# Patient Record
Sex: FEMALE | Race: WHITE | NOT HISPANIC OR LATINO
[De-identification: names, ages, dates, MRNs, and addresses within clinical notes are randomized per-mention and may not be internally consistent; named-entity substitution may affect disease eponyms.]

---

## 2018-09-20 ENCOUNTER — APPOINTMENT (OUTPATIENT)
Dept: HEART AND VASCULAR | Facility: CLINIC | Age: 73
End: 2018-09-20
Payer: MEDICARE

## 2018-09-20 VITALS — WEIGHT: 245 LBS | BODY MASS INDEX: 45.08 KG/M2 | HEIGHT: 62 IN

## 2018-09-20 DIAGNOSIS — Z86.39 PERSONAL HISTORY OF OTHER ENDOCRINE, NUTRITIONAL AND METABOLIC DISEASE: ICD-10-CM

## 2018-09-20 PROCEDURE — 93306 TTE W/DOPPLER COMPLETE: CPT

## 2018-09-20 PROCEDURE — 99214 OFFICE O/P EST MOD 30 MIN: CPT

## 2018-09-20 PROCEDURE — 93880 EXTRACRANIAL BILAT STUDY: CPT

## 2018-09-20 PROCEDURE — 93000 ELECTROCARDIOGRAM COMPLETE: CPT

## 2019-03-07 ENCOUNTER — APPOINTMENT (OUTPATIENT)
Dept: HEART AND VASCULAR | Facility: CLINIC | Age: 74
End: 2019-03-07

## 2019-03-25 ENCOUNTER — APPOINTMENT (OUTPATIENT)
Dept: HEART AND VASCULAR | Facility: CLINIC | Age: 74
End: 2019-03-25

## 2019-04-02 ENCOUNTER — APPOINTMENT (OUTPATIENT)
Dept: HEART AND VASCULAR | Facility: CLINIC | Age: 74
End: 2019-04-02
Payer: MEDICARE

## 2019-04-02 VITALS — HEIGHT: 62 IN | WEIGHT: 253 LBS | BODY MASS INDEX: 46.56 KG/M2

## 2019-04-02 PROCEDURE — 99214 OFFICE O/P EST MOD 30 MIN: CPT

## 2019-04-02 PROCEDURE — 93306 TTE W/DOPPLER COMPLETE: CPT

## 2019-04-02 PROCEDURE — 93000 ELECTROCARDIOGRAM COMPLETE: CPT

## 2019-04-02 NOTE — PHYSICAL EXAM
[General Appearance - Well Developed] : well developed [Normal Appearance] : normal appearance [Well Groomed] : well groomed [General Appearance - Well Nourished] : well nourished [No Deformities] : no deformities [General Appearance - In No Acute Distress] : no acute distress [Normal Conjunctiva] : the conjunctiva exhibited no abnormalities [Eyelids - No Xanthelasma] : the eyelids demonstrated no xanthelasmas [Normal Oral Mucosa] : normal oral mucosa [No Oral Pallor] : no oral pallor [No Oral Cyanosis] : no oral cyanosis [Normal Jugular Venous A Waves Present] : normal jugular venous A waves present [Normal Jugular Venous V Waves Present] : normal jugular venous V waves present [No Jugular Venous Valle A Waves] : no jugular venous valle A waves [Respiration, Rhythm And Depth] : normal respiratory rhythm and effort [Exaggerated Use Of Accessory Muscles For Inspiration] : no accessory muscle use [Auscultation Breath Sounds / Voice Sounds] : lungs were clear to auscultation bilaterally [Abdomen Soft] : soft [Abdomen Tenderness] : non-tender [Abdomen Mass (___ Cm)] : no abdominal mass palpated [Abnormal Walk] : normal gait [Gait - Sufficient For Exercise Testing] : the gait was sufficient for exercise testing [Nail Clubbing] : no clubbing of the fingernails [Cyanosis, Localized] : no localized cyanosis [Petechial Hemorrhages (___cm)] : no petechial hemorrhages [] : no ischemic changes [Normal S1] : normal S1 [Normal S2] : normal S2 [IV] : a grade 4 [Right Carotid Bruit] : right carotid bruit heard [Left Carotid Bruit] : left carotid bruit heard

## 2019-04-03 LAB
ANION GAP SERPL CALC-SCNC: 14 MMOL/L
BUN SERPL-MCNC: 32 MG/DL
CALCIUM SERPL-MCNC: 9.4 MG/DL
CHLORIDE SERPL-SCNC: 110 MMOL/L
CO2 SERPL-SCNC: 20 MMOL/L
CREAT SERPL-MCNC: 1.22 MG/DL
GLUCOSE SERPL-MCNC: 84 MG/DL
NT-PROBNP SERPL-MCNC: 1386 PG/ML
POTASSIUM SERPL-SCNC: 4.2 MMOL/L
SODIUM SERPL-SCNC: 144 MMOL/L

## 2019-04-04 ENCOUNTER — RX RENEWAL (OUTPATIENT)
Age: 74
End: 2019-04-04

## 2019-05-02 NOTE — DISCUSSION/SUMMARY
[FreeTextEntry1] : Carotid non obstructive <50 plaque \par Echo Ef 55% MOD MR Mod -severe AS 44 mmhg SHAINA .88 \par Results of echocardiogram and carotid exam were reviewed with the patient patient aortic valve is approximately 0.88 which is not significantly changed from the measurement obtained in March of this past year she again has some dyspnea with walking minimal distance again her activity level is been downgraded largely due to obesity. We discussed about the likelihood of needing an aortic valve intervention the patient would obviously be a good candidate for a catheter in light of her elevated surgical risk we discussed the potential options and we will follow her up in 6 months to see if any change in symptoms or the aortic valve gradient or medications were reviewed with the patient during her encounter today

## 2019-05-02 NOTE — REVIEW OF SYSTEMS
[Shortness Of Breath] : shortness of breath [Negative] : Constitutional [Chest Pain] : no chest pain [Cough] : no cough [Abdominal Pain] : no abdominal pain

## 2019-05-02 NOTE — HISTORY OF PRESENT ILLNESS
[FreeTextEntry1] : Patient is a 73-year-old white female with progressive aortic valve stenosis status post a prior TIA and left internal carotid endarterectomy in the past she presents today for routine followup patient's exertional capacity. Her  capacity is severely limited due to morbid obesity assessment of her functional status is difficult in light of her poor mobility

## 2019-07-09 ENCOUNTER — RX RENEWAL (OUTPATIENT)
Age: 74
End: 2019-07-09

## 2019-09-17 ENCOUNTER — APPOINTMENT (OUTPATIENT)
Dept: HEART AND VASCULAR | Facility: CLINIC | Age: 74
End: 2019-09-17

## 2020-01-01 ENCOUNTER — APPOINTMENT (OUTPATIENT)
Dept: HEART AND VASCULAR | Facility: CLINIC | Age: 75
End: 2020-01-01

## 2020-01-01 ENCOUNTER — APPOINTMENT (OUTPATIENT)
Dept: NEUROLOGY | Facility: CLINIC | Age: 75
End: 2020-01-01

## 2020-01-01 ENCOUNTER — APPOINTMENT (OUTPATIENT)
Dept: NEUROSURGERY | Facility: CLINIC | Age: 75
End: 2020-01-01

## 2020-01-01 ENCOUNTER — NON-APPOINTMENT (OUTPATIENT)
Age: 75
End: 2020-01-01

## 2020-01-01 ENCOUNTER — TRANSCRIPTION ENCOUNTER (OUTPATIENT)
Age: 75
End: 2020-01-01

## 2020-01-01 ENCOUNTER — APPOINTMENT (OUTPATIENT)
Dept: HEART AND VASCULAR | Facility: CLINIC | Age: 75
End: 2020-01-01
Payer: MEDICARE

## 2020-01-01 ENCOUNTER — LABORATORY RESULT (OUTPATIENT)
Age: 75
End: 2020-01-01

## 2020-01-01 ENCOUNTER — RESULT REVIEW (OUTPATIENT)
Age: 75
End: 2020-01-01

## 2020-01-01 ENCOUNTER — APPOINTMENT (OUTPATIENT)
Dept: CT IMAGING | Facility: HOSPITAL | Age: 75
End: 2020-01-01

## 2020-01-01 ENCOUNTER — APPOINTMENT (OUTPATIENT)
Dept: CARDIOTHORACIC SURGERY | Facility: CLINIC | Age: 75
End: 2020-01-01

## 2020-01-01 ENCOUNTER — APPOINTMENT (OUTPATIENT)
Dept: NEUROSURGERY | Facility: CLINIC | Age: 75
End: 2020-01-01
Payer: MEDICARE

## 2020-01-01 ENCOUNTER — INPATIENT (INPATIENT)
Facility: HOSPITAL | Age: 75
LOS: 7 days | Discharge: NURSING, REHAB OR LONG-TERM CARE | DRG: 149 | End: 2020-06-10
Attending: THORACIC SURGERY (CARDIOTHORACIC VASCULAR SURGERY) | Admitting: THORACIC SURGERY (CARDIOTHORACIC VASCULAR SURGERY)
Payer: MEDICARE

## 2020-01-01 ENCOUNTER — INPATIENT (INPATIENT)
Facility: HOSPITAL | Age: 75
LOS: 11 days | Discharge: HOME HEALTH | DRG: 266 | End: 2020-06-01
Attending: THORACIC SURGERY (CARDIOTHORACIC VASCULAR SURGERY) | Admitting: THORACIC SURGERY (CARDIOTHORACIC VASCULAR SURGERY)
Payer: MEDICARE

## 2020-01-01 ENCOUNTER — OUTPATIENT (OUTPATIENT)
Dept: OUTPATIENT SERVICES | Facility: HOSPITAL | Age: 75
LOS: 1 days | End: 2020-01-01
Payer: MEDICARE

## 2020-01-01 ENCOUNTER — APPOINTMENT (OUTPATIENT)
Dept: CT IMAGING | Facility: HOSPITAL | Age: 75
End: 2020-01-01
Payer: MEDICARE

## 2020-01-01 ENCOUNTER — APPOINTMENT (OUTPATIENT)
Dept: CARDIOTHORACIC SURGERY | Facility: HOSPITAL | Age: 75
End: 2020-01-01
Payer: MEDICARE

## 2020-01-01 ENCOUNTER — RX RENEWAL (OUTPATIENT)
Age: 75
End: 2020-01-01

## 2020-01-01 ENCOUNTER — APPOINTMENT (OUTPATIENT)
Dept: OPHTHALMOLOGY | Facility: CLINIC | Age: 75
End: 2020-01-01

## 2020-01-01 ENCOUNTER — INPATIENT (INPATIENT)
Facility: HOSPITAL | Age: 75
LOS: 9 days | Discharge: NURSING, REHAB OR LONG-TERM CARE | DRG: 31 | End: 2020-08-07
Attending: INTERNAL MEDICINE | Admitting: NEUROLOGICAL SURGERY
Payer: MEDICARE

## 2020-01-01 VITALS
SYSTOLIC BLOOD PRESSURE: 155 MMHG | RESPIRATION RATE: 33 BRPM | HEART RATE: 134 BPM | HEIGHT: 62 IN | DIASTOLIC BLOOD PRESSURE: 101 MMHG | WEIGHT: 244.05 LBS | OXYGEN SATURATION: 97 % | TEMPERATURE: 99 F

## 2020-01-01 VITALS
HEART RATE: 82 BPM | SYSTOLIC BLOOD PRESSURE: 116 MMHG | OXYGEN SATURATION: 97 % | HEIGHT: 62 IN | DIASTOLIC BLOOD PRESSURE: 68 MMHG | WEIGHT: 234.13 LBS | TEMPERATURE: 98 F | RESPIRATION RATE: 19 BRPM

## 2020-01-01 VITALS
RESPIRATION RATE: 20 BRPM | OXYGEN SATURATION: 94 % | HEART RATE: 112 BPM | DIASTOLIC BLOOD PRESSURE: 90 MMHG | SYSTOLIC BLOOD PRESSURE: 130 MMHG | HEIGHT: 66 IN | TEMPERATURE: 98 F

## 2020-01-01 VITALS
RESPIRATION RATE: 18 BRPM | WEIGHT: 240 LBS | HEART RATE: 114 BPM | OXYGEN SATURATION: 98 % | DIASTOLIC BLOOD PRESSURE: 87 MMHG | BODY MASS INDEX: 44.16 KG/M2 | TEMPERATURE: 98 F | SYSTOLIC BLOOD PRESSURE: 125 MMHG | HEIGHT: 62 IN

## 2020-01-01 VITALS
RESPIRATION RATE: 16 BRPM | HEIGHT: 62 IN | WEIGHT: 240 LBS | BODY MASS INDEX: 44.16 KG/M2 | OXYGEN SATURATION: 98 % | TEMPERATURE: 98 F | SYSTOLIC BLOOD PRESSURE: 112 MMHG | HEART RATE: 65 BPM | DIASTOLIC BLOOD PRESSURE: 81 MMHG

## 2020-01-01 VITALS
HEIGHT: 62 IN | SYSTOLIC BLOOD PRESSURE: 121 MMHG | TEMPERATURE: 98.4 F | HEART RATE: 65 BPM | RESPIRATION RATE: 16 BRPM | WEIGHT: 240 LBS | DIASTOLIC BLOOD PRESSURE: 83 MMHG | BODY MASS INDEX: 44.16 KG/M2 | OXYGEN SATURATION: 98 %

## 2020-01-01 VITALS — DIASTOLIC BLOOD PRESSURE: 85 MMHG | SYSTOLIC BLOOD PRESSURE: 120 MMHG

## 2020-01-01 VITALS — DIASTOLIC BLOOD PRESSURE: 85 MMHG | SYSTOLIC BLOOD PRESSURE: 110 MMHG

## 2020-01-01 VITALS
TEMPERATURE: 99 F | HEART RATE: 79 BPM | DIASTOLIC BLOOD PRESSURE: 76 MMHG | RESPIRATION RATE: 16 BRPM | SYSTOLIC BLOOD PRESSURE: 106 MMHG | OXYGEN SATURATION: 95 %

## 2020-01-01 VITALS — TEMPERATURE: 97 F

## 2020-01-01 VITALS — TEMPERATURE: 98 F

## 2020-01-01 DIAGNOSIS — G45.9 TRANSIENT CEREBRAL ISCHEMIC ATTACK, UNSPECIFIED: ICD-10-CM

## 2020-01-01 DIAGNOSIS — Z98.890 OTHER SPECIFIED POSTPROCEDURAL STATES: Chronic | ICD-10-CM

## 2020-01-01 DIAGNOSIS — N39.0 URINARY TRACT INFECTION, SITE NOT SPECIFIED: ICD-10-CM

## 2020-01-01 DIAGNOSIS — Z98.890 OTHER SPECIFIED POSTPROCEDURAL STATES: ICD-10-CM

## 2020-01-01 DIAGNOSIS — I25.10 ATHEROSCLEROTIC HEART DISEASE OF NATIVE CORONARY ARTERY WITHOUT ANGINA PECTORIS: ICD-10-CM

## 2020-01-01 DIAGNOSIS — G93.41 METABOLIC ENCEPHALOPATHY: ICD-10-CM

## 2020-01-01 DIAGNOSIS — I13.0 HYPERTENSIVE HEART AND CHRONIC KIDNEY DISEASE WITH HEART FAILURE AND STAGE 1 THROUGH STAGE 4 CHRONIC KIDNEY DISEASE, OR UNSPECIFIED CHRONIC KIDNEY DISEASE: ICD-10-CM

## 2020-01-01 DIAGNOSIS — N19 UNSPECIFIED KIDNEY FAILURE: ICD-10-CM

## 2020-01-01 DIAGNOSIS — Z86.73 PERSONAL HISTORY OF TRANSIENT ISCHEMIC ATTACK (TIA), AND CEREBRAL INFARCTION WITHOUT RESIDUAL DEFICITS: ICD-10-CM

## 2020-01-01 DIAGNOSIS — E78.5 HYPERLIPIDEMIA, UNSPECIFIED: ICD-10-CM

## 2020-01-01 DIAGNOSIS — R63.8 OTHER SYMPTOMS AND SIGNS CONCERNING FOOD AND FLUID INTAKE: ICD-10-CM

## 2020-01-01 DIAGNOSIS — I50.32 CHRONIC DIASTOLIC (CONGESTIVE) HEART FAILURE: ICD-10-CM

## 2020-01-01 DIAGNOSIS — I27.20 PULMONARY HYPERTENSION, UNSPECIFIED: ICD-10-CM

## 2020-01-01 DIAGNOSIS — I35.0 NONRHEUMATIC AORTIC (VALVE) STENOSIS: ICD-10-CM

## 2020-01-01 DIAGNOSIS — N18.9 CHRONIC KIDNEY DISEASE, UNSPECIFIED: ICD-10-CM

## 2020-01-01 DIAGNOSIS — I47.2 VENTRICULAR TACHYCARDIA: ICD-10-CM

## 2020-01-01 DIAGNOSIS — I50.33 ACUTE ON CHRONIC DIASTOLIC (CONGESTIVE) HEART FAILURE: ICD-10-CM

## 2020-01-01 DIAGNOSIS — Z95.2 PRESENCE OF PROSTHETIC HEART VALVE: ICD-10-CM

## 2020-01-01 DIAGNOSIS — I25.10 ATHEROSCLEROTIC HEART DISEASE OF NATIVE CORONARY ARTERY W/OUT ANGINA PECTORIS: ICD-10-CM

## 2020-01-01 DIAGNOSIS — Z79.82 LONG TERM (CURRENT) USE OF ASPIRIN: ICD-10-CM

## 2020-01-01 DIAGNOSIS — K44.9 DIAPHRAGMATIC HERNIA WITHOUT OBSTRUCTION OR GANGRENE: ICD-10-CM

## 2020-01-01 DIAGNOSIS — R42 DIZZINESS AND GIDDINESS: ICD-10-CM

## 2020-01-01 DIAGNOSIS — I48.20 CHRONIC ATRIAL FIBRILLATION, UNSPECIFIED: ICD-10-CM

## 2020-01-01 DIAGNOSIS — K64.9 UNSPECIFIED HEMORRHOIDS: ICD-10-CM

## 2020-01-01 DIAGNOSIS — Z79.899 OTHER LONG TERM (CURRENT) DRUG THERAPY: ICD-10-CM

## 2020-01-01 DIAGNOSIS — I21.4 NON-ST ELEVATION (NSTEMI) MYOCARDIAL INFARCTION: ICD-10-CM

## 2020-01-01 DIAGNOSIS — R65.20 SEVERE SEPSIS WITHOUT SEPTIC SHOCK: ICD-10-CM

## 2020-01-01 DIAGNOSIS — I48.91 UNSPECIFIED ATRIAL FIBRILLATION: ICD-10-CM

## 2020-01-01 DIAGNOSIS — D32.0 BENIGN NEOPLASM OF CEREBRAL MENINGES: ICD-10-CM

## 2020-01-01 DIAGNOSIS — G91.0 COMMUNICATING HYDROCEPHALUS: ICD-10-CM

## 2020-01-01 DIAGNOSIS — I10 ESSENTIAL (PRIMARY) HYPERTENSION: ICD-10-CM

## 2020-01-01 DIAGNOSIS — G91.9 HYDROCEPHALUS, UNSPECIFIED: ICD-10-CM

## 2020-01-01 DIAGNOSIS — I48.19 OTHER PERSISTENT ATRIAL FIBRILLATION: ICD-10-CM

## 2020-01-01 DIAGNOSIS — G93.5 COMPRESSION OF BRAIN: ICD-10-CM

## 2020-01-01 DIAGNOSIS — J96.01 ACUTE RESPIRATORY FAILURE WITH HYPOXIA: ICD-10-CM

## 2020-01-01 DIAGNOSIS — E66.01 MORBID (SEVERE) OBESITY DUE TO EXCESS CALORIES: ICD-10-CM

## 2020-01-01 DIAGNOSIS — I08.3 COMBINED RHEUMATIC DISORDERS OF MITRAL, AORTIC AND TRICUSPID VALVES: ICD-10-CM

## 2020-01-01 DIAGNOSIS — Z98.2 PRESENCE OF CEREBROSPINAL FLUID DRAINAGE DEVICE: ICD-10-CM

## 2020-01-01 DIAGNOSIS — R22.0 LOCALIZED SWELLING, MASS AND LUMP, HEAD: ICD-10-CM

## 2020-01-01 DIAGNOSIS — R41.82 ALTERED MENTAL STATUS, UNSPECIFIED: ICD-10-CM

## 2020-01-01 DIAGNOSIS — B88.8 OTHER SPECIFIED INFESTATIONS: ICD-10-CM

## 2020-01-01 DIAGNOSIS — K21.9 GASTRO-ESOPHAGEAL REFLUX DISEASE WITHOUT ESOPHAGITIS: ICD-10-CM

## 2020-01-01 DIAGNOSIS — Z00.6 ENCOUNTER FOR EXAMINATION FOR NORMAL COMPARISON AND CONTROL IN CLINICAL RESEARCH PROGRAM: ICD-10-CM

## 2020-01-01 DIAGNOSIS — I71.2 THORACIC AORTIC ANEURYSM, WITHOUT RUPTURE: ICD-10-CM

## 2020-01-01 DIAGNOSIS — N18.2 CHRONIC KIDNEY DISEASE, STAGE 2 (MILD): ICD-10-CM

## 2020-01-01 DIAGNOSIS — I34.0 NONRHEUMATIC MITRAL (VALVE) INSUFFICIENCY: ICD-10-CM

## 2020-01-01 DIAGNOSIS — H53.9 UNSPECIFIED VISUAL DISTURBANCE: ICD-10-CM

## 2020-01-01 DIAGNOSIS — R29.712 NIHSS SCORE 12: ICD-10-CM

## 2020-01-01 DIAGNOSIS — Z16.12 EXTENDED SPECTRUM BETA LACTAMASE (ESBL) RESISTANCE: ICD-10-CM

## 2020-01-01 DIAGNOSIS — R29.810 FACIAL WEAKNESS: ICD-10-CM

## 2020-01-01 DIAGNOSIS — N18.3 CHRONIC KIDNEY DISEASE, STAGE 3 (MODERATE): ICD-10-CM

## 2020-01-01 DIAGNOSIS — Z79.01 LONG TERM (CURRENT) USE OF ANTICOAGULANTS: ICD-10-CM

## 2020-01-01 DIAGNOSIS — I25.2 OLD MYOCARDIAL INFARCTION: ICD-10-CM

## 2020-01-01 DIAGNOSIS — A41.9 SEPSIS, UNSPECIFIED ORGANISM: ICD-10-CM

## 2020-01-01 LAB
A1C WITH ESTIMATED AVERAGE GLUCOSE RESULT: 5.7 % — HIGH (ref 4–5.6)
ACTH SER-ACNC: 19.3 PG/ML — SIGNIFICANT CHANGE UP (ref 7.2–63.3)
ALBUMIN SERPL ELPH-MCNC: 3 G/DL — LOW (ref 3.3–5)
ALBUMIN SERPL ELPH-MCNC: 3.1 G/DL — LOW (ref 3.3–5)
ALBUMIN SERPL ELPH-MCNC: 3.2 G/DL — LOW (ref 3.3–5)
ALBUMIN SERPL ELPH-MCNC: 3.3 G/DL — SIGNIFICANT CHANGE UP (ref 3.3–5)
ALBUMIN SERPL ELPH-MCNC: 3.4 G/DL — SIGNIFICANT CHANGE UP (ref 3.3–5)
ALBUMIN SERPL ELPH-MCNC: 3.5 G/DL — SIGNIFICANT CHANGE UP (ref 3.3–5)
ALBUMIN SERPL ELPH-MCNC: 3.5 G/DL — SIGNIFICANT CHANGE UP (ref 3.3–5)
ALBUMIN SERPL ELPH-MCNC: 3.6 G/DL — SIGNIFICANT CHANGE UP (ref 3.3–5)
ALBUMIN SERPL ELPH-MCNC: 3.7 G/DL — SIGNIFICANT CHANGE UP (ref 3.3–5)
ALBUMIN SERPL ELPH-MCNC: 3.8 G/DL — SIGNIFICANT CHANGE UP (ref 3.3–5)
ALBUMIN SERPL ELPH-MCNC: 3.9 G/DL — SIGNIFICANT CHANGE UP (ref 3.3–5)
ALBUMIN SERPL ELPH-MCNC: 3.9 G/DL — SIGNIFICANT CHANGE UP (ref 3.3–5)
ALBUMIN SERPL ELPH-MCNC: 4 G/DL
ALBUMIN SERPL ELPH-MCNC: 4.1 G/DL — SIGNIFICANT CHANGE UP (ref 3.3–5)
ALP BLD-CCNC: 80 U/L
ALP SERPL-CCNC: 36 U/L — LOW (ref 40–120)
ALP SERPL-CCNC: 38 U/L — LOW (ref 40–120)
ALP SERPL-CCNC: 39 U/L — LOW (ref 40–120)
ALP SERPL-CCNC: 41 U/L — SIGNIFICANT CHANGE UP (ref 40–120)
ALP SERPL-CCNC: 41 U/L — SIGNIFICANT CHANGE UP (ref 40–120)
ALP SERPL-CCNC: 42 U/L — SIGNIFICANT CHANGE UP (ref 40–120)
ALP SERPL-CCNC: 43 U/L — SIGNIFICANT CHANGE UP (ref 40–120)
ALP SERPL-CCNC: 44 U/L — SIGNIFICANT CHANGE UP (ref 40–120)
ALP SERPL-CCNC: 44 U/L — SIGNIFICANT CHANGE UP (ref 40–120)
ALP SERPL-CCNC: 45 U/L — SIGNIFICANT CHANGE UP (ref 40–120)
ALP SERPL-CCNC: 46 U/L — SIGNIFICANT CHANGE UP (ref 40–120)
ALP SERPL-CCNC: 47 U/L — SIGNIFICANT CHANGE UP (ref 40–120)
ALP SERPL-CCNC: 47 U/L — SIGNIFICANT CHANGE UP (ref 40–120)
ALP SERPL-CCNC: 48 U/L — SIGNIFICANT CHANGE UP (ref 40–120)
ALP SERPL-CCNC: 53 U/L — SIGNIFICANT CHANGE UP (ref 40–120)
ALP SERPL-CCNC: 54 U/L — SIGNIFICANT CHANGE UP (ref 40–120)
ALT FLD-CCNC: 10 U/L — SIGNIFICANT CHANGE UP (ref 10–45)
ALT FLD-CCNC: 11 U/L — SIGNIFICANT CHANGE UP (ref 10–45)
ALT FLD-CCNC: 12 U/L — SIGNIFICANT CHANGE UP (ref 10–45)
ALT FLD-CCNC: 12 U/L — SIGNIFICANT CHANGE UP (ref 10–45)
ALT FLD-CCNC: 13 U/L — SIGNIFICANT CHANGE UP (ref 10–45)
ALT FLD-CCNC: 14 U/L — SIGNIFICANT CHANGE UP (ref 10–45)
ALT FLD-CCNC: 16 U/L — SIGNIFICANT CHANGE UP (ref 10–45)
ALT FLD-CCNC: 19 U/L — SIGNIFICANT CHANGE UP (ref 10–45)
ALT FLD-CCNC: 21 U/L — SIGNIFICANT CHANGE UP (ref 10–45)
ALT FLD-CCNC: 7 U/L — LOW (ref 10–45)
ALT FLD-CCNC: <5 U/L — LOW (ref 10–45)
ALT SERPL-CCNC: 11 U/L
ANION GAP SERPL CALC-SCNC: 10 MMOL/L — SIGNIFICANT CHANGE UP (ref 5–17)
ANION GAP SERPL CALC-SCNC: 11 MMOL/L — SIGNIFICANT CHANGE UP (ref 5–17)
ANION GAP SERPL CALC-SCNC: 12 MMOL/L — SIGNIFICANT CHANGE UP (ref 5–17)
ANION GAP SERPL CALC-SCNC: 13 MMOL/L — SIGNIFICANT CHANGE UP (ref 5–17)
ANION GAP SERPL CALC-SCNC: 14 MMOL/L
ANION GAP SERPL CALC-SCNC: 14 MMOL/L — SIGNIFICANT CHANGE UP (ref 5–17)
ANION GAP SERPL CALC-SCNC: 15 MMOL/L — SIGNIFICANT CHANGE UP (ref 5–17)
ANION GAP SERPL CALC-SCNC: 16 MMOL/L — SIGNIFICANT CHANGE UP (ref 5–17)
ANION GAP SERPL CALC-SCNC: 16 MMOL/L — SIGNIFICANT CHANGE UP (ref 5–17)
ANION GAP SERPL CALC-SCNC: 17 MMOL/L — SIGNIFICANT CHANGE UP (ref 5–17)
ANION GAP SERPL CALC-SCNC: 9 MMOL/L — SIGNIFICANT CHANGE UP (ref 5–17)
APPEARANCE CSF: CLEAR — SIGNIFICANT CHANGE UP
APPEARANCE CSF: SIGNIFICANT CHANGE UP
APPEARANCE SPUN FLD: COLORLESS — SIGNIFICANT CHANGE UP
APPEARANCE SPUN FLD: COLORLESS — SIGNIFICANT CHANGE UP
APPEARANCE UR: ABNORMAL
APPEARANCE UR: CLEAR — SIGNIFICANT CHANGE UP
APTT BLD: 103.1 SEC — HIGH (ref 27.5–35.5)
APTT BLD: 103.5 SEC — HIGH (ref 27.5–36.3)
APTT BLD: 139.6 SEC — CRITICAL HIGH (ref 27.5–35.5)
APTT BLD: 156 SEC — CRITICAL HIGH (ref 27.5–35.5)
APTT BLD: 24.5 SEC — LOW (ref 27.5–36.3)
APTT BLD: 25 SEC — LOW (ref 27.5–36.3)
APTT BLD: 25.5 SEC — LOW (ref 27.5–36.3)
APTT BLD: 26.2 SEC — LOW (ref 27.5–36.3)
APTT BLD: 27.2 SEC — LOW (ref 27.5–36.3)
APTT BLD: 27.5 SEC — SIGNIFICANT CHANGE UP (ref 27.5–36.3)
APTT BLD: 28.1 SEC — SIGNIFICANT CHANGE UP (ref 27.5–35.5)
APTT BLD: 29.7 SEC — SIGNIFICANT CHANGE UP (ref 27.5–36.3)
APTT BLD: 30.1 SEC — SIGNIFICANT CHANGE UP (ref 27.5–35.5)
APTT BLD: 32 SEC — SIGNIFICANT CHANGE UP (ref 27.5–35.5)
APTT BLD: 34.5 SEC — SIGNIFICANT CHANGE UP (ref 27.5–36.3)
APTT BLD: 39.9 SEC — HIGH (ref 27.5–36.3)
APTT BLD: 42.3 SEC — HIGH (ref 27.5–36.3)
APTT BLD: 49.4 SEC — HIGH (ref 27.5–36.3)
APTT BLD: 52.3 SEC — HIGH (ref 27.5–36.3)
APTT BLD: 55.4 SEC — HIGH (ref 27.5–36.3)
APTT BLD: 61.6 SEC — HIGH (ref 27.5–36.3)
APTT BLD: 65 SEC — HIGH (ref 27.5–36.3)
APTT BLD: 67.7 SEC — HIGH (ref 27.5–36.3)
APTT BLD: 69.2 SEC — HIGH (ref 27.5–35.5)
APTT BLD: 72.2 SEC — HIGH (ref 27.5–36.3)
APTT BLD: 72.2 SEC — HIGH (ref 27.5–36.3)
APTT BLD: 74.6 SEC — HIGH (ref 27.5–36.3)
APTT BLD: 75.9 SEC — HIGH (ref 27.5–36.3)
APTT BLD: 80.5 SEC — HIGH (ref 27.5–36.3)
APTT BLD: 82 SEC — HIGH (ref 27.5–36.3)
APTT BLD: 84.7 SEC — HIGH (ref 27.5–36.3)
APTT BLD: 89.9 SEC — HIGH (ref 27.5–35.5)
APTT BLD: 95.9 SEC — HIGH (ref 27.5–36.3)
APTT BLD: >200 SEC — CRITICAL HIGH (ref 27.5–35.5)
AST SERPL-CCNC: 15 U/L — SIGNIFICANT CHANGE UP (ref 10–40)
AST SERPL-CCNC: 15 U/L — SIGNIFICANT CHANGE UP (ref 10–40)
AST SERPL-CCNC: 16 U/L — SIGNIFICANT CHANGE UP (ref 10–40)
AST SERPL-CCNC: 16 U/L — SIGNIFICANT CHANGE UP (ref 10–40)
AST SERPL-CCNC: 17 U/L — SIGNIFICANT CHANGE UP (ref 10–40)
AST SERPL-CCNC: 18 U/L
AST SERPL-CCNC: 18 U/L — SIGNIFICANT CHANGE UP (ref 10–40)
AST SERPL-CCNC: 19 U/L — SIGNIFICANT CHANGE UP (ref 10–40)
AST SERPL-CCNC: 20 U/L — SIGNIFICANT CHANGE UP (ref 10–40)
AST SERPL-CCNC: 22 U/L — SIGNIFICANT CHANGE UP (ref 10–40)
AST SERPL-CCNC: 23 U/L — SIGNIFICANT CHANGE UP (ref 10–40)
AST SERPL-CCNC: 30 U/L — SIGNIFICANT CHANGE UP (ref 10–40)
AST SERPL-CCNC: 30 U/L — SIGNIFICANT CHANGE UP (ref 10–40)
AST SERPL-CCNC: 49 U/L — HIGH (ref 10–40)
B BURGDOR DNA SPEC QL NAA+PROBE: NEGATIVE — SIGNIFICANT CHANGE UP
BACTERIA # UR AUTO: PRESENT /HPF
BASE EXCESS BLDA CALC-SCNC: -0.9 MMOL/L — SIGNIFICANT CHANGE UP (ref -2–3)
BASE EXCESS BLDA CALC-SCNC: -2.2 MMOL/L — LOW (ref -2–3)
BASOPHILS # BLD AUTO: 0.06 K/UL — SIGNIFICANT CHANGE UP (ref 0–0.2)
BASOPHILS # BLD AUTO: 0.06 K/UL — SIGNIFICANT CHANGE UP (ref 0–0.2)
BASOPHILS # BLD AUTO: 0.08 K/UL
BASOPHILS # BLD AUTO: 0.08 K/UL — SIGNIFICANT CHANGE UP (ref 0–0.2)
BASOPHILS # BLD AUTO: 0.09 K/UL — SIGNIFICANT CHANGE UP (ref 0–0.2)
BASOPHILS NFR BLD AUTO: 0.5 % — SIGNIFICANT CHANGE UP (ref 0–2)
BASOPHILS NFR BLD AUTO: 0.6 % — SIGNIFICANT CHANGE UP (ref 0–2)
BASOPHILS NFR BLD AUTO: 0.9 % — SIGNIFICANT CHANGE UP (ref 0–2)
BASOPHILS NFR BLD AUTO: 1.1 % — SIGNIFICANT CHANGE UP (ref 0–2)
BASOPHILS NFR BLD AUTO: 1.2 %
BASOPHILS NFR BLD AUTO: 1.2 % — SIGNIFICANT CHANGE UP (ref 0–2)
BASOPHILS NFR BLD AUTO: 1.2 % — SIGNIFICANT CHANGE UP (ref 0–2)
BILIRUB SERPL-MCNC: 0.4 MG/DL
BILIRUB SERPL-MCNC: 0.4 MG/DL — SIGNIFICANT CHANGE UP (ref 0.2–1.2)
BILIRUB SERPL-MCNC: 0.5 MG/DL — SIGNIFICANT CHANGE UP (ref 0.2–1.2)
BILIRUB SERPL-MCNC: 0.6 MG/DL — SIGNIFICANT CHANGE UP (ref 0.2–1.2)
BILIRUB SERPL-MCNC: 0.7 MG/DL — SIGNIFICANT CHANGE UP (ref 0.2–1.2)
BILIRUB SERPL-MCNC: 0.8 MG/DL — SIGNIFICANT CHANGE UP (ref 0.2–1.2)
BILIRUB SERPL-MCNC: 0.9 MG/DL — SIGNIFICANT CHANGE UP (ref 0.2–1.2)
BILIRUB SERPL-MCNC: 0.9 MG/DL — SIGNIFICANT CHANGE UP (ref 0.2–1.2)
BILIRUB UR-MCNC: NEGATIVE — SIGNIFICANT CHANGE UP
BILIRUB UR-MCNC: NEGATIVE — SIGNIFICANT CHANGE UP
BLD GP AB SCN SERPL QL: NEGATIVE — SIGNIFICANT CHANGE UP
BUN SERPL-MCNC: 15 MG/DL — SIGNIFICANT CHANGE UP (ref 7–23)
BUN SERPL-MCNC: 16 MG/DL — SIGNIFICANT CHANGE UP (ref 7–23)
BUN SERPL-MCNC: 16 MG/DL — SIGNIFICANT CHANGE UP (ref 7–23)
BUN SERPL-MCNC: 17 MG/DL — SIGNIFICANT CHANGE UP (ref 7–23)
BUN SERPL-MCNC: 18 MG/DL — SIGNIFICANT CHANGE UP (ref 7–23)
BUN SERPL-MCNC: 19 MG/DL — SIGNIFICANT CHANGE UP (ref 7–23)
BUN SERPL-MCNC: 20 MG/DL — SIGNIFICANT CHANGE UP (ref 7–23)
BUN SERPL-MCNC: 21 MG/DL — SIGNIFICANT CHANGE UP (ref 7–23)
BUN SERPL-MCNC: 21 MG/DL — SIGNIFICANT CHANGE UP (ref 7–23)
BUN SERPL-MCNC: 22 MG/DL — SIGNIFICANT CHANGE UP (ref 7–23)
BUN SERPL-MCNC: 23 MG/DL — SIGNIFICANT CHANGE UP (ref 7–23)
BUN SERPL-MCNC: 23 MG/DL — SIGNIFICANT CHANGE UP (ref 7–23)
BUN SERPL-MCNC: 24 MG/DL
BUN SERPL-MCNC: 24 MG/DL — HIGH (ref 7–23)
BUN SERPL-MCNC: 25 MG/DL — HIGH (ref 7–23)
BUN SERPL-MCNC: 26 MG/DL — HIGH (ref 7–23)
BUN SERPL-MCNC: 28 MG/DL — HIGH (ref 7–23)
BUN SERPL-MCNC: 28 MG/DL — HIGH (ref 7–23)
BUN SERPL-MCNC: 29 MG/DL — HIGH (ref 7–23)
BUN SERPL-MCNC: 29 MG/DL — HIGH (ref 7–23)
BUN SERPL-MCNC: 33 MG/DL — HIGH (ref 7–23)
BUN SERPL-MCNC: 33 MG/DL — HIGH (ref 7–23)
BUN SERPL-MCNC: 35 MG/DL — HIGH (ref 7–23)
BUN SERPL-MCNC: 35 MG/DL — HIGH (ref 7–23)
CALCIUM SERPL-MCNC: 8.5 MG/DL — SIGNIFICANT CHANGE UP (ref 8.4–10.5)
CALCIUM SERPL-MCNC: 8.6 MG/DL — SIGNIFICANT CHANGE UP (ref 8.4–10.5)
CALCIUM SERPL-MCNC: 8.6 MG/DL — SIGNIFICANT CHANGE UP (ref 8.4–10.5)
CALCIUM SERPL-MCNC: 8.7 MG/DL — SIGNIFICANT CHANGE UP (ref 8.4–10.5)
CALCIUM SERPL-MCNC: 8.8 MG/DL — SIGNIFICANT CHANGE UP (ref 8.4–10.5)
CALCIUM SERPL-MCNC: 8.8 MG/DL — SIGNIFICANT CHANGE UP (ref 8.4–10.5)
CALCIUM SERPL-MCNC: 8.9 MG/DL — SIGNIFICANT CHANGE UP (ref 8.4–10.5)
CALCIUM SERPL-MCNC: 8.9 MG/DL — SIGNIFICANT CHANGE UP (ref 8.4–10.5)
CALCIUM SERPL-MCNC: 9 MG/DL — SIGNIFICANT CHANGE UP (ref 8.4–10.5)
CALCIUM SERPL-MCNC: 9.1 MG/DL — SIGNIFICANT CHANGE UP (ref 8.4–10.5)
CALCIUM SERPL-MCNC: 9.2 MG/DL — SIGNIFICANT CHANGE UP (ref 8.4–10.5)
CALCIUM SERPL-MCNC: 9.3 MG/DL — SIGNIFICANT CHANGE UP (ref 8.4–10.5)
CALCIUM SERPL-MCNC: 9.4 MG/DL
CALCIUM SERPL-MCNC: 9.4 MG/DL — SIGNIFICANT CHANGE UP (ref 8.4–10.5)
CALCIUM SERPL-MCNC: 9.5 MG/DL — SIGNIFICANT CHANGE UP (ref 8.4–10.5)
CALCIUM SERPL-MCNC: 9.5 MG/DL — SIGNIFICANT CHANGE UP (ref 8.4–10.5)
CALCIUM SERPL-MCNC: 9.6 MG/DL — SIGNIFICANT CHANGE UP (ref 8.4–10.5)
CALCIUM SERPL-MCNC: 9.7 MG/DL — SIGNIFICANT CHANGE UP (ref 8.4–10.5)
CALCIUM SERPL-MCNC: 9.9 MG/DL — SIGNIFICANT CHANGE UP (ref 8.4–10.5)
CHLORIDE SERPL-SCNC: 100 MMOL/L — SIGNIFICANT CHANGE UP (ref 96–108)
CHLORIDE SERPL-SCNC: 101 MMOL/L — SIGNIFICANT CHANGE UP (ref 96–108)
CHLORIDE SERPL-SCNC: 102 MMOL/L — SIGNIFICANT CHANGE UP (ref 96–108)
CHLORIDE SERPL-SCNC: 103 MMOL/L — SIGNIFICANT CHANGE UP (ref 96–108)
CHLORIDE SERPL-SCNC: 104 MMOL/L — SIGNIFICANT CHANGE UP (ref 96–108)
CHLORIDE SERPL-SCNC: 105 MMOL/L — SIGNIFICANT CHANGE UP (ref 96–108)
CHLORIDE SERPL-SCNC: 106 MMOL/L — SIGNIFICANT CHANGE UP (ref 96–108)
CHLORIDE SERPL-SCNC: 107 MMOL/L — SIGNIFICANT CHANGE UP (ref 96–108)
CHLORIDE SERPL-SCNC: 108 MMOL/L
CHLORIDE SERPL-SCNC: 108 MMOL/L — SIGNIFICANT CHANGE UP (ref 96–108)
CHLORIDE SERPL-SCNC: 109 MMOL/L — HIGH (ref 96–108)
CHLORIDE SERPL-SCNC: 110 MMOL/L — HIGH (ref 96–108)
CHLORIDE SERPL-SCNC: 111 MMOL/L — HIGH (ref 96–108)
CHLORIDE SERPL-SCNC: 95 MMOL/L — LOW (ref 96–108)
CHLORIDE SERPL-SCNC: 96 MMOL/L — SIGNIFICANT CHANGE UP (ref 96–108)
CHLORIDE SERPL-SCNC: 96 MMOL/L — SIGNIFICANT CHANGE UP (ref 96–108)
CHLORIDE SERPL-SCNC: 97 MMOL/L — SIGNIFICANT CHANGE UP (ref 96–108)
CHLORIDE SERPL-SCNC: 98 MMOL/L — SIGNIFICANT CHANGE UP (ref 96–108)
CHOLEST SERPL-MCNC: 157 MG/DL — SIGNIFICANT CHANGE UP (ref 10–199)
CHOLEST SERPL-MCNC: 97 MG/DL
CK MB CFR SERPL CALC: 14.2 NG/ML — HIGH (ref 0–6.7)
CK SERPL-CCNC: 287 U/L — HIGH (ref 25–170)
CO2 SERPL-SCNC: 21 MMOL/L — LOW (ref 22–31)
CO2 SERPL-SCNC: 21 MMOL/L — LOW (ref 22–31)
CO2 SERPL-SCNC: 22 MMOL/L — SIGNIFICANT CHANGE UP (ref 22–31)
CO2 SERPL-SCNC: 23 MMOL/L
CO2 SERPL-SCNC: 23 MMOL/L — SIGNIFICANT CHANGE UP (ref 22–31)
CO2 SERPL-SCNC: 24 MMOL/L — SIGNIFICANT CHANGE UP (ref 22–31)
CO2 SERPL-SCNC: 25 MMOL/L — SIGNIFICANT CHANGE UP (ref 22–31)
CO2 SERPL-SCNC: 26 MMOL/L — SIGNIFICANT CHANGE UP (ref 22–31)
CO2 SERPL-SCNC: 27 MMOL/L — SIGNIFICANT CHANGE UP (ref 22–31)
CO2 SERPL-SCNC: 28 MMOL/L — SIGNIFICANT CHANGE UP (ref 22–31)
CO2 SERPL-SCNC: 28 MMOL/L — SIGNIFICANT CHANGE UP (ref 22–31)
CO2 SERPL-SCNC: 29 MMOL/L — SIGNIFICANT CHANGE UP (ref 22–31)
CO2 SERPL-SCNC: 29 MMOL/L — SIGNIFICANT CHANGE UP (ref 22–31)
COLOR CSF: ABNORMAL
COLOR CSF: SIGNIFICANT CHANGE UP
COLOR SPEC: YELLOW — SIGNIFICANT CHANGE UP
COLOR SPEC: YELLOW — SIGNIFICANT CHANGE UP
CORTIS AM PEAK SERPL-MCNC: 11.5 UG/DL — SIGNIFICANT CHANGE UP (ref 3.9–37.5)
CREAT SERPL-MCNC: 0.97 MG/DL — SIGNIFICANT CHANGE UP (ref 0.5–1.3)
CREAT SERPL-MCNC: 1 MG/DL — SIGNIFICANT CHANGE UP (ref 0.5–1.3)
CREAT SERPL-MCNC: 1.02 MG/DL — SIGNIFICANT CHANGE UP (ref 0.5–1.3)
CREAT SERPL-MCNC: 1.03 MG/DL — SIGNIFICANT CHANGE UP (ref 0.5–1.3)
CREAT SERPL-MCNC: 1.03 MG/DL — SIGNIFICANT CHANGE UP (ref 0.5–1.3)
CREAT SERPL-MCNC: 1.04 MG/DL — SIGNIFICANT CHANGE UP (ref 0.5–1.3)
CREAT SERPL-MCNC: 1.04 MG/DL — SIGNIFICANT CHANGE UP (ref 0.5–1.3)
CREAT SERPL-MCNC: 1.05 MG/DL — SIGNIFICANT CHANGE UP (ref 0.5–1.3)
CREAT SERPL-MCNC: 1.06 MG/DL — SIGNIFICANT CHANGE UP (ref 0.5–1.3)
CREAT SERPL-MCNC: 1.07 MG/DL — SIGNIFICANT CHANGE UP (ref 0.5–1.3)
CREAT SERPL-MCNC: 1.07 MG/DL — SIGNIFICANT CHANGE UP (ref 0.5–1.3)
CREAT SERPL-MCNC: 1.09 MG/DL — SIGNIFICANT CHANGE UP (ref 0.5–1.3)
CREAT SERPL-MCNC: 1.12 MG/DL — SIGNIFICANT CHANGE UP (ref 0.5–1.3)
CREAT SERPL-MCNC: 1.13 MG/DL — SIGNIFICANT CHANGE UP (ref 0.5–1.3)
CREAT SERPL-MCNC: 1.2 MG/DL — SIGNIFICANT CHANGE UP (ref 0.5–1.3)
CREAT SERPL-MCNC: 1.2 MG/DL — SIGNIFICANT CHANGE UP (ref 0.5–1.3)
CREAT SERPL-MCNC: 1.21 MG/DL — SIGNIFICANT CHANGE UP (ref 0.5–1.3)
CREAT SERPL-MCNC: 1.23 MG/DL — SIGNIFICANT CHANGE UP (ref 0.5–1.3)
CREAT SERPL-MCNC: 1.26 MG/DL — SIGNIFICANT CHANGE UP (ref 0.5–1.3)
CREAT SERPL-MCNC: 1.26 MG/DL — SIGNIFICANT CHANGE UP (ref 0.5–1.3)
CREAT SERPL-MCNC: 1.27 MG/DL — SIGNIFICANT CHANGE UP (ref 0.5–1.3)
CREAT SERPL-MCNC: 1.28 MG/DL — SIGNIFICANT CHANGE UP (ref 0.5–1.3)
CREAT SERPL-MCNC: 1.28 MG/DL — SIGNIFICANT CHANGE UP (ref 0.5–1.3)
CREAT SERPL-MCNC: 1.29 MG/DL — SIGNIFICANT CHANGE UP (ref 0.5–1.3)
CREAT SERPL-MCNC: 1.3 MG/DL — SIGNIFICANT CHANGE UP (ref 0.5–1.3)
CREAT SERPL-MCNC: 1.31 MG/DL — HIGH (ref 0.5–1.3)
CREAT SERPL-MCNC: 1.33 MG/DL — HIGH (ref 0.5–1.3)
CREAT SERPL-MCNC: 1.35 MG/DL — HIGH (ref 0.5–1.3)
CREAT SERPL-MCNC: 1.39 MG/DL
CREAT SERPL-MCNC: 1.4 MG/DL — HIGH (ref 0.5–1.3)
CREAT SERPL-MCNC: 1.41 MG/DL — HIGH (ref 0.5–1.3)
CREAT SERPL-MCNC: 1.44 MG/DL — HIGH (ref 0.5–1.3)
CREAT SERPL-MCNC: 1.46 MG/DL — HIGH (ref 0.5–1.3)
CREAT SERPL-MCNC: 1.48 MG/DL — HIGH (ref 0.5–1.3)
CREAT SERPL-MCNC: 1.57 MG/DL — HIGH (ref 0.5–1.3)
CREAT SERPL-MCNC: 1.65 MG/DL — HIGH (ref 0.5–1.3)
CREAT SERPL-MCNC: 1.78 MG/DL — HIGH (ref 0.5–1.3)
CREAT SERPL-MCNC: 1.81 MG/DL — HIGH (ref 0.5–1.3)
CRYPTOC AG CSF-ACNC: NEGATIVE — SIGNIFICANT CHANGE UP
CSF COMMENTS: SIGNIFICANT CHANGE UP
CSF COMMENTS: SIGNIFICANT CHANGE UP
CSF PCR RESULT: SIGNIFICANT CHANGE UP
CULTURE RESULTS: NO GROWTH — SIGNIFICANT CHANGE UP
DIFF PNL FLD: ABNORMAL
DIFF PNL FLD: ABNORMAL
EOSINOPHIL # BLD AUTO: 0.02 K/UL — SIGNIFICANT CHANGE UP (ref 0–0.5)
EOSINOPHIL # BLD AUTO: 0.27 K/UL — SIGNIFICANT CHANGE UP (ref 0–0.5)
EOSINOPHIL # BLD AUTO: 0.29 K/UL — SIGNIFICANT CHANGE UP (ref 0–0.5)
EOSINOPHIL # BLD AUTO: 0.3 K/UL
EOSINOPHIL # BLD AUTO: 0.3 K/UL — SIGNIFICANT CHANGE UP (ref 0–0.5)
EOSINOPHIL # BLD AUTO: 0.33 K/UL — SIGNIFICANT CHANGE UP (ref 0–0.5)
EOSINOPHIL # BLD AUTO: 0.41 K/UL — SIGNIFICANT CHANGE UP (ref 0–0.5)
EOSINOPHIL NFR BLD AUTO: 0.2 % — SIGNIFICANT CHANGE UP (ref 0–6)
EOSINOPHIL NFR BLD AUTO: 2.7 % — SIGNIFICANT CHANGE UP (ref 0–6)
EOSINOPHIL NFR BLD AUTO: 3.3 % — SIGNIFICANT CHANGE UP (ref 0–6)
EOSINOPHIL NFR BLD AUTO: 3.6 % — SIGNIFICANT CHANGE UP (ref 0–6)
EOSINOPHIL NFR BLD AUTO: 4.1 % — SIGNIFICANT CHANGE UP (ref 0–6)
EOSINOPHIL NFR BLD AUTO: 4.3 %
EOSINOPHIL NFR BLD AUTO: 5.5 % — SIGNIFICANT CHANGE UP (ref 0–6)
EPI CELLS # UR: SIGNIFICANT CHANGE UP /HPF (ref 0–5)
ESTIMATED AVERAGE GLUCOSE: 111 MG/DL
ESTIMATED AVERAGE GLUCOSE: 117 MG/DL — HIGH (ref 68–114)
FOLATE SERPL-MCNC: 10.3 NG/ML — SIGNIFICANT CHANGE UP
FOLATE SERPL-MCNC: 11.4 NG/ML
FSH SERPL-MCNC: 0.7 IU/L — SIGNIFICANT CHANGE UP
GAMMA INTERFERON BACKGROUND BLD IA-ACNC: 0.05 IU/ML — SIGNIFICANT CHANGE UP
GAS PNL BLDA: SIGNIFICANT CHANGE UP
GH SERPL-MCNC: 0.79 NG/ML — SIGNIFICANT CHANGE UP (ref 0.12–9.88)
GLUCOSE BLDC GLUCOMTR-MCNC: 102 MG/DL — HIGH (ref 70–99)
GLUCOSE BLDC GLUCOMTR-MCNC: 103 MG/DL — HIGH (ref 70–99)
GLUCOSE BLDC GLUCOMTR-MCNC: 104 MG/DL — HIGH (ref 70–99)
GLUCOSE BLDC GLUCOMTR-MCNC: 105 MG/DL — HIGH (ref 70–99)
GLUCOSE BLDC GLUCOMTR-MCNC: 106 MG/DL — HIGH (ref 70–99)
GLUCOSE BLDC GLUCOMTR-MCNC: 106 MG/DL — HIGH (ref 70–99)
GLUCOSE BLDC GLUCOMTR-MCNC: 107 MG/DL — HIGH (ref 70–99)
GLUCOSE BLDC GLUCOMTR-MCNC: 108 MG/DL — HIGH (ref 70–99)
GLUCOSE BLDC GLUCOMTR-MCNC: 111 MG/DL — HIGH (ref 70–99)
GLUCOSE BLDC GLUCOMTR-MCNC: 113 MG/DL — HIGH (ref 70–99)
GLUCOSE BLDC GLUCOMTR-MCNC: 114 MG/DL — HIGH (ref 70–99)
GLUCOSE BLDC GLUCOMTR-MCNC: 115 MG/DL — HIGH (ref 70–99)
GLUCOSE BLDC GLUCOMTR-MCNC: 115 MG/DL — HIGH (ref 70–99)
GLUCOSE BLDC GLUCOMTR-MCNC: 121 MG/DL — HIGH (ref 70–99)
GLUCOSE BLDC GLUCOMTR-MCNC: 121 MG/DL — HIGH (ref 70–99)
GLUCOSE BLDC GLUCOMTR-MCNC: 122 MG/DL — HIGH (ref 70–99)
GLUCOSE BLDC GLUCOMTR-MCNC: 124 MG/DL — HIGH (ref 70–99)
GLUCOSE BLDC GLUCOMTR-MCNC: 127 MG/DL — HIGH (ref 70–99)
GLUCOSE BLDC GLUCOMTR-MCNC: 129 MG/DL — HIGH (ref 70–99)
GLUCOSE BLDC GLUCOMTR-MCNC: 130 MG/DL — HIGH (ref 70–99)
GLUCOSE BLDC GLUCOMTR-MCNC: 130 MG/DL — HIGH (ref 70–99)
GLUCOSE BLDC GLUCOMTR-MCNC: 135 MG/DL — HIGH (ref 70–99)
GLUCOSE BLDC GLUCOMTR-MCNC: 140 MG/DL — HIGH (ref 70–99)
GLUCOSE BLDC GLUCOMTR-MCNC: 151 MG/DL — HIGH (ref 70–99)
GLUCOSE BLDC GLUCOMTR-MCNC: 153 MG/DL — HIGH (ref 70–99)
GLUCOSE BLDC GLUCOMTR-MCNC: 154 MG/DL — HIGH (ref 70–99)
GLUCOSE BLDC GLUCOMTR-MCNC: 160 MG/DL — HIGH (ref 70–99)
GLUCOSE BLDC GLUCOMTR-MCNC: 163 MG/DL — HIGH (ref 70–99)
GLUCOSE BLDC GLUCOMTR-MCNC: 202 MG/DL — HIGH (ref 70–99)
GLUCOSE BLDC GLUCOMTR-MCNC: 91 MG/DL — SIGNIFICANT CHANGE UP (ref 70–99)
GLUCOSE BLDC GLUCOMTR-MCNC: 91 MG/DL — SIGNIFICANT CHANGE UP (ref 70–99)
GLUCOSE BLDC GLUCOMTR-MCNC: 92 MG/DL — SIGNIFICANT CHANGE UP (ref 70–99)
GLUCOSE BLDC GLUCOMTR-MCNC: 92 MG/DL — SIGNIFICANT CHANGE UP (ref 70–99)
GLUCOSE BLDC GLUCOMTR-MCNC: 93 MG/DL — SIGNIFICANT CHANGE UP (ref 70–99)
GLUCOSE BLDC GLUCOMTR-MCNC: 97 MG/DL — SIGNIFICANT CHANGE UP (ref 70–99)
GLUCOSE BLDC GLUCOMTR-MCNC: 99 MG/DL — SIGNIFICANT CHANGE UP (ref 70–99)
GLUCOSE BLDC GLUCOMTR-MCNC: 99 MG/DL — SIGNIFICANT CHANGE UP (ref 70–99)
GLUCOSE CSF-MCNC: 57 MG/DL — SIGNIFICANT CHANGE UP (ref 40–70)
GLUCOSE CSF-MCNC: 69 MG/DL — SIGNIFICANT CHANGE UP (ref 40–70)
GLUCOSE SERPL-MCNC: 100 MG/DL — HIGH (ref 70–99)
GLUCOSE SERPL-MCNC: 100 MG/DL — HIGH (ref 70–99)
GLUCOSE SERPL-MCNC: 101 MG/DL — HIGH (ref 70–99)
GLUCOSE SERPL-MCNC: 101 MG/DL — HIGH (ref 70–99)
GLUCOSE SERPL-MCNC: 102 MG/DL — HIGH (ref 70–99)
GLUCOSE SERPL-MCNC: 102 MG/DL — HIGH (ref 70–99)
GLUCOSE SERPL-MCNC: 103 MG/DL — HIGH (ref 70–99)
GLUCOSE SERPL-MCNC: 104 MG/DL — HIGH (ref 70–99)
GLUCOSE SERPL-MCNC: 104 MG/DL — HIGH (ref 70–99)
GLUCOSE SERPL-MCNC: 105 MG/DL — HIGH (ref 70–99)
GLUCOSE SERPL-MCNC: 106 MG/DL — HIGH (ref 70–99)
GLUCOSE SERPL-MCNC: 107 MG/DL — HIGH (ref 70–99)
GLUCOSE SERPL-MCNC: 108 MG/DL — HIGH (ref 70–99)
GLUCOSE SERPL-MCNC: 108 MG/DL — HIGH (ref 70–99)
GLUCOSE SERPL-MCNC: 112 MG/DL — HIGH (ref 70–99)
GLUCOSE SERPL-MCNC: 114 MG/DL — HIGH (ref 70–99)
GLUCOSE SERPL-MCNC: 116 MG/DL
GLUCOSE SERPL-MCNC: 116 MG/DL — HIGH (ref 70–99)
GLUCOSE SERPL-MCNC: 120 MG/DL — HIGH (ref 70–99)
GLUCOSE SERPL-MCNC: 124 MG/DL — HIGH (ref 70–99)
GLUCOSE SERPL-MCNC: 128 MG/DL — HIGH (ref 70–99)
GLUCOSE SERPL-MCNC: 132 MG/DL — HIGH (ref 70–99)
GLUCOSE SERPL-MCNC: 140 MG/DL — HIGH (ref 70–99)
GLUCOSE SERPL-MCNC: 141 MG/DL — HIGH (ref 70–99)
GLUCOSE SERPL-MCNC: 142 MG/DL — HIGH (ref 70–99)
GLUCOSE SERPL-MCNC: 144 MG/DL — HIGH (ref 70–99)
GLUCOSE SERPL-MCNC: 145 MG/DL — HIGH (ref 70–99)
GLUCOSE SERPL-MCNC: 160 MG/DL — HIGH (ref 70–99)
GLUCOSE SERPL-MCNC: 168 MG/DL — HIGH (ref 70–99)
GLUCOSE SERPL-MCNC: 185 MG/DL — HIGH (ref 70–99)
GLUCOSE SERPL-MCNC: 88 MG/DL — SIGNIFICANT CHANGE UP (ref 70–99)
GLUCOSE SERPL-MCNC: 88 MG/DL — SIGNIFICANT CHANGE UP (ref 70–99)
GLUCOSE SERPL-MCNC: 89 MG/DL — SIGNIFICANT CHANGE UP (ref 70–99)
GLUCOSE SERPL-MCNC: 90 MG/DL — SIGNIFICANT CHANGE UP (ref 70–99)
GLUCOSE SERPL-MCNC: 91 MG/DL — SIGNIFICANT CHANGE UP (ref 70–99)
GLUCOSE SERPL-MCNC: 92 MG/DL — SIGNIFICANT CHANGE UP (ref 70–99)
GLUCOSE SERPL-MCNC: 93 MG/DL — SIGNIFICANT CHANGE UP (ref 70–99)
GLUCOSE SERPL-MCNC: 94 MG/DL — SIGNIFICANT CHANGE UP (ref 70–99)
GLUCOSE SERPL-MCNC: 98 MG/DL — SIGNIFICANT CHANGE UP (ref 70–99)
GLUCOSE SERPL-MCNC: 99 MG/DL — SIGNIFICANT CHANGE UP (ref 70–99)
GLUCOSE SERPL-MCNC: 99 MG/DL — SIGNIFICANT CHANGE UP (ref 70–99)
GLUCOSE UR QL: NEGATIVE — SIGNIFICANT CHANGE UP
GLUCOSE UR QL: NEGATIVE — SIGNIFICANT CHANGE UP
GRAM STN FLD: SIGNIFICANT CHANGE UP
GRAM STN FLD: SIGNIFICANT CHANGE UP
HBA1C MFR BLD HPLC: 5.5 %
HCO3 BLDA-SCNC: 22 MMOL/L — SIGNIFICANT CHANGE UP (ref 21–28)
HCO3 BLDA-SCNC: 23 MMOL/L — SIGNIFICANT CHANGE UP (ref 21–28)
HCT VFR BLD CALC: 26.5 % — LOW (ref 34.5–45)
HCT VFR BLD CALC: 28.5 % — LOW (ref 34.5–45)
HCT VFR BLD CALC: 29.1 % — LOW (ref 34.5–45)
HCT VFR BLD CALC: 29.6 % — LOW (ref 34.5–45)
HCT VFR BLD CALC: 30.4 % — LOW (ref 34.5–45)
HCT VFR BLD CALC: 30.9 % — LOW (ref 34.5–45)
HCT VFR BLD CALC: 32.1 % — LOW (ref 34.5–45)
HCT VFR BLD CALC: 32.2 % — LOW (ref 34.5–45)
HCT VFR BLD CALC: 32.5 % — LOW (ref 34.5–45)
HCT VFR BLD CALC: 32.7 % — LOW (ref 34.5–45)
HCT VFR BLD CALC: 33 % — LOW (ref 34.5–45)
HCT VFR BLD CALC: 33.1 % — LOW (ref 34.5–45)
HCT VFR BLD CALC: 33.5 % — LOW (ref 34.5–45)
HCT VFR BLD CALC: 33.6 % — LOW (ref 34.5–45)
HCT VFR BLD CALC: 33.7 % — LOW (ref 34.5–45)
HCT VFR BLD CALC: 33.9 % — LOW (ref 34.5–45)
HCT VFR BLD CALC: 34 % — LOW (ref 34.5–45)
HCT VFR BLD CALC: 34.7 % — SIGNIFICANT CHANGE UP (ref 34.5–45)
HCT VFR BLD CALC: 34.8 % — SIGNIFICANT CHANGE UP (ref 34.5–45)
HCT VFR BLD CALC: 34.9 % — SIGNIFICANT CHANGE UP (ref 34.5–45)
HCT VFR BLD CALC: 35.4 %
HCT VFR BLD CALC: 35.8 % — SIGNIFICANT CHANGE UP (ref 34.5–45)
HCT VFR BLD CALC: 36.2 % — SIGNIFICANT CHANGE UP (ref 34.5–45)
HCT VFR BLD CALC: 36.2 % — SIGNIFICANT CHANGE UP (ref 34.5–45)
HCT VFR BLD CALC: 36.3 % — SIGNIFICANT CHANGE UP (ref 34.5–45)
HCT VFR BLD CALC: 36.3 % — SIGNIFICANT CHANGE UP (ref 34.5–45)
HCT VFR BLD CALC: 37.4 % — SIGNIFICANT CHANGE UP (ref 34.5–45)
HCT VFR BLD CALC: 38 % — SIGNIFICANT CHANGE UP (ref 34.5–45)
HCT VFR BLD CALC: 38.4 % — SIGNIFICANT CHANGE UP (ref 34.5–45)
HCT VFR BLD CALC: 38.7 % — SIGNIFICANT CHANGE UP (ref 34.5–45)
HCT VFR BLD CALC: 38.8 % — SIGNIFICANT CHANGE UP (ref 34.5–45)
HCT VFR BLD CALC: 39 % — SIGNIFICANT CHANGE UP (ref 34.5–45)
HCT VFR BLD CALC: 39.1 % — SIGNIFICANT CHANGE UP (ref 34.5–45)
HCT VFR BLD CALC: 39.1 % — SIGNIFICANT CHANGE UP (ref 34.5–45)
HCT VFR BLD CALC: 39.9 % — SIGNIFICANT CHANGE UP (ref 34.5–45)
HCT VFR BLD CALC: 40.2 % — SIGNIFICANT CHANGE UP (ref 34.5–45)
HCT VFR BLD CALC: 40.2 % — SIGNIFICANT CHANGE UP (ref 34.5–45)
HCT VFR BLD CALC: 40.5 % — SIGNIFICANT CHANGE UP (ref 34.5–45)
HCT VFR BLD CALC: 40.7 % — SIGNIFICANT CHANGE UP (ref 34.5–45)
HCT VFR BLD CALC: 40.9 % — SIGNIFICANT CHANGE UP (ref 34.5–45)
HCT VFR BLD CALC: 40.9 % — SIGNIFICANT CHANGE UP (ref 34.5–45)
HCT VFR BLD CALC: 41.1 % — SIGNIFICANT CHANGE UP (ref 34.5–45)
HCT VFR BLD CALC: 41.3 % — SIGNIFICANT CHANGE UP (ref 34.5–45)
HCT VFR BLD CALC: 42.6 % — SIGNIFICANT CHANGE UP (ref 34.5–45)
HCT VFR BLD CALC: 45.3 % — HIGH (ref 34.5–45)
HDLC SERPL-MCNC: 52 MG/DL
HDLC SERPL-MCNC: 64 MG/DL — SIGNIFICANT CHANGE UP
HEMATOPATHOLOGY REPORT: SIGNIFICANT CHANGE UP
HEMATOPATHOLOGY REPORT: SIGNIFICANT CHANGE UP
HGB BLD-MCNC: 10 G/DL — LOW (ref 11.5–15.5)
HGB BLD-MCNC: 10.1 G/DL — LOW (ref 11.5–15.5)
HGB BLD-MCNC: 10.1 G/DL — LOW (ref 11.5–15.5)
HGB BLD-MCNC: 10.2 G/DL — LOW (ref 11.5–15.5)
HGB BLD-MCNC: 10.3 G/DL — LOW (ref 11.5–15.5)
HGB BLD-MCNC: 10.4 G/DL
HGB BLD-MCNC: 10.4 G/DL — LOW (ref 11.5–15.5)
HGB BLD-MCNC: 10.4 G/DL — LOW (ref 11.5–15.5)
HGB BLD-MCNC: 10.6 G/DL — LOW (ref 11.5–15.5)
HGB BLD-MCNC: 10.8 G/DL — LOW (ref 11.5–15.5)
HGB BLD-MCNC: 10.9 G/DL — LOW (ref 11.5–15.5)
HGB BLD-MCNC: 11.2 G/DL — LOW (ref 11.5–15.5)
HGB BLD-MCNC: 11.3 G/DL — LOW (ref 11.5–15.5)
HGB BLD-MCNC: 11.5 G/DL — SIGNIFICANT CHANGE UP (ref 11.5–15.5)
HGB BLD-MCNC: 11.5 G/DL — SIGNIFICANT CHANGE UP (ref 11.5–15.5)
HGB BLD-MCNC: 11.9 G/DL — SIGNIFICANT CHANGE UP (ref 11.5–15.5)
HGB BLD-MCNC: 12 G/DL — SIGNIFICANT CHANGE UP (ref 11.5–15.5)
HGB BLD-MCNC: 12.1 G/DL — SIGNIFICANT CHANGE UP (ref 11.5–15.5)
HGB BLD-MCNC: 12.1 G/DL — SIGNIFICANT CHANGE UP (ref 11.5–15.5)
HGB BLD-MCNC: 12.2 G/DL — SIGNIFICANT CHANGE UP (ref 11.5–15.5)
HGB BLD-MCNC: 12.2 G/DL — SIGNIFICANT CHANGE UP (ref 11.5–15.5)
HGB BLD-MCNC: 12.3 G/DL — SIGNIFICANT CHANGE UP (ref 11.5–15.5)
HGB BLD-MCNC: 12.3 G/DL — SIGNIFICANT CHANGE UP (ref 11.5–15.5)
HGB BLD-MCNC: 12.4 G/DL — SIGNIFICANT CHANGE UP (ref 11.5–15.5)
HGB BLD-MCNC: 12.4 G/DL — SIGNIFICANT CHANGE UP (ref 11.5–15.5)
HGB BLD-MCNC: 12.5 G/DL — SIGNIFICANT CHANGE UP (ref 11.5–15.5)
HGB BLD-MCNC: 12.5 G/DL — SIGNIFICANT CHANGE UP (ref 11.5–15.5)
HGB BLD-MCNC: 12.7 G/DL — SIGNIFICANT CHANGE UP (ref 11.5–15.5)
HGB BLD-MCNC: 12.7 G/DL — SIGNIFICANT CHANGE UP (ref 11.5–15.5)
HGB BLD-MCNC: 12.9 G/DL — SIGNIFICANT CHANGE UP (ref 11.5–15.5)
HGB BLD-MCNC: 13 G/DL — SIGNIFICANT CHANGE UP (ref 11.5–15.5)
HGB BLD-MCNC: 13.3 G/DL — SIGNIFICANT CHANGE UP (ref 11.5–15.5)
HGB BLD-MCNC: 14 G/DL — SIGNIFICANT CHANGE UP (ref 11.5–15.5)
HGB BLD-MCNC: 8.2 G/DL — LOW (ref 11.5–15.5)
HGB BLD-MCNC: 9 G/DL — LOW (ref 11.5–15.5)
HGB BLD-MCNC: 9.1 G/DL — LOW (ref 11.5–15.5)
HGB BLD-MCNC: 9.3 G/DL — LOW (ref 11.5–15.5)
HGB BLD-MCNC: 9.7 G/DL — LOW (ref 11.5–15.5)
HGB BLD-MCNC: 9.8 G/DL — LOW (ref 11.5–15.5)
HGB BLD-MCNC: 9.9 G/DL — LOW (ref 11.5–15.5)
IMM GRANULOCYTES NFR BLD AUTO: 0.1 %
IMM GRANULOCYTES NFR BLD AUTO: 0.3 % — SIGNIFICANT CHANGE UP (ref 0–1.5)
IMM GRANULOCYTES NFR BLD AUTO: 0.3 % — SIGNIFICANT CHANGE UP (ref 0–1.5)
IMM GRANULOCYTES NFR BLD AUTO: 0.5 % — SIGNIFICANT CHANGE UP (ref 0–1.5)
IMM GRANULOCYTES NFR BLD AUTO: 0.6 % — SIGNIFICANT CHANGE UP (ref 0–1.5)
IMM GRANULOCYTES NFR BLD AUTO: 0.6 % — SIGNIFICANT CHANGE UP (ref 0–1.5)
IMM GRANULOCYTES NFR BLD AUTO: 1.7 % — HIGH (ref 0–1.5)
IMMUNOLOGIST REVIEW: SIGNIFICANT CHANGE UP
INNER EAR 68KD AB FLD QL: <1.5 U/L — SIGNIFICANT CHANGE UP (ref 0–3.1)
INR BLD: 1.06 — SIGNIFICANT CHANGE UP (ref 0.88–1.16)
INR BLD: 1.14 — SIGNIFICANT CHANGE UP (ref 0.88–1.16)
INR BLD: 1.17 — HIGH (ref 0.88–1.16)
INR BLD: 1.18 — HIGH (ref 0.88–1.16)
INR BLD: 1.19 — HIGH (ref 0.88–1.16)
INR BLD: 1.2 — HIGH (ref 0.88–1.16)
INR BLD: 1.22 — HIGH (ref 0.88–1.16)
INR BLD: 1.23 — HIGH (ref 0.88–1.16)
INR BLD: 1.25 — HIGH (ref 0.88–1.16)
INR BLD: 1.26 — HIGH (ref 0.88–1.16)
INR BLD: 1.31 — HIGH (ref 0.88–1.16)
INR BLD: 1.31 — HIGH (ref 0.88–1.16)
INR BLD: 1.32 — HIGH (ref 0.88–1.16)
INR BLD: 1.33 — HIGH (ref 0.88–1.16)
INR BLD: 1.34 — HIGH (ref 0.88–1.16)
INR BLD: 1.37 — HIGH (ref 0.88–1.16)
INR BLD: 1.55 — HIGH (ref 0.88–1.16)
INR BLD: 1.6 — HIGH (ref 0.88–1.16)
INR BLD: 1.75 — HIGH (ref 0.88–1.16)
KETONES UR-MCNC: NEGATIVE — SIGNIFICANT CHANGE UP
KETONES UR-MCNC: NEGATIVE — SIGNIFICANT CHANGE UP
LABORATORY COMMENT REPORT: SIGNIFICANT CHANGE UP
LACTATE SERPL-SCNC: 0.9 MMOL/L — SIGNIFICANT CHANGE UP (ref 0.5–2)
LACTATE SERPL-SCNC: 1 MMOL/L — SIGNIFICANT CHANGE UP (ref 0.5–2)
LACTATE SERPL-SCNC: 1.1 MMOL/L — SIGNIFICANT CHANGE UP (ref 0.5–2)
LACTATE SERPL-SCNC: 1.3 MMOL/L — SIGNIFICANT CHANGE UP (ref 0.5–2)
LACTATE SERPL-SCNC: 1.6 MMOL/L — SIGNIFICANT CHANGE UP (ref 0.5–2)
LACTATE SERPL-SCNC: 2.3 MMOL/L — HIGH (ref 0.5–2)
LDH CSF L TO P-CCNC: 14 U/L — SIGNIFICANT CHANGE UP
LDH FLD-CCNC: 14 U/L — SIGNIFICANT CHANGE UP
LDLC SERPL CALC-MCNC: 25 MG/DL
LEUKOCYTE ESTERASE UR-ACNC: ABNORMAL
LEUKOCYTE ESTERASE UR-ACNC: NEGATIVE — SIGNIFICANT CHANGE UP
LH SERPL-ACNC: <0.3 IU/L — SIGNIFICANT CHANGE UP
LIPID PNL WITH DIRECT LDL SERPL: 72 MG/DL — SIGNIFICANT CHANGE UP
LYMPHOCYTES # BLD AUTO: 1 K/UL — SIGNIFICANT CHANGE UP (ref 1–3.3)
LYMPHOCYTES # BLD AUTO: 1.18 K/UL — SIGNIFICANT CHANGE UP (ref 1–3.3)
LYMPHOCYTES # BLD AUTO: 1.38 K/UL — SIGNIFICANT CHANGE UP (ref 1–3.3)
LYMPHOCYTES # BLD AUTO: 1.67 K/UL — SIGNIFICANT CHANGE UP (ref 1–3.3)
LYMPHOCYTES # BLD AUTO: 1.68 K/UL
LYMPHOCYTES # BLD AUTO: 10.9 % — LOW (ref 13–44)
LYMPHOCYTES # BLD AUTO: 17 % — SIGNIFICANT CHANGE UP (ref 13–44)
LYMPHOCYTES # BLD AUTO: 2.08 K/UL — SIGNIFICANT CHANGE UP (ref 1–3.3)
LYMPHOCYTES # BLD AUTO: 2.27 K/UL — SIGNIFICANT CHANGE UP (ref 1–3.3)
LYMPHOCYTES # BLD AUTO: 22.1 % — SIGNIFICANT CHANGE UP (ref 13–44)
LYMPHOCYTES # BLD AUTO: 23.6 % — SIGNIFICANT CHANGE UP (ref 13–44)
LYMPHOCYTES # BLD AUTO: 30.3 % — SIGNIFICANT CHANGE UP (ref 13–44)
LYMPHOCYTES # BLD AUTO: 8.9 % — LOW (ref 13–44)
LYMPHOCYTES # CSF: 1 % — LOW (ref 40–80)
LYMPHOCYTES # CSF: 3 % — LOW (ref 40–80)
LYMPHOCYTES NFR BLD AUTO: 24.2 %
M TB IFN-G BLD-IMP: NEGATIVE — SIGNIFICANT CHANGE UP
M TB IFN-G CD4+ BCKGRND COR BLD-ACNC: 0.01 IU/ML — SIGNIFICANT CHANGE UP
M TB IFN-G CD4+CD8+ BCKGRND COR BLD-ACNC: 0.03 IU/ML — SIGNIFICANT CHANGE UP
MAGNESIUM SERPL-MCNC: 1.6 MG/DL — SIGNIFICANT CHANGE UP (ref 1.6–2.6)
MAGNESIUM SERPL-MCNC: 1.6 MG/DL — SIGNIFICANT CHANGE UP (ref 1.6–2.6)
MAGNESIUM SERPL-MCNC: 1.7 MG/DL — SIGNIFICANT CHANGE UP (ref 1.6–2.6)
MAGNESIUM SERPL-MCNC: 1.8 MG/DL — SIGNIFICANT CHANGE UP (ref 1.6–2.6)
MAGNESIUM SERPL-MCNC: 1.9 MG/DL — SIGNIFICANT CHANGE UP (ref 1.6–2.6)
MAGNESIUM SERPL-MCNC: 2 MG/DL — SIGNIFICANT CHANGE UP (ref 1.6–2.6)
MAGNESIUM SERPL-MCNC: 2.1 MG/DL — SIGNIFICANT CHANGE UP (ref 1.6–2.6)
MAGNESIUM SERPL-MCNC: 2.2 MG/DL — SIGNIFICANT CHANGE UP (ref 1.6–2.6)
MAGNESIUM SERPL-MCNC: 2.2 MG/DL — SIGNIFICANT CHANGE UP (ref 1.6–2.6)
MAGNESIUM SERPL-MCNC: 2.3 MG/DL — SIGNIFICANT CHANGE UP (ref 1.6–2.6)
MAGNESIUM SERPL-MCNC: 2.3 MG/DL — SIGNIFICANT CHANGE UP (ref 1.6–2.6)
MAGNESIUM SERPL-MCNC: 2.4 MG/DL — SIGNIFICANT CHANGE UP (ref 1.6–2.6)
MAGNESIUM SERPL-MCNC: 2.4 MG/DL — SIGNIFICANT CHANGE UP (ref 1.6–2.6)
MAGNESIUM SERPL-MCNC: 2.5 MG/DL — SIGNIFICANT CHANGE UP (ref 1.6–2.6)
MAGNESIUM SERPL-MCNC: 2.6 MG/DL — SIGNIFICANT CHANGE UP (ref 1.6–2.6)
MAGNESIUM SERPL-MCNC: 3.2 MG/DL — HIGH (ref 1.6–2.6)
MAN DIFF?: NORMAL
MCHC RBC-ENTMCNC: 28.7 PG
MCHC RBC-ENTMCNC: 29.4 GM/DL
MCHC RBC-ENTMCNC: 29.7 GM/DL — LOW (ref 32–36)
MCHC RBC-ENTMCNC: 29.7 PG — SIGNIFICANT CHANGE UP (ref 27–34)
MCHC RBC-ENTMCNC: 29.7 PG — SIGNIFICANT CHANGE UP (ref 27–34)
MCHC RBC-ENTMCNC: 29.8 PG — SIGNIFICANT CHANGE UP (ref 27–34)
MCHC RBC-ENTMCNC: 29.8 PG — SIGNIFICANT CHANGE UP (ref 27–34)
MCHC RBC-ENTMCNC: 29.9 PG — SIGNIFICANT CHANGE UP (ref 27–34)
MCHC RBC-ENTMCNC: 30 PG — SIGNIFICANT CHANGE UP (ref 27–34)
MCHC RBC-ENTMCNC: 30.1 GM/DL — LOW (ref 32–36)
MCHC RBC-ENTMCNC: 30.1 PG — SIGNIFICANT CHANGE UP (ref 27–34)
MCHC RBC-ENTMCNC: 30.2 PG — SIGNIFICANT CHANGE UP (ref 27–34)
MCHC RBC-ENTMCNC: 30.3 GM/DL — LOW (ref 32–36)
MCHC RBC-ENTMCNC: 30.3 PG — SIGNIFICANT CHANGE UP (ref 27–34)
MCHC RBC-ENTMCNC: 30.4 GM/DL — LOW (ref 32–36)
MCHC RBC-ENTMCNC: 30.4 PG — SIGNIFICANT CHANGE UP (ref 27–34)
MCHC RBC-ENTMCNC: 30.4 PG — SIGNIFICANT CHANGE UP (ref 27–34)
MCHC RBC-ENTMCNC: 30.5 GM/DL — LOW (ref 32–36)
MCHC RBC-ENTMCNC: 30.5 PG — SIGNIFICANT CHANGE UP (ref 27–34)
MCHC RBC-ENTMCNC: 30.6 GM/DL — LOW (ref 32–36)
MCHC RBC-ENTMCNC: 30.6 GM/DL — LOW (ref 32–36)
MCHC RBC-ENTMCNC: 30.6 PG — SIGNIFICANT CHANGE UP (ref 27–34)
MCHC RBC-ENTMCNC: 30.6 PG — SIGNIFICANT CHANGE UP (ref 27–34)
MCHC RBC-ENTMCNC: 30.7 GM/DL — LOW (ref 32–36)
MCHC RBC-ENTMCNC: 30.7 PG — SIGNIFICANT CHANGE UP (ref 27–34)
MCHC RBC-ENTMCNC: 30.8 GM/DL — LOW (ref 32–36)
MCHC RBC-ENTMCNC: 30.8 PG — SIGNIFICANT CHANGE UP (ref 27–34)
MCHC RBC-ENTMCNC: 30.9 GM/DL — LOW (ref 32–36)
MCHC RBC-ENTMCNC: 30.9 PG — SIGNIFICANT CHANGE UP (ref 27–34)
MCHC RBC-ENTMCNC: 31 PG — SIGNIFICANT CHANGE UP (ref 27–34)
MCHC RBC-ENTMCNC: 31.1 GM/DL — LOW (ref 32–36)
MCHC RBC-ENTMCNC: 31.1 PG — SIGNIFICANT CHANGE UP (ref 27–34)
MCHC RBC-ENTMCNC: 31.2 GM/DL — LOW (ref 32–36)
MCHC RBC-ENTMCNC: 31.2 PG — SIGNIFICANT CHANGE UP (ref 27–34)
MCHC RBC-ENTMCNC: 31.2 PG — SIGNIFICANT CHANGE UP (ref 27–34)
MCHC RBC-ENTMCNC: 31.3 GM/DL — LOW (ref 32–36)
MCHC RBC-ENTMCNC: 31.3 PG — SIGNIFICANT CHANGE UP (ref 27–34)
MCHC RBC-ENTMCNC: 31.4 GM/DL — LOW (ref 32–36)
MCHC RBC-ENTMCNC: 31.4 PG — SIGNIFICANT CHANGE UP (ref 27–34)
MCHC RBC-ENTMCNC: 31.5 GM/DL — LOW (ref 32–36)
MCHC RBC-ENTMCNC: 31.6 GM/DL — LOW (ref 32–36)
MCHC RBC-ENTMCNC: 31.7 GM/DL — LOW (ref 32–36)
MCHC RBC-ENTMCNC: 31.7 GM/DL — LOW (ref 32–36)
MCHC RBC-ENTMCNC: 31.8 GM/DL — LOW (ref 32–36)
MCHC RBC-ENTMCNC: 31.8 GM/DL — LOW (ref 32–36)
MCHC RBC-ENTMCNC: 31.9 GM/DL — LOW (ref 32–36)
MCHC RBC-ENTMCNC: 32 GM/DL — SIGNIFICANT CHANGE UP (ref 32–36)
MCHC RBC-ENTMCNC: 32.1 GM/DL — SIGNIFICANT CHANGE UP (ref 32–36)
MCV RBC AUTO: 100.9 FL — HIGH (ref 80–100)
MCV RBC AUTO: 100.9 FL — HIGH (ref 80–100)
MCV RBC AUTO: 101.5 FL — HIGH (ref 80–100)
MCV RBC AUTO: 103.3 FL — HIGH (ref 80–100)
MCV RBC AUTO: 103.4 FL — HIGH (ref 80–100)
MCV RBC AUTO: 95.1 FL — SIGNIFICANT CHANGE UP (ref 80–100)
MCV RBC AUTO: 95.5 FL — SIGNIFICANT CHANGE UP (ref 80–100)
MCV RBC AUTO: 95.6 FL — SIGNIFICANT CHANGE UP (ref 80–100)
MCV RBC AUTO: 95.8 FL — SIGNIFICANT CHANGE UP (ref 80–100)
MCV RBC AUTO: 95.9 FL — SIGNIFICANT CHANGE UP (ref 80–100)
MCV RBC AUTO: 96 FL — SIGNIFICANT CHANGE UP (ref 80–100)
MCV RBC AUTO: 96.1 FL — SIGNIFICANT CHANGE UP (ref 80–100)
MCV RBC AUTO: 96.2 FL — SIGNIFICANT CHANGE UP (ref 80–100)
MCV RBC AUTO: 96.3 FL — SIGNIFICANT CHANGE UP (ref 80–100)
MCV RBC AUTO: 96.3 FL — SIGNIFICANT CHANGE UP (ref 80–100)
MCV RBC AUTO: 96.5 FL — SIGNIFICANT CHANGE UP (ref 80–100)
MCV RBC AUTO: 96.6 FL — SIGNIFICANT CHANGE UP (ref 80–100)
MCV RBC AUTO: 96.8 FL — SIGNIFICANT CHANGE UP (ref 80–100)
MCV RBC AUTO: 97 FL — SIGNIFICANT CHANGE UP (ref 80–100)
MCV RBC AUTO: 97.1 FL — SIGNIFICANT CHANGE UP (ref 80–100)
MCV RBC AUTO: 97.1 FL — SIGNIFICANT CHANGE UP (ref 80–100)
MCV RBC AUTO: 97.2 FL — SIGNIFICANT CHANGE UP (ref 80–100)
MCV RBC AUTO: 97.3 FL — SIGNIFICANT CHANGE UP (ref 80–100)
MCV RBC AUTO: 97.5 FL
MCV RBC AUTO: 97.5 FL — SIGNIFICANT CHANGE UP (ref 80–100)
MCV RBC AUTO: 97.7 FL — SIGNIFICANT CHANGE UP (ref 80–100)
MCV RBC AUTO: 97.7 FL — SIGNIFICANT CHANGE UP (ref 80–100)
MCV RBC AUTO: 97.8 FL — SIGNIFICANT CHANGE UP (ref 80–100)
MCV RBC AUTO: 97.8 FL — SIGNIFICANT CHANGE UP (ref 80–100)
MCV RBC AUTO: 97.9 FL — SIGNIFICANT CHANGE UP (ref 80–100)
MCV RBC AUTO: 98.1 FL — SIGNIFICANT CHANGE UP (ref 80–100)
MCV RBC AUTO: 98.1 FL — SIGNIFICANT CHANGE UP (ref 80–100)
MCV RBC AUTO: 98.4 FL — SIGNIFICANT CHANGE UP (ref 80–100)
MCV RBC AUTO: 98.8 FL — SIGNIFICANT CHANGE UP (ref 80–100)
MCV RBC AUTO: 99 FL — SIGNIFICANT CHANGE UP (ref 80–100)
MCV RBC AUTO: 99 FL — SIGNIFICANT CHANGE UP (ref 80–100)
MCV RBC AUTO: 99.1 FL — SIGNIFICANT CHANGE UP (ref 80–100)
MONOCYTES # BLD AUTO: 0.55 K/UL — SIGNIFICANT CHANGE UP (ref 0–0.9)
MONOCYTES # BLD AUTO: 0.71 K/UL — SIGNIFICANT CHANGE UP (ref 0–0.9)
MONOCYTES # BLD AUTO: 0.73 K/UL — SIGNIFICANT CHANGE UP (ref 0–0.9)
MONOCYTES # BLD AUTO: 0.8 K/UL
MONOCYTES # BLD AUTO: 0.95 K/UL — HIGH (ref 0–0.9)
MONOCYTES # BLD AUTO: 1 K/UL — HIGH (ref 0–0.9)
MONOCYTES # BLD AUTO: 1.21 K/UL — HIGH (ref 0–0.9)
MONOCYTES NFR BLD AUTO: 10.8 % — SIGNIFICANT CHANGE UP (ref 2–14)
MONOCYTES NFR BLD AUTO: 11.2 % — SIGNIFICANT CHANGE UP (ref 2–14)
MONOCYTES NFR BLD AUTO: 11.5 %
MONOCYTES NFR BLD AUTO: 12.3 % — SIGNIFICANT CHANGE UP (ref 2–14)
MONOCYTES NFR BLD AUTO: 4.9 % — SIGNIFICANT CHANGE UP (ref 2–14)
MONOCYTES NFR BLD AUTO: 9.4 % — SIGNIFICANT CHANGE UP (ref 2–14)
MONOCYTES NFR BLD AUTO: 9.8 % — SIGNIFICANT CHANGE UP (ref 2–14)
MONOS+MACROS NFR CSF: 0 % — LOW (ref 15–45)
NEUTROPHILS # BLD AUTO: 3.96 K/UL — SIGNIFICANT CHANGE UP (ref 1.8–7.4)
NEUTROPHILS # BLD AUTO: 4.07 K/UL
NEUTROPHILS # BLD AUTO: 4.77 K/UL — SIGNIFICANT CHANGE UP (ref 1.8–7.4)
NEUTROPHILS # BLD AUTO: 5.29 K/UL — SIGNIFICANT CHANGE UP (ref 1.8–7.4)
NEUTROPHILS # BLD AUTO: 5.39 K/UL — SIGNIFICANT CHANGE UP (ref 1.8–7.4)
NEUTROPHILS # BLD AUTO: 8.27 K/UL — HIGH (ref 1.8–7.4)
NEUTROPHILS # BLD AUTO: 9.14 K/UL — HIGH (ref 1.8–7.4)
NEUTROPHILS # CSF: 1 % — SIGNIFICANT CHANGE UP (ref 0–6)
NEUTROPHILS # CSF: 2 % — SIGNIFICANT CHANGE UP (ref 0–6)
NEUTROPHILS NFR BLD AUTO: 52.9 % — SIGNIFICANT CHANGE UP (ref 43–77)
NEUTROPHILS NFR BLD AUTO: 58.7 %
NEUTROPHILS NFR BLD AUTO: 61.1 % — SIGNIFICANT CHANGE UP (ref 43–77)
NEUTROPHILS NFR BLD AUTO: 63.2 % — SIGNIFICANT CHANGE UP (ref 43–77)
NEUTROPHILS NFR BLD AUTO: 64.9 % — SIGNIFICANT CHANGE UP (ref 43–77)
NEUTROPHILS NFR BLD AUTO: 76.5 % — SIGNIFICANT CHANGE UP (ref 43–77)
NEUTROPHILS NFR BLD AUTO: 81.3 % — HIGH (ref 43–77)
NITRITE UR-MCNC: NEGATIVE — SIGNIFICANT CHANGE UP
NITRITE UR-MCNC: NEGATIVE — SIGNIFICANT CHANGE UP
NON-GYNECOLOGICAL CYTOLOGY STUDY: SIGNIFICANT CHANGE UP
NONHDLC SERPL-MCNC: 45 MG/DL
NRBC # BLD: 0 /100 WBCS — SIGNIFICANT CHANGE UP (ref 0–0)
NRBC NFR CSF: 3 /UL — SIGNIFICANT CHANGE UP (ref 0–5)
NRBC NFR CSF: 4 /UL — SIGNIFICANT CHANGE UP (ref 0–5)
NT-PROBNP SERPL-MCNC: 3123 PG/ML
NT-PROBNP SERPL-SCNC: 9788 PG/ML — HIGH (ref 0–300)
PCO2 BLDA: 33 MMHG — SIGNIFICANT CHANGE UP (ref 32–45)
PCO2 BLDA: 36 MMHG — SIGNIFICANT CHANGE UP (ref 32–45)
PH BLDA: 7.4 — SIGNIFICANT CHANGE UP (ref 7.35–7.45)
PH BLDA: 7.46 — HIGH (ref 7.35–7.45)
PH UR: 6 — SIGNIFICANT CHANGE UP (ref 5–8)
PH UR: 6.5 — SIGNIFICANT CHANGE UP (ref 5–8)
PHOSPHATE SERPL-MCNC: 2.7 MG/DL — SIGNIFICANT CHANGE UP (ref 2.5–4.5)
PHOSPHATE SERPL-MCNC: 2.8 MG/DL — SIGNIFICANT CHANGE UP (ref 2.5–4.5)
PHOSPHATE SERPL-MCNC: 2.8 MG/DL — SIGNIFICANT CHANGE UP (ref 2.5–4.5)
PHOSPHATE SERPL-MCNC: 3 MG/DL — SIGNIFICANT CHANGE UP (ref 2.5–4.5)
PHOSPHATE SERPL-MCNC: 3.2 MG/DL — SIGNIFICANT CHANGE UP (ref 2.5–4.5)
PHOSPHATE SERPL-MCNC: 3.3 MG/DL — SIGNIFICANT CHANGE UP (ref 2.5–4.5)
PHOSPHATE SERPL-MCNC: 3.4 MG/DL — SIGNIFICANT CHANGE UP (ref 2.5–4.5)
PHOSPHATE SERPL-MCNC: 3.5 MG/DL — SIGNIFICANT CHANGE UP (ref 2.5–4.5)
PHOSPHATE SERPL-MCNC: 3.5 MG/DL — SIGNIFICANT CHANGE UP (ref 2.5–4.5)
PHOSPHATE SERPL-MCNC: 3.6 MG/DL — SIGNIFICANT CHANGE UP (ref 2.5–4.5)
PHOSPHATE SERPL-MCNC: 3.7 MG/DL — SIGNIFICANT CHANGE UP (ref 2.5–4.5)
PHOSPHATE SERPL-MCNC: 3.9 MG/DL — SIGNIFICANT CHANGE UP (ref 2.5–4.5)
PHOSPHATE SERPL-MCNC: 3.9 MG/DL — SIGNIFICANT CHANGE UP (ref 2.5–4.5)
PHOSPHATE SERPL-MCNC: 4 MG/DL — SIGNIFICANT CHANGE UP (ref 2.5–4.5)
PHOSPHATE SERPL-MCNC: 4 MG/DL — SIGNIFICANT CHANGE UP (ref 2.5–4.5)
PHOSPHATE SERPL-MCNC: 4.1 MG/DL — SIGNIFICANT CHANGE UP (ref 2.5–4.5)
PHOSPHATE SERPL-MCNC: 4.3 MG/DL — SIGNIFICANT CHANGE UP (ref 2.5–4.5)
PHOSPHATE SERPL-MCNC: 4.4 MG/DL — SIGNIFICANT CHANGE UP (ref 2.5–4.5)
PHOSPHATE SERPL-MCNC: 4.4 MG/DL — SIGNIFICANT CHANGE UP (ref 2.5–4.5)
PHOSPHATE SERPL-MCNC: 4.5 MG/DL — SIGNIFICANT CHANGE UP (ref 2.5–4.5)
PHOSPHATE SERPL-MCNC: 4.5 MG/DL — SIGNIFICANT CHANGE UP (ref 2.5–4.5)
PHOSPHATE SERPL-MCNC: 4.8 MG/DL — HIGH (ref 2.5–4.5)
PLATELET # BLD AUTO: 125 K/UL — LOW (ref 150–400)
PLATELET # BLD AUTO: 134 K/UL — LOW (ref 150–400)
PLATELET # BLD AUTO: 142 K/UL — LOW (ref 150–400)
PLATELET # BLD AUTO: 143 K/UL — LOW (ref 150–400)
PLATELET # BLD AUTO: 144 K/UL — LOW (ref 150–400)
PLATELET # BLD AUTO: 144 K/UL — LOW (ref 150–400)
PLATELET # BLD AUTO: 145 K/UL — LOW (ref 150–400)
PLATELET # BLD AUTO: 146 K/UL — LOW (ref 150–400)
PLATELET # BLD AUTO: 147 K/UL — LOW (ref 150–400)
PLATELET # BLD AUTO: 149 K/UL — LOW (ref 150–400)
PLATELET # BLD AUTO: 150 K/UL — SIGNIFICANT CHANGE UP (ref 150–400)
PLATELET # BLD AUTO: 153 K/UL — SIGNIFICANT CHANGE UP (ref 150–400)
PLATELET # BLD AUTO: 157 K/UL — SIGNIFICANT CHANGE UP (ref 150–400)
PLATELET # BLD AUTO: 160 K/UL — SIGNIFICANT CHANGE UP (ref 150–400)
PLATELET # BLD AUTO: 162 K/UL — SIGNIFICANT CHANGE UP (ref 150–400)
PLATELET # BLD AUTO: 163 K/UL — SIGNIFICANT CHANGE UP (ref 150–400)
PLATELET # BLD AUTO: 164 K/UL — SIGNIFICANT CHANGE UP (ref 150–400)
PLATELET # BLD AUTO: 167 K/UL — SIGNIFICANT CHANGE UP (ref 150–400)
PLATELET # BLD AUTO: 169 K/UL — SIGNIFICANT CHANGE UP (ref 150–400)
PLATELET # BLD AUTO: 170 K/UL — SIGNIFICANT CHANGE UP (ref 150–400)
PLATELET # BLD AUTO: 171 K/UL — SIGNIFICANT CHANGE UP (ref 150–400)
PLATELET # BLD AUTO: 172 K/UL — SIGNIFICANT CHANGE UP (ref 150–400)
PLATELET # BLD AUTO: 172 K/UL — SIGNIFICANT CHANGE UP (ref 150–400)
PLATELET # BLD AUTO: 173 K/UL — SIGNIFICANT CHANGE UP (ref 150–400)
PLATELET # BLD AUTO: 174 K/UL — SIGNIFICANT CHANGE UP (ref 150–400)
PLATELET # BLD AUTO: 175 K/UL — SIGNIFICANT CHANGE UP (ref 150–400)
PLATELET # BLD AUTO: 175 K/UL — SIGNIFICANT CHANGE UP (ref 150–400)
PLATELET # BLD AUTO: 176 K/UL — SIGNIFICANT CHANGE UP (ref 150–400)
PLATELET # BLD AUTO: 179 K/UL — SIGNIFICANT CHANGE UP (ref 150–400)
PLATELET # BLD AUTO: 179 K/UL — SIGNIFICANT CHANGE UP (ref 150–400)
PLATELET # BLD AUTO: 183 K/UL — SIGNIFICANT CHANGE UP (ref 150–400)
PLATELET # BLD AUTO: 186 K/UL — SIGNIFICANT CHANGE UP (ref 150–400)
PLATELET # BLD AUTO: 189 K/UL — SIGNIFICANT CHANGE UP (ref 150–400)
PLATELET # BLD AUTO: 194 K/UL — SIGNIFICANT CHANGE UP (ref 150–400)
PLATELET # BLD AUTO: 214 K/UL — SIGNIFICANT CHANGE UP (ref 150–400)
PLATELET # BLD AUTO: 214 K/UL — SIGNIFICANT CHANGE UP (ref 150–400)
PLATELET # BLD AUTO: 220 K/UL
PLATELET # BLD AUTO: 224 K/UL — SIGNIFICANT CHANGE UP (ref 150–400)
PLATELET # BLD AUTO: 229 K/UL — SIGNIFICANT CHANGE UP (ref 150–400)
PLATELET # BLD AUTO: 234 K/UL — SIGNIFICANT CHANGE UP (ref 150–400)
PLATELET # BLD AUTO: 238 K/UL — SIGNIFICANT CHANGE UP (ref 150–400)
PLATELET # BLD AUTO: 238 K/UL — SIGNIFICANT CHANGE UP (ref 150–400)
PLATELET # BLD AUTO: 247 K/UL — SIGNIFICANT CHANGE UP (ref 150–400)
PO2 BLDA: 270 MMHG — HIGH (ref 83–108)
PO2 BLDA: 74 MMHG — LOW (ref 83–108)
POTASSIUM SERPL-MCNC: 3.5 MMOL/L — SIGNIFICANT CHANGE UP (ref 3.5–5.3)
POTASSIUM SERPL-MCNC: 3.6 MMOL/L — SIGNIFICANT CHANGE UP (ref 3.5–5.3)
POTASSIUM SERPL-MCNC: 3.7 MMOL/L — SIGNIFICANT CHANGE UP (ref 3.5–5.3)
POTASSIUM SERPL-MCNC: 3.8 MMOL/L — SIGNIFICANT CHANGE UP (ref 3.5–5.3)
POTASSIUM SERPL-MCNC: 3.9 MMOL/L — SIGNIFICANT CHANGE UP (ref 3.5–5.3)
POTASSIUM SERPL-MCNC: 4 MMOL/L — SIGNIFICANT CHANGE UP (ref 3.5–5.3)
POTASSIUM SERPL-MCNC: 4.1 MMOL/L — SIGNIFICANT CHANGE UP (ref 3.5–5.3)
POTASSIUM SERPL-MCNC: 4.2 MMOL/L — SIGNIFICANT CHANGE UP (ref 3.5–5.3)
POTASSIUM SERPL-MCNC: 4.3 MMOL/L — SIGNIFICANT CHANGE UP (ref 3.5–5.3)
POTASSIUM SERPL-MCNC: 4.4 MMOL/L — SIGNIFICANT CHANGE UP (ref 3.5–5.3)
POTASSIUM SERPL-MCNC: 4.5 MMOL/L — SIGNIFICANT CHANGE UP (ref 3.5–5.3)
POTASSIUM SERPL-MCNC: 4.6 MMOL/L — SIGNIFICANT CHANGE UP (ref 3.5–5.3)
POTASSIUM SERPL-MCNC: 4.6 MMOL/L — SIGNIFICANT CHANGE UP (ref 3.5–5.3)
POTASSIUM SERPL-MCNC: 4.7 MMOL/L — SIGNIFICANT CHANGE UP (ref 3.5–5.3)
POTASSIUM SERPL-MCNC: 4.8 MMOL/L — SIGNIFICANT CHANGE UP (ref 3.5–5.3)
POTASSIUM SERPL-MCNC: 4.9 MMOL/L — SIGNIFICANT CHANGE UP (ref 3.5–5.3)
POTASSIUM SERPL-MCNC: 5 MMOL/L — SIGNIFICANT CHANGE UP (ref 3.5–5.3)
POTASSIUM SERPL-MCNC: 5.1 MMOL/L — SIGNIFICANT CHANGE UP (ref 3.5–5.3)
POTASSIUM SERPL-MCNC: 5.3 MMOL/L — SIGNIFICANT CHANGE UP (ref 3.5–5.3)
POTASSIUM SERPL-SCNC: 3.5 MMOL/L — SIGNIFICANT CHANGE UP (ref 3.5–5.3)
POTASSIUM SERPL-SCNC: 3.6 MMOL/L — SIGNIFICANT CHANGE UP (ref 3.5–5.3)
POTASSIUM SERPL-SCNC: 3.7 MMOL/L — SIGNIFICANT CHANGE UP (ref 3.5–5.3)
POTASSIUM SERPL-SCNC: 3.8 MMOL/L — SIGNIFICANT CHANGE UP (ref 3.5–5.3)
POTASSIUM SERPL-SCNC: 3.9 MMOL/L — SIGNIFICANT CHANGE UP (ref 3.5–5.3)
POTASSIUM SERPL-SCNC: 4 MMOL/L — SIGNIFICANT CHANGE UP (ref 3.5–5.3)
POTASSIUM SERPL-SCNC: 4.1 MMOL/L — SIGNIFICANT CHANGE UP (ref 3.5–5.3)
POTASSIUM SERPL-SCNC: 4.2 MMOL/L — SIGNIFICANT CHANGE UP (ref 3.5–5.3)
POTASSIUM SERPL-SCNC: 4.3 MMOL/L — SIGNIFICANT CHANGE UP (ref 3.5–5.3)
POTASSIUM SERPL-SCNC: 4.4 MMOL/L
POTASSIUM SERPL-SCNC: 4.4 MMOL/L — SIGNIFICANT CHANGE UP (ref 3.5–5.3)
POTASSIUM SERPL-SCNC: 4.5 MMOL/L — SIGNIFICANT CHANGE UP (ref 3.5–5.3)
POTASSIUM SERPL-SCNC: 4.6 MMOL/L — SIGNIFICANT CHANGE UP (ref 3.5–5.3)
POTASSIUM SERPL-SCNC: 4.6 MMOL/L — SIGNIFICANT CHANGE UP (ref 3.5–5.3)
POTASSIUM SERPL-SCNC: 4.7 MMOL/L — SIGNIFICANT CHANGE UP (ref 3.5–5.3)
POTASSIUM SERPL-SCNC: 4.8 MMOL/L — SIGNIFICANT CHANGE UP (ref 3.5–5.3)
POTASSIUM SERPL-SCNC: 4.9 MMOL/L — SIGNIFICANT CHANGE UP (ref 3.5–5.3)
POTASSIUM SERPL-SCNC: 5 MMOL/L — SIGNIFICANT CHANGE UP (ref 3.5–5.3)
POTASSIUM SERPL-SCNC: 5.1 MMOL/L — SIGNIFICANT CHANGE UP (ref 3.5–5.3)
POTASSIUM SERPL-SCNC: 5.3 MMOL/L — SIGNIFICANT CHANGE UP (ref 3.5–5.3)
PROT CSF-MCNC: 106 MG/DL — HIGH (ref 15–45)
PROT CSF-MCNC: 181 MG/DL — HIGH (ref 15–45)
PROT SERPL-MCNC: 5.9 G/DL — LOW (ref 6–8.3)
PROT SERPL-MCNC: 6 G/DL — SIGNIFICANT CHANGE UP (ref 6–8.3)
PROT SERPL-MCNC: 6.1 G/DL — SIGNIFICANT CHANGE UP (ref 6–8.3)
PROT SERPL-MCNC: 6.2 G/DL — SIGNIFICANT CHANGE UP (ref 6–8.3)
PROT SERPL-MCNC: 6.3 G/DL — SIGNIFICANT CHANGE UP (ref 6–8.3)
PROT SERPL-MCNC: 6.4 G/DL — SIGNIFICANT CHANGE UP (ref 6–8.3)
PROT SERPL-MCNC: 6.4 G/DL — SIGNIFICANT CHANGE UP (ref 6–8.3)
PROT SERPL-MCNC: 6.5 G/DL — SIGNIFICANT CHANGE UP (ref 6–8.3)
PROT SERPL-MCNC: 6.6 G/DL — SIGNIFICANT CHANGE UP (ref 6–8.3)
PROT SERPL-MCNC: 6.6 G/DL — SIGNIFICANT CHANGE UP (ref 6–8.3)
PROT SERPL-MCNC: 6.7 G/DL
PROT SERPL-MCNC: 6.8 G/DL — SIGNIFICANT CHANGE UP (ref 6–8.3)
PROT SERPL-MCNC: 6.9 G/DL — SIGNIFICANT CHANGE UP (ref 6–8.3)
PROT SERPL-MCNC: 7.1 G/DL — SIGNIFICANT CHANGE UP (ref 6–8.3)
PROT SERPL-MCNC: 7.2 G/DL — SIGNIFICANT CHANGE UP (ref 6–8.3)
PROT SERPL-MCNC: 7.6 G/DL — SIGNIFICANT CHANGE UP (ref 6–8.3)
PROT UR-MCNC: ABNORMAL MG/DL
PROT UR-MCNC: NEGATIVE MG/DL — SIGNIFICANT CHANGE UP
PROTHROM AB SERPL-ACNC: 12.1 SEC — SIGNIFICANT CHANGE UP (ref 10–12.9)
PROTHROM AB SERPL-ACNC: 13 SEC — HIGH (ref 10–12.9)
PROTHROM AB SERPL-ACNC: 13 SEC — HIGH (ref 10–12.9)
PROTHROM AB SERPL-ACNC: 13.1 SEC — HIGH (ref 10–12.9)
PROTHROM AB SERPL-ACNC: 13.1 SEC — HIGH (ref 10–12.9)
PROTHROM AB SERPL-ACNC: 13.4 SEC — HIGH (ref 10–12.9)
PROTHROM AB SERPL-ACNC: 13.5 SEC — HIGH (ref 10–12.9)
PROTHROM AB SERPL-ACNC: 13.6 SEC — HIGH (ref 10–12.9)
PROTHROM AB SERPL-ACNC: 13.7 SEC — HIGH (ref 10–12.9)
PROTHROM AB SERPL-ACNC: 13.7 SEC — HIGH (ref 10–12.9)
PROTHROM AB SERPL-ACNC: 13.8 SEC — HIGH (ref 10–12.9)
PROTHROM AB SERPL-ACNC: 13.8 SEC — HIGH (ref 10–12.9)
PROTHROM AB SERPL-ACNC: 14 SEC — HIGH (ref 10–12.9)
PROTHROM AB SERPL-ACNC: 14.1 SEC — HIGH (ref 10–12.9)
PROTHROM AB SERPL-ACNC: 14.4 SEC — HIGH (ref 10–12.9)
PROTHROM AB SERPL-ACNC: 14.4 SEC — HIGH (ref 10–12.9)
PROTHROM AB SERPL-ACNC: 14.5 SEC — HIGH (ref 10–12.9)
PROTHROM AB SERPL-ACNC: 14.8 SEC — HIGH (ref 10.6–13.6)
PROTHROM AB SERPL-ACNC: 15 SEC — HIGH (ref 10–12.9)
PROTHROM AB SERPL-ACNC: 15.1 SEC — HIGH (ref 10–12.9)
PROTHROM AB SERPL-ACNC: 15.2 SEC — HIGH (ref 10–12.9)
PROTHROM AB SERPL-ACNC: 15.3 SEC — HIGH (ref 10–12.9)
PROTHROM AB SERPL-ACNC: 15.4 SEC — HIGH (ref 10–12.9)
PROTHROM AB SERPL-ACNC: 16.2 SEC — HIGH (ref 10.6–13.6)
PROTHROM AB SERPL-ACNC: 18.2 SEC — HIGH (ref 10.6–13.6)
PROTHROM AB SERPL-ACNC: 18.8 SEC — HIGH (ref 10.6–13.6)
PROTHROM AB SERPL-ACNC: 20.3 SEC — HIGH (ref 10–12.9)
QUANT TB PLUS MITOGEN MINUS NIL: 8.13 IU/ML — SIGNIFICANT CHANGE UP
RBC # BLD: 2.71 M/UL — LOW (ref 3.8–5.2)
RBC # BLD: 2.97 M/UL — LOW (ref 3.8–5.2)
RBC # BLD: 2.98 M/UL — LOW (ref 3.8–5.2)
RBC # BLD: 3.03 M/UL — LOW (ref 3.8–5.2)
RBC # BLD: 3.17 M/UL — LOW (ref 3.8–5.2)
RBC # BLD: 3.17 M/UL — LOW (ref 3.8–5.2)
RBC # BLD: 3.24 M/UL — LOW (ref 3.8–5.2)
RBC # BLD: 3.26 M/UL — LOW (ref 3.8–5.2)
RBC # BLD: 3.27 M/UL — LOW (ref 3.8–5.2)
RBC # BLD: 3.31 M/UL — LOW (ref 3.8–5.2)
RBC # BLD: 3.31 M/UL — LOW (ref 3.8–5.2)
RBC # BLD: 3.35 M/UL — LOW (ref 3.8–5.2)
RBC # BLD: 3.37 M/UL — LOW (ref 3.8–5.2)
RBC # BLD: 3.38 M/UL — LOW (ref 3.8–5.2)
RBC # BLD: 3.4 M/UL — LOW (ref 3.8–5.2)
RBC # BLD: 3.48 M/UL — LOW (ref 3.8–5.2)
RBC # BLD: 3.48 M/UL — LOW (ref 3.8–5.2)
RBC # BLD: 3.51 M/UL — LOW (ref 3.8–5.2)
RBC # BLD: 3.52 M/UL — LOW (ref 3.8–5.2)
RBC # BLD: 3.57 M/UL — LOW (ref 3.8–5.2)
RBC # BLD: 3.63 M/UL
RBC # BLD: 3.7 M/UL — LOW (ref 3.8–5.2)
RBC # BLD: 3.72 M/UL — LOW (ref 3.8–5.2)
RBC # BLD: 3.74 M/UL — LOW (ref 3.8–5.2)
RBC # BLD: 3.75 M/UL — LOW (ref 3.8–5.2)
RBC # BLD: 3.77 M/UL — LOW (ref 3.8–5.2)
RBC # BLD: 3.82 M/UL — SIGNIFICANT CHANGE UP (ref 3.8–5.2)
RBC # BLD: 3.86 M/UL — SIGNIFICANT CHANGE UP (ref 3.8–5.2)
RBC # BLD: 4.01 M/UL — SIGNIFICANT CHANGE UP (ref 3.8–5.2)
RBC # BLD: 4.02 M/UL — SIGNIFICANT CHANGE UP (ref 3.8–5.2)
RBC # BLD: 4.02 M/UL — SIGNIFICANT CHANGE UP (ref 3.8–5.2)
RBC # BLD: 4.03 M/UL — SIGNIFICANT CHANGE UP (ref 3.8–5.2)
RBC # BLD: 4.03 M/UL — SIGNIFICANT CHANGE UP (ref 3.8–5.2)
RBC # BLD: 4.06 M/UL — SIGNIFICANT CHANGE UP (ref 3.8–5.2)
RBC # BLD: 4.07 M/UL — SIGNIFICANT CHANGE UP (ref 3.8–5.2)
RBC # BLD: 4.09 M/UL — SIGNIFICANT CHANGE UP (ref 3.8–5.2)
RBC # BLD: 4.11 M/UL — SIGNIFICANT CHANGE UP (ref 3.8–5.2)
RBC # BLD: 4.13 M/UL — SIGNIFICANT CHANGE UP (ref 3.8–5.2)
RBC # BLD: 4.18 M/UL — SIGNIFICANT CHANGE UP (ref 3.8–5.2)
RBC # BLD: 4.19 M/UL — SIGNIFICANT CHANGE UP (ref 3.8–5.2)
RBC # BLD: 4.21 M/UL — SIGNIFICANT CHANGE UP (ref 3.8–5.2)
RBC # BLD: 4.29 M/UL — SIGNIFICANT CHANGE UP (ref 3.8–5.2)
RBC # BLD: 4.3 M/UL — SIGNIFICANT CHANGE UP (ref 3.8–5.2)
RBC # BLD: 4.43 M/UL — SIGNIFICANT CHANGE UP (ref 3.8–5.2)
RBC # BLD: 4.67 M/UL — SIGNIFICANT CHANGE UP (ref 3.8–5.2)
RBC # CSF: 16 /UL — HIGH (ref 0–0)
RBC # CSF: HIGH /UL (ref 0–0)
RBC # FLD: 12.2 % — SIGNIFICANT CHANGE UP (ref 10.3–14.5)
RBC # FLD: 12.3 % — SIGNIFICANT CHANGE UP (ref 10.3–14.5)
RBC # FLD: 12.4 % — SIGNIFICANT CHANGE UP (ref 10.3–14.5)
RBC # FLD: 12.4 % — SIGNIFICANT CHANGE UP (ref 10.3–14.5)
RBC # FLD: 12.5 % — SIGNIFICANT CHANGE UP (ref 10.3–14.5)
RBC # FLD: 12.6 % — SIGNIFICANT CHANGE UP (ref 10.3–14.5)
RBC # FLD: 12.7 % — SIGNIFICANT CHANGE UP (ref 10.3–14.5)
RBC # FLD: 12.8 % — SIGNIFICANT CHANGE UP (ref 10.3–14.5)
RBC # FLD: 12.8 % — SIGNIFICANT CHANGE UP (ref 10.3–14.5)
RBC # FLD: 12.9 % — SIGNIFICANT CHANGE UP (ref 10.3–14.5)
RBC # FLD: 13 % — SIGNIFICANT CHANGE UP (ref 10.3–14.5)
RBC # FLD: 13.1 % — SIGNIFICANT CHANGE UP (ref 10.3–14.5)
RBC # FLD: 13.2 % — SIGNIFICANT CHANGE UP (ref 10.3–14.5)
RBC # FLD: 13.3 % — SIGNIFICANT CHANGE UP (ref 10.3–14.5)
RBC # FLD: 13.5 % — SIGNIFICANT CHANGE UP (ref 10.3–14.5)
RBC # FLD: 13.6 % — SIGNIFICANT CHANGE UP (ref 10.3–14.5)
RBC # FLD: 13.8 % — SIGNIFICANT CHANGE UP (ref 10.3–14.5)
RBC # FLD: 14.4 % — SIGNIFICANT CHANGE UP (ref 10.3–14.5)
RBC # FLD: 14.4 % — SIGNIFICANT CHANGE UP (ref 10.3–14.5)
RBC # FLD: 14.7 %
RBC CASTS # UR COMP ASSIST: < 5 /HPF — SIGNIFICANT CHANGE UP
RH IG SCN BLD-IMP: NEGATIVE — SIGNIFICANT CHANGE UP
SAO2 % BLDA: 95 % — SIGNIFICANT CHANGE UP (ref 95–100)
SAO2 % BLDA: 99 % — SIGNIFICANT CHANGE UP (ref 95–100)
SARS-COV-2 RNA SPEC QL NAA+PROBE: SIGNIFICANT CHANGE UP
SODIUM SERPL-SCNC: 137 MMOL/L — SIGNIFICANT CHANGE UP (ref 135–145)
SODIUM SERPL-SCNC: 138 MMOL/L — SIGNIFICANT CHANGE UP (ref 135–145)
SODIUM SERPL-SCNC: 139 MMOL/L — SIGNIFICANT CHANGE UP (ref 135–145)
SODIUM SERPL-SCNC: 140 MMOL/L — SIGNIFICANT CHANGE UP (ref 135–145)
SODIUM SERPL-SCNC: 141 MMOL/L — SIGNIFICANT CHANGE UP (ref 135–145)
SODIUM SERPL-SCNC: 142 MMOL/L — SIGNIFICANT CHANGE UP (ref 135–145)
SODIUM SERPL-SCNC: 143 MMOL/L — SIGNIFICANT CHANGE UP (ref 135–145)
SODIUM SERPL-SCNC: 144 MMOL/L — SIGNIFICANT CHANGE UP (ref 135–145)
SODIUM SERPL-SCNC: 146 MMOL/L
SODIUM SERPL-SCNC: 146 MMOL/L — HIGH (ref 135–145)
SODIUM SERPL-SCNC: 147 MMOL/L — HIGH (ref 135–145)
SODIUM SERPL-SCNC: 147 MMOL/L — HIGH (ref 135–145)
SOURCE HSV 1/2: SIGNIFICANT CHANGE UP
SP GR SPEC: 1.01 — SIGNIFICANT CHANGE UP (ref 1–1.03)
SP GR SPEC: 1.02 — SIGNIFICANT CHANGE UP (ref 1–1.03)
SPECIMEN SOURCE: SIGNIFICANT CHANGE UP
T PALLIDUM AB TITR SER: NEGATIVE — SIGNIFICANT CHANGE UP
T3FREE SERPL-MCNC: 2.3 PG/ML
T3RU NFR SERPL: 0.9 TBI
T4 AB SER-ACNC: 6.1 UG/DL — SIGNIFICANT CHANGE UP (ref 3.17–11.72)
T4 FREE SERPL-MCNC: 1.3 NG/DL
T4 SERPL-MCNC: 6.4 UG/DL
TOTAL CHOLESTEROL/HDL RATIO MEASUREMENT: 2.5 RATIO — LOW (ref 3.3–7.1)
TRIGL SERPL-MCNC: 105 MG/DL — SIGNIFICANT CHANGE UP (ref 10–149)
TRIGL SERPL-MCNC: 98 MG/DL
TROPONIN T SERPL-MCNC: 0.01 NG/ML — SIGNIFICANT CHANGE UP (ref 0–0.01)
TROPONIN T SERPL-MCNC: 0.9 NG/ML — CRITICAL HIGH (ref 0–0.01)
TROPONIN T SERPL-MCNC: <0.01 NG/ML — SIGNIFICANT CHANGE UP (ref 0–0.01)
TSH SERPL-ACNC: 2.33 UIU/ML
TSH SERPL-MCNC: 0.54 UIU/ML — SIGNIFICANT CHANGE UP (ref 0.35–4.94)
TSH SERPL-MCNC: 2.27 UIU/ML — SIGNIFICANT CHANGE UP (ref 0.35–4.94)
TSH SERPL-MCNC: 3.33 UIU/ML — SIGNIFICANT CHANGE UP (ref 0.35–4.94)
TUBE TYPE: SIGNIFICANT CHANGE UP
TUBE TYPE: SIGNIFICANT CHANGE UP
UROBILINOGEN FLD QL: 0.2 E.U./DL — SIGNIFICANT CHANGE UP
UROBILINOGEN FLD QL: 0.2 E.U./DL — SIGNIFICANT CHANGE UP
VDRL CSF-TITR: NEGATIVE — SIGNIFICANT CHANGE UP
VIT B12 SERPL-MCNC: 509 PG/ML
VIT B12 SERPL-MCNC: 588 PG/ML — SIGNIFICANT CHANGE UP (ref 232–1245)
WBC # BLD: 10.1 K/UL — SIGNIFICANT CHANGE UP (ref 3.8–10.5)
WBC # BLD: 10.8 K/UL — HIGH (ref 3.8–10.5)
WBC # BLD: 11.24 K/UL — HIGH (ref 3.8–10.5)
WBC # BLD: 11.64 K/UL — HIGH (ref 3.8–10.5)
WBC # BLD: 11.95 K/UL — HIGH (ref 3.8–10.5)
WBC # BLD: 14.01 K/UL — HIGH (ref 3.8–10.5)
WBC # BLD: 6.58 K/UL — SIGNIFICANT CHANGE UP (ref 3.8–10.5)
WBC # BLD: 6.69 K/UL — SIGNIFICANT CHANGE UP (ref 3.8–10.5)
WBC # BLD: 6.97 K/UL — SIGNIFICANT CHANGE UP (ref 3.8–10.5)
WBC # BLD: 7.29 K/UL — SIGNIFICANT CHANGE UP (ref 3.8–10.5)
WBC # BLD: 7.36 K/UL — SIGNIFICANT CHANGE UP (ref 3.8–10.5)
WBC # BLD: 7.4 K/UL — SIGNIFICANT CHANGE UP (ref 3.8–10.5)
WBC # BLD: 7.43 K/UL — SIGNIFICANT CHANGE UP (ref 3.8–10.5)
WBC # BLD: 7.45 K/UL — SIGNIFICANT CHANGE UP (ref 3.8–10.5)
WBC # BLD: 7.48 K/UL — SIGNIFICANT CHANGE UP (ref 3.8–10.5)
WBC # BLD: 7.5 K/UL — SIGNIFICANT CHANGE UP (ref 3.8–10.5)
WBC # BLD: 7.52 K/UL — SIGNIFICANT CHANGE UP (ref 3.8–10.5)
WBC # BLD: 7.53 K/UL — SIGNIFICANT CHANGE UP (ref 3.8–10.5)
WBC # BLD: 7.53 K/UL — SIGNIFICANT CHANGE UP (ref 3.8–10.5)
WBC # BLD: 7.55 K/UL — SIGNIFICANT CHANGE UP (ref 3.8–10.5)
WBC # BLD: 7.55 K/UL — SIGNIFICANT CHANGE UP (ref 3.8–10.5)
WBC # BLD: 7.58 K/UL — SIGNIFICANT CHANGE UP (ref 3.8–10.5)
WBC # BLD: 7.59 K/UL — SIGNIFICANT CHANGE UP (ref 3.8–10.5)
WBC # BLD: 7.73 K/UL — SIGNIFICANT CHANGE UP (ref 3.8–10.5)
WBC # BLD: 7.76 K/UL — SIGNIFICANT CHANGE UP (ref 3.8–10.5)
WBC # BLD: 7.79 K/UL — SIGNIFICANT CHANGE UP (ref 3.8–10.5)
WBC # BLD: 7.85 K/UL — SIGNIFICANT CHANGE UP (ref 3.8–10.5)
WBC # BLD: 7.95 K/UL — SIGNIFICANT CHANGE UP (ref 3.8–10.5)
WBC # BLD: 8.01 K/UL — SIGNIFICANT CHANGE UP (ref 3.8–10.5)
WBC # BLD: 8.03 K/UL — SIGNIFICANT CHANGE UP (ref 3.8–10.5)
WBC # BLD: 8.09 K/UL — SIGNIFICANT CHANGE UP (ref 3.8–10.5)
WBC # BLD: 8.14 K/UL — SIGNIFICANT CHANGE UP (ref 3.8–10.5)
WBC # BLD: 8.27 K/UL — SIGNIFICANT CHANGE UP (ref 3.8–10.5)
WBC # BLD: 8.7 K/UL — SIGNIFICANT CHANGE UP (ref 3.8–10.5)
WBC # BLD: 8.82 K/UL — SIGNIFICANT CHANGE UP (ref 3.8–10.5)
WBC # BLD: 9.02 K/UL — SIGNIFICANT CHANGE UP (ref 3.8–10.5)
WBC # BLD: 9.03 K/UL — SIGNIFICANT CHANGE UP (ref 3.8–10.5)
WBC # BLD: 9.03 K/UL — SIGNIFICANT CHANGE UP (ref 3.8–10.5)
WBC # BLD: 9.11 K/UL — SIGNIFICANT CHANGE UP (ref 3.8–10.5)
WBC # BLD: 9.41 K/UL — SIGNIFICANT CHANGE UP (ref 3.8–10.5)
WBC # BLD: 9.47 K/UL — SIGNIFICANT CHANGE UP (ref 3.8–10.5)
WBC # BLD: 9.66 K/UL — SIGNIFICANT CHANGE UP (ref 3.8–10.5)
WBC # BLD: 9.72 K/UL — SIGNIFICANT CHANGE UP (ref 3.8–10.5)
WBC # BLD: 9.74 K/UL — SIGNIFICANT CHANGE UP (ref 3.8–10.5)
WBC # FLD AUTO: 10.1 K/UL — SIGNIFICANT CHANGE UP (ref 3.8–10.5)
WBC # FLD AUTO: 10.8 K/UL — HIGH (ref 3.8–10.5)
WBC # FLD AUTO: 11.24 K/UL — HIGH (ref 3.8–10.5)
WBC # FLD AUTO: 11.64 K/UL — HIGH (ref 3.8–10.5)
WBC # FLD AUTO: 11.95 K/UL — HIGH (ref 3.8–10.5)
WBC # FLD AUTO: 14.01 K/UL — HIGH (ref 3.8–10.5)
WBC # FLD AUTO: 6.58 K/UL — SIGNIFICANT CHANGE UP (ref 3.8–10.5)
WBC # FLD AUTO: 6.69 K/UL — SIGNIFICANT CHANGE UP (ref 3.8–10.5)
WBC # FLD AUTO: 6.94 K/UL
WBC # FLD AUTO: 6.97 K/UL — SIGNIFICANT CHANGE UP (ref 3.8–10.5)
WBC # FLD AUTO: 7.29 K/UL — SIGNIFICANT CHANGE UP (ref 3.8–10.5)
WBC # FLD AUTO: 7.36 K/UL — SIGNIFICANT CHANGE UP (ref 3.8–10.5)
WBC # FLD AUTO: 7.4 K/UL — SIGNIFICANT CHANGE UP (ref 3.8–10.5)
WBC # FLD AUTO: 7.43 K/UL — SIGNIFICANT CHANGE UP (ref 3.8–10.5)
WBC # FLD AUTO: 7.45 K/UL — SIGNIFICANT CHANGE UP (ref 3.8–10.5)
WBC # FLD AUTO: 7.48 K/UL — SIGNIFICANT CHANGE UP (ref 3.8–10.5)
WBC # FLD AUTO: 7.5 K/UL — SIGNIFICANT CHANGE UP (ref 3.8–10.5)
WBC # FLD AUTO: 7.52 K/UL — SIGNIFICANT CHANGE UP (ref 3.8–10.5)
WBC # FLD AUTO: 7.53 K/UL — SIGNIFICANT CHANGE UP (ref 3.8–10.5)
WBC # FLD AUTO: 7.53 K/UL — SIGNIFICANT CHANGE UP (ref 3.8–10.5)
WBC # FLD AUTO: 7.55 K/UL — SIGNIFICANT CHANGE UP (ref 3.8–10.5)
WBC # FLD AUTO: 7.55 K/UL — SIGNIFICANT CHANGE UP (ref 3.8–10.5)
WBC # FLD AUTO: 7.58 K/UL — SIGNIFICANT CHANGE UP (ref 3.8–10.5)
WBC # FLD AUTO: 7.59 K/UL — SIGNIFICANT CHANGE UP (ref 3.8–10.5)
WBC # FLD AUTO: 7.73 K/UL — SIGNIFICANT CHANGE UP (ref 3.8–10.5)
WBC # FLD AUTO: 7.76 K/UL — SIGNIFICANT CHANGE UP (ref 3.8–10.5)
WBC # FLD AUTO: 7.79 K/UL — SIGNIFICANT CHANGE UP (ref 3.8–10.5)
WBC # FLD AUTO: 7.85 K/UL — SIGNIFICANT CHANGE UP (ref 3.8–10.5)
WBC # FLD AUTO: 7.95 K/UL — SIGNIFICANT CHANGE UP (ref 3.8–10.5)
WBC # FLD AUTO: 8.01 K/UL — SIGNIFICANT CHANGE UP (ref 3.8–10.5)
WBC # FLD AUTO: 8.03 K/UL — SIGNIFICANT CHANGE UP (ref 3.8–10.5)
WBC # FLD AUTO: 8.09 K/UL — SIGNIFICANT CHANGE UP (ref 3.8–10.5)
WBC # FLD AUTO: 8.14 K/UL — SIGNIFICANT CHANGE UP (ref 3.8–10.5)
WBC # FLD AUTO: 8.27 K/UL — SIGNIFICANT CHANGE UP (ref 3.8–10.5)
WBC # FLD AUTO: 8.7 K/UL — SIGNIFICANT CHANGE UP (ref 3.8–10.5)
WBC # FLD AUTO: 8.82 K/UL — SIGNIFICANT CHANGE UP (ref 3.8–10.5)
WBC # FLD AUTO: 9.02 K/UL — SIGNIFICANT CHANGE UP (ref 3.8–10.5)
WBC # FLD AUTO: 9.03 K/UL — SIGNIFICANT CHANGE UP (ref 3.8–10.5)
WBC # FLD AUTO: 9.03 K/UL — SIGNIFICANT CHANGE UP (ref 3.8–10.5)
WBC # FLD AUTO: 9.11 K/UL — SIGNIFICANT CHANGE UP (ref 3.8–10.5)
WBC # FLD AUTO: 9.41 K/UL — SIGNIFICANT CHANGE UP (ref 3.8–10.5)
WBC # FLD AUTO: 9.47 K/UL — SIGNIFICANT CHANGE UP (ref 3.8–10.5)
WBC # FLD AUTO: 9.66 K/UL — SIGNIFICANT CHANGE UP (ref 3.8–10.5)
WBC # FLD AUTO: 9.72 K/UL — SIGNIFICANT CHANGE UP (ref 3.8–10.5)
WBC # FLD AUTO: 9.74 K/UL — SIGNIFICANT CHANGE UP (ref 3.8–10.5)
WBC UR QL: > 10 /HPF
WNV RNA SPEC QL NAA+PROBE: SIGNIFICANT CHANGE UP
WNV RNA SPEC QL NAA+PROBE: SIGNIFICANT CHANGE UP

## 2020-01-01 PROCEDURE — 71045 X-RAY EXAM CHEST 1 VIEW: CPT | Mod: 26

## 2020-01-01 PROCEDURE — 83002 ASSAY OF GONADOTROPIN (LH): CPT

## 2020-01-01 PROCEDURE — 87476 LYME DIS DNA AMP PROBE: CPT

## 2020-01-01 PROCEDURE — 87086 URINE CULTURE/COLONY COUNT: CPT

## 2020-01-01 PROCEDURE — 82803 BLOOD GASES ANY COMBINATION: CPT

## 2020-01-01 PROCEDURE — 99292 CRITICAL CARE ADDL 30 MIN: CPT

## 2020-01-01 PROCEDURE — 99233 SBSQ HOSP IP/OBS HIGH 50: CPT | Mod: GC

## 2020-01-01 PROCEDURE — 99223 1ST HOSP IP/OBS HIGH 75: CPT

## 2020-01-01 PROCEDURE — 75573 CT HRT C+ STRUX CGEN HRT DS: CPT | Mod: 26

## 2020-01-01 PROCEDURE — 93308 TTE F-UP OR LMTD: CPT | Mod: 26

## 2020-01-01 PROCEDURE — 33361 REPLACE AORTIC VALVE PERQ: CPT | Mod: 62,Q0

## 2020-01-01 PROCEDURE — C1889: CPT

## 2020-01-01 PROCEDURE — 82607 VITAMIN B-12: CPT

## 2020-01-01 PROCEDURE — C1713: CPT

## 2020-01-01 PROCEDURE — 71045 X-RAY EXAM CHEST 1 VIEW: CPT

## 2020-01-01 PROCEDURE — 99291 CRITICAL CARE FIRST HOUR: CPT

## 2020-01-01 PROCEDURE — 85027 COMPLETE CBC AUTOMATED: CPT

## 2020-01-01 PROCEDURE — 88108 CYTOPATH CONCENTRATE TECH: CPT | Mod: 26

## 2020-01-01 PROCEDURE — 93880 EXTRACRANIAL BILAT STUDY: CPT | Mod: 26

## 2020-01-01 PROCEDURE — 82962 GLUCOSE BLOOD TEST: CPT

## 2020-01-01 PROCEDURE — 93005 ELECTROCARDIOGRAM TRACING: CPT | Mod: XU

## 2020-01-01 PROCEDURE — 92610 EVALUATE SWALLOWING FUNCTION: CPT

## 2020-01-01 PROCEDURE — 83735 ASSAY OF MAGNESIUM: CPT

## 2020-01-01 PROCEDURE — 85610 PROTHROMBIN TIME: CPT

## 2020-01-01 PROCEDURE — 93010 ELECTROCARDIOGRAM REPORT: CPT

## 2020-01-01 PROCEDURE — 36415 COLL VENOUS BLD VENIPUNCTURE: CPT

## 2020-01-01 PROCEDURE — 81001 URINALYSIS AUTO W/SCOPE: CPT

## 2020-01-01 PROCEDURE — 87483 CNS DNA AMP PROBE TYPE 12-25: CPT

## 2020-01-01 PROCEDURE — 86480 TB TEST CELL IMMUN MEASURE: CPT

## 2020-01-01 PROCEDURE — 99232 SBSQ HOSP IP/OBS MODERATE 35: CPT

## 2020-01-01 PROCEDURE — 82164 ANGIOTENSIN I ENZYME TEST: CPT

## 2020-01-01 PROCEDURE — 82550 ASSAY OF CK (CPK): CPT

## 2020-01-01 PROCEDURE — 93005 ELECTROCARDIOGRAM TRACING: CPT

## 2020-01-01 PROCEDURE — 70450 CT HEAD/BRAIN W/O DYE: CPT | Mod: 26

## 2020-01-01 PROCEDURE — 93010 ELECTROCARDIOGRAM REPORT: CPT | Mod: 77

## 2020-01-01 PROCEDURE — 85025 COMPLETE CBC W/AUTO DIFF WBC: CPT

## 2020-01-01 PROCEDURE — 82533 TOTAL CORTISOL: CPT

## 2020-01-01 PROCEDURE — 99233 SBSQ HOSP IP/OBS HIGH 50: CPT

## 2020-01-01 PROCEDURE — 97116 GAIT TRAINING THERAPY: CPT

## 2020-01-01 PROCEDURE — A9585: CPT

## 2020-01-01 PROCEDURE — 99222 1ST HOSP IP/OBS MODERATE 55: CPT

## 2020-01-01 PROCEDURE — P9045: CPT

## 2020-01-01 PROCEDURE — 80053 COMPREHEN METABOLIC PANEL: CPT

## 2020-01-01 PROCEDURE — 83615 LACTATE (LD) (LDH) ENZYME: CPT

## 2020-01-01 PROCEDURE — 83001 ASSAY OF GONADOTROPIN (FSH): CPT

## 2020-01-01 PROCEDURE — 70498 CT ANGIOGRAPHY NECK: CPT

## 2020-01-01 PROCEDURE — 86901 BLOOD TYPING SEROLOGIC RH(D): CPT

## 2020-01-01 PROCEDURE — 62223 ESTABLISH BRAIN CAVITY SHUNT: CPT | Mod: 62

## 2020-01-01 PROCEDURE — 97161 PT EVAL LOW COMPLEX 20 MIN: CPT

## 2020-01-01 PROCEDURE — 74174 CTA ABD&PLVS W/CONTRAST: CPT | Mod: 26

## 2020-01-01 PROCEDURE — 97530 THERAPEUTIC ACTIVITIES: CPT

## 2020-01-01 PROCEDURE — 86850 RBC ANTIBODY SCREEN: CPT

## 2020-01-01 PROCEDURE — C1894: CPT

## 2020-01-01 PROCEDURE — 36620 INSERTION CATHETER ARTERY: CPT

## 2020-01-01 PROCEDURE — 84132 ASSAY OF SERUM POTASSIUM: CPT

## 2020-01-01 PROCEDURE — 86255 FLUORESCENT ANTIBODY SCREEN: CPT

## 2020-01-01 PROCEDURE — 84100 ASSAY OF PHOSPHORUS: CPT

## 2020-01-01 PROCEDURE — C1887: CPT

## 2020-01-01 PROCEDURE — 80048 BASIC METABOLIC PNL TOTAL CA: CPT

## 2020-01-01 PROCEDURE — 83880 ASSAY OF NATRIURETIC PEPTIDE: CPT

## 2020-01-01 PROCEDURE — 86341 ISLET CELL ANTIBODY: CPT

## 2020-01-01 PROCEDURE — P9047: CPT

## 2020-01-01 PROCEDURE — 83003 ASSAY GROWTH HORMONE (HGH): CPT

## 2020-01-01 PROCEDURE — 70450 CT HEAD/BRAIN W/O DYE: CPT

## 2020-01-01 PROCEDURE — 93308 TTE F-UP OR LMTD: CPT

## 2020-01-01 PROCEDURE — 99238 HOSP IP/OBS DSCHRG MGMT 30/<: CPT

## 2020-01-01 PROCEDURE — 82024 ASSAY OF ACTH: CPT

## 2020-01-01 PROCEDURE — 97164 PT RE-EVAL EST PLAN CARE: CPT

## 2020-01-01 PROCEDURE — 80061 LIPID PANEL: CPT

## 2020-01-01 PROCEDURE — 88108 CYTOPATH CONCENTRATE TECH: CPT

## 2020-01-01 PROCEDURE — 86780 TREPONEMA PALLIDUM: CPT

## 2020-01-01 PROCEDURE — 70496 CT ANGIOGRAPHY HEAD: CPT

## 2020-01-01 PROCEDURE — 84157 ASSAY OF PROTEIN OTHER: CPT

## 2020-01-01 PROCEDURE — 85730 THROMBOPLASTIN TIME PARTIAL: CPT

## 2020-01-01 PROCEDURE — 84484 ASSAY OF TROPONIN QUANT: CPT

## 2020-01-01 PROCEDURE — 62328 DX LMBR SPI PNXR W/FLUOR/CT: CPT

## 2020-01-01 PROCEDURE — 86923 COMPATIBILITY TEST ELECTRIC: CPT

## 2020-01-01 PROCEDURE — 84443 ASSAY THYROID STIM HORMONE: CPT

## 2020-01-01 PROCEDURE — 93306 TTE W/DOPPLER COMPLETE: CPT | Mod: 26

## 2020-01-01 PROCEDURE — 99213 OFFICE O/P EST LOW 20 MIN: CPT | Mod: 24

## 2020-01-01 PROCEDURE — 99231 SBSQ HOSP IP/OBS SF/LOW 25: CPT

## 2020-01-01 PROCEDURE — 87070 CULTURE OTHR SPECIMN AEROBIC: CPT

## 2020-01-01 PROCEDURE — C1760: CPT

## 2020-01-01 PROCEDURE — 93000 ELECTROCARDIOGRAM COMPLETE: CPT

## 2020-01-01 PROCEDURE — 70498 CT ANGIOGRAPHY NECK: CPT | Mod: 26

## 2020-01-01 PROCEDURE — 99291 CRITICAL CARE FIRST HOUR: CPT | Mod: 25

## 2020-01-01 PROCEDURE — 93355 ECHO TRANSESOPHAGEAL (TEE): CPT | Mod: 59

## 2020-01-01 PROCEDURE — 97535 SELF CARE MNGMENT TRAINING: CPT

## 2020-01-01 PROCEDURE — 93306 TTE W/DOPPLER COMPLETE: CPT

## 2020-01-01 PROCEDURE — C1769: CPT

## 2020-01-01 PROCEDURE — 99285 EMERGENCY DEPT VISIT HI MDM: CPT

## 2020-01-01 PROCEDURE — 99215 OFFICE O/P EST HI 40 MIN: CPT

## 2020-01-01 PROCEDURE — 87798 DETECT AGENT NOS DNA AMP: CPT

## 2020-01-01 PROCEDURE — 99232 SBSQ HOSP IP/OBS MODERATE 35: CPT | Mod: 25

## 2020-01-01 PROCEDURE — 86592 SYPHILIS TEST NON-TREP QUAL: CPT

## 2020-01-01 PROCEDURE — 93355 ECHO TRANSESOPHAGEAL (TEE): CPT | Mod: 26

## 2020-01-01 PROCEDURE — 99024 POSTOP FOLLOW-UP VISIT: CPT

## 2020-01-01 PROCEDURE — 84436 ASSAY OF TOTAL THYROXINE: CPT

## 2020-01-01 PROCEDURE — 82945 GLUCOSE OTHER FLUID: CPT

## 2020-01-01 PROCEDURE — 92526 ORAL FUNCTION THERAPY: CPT

## 2020-01-01 PROCEDURE — 87205 SMEAR GRAM STAIN: CPT

## 2020-01-01 PROCEDURE — 99214 OFFICE O/P EST MOD 30 MIN: CPT

## 2020-01-01 PROCEDURE — 97163 PT EVAL HIGH COMPLEX 45 MIN: CPT

## 2020-01-01 PROCEDURE — 82553 CREATINE MB FRACTION: CPT

## 2020-01-01 PROCEDURE — 99223 1ST HOSP IP/OBS HIGH 75: CPT | Mod: GC

## 2020-01-01 PROCEDURE — 70496 CT ANGIOGRAPHY HEAD: CPT | Mod: 26

## 2020-01-01 PROCEDURE — 87635 SARS-COV-2 COVID-19 AMP PRB: CPT

## 2020-01-01 PROCEDURE — 93880 EXTRACRANIAL BILAT STUDY: CPT

## 2020-01-01 PROCEDURE — 84295 ASSAY OF SERUM SODIUM: CPT

## 2020-01-01 PROCEDURE — 93312 ECHO TRANSESOPHAGEAL: CPT

## 2020-01-01 PROCEDURE — 99285 EMERGENCY DEPT VISIT HI MDM: CPT | Mod: 25

## 2020-01-01 PROCEDURE — 86403 PARTICLE AGGLUT ANTBDY SCRN: CPT

## 2020-01-01 PROCEDURE — 83605 ASSAY OF LACTIC ACID: CPT

## 2020-01-01 PROCEDURE — 83036 HEMOGLOBIN GLYCOSYLATED A1C: CPT

## 2020-01-01 PROCEDURE — 82330 ASSAY OF CALCIUM: CPT

## 2020-01-01 PROCEDURE — 70552 MRI BRAIN STEM W/DYE: CPT

## 2020-01-01 PROCEDURE — 87529 HSV DNA AMP PROBE: CPT

## 2020-01-01 PROCEDURE — 94660 CPAP INITIATION&MGMT: CPT

## 2020-01-01 PROCEDURE — 51702 INSERT TEMP BLADDER CATH: CPT

## 2020-01-01 PROCEDURE — 82746 ASSAY OF FOLIC ACID SERUM: CPT

## 2020-01-01 PROCEDURE — 70552 MRI BRAIN STEM W/DYE: CPT | Mod: 26

## 2020-01-01 PROCEDURE — 89051 BODY FLUID CELL COUNT: CPT

## 2020-01-01 PROCEDURE — 97110 THERAPEUTIC EXERCISES: CPT

## 2020-01-01 RX ORDER — PANTOPRAZOLE SODIUM 20 MG/1
1 TABLET, DELAYED RELEASE ORAL
Qty: 0 | Refills: 0 | DISCHARGE
Start: 2020-01-01

## 2020-01-01 RX ORDER — CLOPIDOGREL 75 MG/1
75 TABLET, FILM COATED ORAL DAILY
Qty: 30 | Refills: 2 | Status: DISCONTINUED | COMMUNITY
End: 2020-01-01

## 2020-01-01 RX ORDER — CALCIUM GLUCONATE 100 MG/ML
2 VIAL (ML) INTRAVENOUS ONCE
Refills: 0 | Status: COMPLETED | OUTPATIENT
Start: 2020-01-01 | End: 2020-01-01

## 2020-01-01 RX ORDER — SENNA PLUS 8.6 MG/1
2 TABLET ORAL
Qty: 60 | Refills: 0
Start: 2020-01-01 | End: 2020-01-01

## 2020-01-01 RX ORDER — CEFTRIAXONE 500 MG/1
1000 INJECTION, POWDER, FOR SOLUTION INTRAMUSCULAR; INTRAVENOUS ONCE
Refills: 0 | Status: COMPLETED | OUTPATIENT
Start: 2020-01-01 | End: 2020-01-01

## 2020-01-01 RX ORDER — METOPROLOL TARTRATE 50 MG
37.5 TABLET ORAL
Refills: 0 | Status: DISCONTINUED | OUTPATIENT
Start: 2020-01-01 | End: 2020-01-01

## 2020-01-01 RX ORDER — SENNA PLUS 8.6 MG/1
2 TABLET ORAL AT BEDTIME
Refills: 0 | Status: DISCONTINUED | OUTPATIENT
Start: 2020-01-01 | End: 2020-01-01

## 2020-01-01 RX ORDER — NISOLDIPINE 17 MG/1
17 TABLET, FILM COATED, EXTENDED RELEASE ORAL DAILY
Refills: 0 | Status: DISCONTINUED | COMMUNITY
End: 2020-01-01

## 2020-01-01 RX ORDER — POVIDONE-IODINE 5 %
1 AEROSOL (ML) TOPICAL ONCE
Refills: 0 | Status: COMPLETED | OUTPATIENT
Start: 2020-01-01 | End: 2020-01-01

## 2020-01-01 RX ORDER — ASPIRIN/CALCIUM CARB/MAGNESIUM 324 MG
81 TABLET ORAL DAILY
Refills: 0 | Status: DISCONTINUED | OUTPATIENT
Start: 2020-01-01 | End: 2020-01-01

## 2020-01-01 RX ORDER — ACETAMINOPHEN 500 MG
650 TABLET ORAL EVERY 6 HOURS
Refills: 0 | Status: DISCONTINUED | OUTPATIENT
Start: 2020-01-01 | End: 2020-01-01

## 2020-01-01 RX ORDER — HEPARIN SODIUM 5000 [USP'U]/ML
9000 INJECTION INTRAVENOUS; SUBCUTANEOUS ONCE
Refills: 0 | Status: COMPLETED | OUTPATIENT
Start: 2020-01-01 | End: 2020-01-01

## 2020-01-01 RX ORDER — METOPROLOL TARTRATE 50 MG
12.5 TABLET ORAL EVERY 8 HOURS
Refills: 0 | Status: DISCONTINUED | OUTPATIENT
Start: 2020-01-01 | End: 2020-01-01

## 2020-01-01 RX ORDER — METOPROLOL TARTRATE 50 MG
25 TABLET ORAL EVERY 12 HOURS
Refills: 0 | Status: DISCONTINUED | OUTPATIENT
Start: 2020-01-01 | End: 2020-01-01

## 2020-01-01 RX ORDER — METOPROLOL TARTRATE 50 MG
1 TABLET ORAL
Qty: 0 | Refills: 0 | DISCHARGE

## 2020-01-01 RX ORDER — SENNOSIDES 8.6 MG/1
8.6 TABLET ORAL
Qty: 28 | Refills: 0 | Status: ACTIVE | COMMUNITY
Start: 2020-01-01

## 2020-01-01 RX ORDER — SODIUM CHLORIDE 9 MG/ML
600 INJECTION INTRAMUSCULAR; INTRAVENOUS; SUBCUTANEOUS
Refills: 0 | Status: DISCONTINUED | OUTPATIENT
Start: 2020-01-01 | End: 2020-01-01

## 2020-01-01 RX ORDER — POLYETHYLENE GLYCOL 3350 17 G/17G
17 POWDER, FOR SOLUTION ORAL DAILY
Refills: 0 | Status: DISCONTINUED | OUTPATIENT
Start: 2020-01-01 | End: 2020-01-01

## 2020-01-01 RX ORDER — HEPARIN SODIUM 5000 [USP'U]/ML
1000 INJECTION INTRAVENOUS; SUBCUTANEOUS
Qty: 25000 | Refills: 0 | Status: DISCONTINUED | OUTPATIENT
Start: 2020-01-01 | End: 2020-01-01

## 2020-01-01 RX ORDER — ALBUMIN HUMAN 25 %
250 VIAL (ML) INTRAVENOUS
Refills: 0 | Status: COMPLETED | OUTPATIENT
Start: 2020-01-01 | End: 2020-01-01

## 2020-01-01 RX ORDER — LIDOCAINE 4 G/100G
2 CREAM TOPICAL EVERY 24 HOURS
Refills: 0 | Status: DISCONTINUED | OUTPATIENT
Start: 2020-01-01 | End: 2020-01-01

## 2020-01-01 RX ORDER — DILTIAZEM HCL 120 MG
30 CAPSULE, EXT RELEASE 24 HR ORAL EVERY 6 HOURS
Refills: 0 | Status: DISCONTINUED | OUTPATIENT
Start: 2020-01-01 | End: 2020-01-01

## 2020-01-01 RX ORDER — MAGNESIUM SULFATE 500 MG/ML
2 VIAL (ML) INJECTION ONCE
Refills: 0 | Status: COMPLETED | OUTPATIENT
Start: 2020-01-01 | End: 2020-01-01

## 2020-01-01 RX ORDER — AMIODARONE HYDROCHLORIDE 400 MG/1
150 TABLET ORAL ONCE
Refills: 0 | Status: COMPLETED | OUTPATIENT
Start: 2020-01-01 | End: 2020-01-01

## 2020-01-01 RX ORDER — DEXTROSE 50 % IN WATER 50 %
12.5 SYRINGE (ML) INTRAVENOUS ONCE
Refills: 0 | Status: DISCONTINUED | OUTPATIENT
Start: 2020-01-01 | End: 2020-01-01

## 2020-01-01 RX ORDER — METOPROLOL TARTRATE 50 MG
12.5 TABLET ORAL ONCE
Refills: 0 | Status: COMPLETED | OUTPATIENT
Start: 2020-01-01 | End: 2020-01-01

## 2020-01-01 RX ORDER — APIXABAN 2.5 MG/1
5 TABLET, FILM COATED ORAL EVERY 12 HOURS
Refills: 0 | Status: DISCONTINUED | OUTPATIENT
Start: 2020-01-01 | End: 2020-01-01

## 2020-01-01 RX ORDER — POTASSIUM CHLORIDE 20 MEQ
20 PACKET (EA) ORAL ONCE
Refills: 0 | Status: COMPLETED | OUTPATIENT
Start: 2020-01-01 | End: 2020-01-01

## 2020-01-01 RX ORDER — PANTOPRAZOLE SODIUM 20 MG/1
40 TABLET, DELAYED RELEASE ORAL DAILY
Refills: 0 | Status: DISCONTINUED | OUTPATIENT
Start: 2020-01-01 | End: 2020-01-01

## 2020-01-01 RX ORDER — ATORVASTATIN CALCIUM 80 MG/1
1 TABLET, FILM COATED ORAL
Qty: 0 | Refills: 0 | DISCHARGE
Start: 2020-01-01

## 2020-01-01 RX ORDER — ASPIRIN/CALCIUM CARB/MAGNESIUM 324 MG
1 TABLET ORAL
Qty: 0 | Refills: 0 | DISCHARGE
Start: 2020-01-01

## 2020-01-01 RX ORDER — MAGNESIUM OXIDE 400 MG ORAL TABLET 241.3 MG
400 TABLET ORAL ONCE
Refills: 0 | Status: COMPLETED | OUTPATIENT
Start: 2020-01-01 | End: 2020-01-01

## 2020-01-01 RX ORDER — ASPIRIN/CALCIUM CARB/MAGNESIUM 324 MG
243 TABLET ORAL ONCE
Refills: 0 | Status: COMPLETED | OUTPATIENT
Start: 2020-01-01 | End: 2020-01-01

## 2020-01-01 RX ORDER — METOPROLOL TARTRATE 50 MG/1
50 TABLET, FILM COATED ORAL
Qty: 180 | Refills: 3 | Status: ACTIVE | COMMUNITY

## 2020-01-01 RX ORDER — DILTIAZEM HCL 120 MG
10 CAPSULE, EXT RELEASE 24 HR ORAL ONCE
Refills: 0 | Status: COMPLETED | OUTPATIENT
Start: 2020-01-01 | End: 2020-01-01

## 2020-01-01 RX ORDER — CHLORHEXIDINE GLUCONATE 213 G/1000ML
1 SOLUTION TOPICAL ONCE
Refills: 0 | Status: COMPLETED | OUTPATIENT
Start: 2020-01-01 | End: 2020-01-01

## 2020-01-01 RX ORDER — MAGNESIUM OXIDE 400 MG ORAL TABLET 241.3 MG
800 TABLET ORAL ONCE
Refills: 0 | Status: COMPLETED | OUTPATIENT
Start: 2020-01-01 | End: 2020-01-01

## 2020-01-01 RX ORDER — TORSEMIDE 10 MG/1
10 TABLET ORAL
Qty: 90 | Refills: 1 | Status: DISCONTINUED | COMMUNITY
Start: 2019-07-09 | End: 2020-01-01

## 2020-01-01 RX ORDER — ATORVASTATIN CALCIUM 80 MG/1
40 TABLET, FILM COATED ORAL AT BEDTIME
Refills: 0 | Status: DISCONTINUED | OUTPATIENT
Start: 2020-01-01 | End: 2020-01-01

## 2020-01-01 RX ORDER — ACETAMINOPHEN 500 MG
2 TABLET ORAL
Qty: 0 | Refills: 0 | DISCHARGE
Start: 2020-01-01

## 2020-01-01 RX ORDER — AMIODARONE HYDROCHLORIDE 400 MG/1
1 TABLET ORAL
Qty: 900 | Refills: 0 | Status: DISCONTINUED | OUTPATIENT
Start: 2020-01-01 | End: 2020-01-01

## 2020-01-01 RX ORDER — SODIUM CHLORIDE 9 MG/ML
1000 INJECTION, SOLUTION INTRAVENOUS
Refills: 0 | Status: DISCONTINUED | OUTPATIENT
Start: 2020-01-01 | End: 2020-01-01

## 2020-01-01 RX ORDER — POTASSIUM CHLORIDE 20 MEQ
40 PACKET (EA) ORAL ONCE
Refills: 0 | Status: COMPLETED | OUTPATIENT
Start: 2020-01-01 | End: 2020-01-01

## 2020-01-01 RX ORDER — METOPROLOL TARTRATE 50 MG
5 TABLET ORAL ONCE
Refills: 0 | Status: COMPLETED | OUTPATIENT
Start: 2020-01-01 | End: 2020-01-01

## 2020-01-01 RX ORDER — NITROGLYCERIN 6.5 MG
0.4 CAPSULE, EXTENDED RELEASE ORAL
Refills: 0 | Status: DISCONTINUED | OUTPATIENT
Start: 2020-01-01 | End: 2020-01-01

## 2020-01-01 RX ORDER — ACETAMINOPHEN 325 MG/1
325 TABLET ORAL EVERY 6 HOURS
Refills: 0 | Status: ACTIVE | COMMUNITY
Start: 2020-01-01

## 2020-01-01 RX ORDER — GLUCAGON INJECTION, SOLUTION 0.5 MG/.1ML
1 INJECTION, SOLUTION SUBCUTANEOUS ONCE
Refills: 0 | Status: DISCONTINUED | OUTPATIENT
Start: 2020-01-01 | End: 2020-01-01

## 2020-01-01 RX ORDER — HEPARIN SODIUM 5000 [USP'U]/ML
1600 INJECTION INTRAVENOUS; SUBCUTANEOUS
Qty: 25000 | Refills: 0 | Status: DISCONTINUED | OUTPATIENT
Start: 2020-01-01 | End: 2020-01-01

## 2020-01-01 RX ORDER — FUROSEMIDE 40 MG
40 TABLET ORAL ONCE
Refills: 0 | Status: COMPLETED | OUTPATIENT
Start: 2020-01-01 | End: 2020-01-01

## 2020-01-01 RX ORDER — ASPIRIN 81 MG/1
81 TABLET ORAL DAILY
Qty: 90 | Refills: 0 | Status: ACTIVE | COMMUNITY
Start: 2020-01-01

## 2020-01-01 RX ORDER — PANTOPRAZOLE SODIUM 20 MG/1
40 TABLET, DELAYED RELEASE ORAL EVERY 12 HOURS
Refills: 0 | Status: DISCONTINUED | OUTPATIENT
Start: 2020-01-01 | End: 2020-01-01

## 2020-01-01 RX ORDER — SODIUM CHLORIDE 9 MG/ML
1000 INJECTION INTRAMUSCULAR; INTRAVENOUS; SUBCUTANEOUS
Refills: 0 | Status: DISCONTINUED | OUTPATIENT
Start: 2020-01-01 | End: 2020-01-01

## 2020-01-01 RX ORDER — CLOPIDOGREL BISULFATE 75 MG/1
75 TABLET, FILM COATED ORAL DAILY
Refills: 0 | Status: DISCONTINUED | OUTPATIENT
Start: 2020-01-01 | End: 2020-01-01

## 2020-01-01 RX ORDER — ERTAPENEM SODIUM 1 G/1
1000 INJECTION, POWDER, LYOPHILIZED, FOR SOLUTION INTRAMUSCULAR; INTRAVENOUS ONCE
Refills: 0 | Status: COMPLETED | OUTPATIENT
Start: 2020-01-01 | End: 2020-01-01

## 2020-01-01 RX ORDER — FUROSEMIDE 40 MG
20 TABLET ORAL ONCE
Refills: 0 | Status: COMPLETED | OUTPATIENT
Start: 2020-01-01 | End: 2020-01-01

## 2020-01-01 RX ORDER — DILTIAZEM HYDROCHLORIDE 120 MG/1
120 TABLET, EXTENDED RELEASE ORAL DAILY
Refills: 0 | Status: DISCONTINUED | COMMUNITY
Start: 2020-01-01 | End: 2020-01-01

## 2020-01-01 RX ORDER — LATANOPROST 0.05 MG/ML
1 SOLUTION/ DROPS OPHTHALMIC; TOPICAL AT BEDTIME
Refills: 0 | Status: DISCONTINUED | OUTPATIENT
Start: 2020-01-01 | End: 2020-01-01

## 2020-01-01 RX ORDER — METOPROLOL TARTRATE 50 MG
2.5 TABLET ORAL ONCE
Refills: 0 | Status: COMPLETED | OUTPATIENT
Start: 2020-01-01 | End: 2020-01-01

## 2020-01-01 RX ORDER — CEFAZOLIN SODIUM 1 G
2000 VIAL (EA) INJECTION EVERY 8 HOURS
Refills: 0 | Status: DISCONTINUED | OUTPATIENT
Start: 2020-01-01 | End: 2020-01-01

## 2020-01-01 RX ORDER — APIXABAN 2.5 MG/1
2.5 TABLET, FILM COATED ORAL EVERY 12 HOURS
Refills: 0 | Status: DISCONTINUED | OUTPATIENT
Start: 2020-01-01 | End: 2020-01-01

## 2020-01-01 RX ORDER — HEPARIN SODIUM 5000 [USP'U]/ML
700 INJECTION INTRAVENOUS; SUBCUTANEOUS
Qty: 25000 | Refills: 0 | Status: DISCONTINUED | OUTPATIENT
Start: 2020-01-01 | End: 2020-01-01

## 2020-01-01 RX ORDER — CLOPIDOGREL BISULFATE 75 MG/1
1 TABLET, FILM COATED ORAL
Qty: 0 | Refills: 0 | DISCHARGE

## 2020-01-01 RX ORDER — MAGNESIUM HYDROXIDE 400 MG/1
30 TABLET, CHEWABLE ORAL DAILY
Refills: 0 | Status: DISCONTINUED | OUTPATIENT
Start: 2020-01-01 | End: 2020-01-01

## 2020-01-01 RX ORDER — SODIUM CHLORIDE 9 MG/ML
3 INJECTION INTRAMUSCULAR; INTRAVENOUS; SUBCUTANEOUS EVERY 8 HOURS
Refills: 0 | Status: DISCONTINUED | OUTPATIENT
Start: 2020-01-01 | End: 2020-01-01

## 2020-01-01 RX ORDER — METOPROLOL TARTRATE 50 MG
25 TABLET ORAL EVERY 6 HOURS
Refills: 0 | Status: DISCONTINUED | OUTPATIENT
Start: 2020-01-01 | End: 2020-01-01

## 2020-01-01 RX ORDER — HEPARIN SODIUM 5000 [USP'U]/ML
7500 INJECTION INTRAVENOUS; SUBCUTANEOUS EVERY 8 HOURS
Refills: 0 | Status: DISCONTINUED | OUTPATIENT
Start: 2020-01-01 | End: 2020-01-01

## 2020-01-01 RX ORDER — METOPROLOL TARTRATE 50 MG
1 TABLET ORAL
Qty: 60 | Refills: 0
Start: 2020-01-01 | End: 2020-01-01

## 2020-01-01 RX ORDER — DILTIAZEM HCL 120 MG
5 CAPSULE, EXT RELEASE 24 HR ORAL ONCE
Refills: 0 | Status: COMPLETED | OUTPATIENT
Start: 2020-01-01 | End: 2020-01-01

## 2020-01-01 RX ORDER — AMIODARONE HYDROCHLORIDE 400 MG/1
TABLET ORAL
Refills: 0 | Status: DISCONTINUED | OUTPATIENT
Start: 2020-01-01 | End: 2020-01-01

## 2020-01-01 RX ORDER — CEFAZOLIN SODIUM 1 G
2000 VIAL (EA) INJECTION EVERY 8 HOURS
Refills: 0 | Status: COMPLETED | OUTPATIENT
Start: 2020-01-01 | End: 2020-01-01

## 2020-01-01 RX ORDER — DEXTROSE 50 % IN WATER 50 %
15 SYRINGE (ML) INTRAVENOUS ONCE
Refills: 0 | Status: DISCONTINUED | OUTPATIENT
Start: 2020-01-01 | End: 2020-01-01

## 2020-01-01 RX ORDER — PANTOPRAZOLE SODIUM 20 MG/1
40 TABLET, DELAYED RELEASE ORAL
Refills: 0 | Status: DISCONTINUED | OUTPATIENT
Start: 2020-01-01 | End: 2020-01-01

## 2020-01-01 RX ORDER — AMIODARONE HYDROCHLORIDE 400 MG/1
400 TABLET ORAL EVERY 8 HOURS
Refills: 0 | Status: COMPLETED | OUTPATIENT
Start: 2020-01-01 | End: 2020-01-01

## 2020-01-01 RX ORDER — POTASSIUM CHLORIDE 20 MEQ
10 PACKET (EA) ORAL EVERY 12 HOURS
Refills: 0 | Status: DISCONTINUED | OUTPATIENT
Start: 2020-01-01 | End: 2020-01-01

## 2020-01-01 RX ORDER — POTASSIUM CHLORIDE 20 MEQ
20 PACKET (EA) ORAL
Refills: 0 | Status: DISCONTINUED | OUTPATIENT
Start: 2020-01-01 | End: 2020-01-01

## 2020-01-01 RX ORDER — OMEPRAZOLE 10 MG/1
1 CAPSULE, DELAYED RELEASE ORAL
Qty: 0 | Refills: 0 | DISCHARGE

## 2020-01-01 RX ORDER — CEFAZOLIN SODIUM 1 G
1000 VIAL (EA) INJECTION ONCE
Refills: 0 | Status: COMPLETED | OUTPATIENT
Start: 2020-01-01 | End: 2020-01-01

## 2020-01-01 RX ORDER — CHLORHEXIDINE GLUCONATE 213 G/1000ML
15 SOLUTION TOPICAL ONCE
Refills: 0 | Status: COMPLETED | OUTPATIENT
Start: 2020-01-01 | End: 2020-01-01

## 2020-01-01 RX ORDER — HEPARIN SODIUM 5000 [USP'U]/ML
1300 INJECTION INTRAVENOUS; SUBCUTANEOUS
Qty: 25000 | Refills: 0 | Status: DISCONTINUED | OUTPATIENT
Start: 2020-01-01 | End: 2020-01-01

## 2020-01-01 RX ORDER — DILTIAZEM HCL 120 MG
120 CAPSULE, EXT RELEASE 24 HR ORAL DAILY
Refills: 0 | Status: DISCONTINUED | OUTPATIENT
Start: 2020-01-01 | End: 2020-01-01

## 2020-01-01 RX ORDER — AMIODARONE HYDROCHLORIDE 400 MG/1
0.5 TABLET ORAL
Qty: 900 | Refills: 0 | Status: DISCONTINUED | OUTPATIENT
Start: 2020-01-01 | End: 2020-01-01

## 2020-01-01 RX ORDER — CHLORHEXIDINE GLUCONATE 213 G/1000ML
1 SOLUTION TOPICAL
Refills: 0 | Status: DISCONTINUED | OUTPATIENT
Start: 2020-01-01 | End: 2020-01-01

## 2020-01-01 RX ORDER — CEFAZOLIN SODIUM 1 G
1000 VIAL (EA) INJECTION EVERY 8 HOURS
Refills: 0 | Status: DISCONTINUED | OUTPATIENT
Start: 2020-01-01 | End: 2020-01-01

## 2020-01-01 RX ORDER — FENTANYL CITRATE 50 UG/ML
25 INJECTION INTRAVENOUS ONCE
Refills: 0 | Status: DISCONTINUED | OUTPATIENT
Start: 2020-01-01 | End: 2020-01-01

## 2020-01-01 RX ORDER — SODIUM CHLORIDE 9 MG/ML
500 INJECTION, SOLUTION INTRAVENOUS ONCE
Refills: 0 | Status: COMPLETED | OUTPATIENT
Start: 2020-01-01 | End: 2020-01-01

## 2020-01-01 RX ORDER — MAGNESIUM SULFATE 500 MG/ML
2 VIAL (ML) INJECTION ONCE
Refills: 0 | Status: DISCONTINUED | OUTPATIENT
Start: 2020-01-01 | End: 2020-01-01

## 2020-01-01 RX ORDER — ATORVASTATIN CALCIUM 80 MG/1
1 TABLET, FILM COATED ORAL
Qty: 30 | Refills: 0
Start: 2020-01-01 | End: 2020-01-01

## 2020-01-01 RX ORDER — DILTIAZEM HCL 120 MG
1 CAPSULE, EXT RELEASE 24 HR ORAL
Qty: 0 | Refills: 0 | DISCHARGE
Start: 2020-01-01

## 2020-01-01 RX ORDER — METOPROLOL TARTRATE 50 MG
37.5 TABLET ORAL EVERY 12 HOURS
Refills: 0 | Status: DISCONTINUED | OUTPATIENT
Start: 2020-01-01 | End: 2020-01-01

## 2020-01-01 RX ORDER — DILTIAZEM HCL 120 MG
1 CAPSULE, EXT RELEASE 24 HR ORAL
Qty: 30 | Refills: 0
Start: 2020-01-01 | End: 2020-01-01

## 2020-01-01 RX ORDER — INSULIN LISPRO 100/ML
VIAL (ML) SUBCUTANEOUS
Refills: 0 | Status: DISCONTINUED | OUTPATIENT
Start: 2020-01-01 | End: 2020-01-01

## 2020-01-01 RX ORDER — APIXABAN 5 MG/1
5 TABLET, FILM COATED ORAL
Qty: 90 | Refills: 0 | Status: ACTIVE | COMMUNITY
Start: 2020-01-01

## 2020-01-01 RX ORDER — ATORVASTATIN CALCIUM 80 MG/1
1 TABLET, FILM COATED ORAL
Qty: 0 | Refills: 0 | DISCHARGE

## 2020-01-01 RX ORDER — OMEPRAZOLE 10 MG/1
1 CAPSULE, DELAYED RELEASE ORAL
Qty: 30 | Refills: 0
Start: 2020-01-01 | End: 2020-01-01

## 2020-01-01 RX ORDER — LOSARTAN/HYDROCHLOROTHIAZIDE 100MG-25MG
1 TABLET ORAL
Qty: 0 | Refills: 0 | DISCHARGE

## 2020-01-01 RX ORDER — DILTIAZEM HCL 120 MG
30 CAPSULE, EXT RELEASE 24 HR ORAL ONCE
Refills: 0 | Status: COMPLETED | OUTPATIENT
Start: 2020-01-01 | End: 2020-01-01

## 2020-01-01 RX ORDER — DEXTROSE 50 % IN WATER 50 %
25 SYRINGE (ML) INTRAVENOUS ONCE
Refills: 0 | Status: DISCONTINUED | OUTPATIENT
Start: 2020-01-01 | End: 2020-01-01

## 2020-01-01 RX ORDER — METOPROLOL TARTRATE 50 MG
1 TABLET ORAL
Qty: 0 | Refills: 0 | DISCHARGE
Start: 2020-01-01

## 2020-01-01 RX ORDER — METOPROLOL TARTRATE 50 MG
12.5 TABLET ORAL EVERY 12 HOURS
Refills: 0 | Status: DISCONTINUED | OUTPATIENT
Start: 2020-01-01 | End: 2020-01-01

## 2020-01-01 RX ORDER — ACETAMINOPHEN 500 MG
2 TABLET ORAL
Qty: 240 | Refills: 0
Start: 2020-01-01 | End: 2020-01-01

## 2020-01-01 RX ORDER — AMIODARONE HYDROCHLORIDE 400 MG/1
200 TABLET ORAL DAILY
Refills: 0 | Status: DISCONTINUED | OUTPATIENT
Start: 2020-01-01 | End: 2020-01-01

## 2020-01-01 RX ORDER — MEPERIDINE HYDROCHLORIDE 50 MG/ML
25 INJECTION INTRAMUSCULAR; INTRAVENOUS; SUBCUTANEOUS ONCE
Refills: 0 | Status: DISCONTINUED | OUTPATIENT
Start: 2020-01-01 | End: 2020-01-01

## 2020-01-01 RX ORDER — FUROSEMIDE 40 MG
20 TABLET ORAL EVERY 8 HOURS
Refills: 0 | Status: DISCONTINUED | OUTPATIENT
Start: 2020-01-01 | End: 2020-01-01

## 2020-01-01 RX ORDER — FUROSEMIDE 40 MG
20 TABLET ORAL EVERY 12 HOURS
Refills: 0 | Status: DISCONTINUED | OUTPATIENT
Start: 2020-01-01 | End: 2020-01-01

## 2020-01-01 RX ORDER — HEPARIN SODIUM 5000 [USP'U]/ML
INJECTION INTRAVENOUS; SUBCUTANEOUS
Qty: 25000 | Refills: 0 | Status: DISCONTINUED | OUTPATIENT
Start: 2020-01-01 | End: 2020-01-01

## 2020-01-01 RX ORDER — CLOPIDOGREL BISULFATE 75 MG/1
300 TABLET, FILM COATED ORAL ONCE
Refills: 0 | Status: COMPLETED | OUTPATIENT
Start: 2020-01-01 | End: 2020-01-01

## 2020-01-01 RX ORDER — HEPARIN SODIUM 5000 [USP'U]/ML
1200 INJECTION INTRAVENOUS; SUBCUTANEOUS
Qty: 25000 | Refills: 0 | Status: DISCONTINUED | OUTPATIENT
Start: 2020-01-01 | End: 2020-01-01

## 2020-01-01 RX ORDER — NISOLDIPINE 25.5 MG/1
1 TABLET, FILM COATED, EXTENDED RELEASE ORAL
Qty: 0 | Refills: 0 | DISCHARGE

## 2020-01-01 RX ORDER — SENNA PLUS 8.6 MG/1
2 TABLET ORAL
Qty: 0 | Refills: 0 | DISCHARGE
Start: 2020-01-01

## 2020-01-01 RX ORDER — AMIODARONE HYDROCHLORIDE 400 MG/1
0.03 TABLET ORAL
Qty: 900 | Refills: 0 | Status: DISCONTINUED | OUTPATIENT
Start: 2020-01-01 | End: 2020-01-01

## 2020-01-01 RX ORDER — ASPIRIN/CALCIUM CARB/MAGNESIUM 324 MG
1 TABLET ORAL
Qty: 30 | Refills: 0
Start: 2020-01-01 | End: 2020-01-01

## 2020-01-01 RX ORDER — ALBUMIN HUMAN 25 %
50 VIAL (ML) INTRAVENOUS
Refills: 0 | Status: COMPLETED | OUTPATIENT
Start: 2020-01-01 | End: 2020-01-01

## 2020-01-01 RX ORDER — FUROSEMIDE 40 MG
10 TABLET ORAL ONCE
Refills: 0 | Status: COMPLETED | OUTPATIENT
Start: 2020-01-01 | End: 2020-01-01

## 2020-01-01 RX ORDER — METOPROLOL TARTRATE 50 MG
12.5 TABLET ORAL EVERY 6 HOURS
Refills: 0 | Status: DISCONTINUED | OUTPATIENT
Start: 2020-01-01 | End: 2020-01-01

## 2020-01-01 RX ORDER — APIXABAN 2.5 MG/1
1 TABLET, FILM COATED ORAL
Qty: 0 | Refills: 0 | DISCHARGE
Start: 2020-01-01

## 2020-01-01 RX ORDER — CHLORHEXIDINE GLUCONATE 213 G/1000ML
5 SOLUTION TOPICAL EVERY 4 HOURS
Refills: 0 | Status: DISCONTINUED | OUTPATIENT
Start: 2020-01-01 | End: 2020-01-01

## 2020-01-01 RX ORDER — ALBUMIN HUMAN 25 %
250 VIAL (ML) INTRAVENOUS ONCE
Refills: 0 | Status: COMPLETED | OUTPATIENT
Start: 2020-01-01 | End: 2020-01-01

## 2020-01-01 RX ORDER — METOPROLOL TARTRATE 50 MG
5 TABLET ORAL EVERY 6 HOURS
Refills: 0 | Status: DISCONTINUED | OUTPATIENT
Start: 2020-01-01 | End: 2020-01-01

## 2020-01-01 RX ORDER — AMIODARONE HYDROCHLORIDE 400 MG/1
150 TABLET ORAL ONCE
Refills: 0 | Status: DISCONTINUED | OUTPATIENT
Start: 2020-01-01 | End: 2020-01-01

## 2020-01-01 RX ORDER — APIXABAN 2.5 MG/1
1 TABLET, FILM COATED ORAL
Qty: 60 | Refills: 0
Start: 2020-01-01 | End: 2020-01-01

## 2020-01-01 RX ADMIN — Medication 81 MILLIGRAM(S): at 11:55

## 2020-01-01 RX ADMIN — APIXABAN 5 MILLIGRAM(S): 2.5 TABLET, FILM COATED ORAL at 06:41

## 2020-01-01 RX ADMIN — SENNA PLUS 2 TABLET(S): 8.6 TABLET ORAL at 21:06

## 2020-01-01 RX ADMIN — AMIODARONE HYDROCHLORIDE 400 MILLIGRAM(S): 400 TABLET ORAL at 14:23

## 2020-01-01 RX ADMIN — Medication 12.5 MILLIGRAM(S): at 16:30

## 2020-01-01 RX ADMIN — AMIODARONE HYDROCHLORIDE 400 MILLIGRAM(S): 400 TABLET ORAL at 05:30

## 2020-01-01 RX ADMIN — Medication 25 MILLIGRAM(S): at 06:15

## 2020-01-01 RX ADMIN — Medication 2: at 16:28

## 2020-01-01 RX ADMIN — ATORVASTATIN CALCIUM 40 MILLIGRAM(S): 80 TABLET, FILM COATED ORAL at 23:00

## 2020-01-01 RX ADMIN — AMIODARONE HYDROCHLORIDE 400 MILLIGRAM(S): 400 TABLET ORAL at 21:05

## 2020-01-01 RX ADMIN — SODIUM CHLORIDE 3 MILLILITER(S): 9 INJECTION INTRAMUSCULAR; INTRAVENOUS; SUBCUTANEOUS at 13:25

## 2020-01-01 RX ADMIN — CHLORHEXIDINE GLUCONATE 1 APPLICATION(S): 213 SOLUTION TOPICAL at 06:18

## 2020-01-01 RX ADMIN — APIXABAN 2.5 MILLIGRAM(S): 2.5 TABLET, FILM COATED ORAL at 17:50

## 2020-01-01 RX ADMIN — APIXABAN 2.5 MILLIGRAM(S): 2.5 TABLET, FILM COATED ORAL at 05:14

## 2020-01-01 RX ADMIN — PANTOPRAZOLE SODIUM 40 MILLIGRAM(S): 20 TABLET, DELAYED RELEASE ORAL at 06:44

## 2020-01-01 RX ADMIN — Medication 12.5 MILLIGRAM(S): at 03:14

## 2020-01-01 RX ADMIN — Medication 30 MILLIGRAM(S): at 12:18

## 2020-01-01 RX ADMIN — CHLORHEXIDINE GLUCONATE 1 APPLICATION(S): 213 SOLUTION TOPICAL at 05:30

## 2020-01-01 RX ADMIN — SODIUM CHLORIDE 3 MILLILITER(S): 9 INJECTION INTRAMUSCULAR; INTRAVENOUS; SUBCUTANEOUS at 21:17

## 2020-01-01 RX ADMIN — APIXABAN 2.5 MILLIGRAM(S): 2.5 TABLET, FILM COATED ORAL at 19:55

## 2020-01-01 RX ADMIN — Medication 100 MILLIGRAM(S): at 21:34

## 2020-01-01 RX ADMIN — ATORVASTATIN CALCIUM 40 MILLIGRAM(S): 80 TABLET, FILM COATED ORAL at 21:15

## 2020-01-01 RX ADMIN — LATANOPROST 1 DROP(S): 0.05 SOLUTION/ DROPS OPHTHALMIC; TOPICAL at 22:45

## 2020-01-01 RX ADMIN — PANTOPRAZOLE SODIUM 40 MILLIGRAM(S): 20 TABLET, DELAYED RELEASE ORAL at 06:02

## 2020-01-01 RX ADMIN — ATORVASTATIN CALCIUM 40 MILLIGRAM(S): 80 TABLET, FILM COATED ORAL at 22:49

## 2020-01-01 RX ADMIN — Medication 200 GRAM(S): at 20:35

## 2020-01-01 RX ADMIN — MAGNESIUM OXIDE 400 MG ORAL TABLET 800 MILLIGRAM(S): 241.3 TABLET ORAL at 10:15

## 2020-01-01 RX ADMIN — Medication 81 MILLIGRAM(S): at 12:15

## 2020-01-01 RX ADMIN — CHLORHEXIDINE GLUCONATE 1 APPLICATION(S): 213 SOLUTION TOPICAL at 05:50

## 2020-01-01 RX ADMIN — Medication 25 MILLIGRAM(S): at 18:20

## 2020-01-01 RX ADMIN — SODIUM CHLORIDE 3 MILLILITER(S): 9 INJECTION INTRAMUSCULAR; INTRAVENOUS; SUBCUTANEOUS at 09:57

## 2020-01-01 RX ADMIN — PANTOPRAZOLE SODIUM 40 MILLIGRAM(S): 20 TABLET, DELAYED RELEASE ORAL at 07:06

## 2020-01-01 RX ADMIN — APIXABAN 2.5 MILLIGRAM(S): 2.5 TABLET, FILM COATED ORAL at 18:20

## 2020-01-01 RX ADMIN — Medication 20 MILLIGRAM(S): at 17:57

## 2020-01-01 RX ADMIN — Medication 25 MILLIGRAM(S): at 06:03

## 2020-01-01 RX ADMIN — APIXABAN 2.5 MILLIGRAM(S): 2.5 TABLET, FILM COATED ORAL at 06:21

## 2020-01-01 RX ADMIN — Medication 81 MILLIGRAM(S): at 12:25

## 2020-01-01 RX ADMIN — ATORVASTATIN CALCIUM 40 MILLIGRAM(S): 80 TABLET, FILM COATED ORAL at 22:15

## 2020-01-01 RX ADMIN — AMIODARONE HYDROCHLORIDE 133.33 MILLIGRAM(S): 400 TABLET ORAL at 00:30

## 2020-01-01 RX ADMIN — ATORVASTATIN CALCIUM 40 MILLIGRAM(S): 80 TABLET, FILM COATED ORAL at 22:04

## 2020-01-01 RX ADMIN — AMIODARONE HYDROCHLORIDE 400 MILLIGRAM(S): 400 TABLET ORAL at 14:12

## 2020-01-01 RX ADMIN — Medication 2: at 22:31

## 2020-01-01 RX ADMIN — SENNA PLUS 2 TABLET(S): 8.6 TABLET ORAL at 21:30

## 2020-01-01 RX ADMIN — Medication 37.5 MILLIGRAM(S): at 17:26

## 2020-01-01 RX ADMIN — Medication 30 MILLIGRAM(S): at 05:14

## 2020-01-01 RX ADMIN — Medication 40 MILLIEQUIVALENT(S): at 10:00

## 2020-01-01 RX ADMIN — Medication 37.5 MILLIGRAM(S): at 18:33

## 2020-01-01 RX ADMIN — MAGNESIUM OXIDE 400 MG ORAL TABLET 400 MILLIGRAM(S): 241.3 TABLET ORAL at 07:53

## 2020-01-01 RX ADMIN — PANTOPRAZOLE SODIUM 40 MILLIGRAM(S): 20 TABLET, DELAYED RELEASE ORAL at 05:32

## 2020-01-01 RX ADMIN — Medication 120 MILLIGRAM(S): at 06:41

## 2020-01-01 RX ADMIN — PANTOPRAZOLE SODIUM 40 MILLIGRAM(S): 20 TABLET, DELAYED RELEASE ORAL at 06:18

## 2020-01-01 RX ADMIN — CHLORHEXIDINE GLUCONATE 5 MILLILITER(S): 213 SOLUTION TOPICAL at 13:38

## 2020-01-01 RX ADMIN — APIXABAN 5 MILLIGRAM(S): 2.5 TABLET, FILM COATED ORAL at 19:06

## 2020-01-01 RX ADMIN — SODIUM CHLORIDE 50 MILLILITER(S): 9 INJECTION INTRAMUSCULAR; INTRAVENOUS; SUBCUTANEOUS at 18:53

## 2020-01-01 RX ADMIN — APIXABAN 2.5 MILLIGRAM(S): 2.5 TABLET, FILM COATED ORAL at 05:28

## 2020-01-01 RX ADMIN — Medication 37.5 MILLIGRAM(S): at 18:16

## 2020-01-01 RX ADMIN — PANTOPRAZOLE SODIUM 40 MILLIGRAM(S): 20 TABLET, DELAYED RELEASE ORAL at 06:20

## 2020-01-01 RX ADMIN — Medication 10 MILLIEQUIVALENT(S): at 06:09

## 2020-01-01 RX ADMIN — Medication 50 MILLILITER(S): at 14:15

## 2020-01-01 RX ADMIN — HEPARIN SODIUM 7500 UNIT(S): 5000 INJECTION INTRAVENOUS; SUBCUTANEOUS at 06:53

## 2020-01-01 RX ADMIN — PANTOPRAZOLE SODIUM 40 MILLIGRAM(S): 20 TABLET, DELAYED RELEASE ORAL at 06:41

## 2020-01-01 RX ADMIN — Medication 650 MILLIGRAM(S): at 23:26

## 2020-01-01 RX ADMIN — Medication 25 MILLIGRAM(S): at 23:20

## 2020-01-01 RX ADMIN — Medication 20 MILLIEQUIVALENT(S): at 12:39

## 2020-01-01 RX ADMIN — Medication 12.5 MILLIGRAM(S): at 17:57

## 2020-01-01 RX ADMIN — APIXABAN 2.5 MILLIGRAM(S): 2.5 TABLET, FILM COATED ORAL at 06:03

## 2020-01-01 RX ADMIN — CHLORHEXIDINE GLUCONATE 1 APPLICATION(S): 213 SOLUTION TOPICAL at 05:28

## 2020-01-01 RX ADMIN — Medication 81 MILLIGRAM(S): at 12:18

## 2020-01-01 RX ADMIN — Medication 10 MILLIEQUIVALENT(S): at 17:50

## 2020-01-01 RX ADMIN — LATANOPROST 1 DROP(S): 0.05 SOLUTION/ DROPS OPHTHALMIC; TOPICAL at 21:30

## 2020-01-01 RX ADMIN — Medication 120 MILLIGRAM(S): at 14:12

## 2020-01-01 RX ADMIN — SENNA PLUS 2 TABLET(S): 8.6 TABLET ORAL at 22:04

## 2020-01-01 RX ADMIN — LATANOPROST 1 DROP(S): 0.05 SOLUTION/ DROPS OPHTHALMIC; TOPICAL at 01:37

## 2020-01-01 RX ADMIN — Medication 25 MILLIGRAM(S): at 06:29

## 2020-01-01 RX ADMIN — MAGNESIUM OXIDE 400 MG ORAL TABLET 400 MILLIGRAM(S): 241.3 TABLET ORAL at 20:16

## 2020-01-01 RX ADMIN — Medication 125 MILLILITER(S): at 10:33

## 2020-01-01 RX ADMIN — CHLORHEXIDINE GLUCONATE 1 APPLICATION(S): 213 SOLUTION TOPICAL at 09:37

## 2020-01-01 RX ADMIN — Medication 25 MILLIGRAM(S): at 11:55

## 2020-01-01 RX ADMIN — SODIUM CHLORIDE 3 MILLILITER(S): 9 INJECTION INTRAMUSCULAR; INTRAVENOUS; SUBCUTANEOUS at 14:12

## 2020-01-01 RX ADMIN — SODIUM CHLORIDE 50 MILLILITER(S): 9 INJECTION, SOLUTION INTRAVENOUS at 11:30

## 2020-01-01 RX ADMIN — ATORVASTATIN CALCIUM 40 MILLIGRAM(S): 80 TABLET, FILM COATED ORAL at 22:37

## 2020-01-01 RX ADMIN — SENNA PLUS 2 TABLET(S): 8.6 TABLET ORAL at 21:35

## 2020-01-01 RX ADMIN — Medication 37.5 MILLIGRAM(S): at 18:07

## 2020-01-01 RX ADMIN — Medication 12.5 MILLIGRAM(S): at 10:43

## 2020-01-01 RX ADMIN — Medication 12.5 MILLIGRAM(S): at 17:50

## 2020-01-01 RX ADMIN — CEFTRIAXONE 100 MILLIGRAM(S): 500 INJECTION, POWDER, FOR SOLUTION INTRAMUSCULAR; INTRAVENOUS at 20:01

## 2020-01-01 RX ADMIN — SODIUM CHLORIDE 80 MILLILITER(S): 9 INJECTION INTRAMUSCULAR; INTRAVENOUS; SUBCUTANEOUS at 21:30

## 2020-01-01 RX ADMIN — Medication 81 MILLIGRAM(S): at 11:47

## 2020-01-01 RX ADMIN — LATANOPROST 1 DROP(S): 0.05 SOLUTION/ DROPS OPHTHALMIC; TOPICAL at 00:12

## 2020-01-01 RX ADMIN — SODIUM CHLORIDE 3 MILLILITER(S): 9 INJECTION INTRAMUSCULAR; INTRAVENOUS; SUBCUTANEOUS at 13:23

## 2020-01-01 RX ADMIN — ATORVASTATIN CALCIUM 40 MILLIGRAM(S): 80 TABLET, FILM COATED ORAL at 21:35

## 2020-01-01 RX ADMIN — CLOPIDOGREL BISULFATE 300 MILLIGRAM(S): 75 TABLET, FILM COATED ORAL at 09:36

## 2020-01-01 RX ADMIN — LIDOCAINE 2 PATCH: 4 CREAM TOPICAL at 00:00

## 2020-01-01 RX ADMIN — AMIODARONE HYDROCHLORIDE 400 MILLIGRAM(S): 400 TABLET ORAL at 21:35

## 2020-01-01 RX ADMIN — Medication 100 MILLIGRAM(S): at 23:43

## 2020-01-01 RX ADMIN — Medication 25 MILLIGRAM(S): at 06:54

## 2020-01-01 RX ADMIN — ATORVASTATIN CALCIUM 40 MILLIGRAM(S): 80 TABLET, FILM COATED ORAL at 21:06

## 2020-01-01 RX ADMIN — Medication 5 MILLIGRAM(S): at 09:11

## 2020-01-01 RX ADMIN — Medication 250 MILLILITER(S): at 22:27

## 2020-01-01 RX ADMIN — PANTOPRAZOLE SODIUM 40 MILLIGRAM(S): 20 TABLET, DELAYED RELEASE ORAL at 06:29

## 2020-01-01 RX ADMIN — LATANOPROST 1 DROP(S): 0.05 SOLUTION/ DROPS OPHTHALMIC; TOPICAL at 22:27

## 2020-01-01 RX ADMIN — SENNA PLUS 2 TABLET(S): 8.6 TABLET ORAL at 21:03

## 2020-01-01 RX ADMIN — LATANOPROST 1 DROP(S): 0.05 SOLUTION/ DROPS OPHTHALMIC; TOPICAL at 22:16

## 2020-01-01 RX ADMIN — Medication 20 MILLIGRAM(S): at 10:30

## 2020-01-01 RX ADMIN — Medication 10 MILLIGRAM(S): at 16:00

## 2020-01-01 RX ADMIN — Medication 30 MILLIGRAM(S): at 00:52

## 2020-01-01 RX ADMIN — Medication 120 MILLIGRAM(S): at 06:00

## 2020-01-01 RX ADMIN — AMIODARONE HYDROCHLORIDE 400 MILLIGRAM(S): 400 TABLET ORAL at 13:51

## 2020-01-01 RX ADMIN — Medication 120 MILLIGRAM(S): at 07:20

## 2020-01-01 RX ADMIN — Medication 10 MILLIGRAM(S): at 10:24

## 2020-01-01 RX ADMIN — Medication 650 MILLIGRAM(S): at 02:10

## 2020-01-01 RX ADMIN — HEPARIN SODIUM 13 UNIT(S)/HR: 5000 INJECTION INTRAVENOUS; SUBCUTANEOUS at 19:28

## 2020-01-01 RX ADMIN — APIXABAN 5 MILLIGRAM(S): 2.5 TABLET, FILM COATED ORAL at 07:18

## 2020-01-01 RX ADMIN — SODIUM CHLORIDE 3 MILLILITER(S): 9 INJECTION INTRAMUSCULAR; INTRAVENOUS; SUBCUTANEOUS at 21:21

## 2020-01-01 RX ADMIN — Medication 20 MILLIGRAM(S): at 22:30

## 2020-01-01 RX ADMIN — Medication 81 MILLIGRAM(S): at 14:11

## 2020-01-01 RX ADMIN — SENNA PLUS 2 TABLET(S): 8.6 TABLET ORAL at 22:16

## 2020-01-01 RX ADMIN — APIXABAN 2.5 MILLIGRAM(S): 2.5 TABLET, FILM COATED ORAL at 19:05

## 2020-01-01 RX ADMIN — POLYETHYLENE GLYCOL 3350 17 GRAM(S): 17 POWDER, FOR SOLUTION ORAL at 13:39

## 2020-01-01 RX ADMIN — PANTOPRAZOLE SODIUM 40 MILLIGRAM(S): 20 TABLET, DELAYED RELEASE ORAL at 06:03

## 2020-01-01 RX ADMIN — ATORVASTATIN CALCIUM 40 MILLIGRAM(S): 80 TABLET, FILM COATED ORAL at 21:26

## 2020-01-01 RX ADMIN — Medication 30 MILLIGRAM(S): at 19:05

## 2020-01-01 RX ADMIN — PANTOPRAZOLE SODIUM 40 MILLIGRAM(S): 20 TABLET, DELAYED RELEASE ORAL at 07:20

## 2020-01-01 RX ADMIN — Medication 37.5 MILLIGRAM(S): at 06:40

## 2020-01-01 RX ADMIN — Medication 10 MILLIEQUIVALENT(S): at 09:27

## 2020-01-01 RX ADMIN — APIXABAN 5 MILLIGRAM(S): 2.5 TABLET, FILM COATED ORAL at 06:59

## 2020-01-01 RX ADMIN — Medication 5 MILLIGRAM(S): at 22:12

## 2020-01-01 RX ADMIN — HEPARIN SODIUM 11 UNIT(S)/HR: 5000 INJECTION INTRAVENOUS; SUBCUTANEOUS at 11:55

## 2020-01-01 RX ADMIN — SODIUM CHLORIDE 3 MILLILITER(S): 9 INJECTION INTRAMUSCULAR; INTRAVENOUS; SUBCUTANEOUS at 06:50

## 2020-01-01 RX ADMIN — APIXABAN 5 MILLIGRAM(S): 2.5 TABLET, FILM COATED ORAL at 05:36

## 2020-01-01 RX ADMIN — PANTOPRAZOLE SODIUM 40 MILLIGRAM(S): 20 TABLET, DELAYED RELEASE ORAL at 06:00

## 2020-01-01 RX ADMIN — MAGNESIUM OXIDE 400 MG ORAL TABLET 800 MILLIGRAM(S): 241.3 TABLET ORAL at 12:39

## 2020-01-01 RX ADMIN — Medication 30 MILLIGRAM(S): at 17:58

## 2020-01-01 RX ADMIN — Medication 5 MILLIGRAM(S): at 21:48

## 2020-01-01 RX ADMIN — SENNA PLUS 2 TABLET(S): 8.6 TABLET ORAL at 23:26

## 2020-01-01 RX ADMIN — ATORVASTATIN CALCIUM 40 MILLIGRAM(S): 80 TABLET, FILM COATED ORAL at 21:03

## 2020-01-01 RX ADMIN — Medication 2: at 22:04

## 2020-01-01 RX ADMIN — Medication 25 MILLIGRAM(S): at 11:20

## 2020-01-01 RX ADMIN — PANTOPRAZOLE SODIUM 40 MILLIGRAM(S): 20 TABLET, DELAYED RELEASE ORAL at 07:37

## 2020-01-01 RX ADMIN — SENNA PLUS 2 TABLET(S): 8.6 TABLET ORAL at 21:17

## 2020-01-01 RX ADMIN — Medication 5 MILLIGRAM(S): at 19:06

## 2020-01-01 RX ADMIN — Medication 37.5 MILLIGRAM(S): at 18:31

## 2020-01-01 RX ADMIN — Medication 4: at 16:15

## 2020-01-01 RX ADMIN — Medication 81 MILLIGRAM(S): at 12:44

## 2020-01-01 RX ADMIN — POLYETHYLENE GLYCOL 3350 17 GRAM(S): 17 POWDER, FOR SOLUTION ORAL at 11:02

## 2020-01-01 RX ADMIN — Medication 20 MILLIEQUIVALENT(S): at 10:30

## 2020-01-01 RX ADMIN — CHLORHEXIDINE GLUCONATE 1 APPLICATION(S): 213 SOLUTION TOPICAL at 06:39

## 2020-01-01 RX ADMIN — Medication 30 MILLIGRAM(S): at 23:03

## 2020-01-01 RX ADMIN — Medication 120 MILLIGRAM(S): at 05:46

## 2020-01-01 RX ADMIN — Medication 120 MILLIGRAM(S): at 07:07

## 2020-01-01 RX ADMIN — ATORVASTATIN CALCIUM 40 MILLIGRAM(S): 80 TABLET, FILM COATED ORAL at 23:26

## 2020-01-01 RX ADMIN — CHLORHEXIDINE GLUCONATE 5 MILLILITER(S): 213 SOLUTION TOPICAL at 19:07

## 2020-01-01 RX ADMIN — Medication 40 MILLIEQUIVALENT(S): at 10:04

## 2020-01-01 RX ADMIN — LIDOCAINE 2 PATCH: 4 CREAM TOPICAL at 19:41

## 2020-01-01 RX ADMIN — SODIUM CHLORIDE 50 MILLILITER(S): 9 INJECTION, SOLUTION INTRAVENOUS at 13:30

## 2020-01-01 RX ADMIN — Medication 12.5 MILLIGRAM(S): at 21:14

## 2020-01-01 RX ADMIN — AMIODARONE HYDROCHLORIDE 600 MILLIGRAM(S): 400 TABLET ORAL at 17:35

## 2020-01-01 RX ADMIN — AMIODARONE HYDROCHLORIDE 400 MILLIGRAM(S): 400 TABLET ORAL at 06:54

## 2020-01-01 RX ADMIN — Medication 100 MILLIGRAM(S): at 06:17

## 2020-01-01 RX ADMIN — Medication 10 MILLIGRAM(S): at 07:35

## 2020-01-01 RX ADMIN — Medication 20 MILLIGRAM(S): at 05:14

## 2020-01-01 RX ADMIN — Medication 37.5 MILLIGRAM(S): at 06:35

## 2020-01-01 RX ADMIN — SENNA PLUS 2 TABLET(S): 8.6 TABLET ORAL at 22:45

## 2020-01-01 RX ADMIN — Medication 25 MILLIGRAM(S): at 06:41

## 2020-01-01 RX ADMIN — Medication 81 MILLIGRAM(S): at 12:13

## 2020-01-01 RX ADMIN — APIXABAN 5 MILLIGRAM(S): 2.5 TABLET, FILM COATED ORAL at 05:48

## 2020-01-01 RX ADMIN — ATORVASTATIN CALCIUM 40 MILLIGRAM(S): 80 TABLET, FILM COATED ORAL at 21:46

## 2020-01-01 RX ADMIN — Medication 37.5 MILLIGRAM(S): at 06:44

## 2020-01-01 RX ADMIN — Medication 37.5 MILLIGRAM(S): at 06:17

## 2020-01-01 RX ADMIN — PANTOPRAZOLE SODIUM 40 MILLIGRAM(S): 20 TABLET, DELAYED RELEASE ORAL at 18:15

## 2020-01-01 RX ADMIN — Medication 20 MILLIEQUIVALENT(S): at 20:16

## 2020-01-01 RX ADMIN — Medication 81 MILLIGRAM(S): at 12:12

## 2020-01-01 RX ADMIN — Medication 650 MILLIGRAM(S): at 03:21

## 2020-01-01 RX ADMIN — PANTOPRAZOLE SODIUM 40 MILLIGRAM(S): 20 TABLET, DELAYED RELEASE ORAL at 19:00

## 2020-01-01 RX ADMIN — Medication 5 MILLIGRAM(S): at 05:23

## 2020-01-01 RX ADMIN — AMIODARONE HYDROCHLORIDE 200 MILLIGRAM(S): 400 TABLET ORAL at 05:49

## 2020-01-01 RX ADMIN — PANTOPRAZOLE SODIUM 40 MILLIGRAM(S): 20 TABLET, DELAYED RELEASE ORAL at 18:33

## 2020-01-01 RX ADMIN — Medication 2.5 MILLIGRAM(S): at 16:45

## 2020-01-01 RX ADMIN — CHLORHEXIDINE GLUCONATE 1 APPLICATION(S): 213 SOLUTION TOPICAL at 05:15

## 2020-01-01 RX ADMIN — Medication 81 MILLIGRAM(S): at 10:24

## 2020-01-01 RX ADMIN — CHLORHEXIDINE GLUCONATE 1 APPLICATION(S): 213 SOLUTION TOPICAL at 19:30

## 2020-01-01 RX ADMIN — POLYETHYLENE GLYCOL 3350 17 GRAM(S): 17 POWDER, FOR SOLUTION ORAL at 12:18

## 2020-01-01 RX ADMIN — LIDOCAINE 2 PATCH: 4 CREAM TOPICAL at 11:49

## 2020-01-01 RX ADMIN — SENNA PLUS 2 TABLET(S): 8.6 TABLET ORAL at 22:30

## 2020-01-01 RX ADMIN — Medication 25 MILLIGRAM(S): at 17:45

## 2020-01-01 RX ADMIN — APIXABAN 2.5 MILLIGRAM(S): 2.5 TABLET, FILM COATED ORAL at 17:57

## 2020-01-01 RX ADMIN — Medication 25 MILLIGRAM(S): at 19:07

## 2020-01-01 RX ADMIN — HEPARIN SODIUM 9 UNIT(S)/HR: 5000 INJECTION INTRAVENOUS; SUBCUTANEOUS at 07:55

## 2020-01-01 RX ADMIN — Medication 100 MILLIGRAM(S): at 16:16

## 2020-01-01 RX ADMIN — SENNA PLUS 2 TABLET(S): 8.6 TABLET ORAL at 22:03

## 2020-01-01 RX ADMIN — Medication 37.5 MILLIGRAM(S): at 05:56

## 2020-01-01 RX ADMIN — Medication 100 MILLIGRAM(S): at 15:34

## 2020-01-01 RX ADMIN — HEPARIN SODIUM 9000 UNIT(S): 5000 INJECTION INTRAVENOUS; SUBCUTANEOUS at 18:43

## 2020-01-01 RX ADMIN — SODIUM CHLORIDE 3 MILLILITER(S): 9 INJECTION INTRAMUSCULAR; INTRAVENOUS; SUBCUTANEOUS at 21:30

## 2020-01-01 RX ADMIN — Medication 37.5 MILLIGRAM(S): at 07:19

## 2020-01-01 RX ADMIN — SODIUM CHLORIDE 100 MILLILITER(S): 9 INJECTION, SOLUTION INTRAVENOUS at 19:21

## 2020-01-01 RX ADMIN — Medication 10 MILLIEQUIVALENT(S): at 18:29

## 2020-01-01 RX ADMIN — Medication 12.5 MILLIGRAM(S): at 12:18

## 2020-01-01 RX ADMIN — SODIUM CHLORIDE 50 MILLILITER(S): 9 INJECTION INTRAMUSCULAR; INTRAVENOUS; SUBCUTANEOUS at 03:10

## 2020-01-01 RX ADMIN — SODIUM CHLORIDE 3 MILLILITER(S): 9 INJECTION INTRAMUSCULAR; INTRAVENOUS; SUBCUTANEOUS at 11:30

## 2020-01-01 RX ADMIN — Medication 30 MILLIGRAM(S): at 09:57

## 2020-01-01 RX ADMIN — AMIODARONE HYDROCHLORIDE 33.3 MG/MIN: 400 TABLET ORAL at 20:29

## 2020-01-01 RX ADMIN — SODIUM CHLORIDE 3 MILLILITER(S): 9 INJECTION INTRAMUSCULAR; INTRAVENOUS; SUBCUTANEOUS at 05:33

## 2020-01-01 RX ADMIN — Medication 5 MILLIGRAM(S): at 11:00

## 2020-01-01 RX ADMIN — Medication 120 MILLIGRAM(S): at 06:47

## 2020-01-01 RX ADMIN — SODIUM CHLORIDE 3 MILLILITER(S): 9 INJECTION INTRAMUSCULAR; INTRAVENOUS; SUBCUTANEOUS at 21:49

## 2020-01-01 RX ADMIN — Medication 81 MILLIGRAM(S): at 09:56

## 2020-01-01 RX ADMIN — SODIUM CHLORIDE 3 MILLILITER(S): 9 INJECTION INTRAMUSCULAR; INTRAVENOUS; SUBCUTANEOUS at 12:53

## 2020-01-01 RX ADMIN — APIXABAN 5 MILLIGRAM(S): 2.5 TABLET, FILM COATED ORAL at 17:43

## 2020-01-01 RX ADMIN — Medication 30 MILLIGRAM(S): at 04:39

## 2020-01-01 RX ADMIN — SENNA PLUS 2 TABLET(S): 8.6 TABLET ORAL at 23:00

## 2020-01-01 RX ADMIN — LATANOPROST 1 DROP(S): 0.05 SOLUTION/ DROPS OPHTHALMIC; TOPICAL at 22:38

## 2020-01-01 RX ADMIN — Medication 120 MILLIGRAM(S): at 06:03

## 2020-01-01 RX ADMIN — ATORVASTATIN CALCIUM 40 MILLIGRAM(S): 80 TABLET, FILM COATED ORAL at 01:45

## 2020-01-01 RX ADMIN — PANTOPRAZOLE SODIUM 40 MILLIGRAM(S): 20 TABLET, DELAYED RELEASE ORAL at 06:53

## 2020-01-01 RX ADMIN — APIXABAN 5 MILLIGRAM(S): 2.5 TABLET, FILM COATED ORAL at 23:59

## 2020-01-01 RX ADMIN — Medication 37.5 MILLIGRAM(S): at 17:22

## 2020-01-01 RX ADMIN — POLYETHYLENE GLYCOL 3350 17 GRAM(S): 17 POWDER, FOR SOLUTION ORAL at 12:15

## 2020-01-01 RX ADMIN — Medication 120 MILLIGRAM(S): at 05:32

## 2020-01-01 RX ADMIN — Medication 2.5 MILLIGRAM(S): at 17:05

## 2020-01-01 RX ADMIN — AMIODARONE HYDROCHLORIDE 400 MILLIGRAM(S): 400 TABLET ORAL at 06:15

## 2020-01-01 RX ADMIN — Medication 25 MILLIGRAM(S): at 05:48

## 2020-01-01 RX ADMIN — Medication 37.5 MILLIGRAM(S): at 05:32

## 2020-01-01 RX ADMIN — POLYETHYLENE GLYCOL 3350 17 GRAM(S): 17 POWDER, FOR SOLUTION ORAL at 14:47

## 2020-01-01 RX ADMIN — Medication 50 GRAM(S): at 14:55

## 2020-01-01 RX ADMIN — Medication 5 MILLIGRAM(S): at 18:01

## 2020-01-01 RX ADMIN — Medication 5 MILLIGRAM(S): at 05:30

## 2020-01-01 RX ADMIN — Medication 25 MILLIGRAM(S): at 05:36

## 2020-01-01 RX ADMIN — Medication 20 MILLIEQUIVALENT(S): at 06:20

## 2020-01-01 RX ADMIN — APIXABAN 2.5 MILLIGRAM(S): 2.5 TABLET, FILM COATED ORAL at 06:08

## 2020-01-01 RX ADMIN — Medication 100 MILLIGRAM(S): at 07:50

## 2020-01-01 RX ADMIN — CHLORHEXIDINE GLUCONATE 1 APPLICATION(S): 213 SOLUTION TOPICAL at 07:00

## 2020-01-01 RX ADMIN — ATORVASTATIN CALCIUM 40 MILLIGRAM(S): 80 TABLET, FILM COATED ORAL at 21:29

## 2020-01-01 RX ADMIN — Medication 0: at 21:22

## 2020-01-01 RX ADMIN — HEPARIN SODIUM 7500 UNIT(S): 5000 INJECTION INTRAVENOUS; SUBCUTANEOUS at 14:04

## 2020-01-01 RX ADMIN — ATORVASTATIN CALCIUM 40 MILLIGRAM(S): 80 TABLET, FILM COATED ORAL at 21:34

## 2020-01-01 RX ADMIN — Medication 37.5 MILLIGRAM(S): at 18:53

## 2020-01-01 RX ADMIN — LIDOCAINE 2 PATCH: 4 CREAM TOPICAL at 12:36

## 2020-01-01 RX ADMIN — AMIODARONE HYDROCHLORIDE 400 MILLIGRAM(S): 400 TABLET ORAL at 21:32

## 2020-01-01 RX ADMIN — SODIUM CHLORIDE 3 MILLILITER(S): 9 INJECTION INTRAMUSCULAR; INTRAVENOUS; SUBCUTANEOUS at 22:28

## 2020-01-01 RX ADMIN — AMIODARONE HYDROCHLORIDE 200 MILLIGRAM(S): 400 TABLET ORAL at 17:19

## 2020-01-01 RX ADMIN — Medication 120 MILLIGRAM(S): at 05:57

## 2020-01-01 RX ADMIN — SODIUM CHLORIDE 3 MILLILITER(S): 9 INJECTION INTRAMUSCULAR; INTRAVENOUS; SUBCUTANEOUS at 05:28

## 2020-01-01 RX ADMIN — PANTOPRAZOLE SODIUM 40 MILLIGRAM(S): 20 TABLET, DELAYED RELEASE ORAL at 11:38

## 2020-01-01 RX ADMIN — SENNA PLUS 2 TABLET(S): 8.6 TABLET ORAL at 23:38

## 2020-01-01 RX ADMIN — PANTOPRAZOLE SODIUM 40 MILLIGRAM(S): 20 TABLET, DELAYED RELEASE ORAL at 13:38

## 2020-01-01 RX ADMIN — Medication 20 MILLIGRAM(S): at 17:48

## 2020-01-01 RX ADMIN — PANTOPRAZOLE SODIUM 40 MILLIGRAM(S): 20 TABLET, DELAYED RELEASE ORAL at 18:53

## 2020-01-01 RX ADMIN — POLYETHYLENE GLYCOL 3350 17 GRAM(S): 17 POWDER, FOR SOLUTION ORAL at 12:20

## 2020-01-01 RX ADMIN — LATANOPROST 1 DROP(S): 0.05 SOLUTION/ DROPS OPHTHALMIC; TOPICAL at 21:07

## 2020-01-01 RX ADMIN — Medication 10 MILLIEQUIVALENT(S): at 17:57

## 2020-01-01 RX ADMIN — Medication 120 MILLIGRAM(S): at 05:43

## 2020-01-01 RX ADMIN — Medication 12.5 MILLIGRAM(S): at 22:15

## 2020-01-01 RX ADMIN — ATORVASTATIN CALCIUM 40 MILLIGRAM(S): 80 TABLET, FILM COATED ORAL at 21:32

## 2020-01-01 RX ADMIN — SODIUM CHLORIDE 3 MILLILITER(S): 9 INJECTION INTRAMUSCULAR; INTRAVENOUS; SUBCUTANEOUS at 21:22

## 2020-01-01 RX ADMIN — Medication 20 MILLIGRAM(S): at 05:27

## 2020-01-01 RX ADMIN — APIXABAN 5 MILLIGRAM(S): 2.5 TABLET, FILM COATED ORAL at 17:22

## 2020-01-01 RX ADMIN — SODIUM CHLORIDE 3 MILLILITER(S): 9 INJECTION INTRAMUSCULAR; INTRAVENOUS; SUBCUTANEOUS at 05:32

## 2020-01-01 RX ADMIN — HEPARIN SODIUM 1900 UNIT(S)/HR: 5000 INJECTION INTRAVENOUS; SUBCUTANEOUS at 18:42

## 2020-01-01 RX ADMIN — Medication 12.5 MILLIGRAM(S): at 04:39

## 2020-01-01 RX ADMIN — APIXABAN 5 MILLIGRAM(S): 2.5 TABLET, FILM COATED ORAL at 06:39

## 2020-01-01 RX ADMIN — Medication 37.5 MILLIGRAM(S): at 06:43

## 2020-01-01 RX ADMIN — ATORVASTATIN CALCIUM 40 MILLIGRAM(S): 80 TABLET, FILM COATED ORAL at 22:16

## 2020-01-01 RX ADMIN — LIDOCAINE 2 PATCH: 4 CREAM TOPICAL at 18:15

## 2020-01-01 RX ADMIN — Medication 2.5 MILLIGRAM(S): at 02:12

## 2020-01-01 RX ADMIN — Medication 120 MILLIGRAM(S): at 06:45

## 2020-01-01 RX ADMIN — SODIUM CHLORIDE 3 MILLILITER(S): 9 INJECTION INTRAMUSCULAR; INTRAVENOUS; SUBCUTANEOUS at 05:15

## 2020-01-01 RX ADMIN — HEPARIN SODIUM 7500 UNIT(S): 5000 INJECTION INTRAVENOUS; SUBCUTANEOUS at 13:38

## 2020-01-01 RX ADMIN — HEPARIN SODIUM 16 UNIT(S)/HR: 5000 INJECTION INTRAVENOUS; SUBCUTANEOUS at 02:18

## 2020-01-01 RX ADMIN — PANTOPRAZOLE SODIUM 40 MILLIGRAM(S): 20 TABLET, DELAYED RELEASE ORAL at 06:48

## 2020-01-01 RX ADMIN — SODIUM CHLORIDE 3 MILLILITER(S): 9 INJECTION INTRAMUSCULAR; INTRAVENOUS; SUBCUTANEOUS at 21:07

## 2020-01-01 RX ADMIN — APIXABAN 5 MILLIGRAM(S): 2.5 TABLET, FILM COATED ORAL at 18:30

## 2020-01-01 RX ADMIN — Medication 50 MILLILITER(S): at 13:15

## 2020-01-01 RX ADMIN — Medication 12.5 MILLIGRAM(S): at 12:43

## 2020-01-01 RX ADMIN — Medication 2.5 MILLIGRAM(S): at 02:15

## 2020-01-01 RX ADMIN — SENNA PLUS 2 TABLET(S): 8.6 TABLET ORAL at 21:52

## 2020-01-01 RX ADMIN — SODIUM CHLORIDE 500 MILLILITER(S): 9 INJECTION, SOLUTION INTRAVENOUS at 09:56

## 2020-01-01 RX ADMIN — APIXABAN 2.5 MILLIGRAM(S): 2.5 TABLET, FILM COATED ORAL at 06:14

## 2020-01-01 RX ADMIN — Medication 650 MILLIGRAM(S): at 15:07

## 2020-01-01 RX ADMIN — SODIUM CHLORIDE 3 MILLILITER(S): 9 INJECTION INTRAMUSCULAR; INTRAVENOUS; SUBCUTANEOUS at 05:49

## 2020-01-01 RX ADMIN — PANTOPRAZOLE SODIUM 40 MILLIGRAM(S): 20 TABLET, DELAYED RELEASE ORAL at 06:54

## 2020-01-01 RX ADMIN — CLOPIDOGREL BISULFATE 75 MILLIGRAM(S): 75 TABLET, FILM COATED ORAL at 11:02

## 2020-01-01 RX ADMIN — Medication 12.5 MILLIGRAM(S): at 06:21

## 2020-01-01 RX ADMIN — Medication 650 MILLIGRAM(S): at 02:46

## 2020-01-01 RX ADMIN — Medication 40 MILLIGRAM(S): at 17:20

## 2020-01-01 RX ADMIN — CHLORHEXIDINE GLUCONATE 1 APPLICATION(S): 213 SOLUTION TOPICAL at 06:53

## 2020-01-01 RX ADMIN — CHLORHEXIDINE GLUCONATE 1 APPLICATION(S): 213 SOLUTION TOPICAL at 21:34

## 2020-01-01 RX ADMIN — LATANOPROST 1 DROP(S): 0.05 SOLUTION/ DROPS OPHTHALMIC; TOPICAL at 21:40

## 2020-01-01 RX ADMIN — LATANOPROST 1 DROP(S): 0.05 SOLUTION/ DROPS OPHTHALMIC; TOPICAL at 00:06

## 2020-01-01 RX ADMIN — Medication 650 MILLIGRAM(S): at 14:07

## 2020-01-01 RX ADMIN — Medication 120 MILLIGRAM(S): at 06:44

## 2020-01-01 RX ADMIN — LATANOPROST 1 DROP(S): 0.05 SOLUTION/ DROPS OPHTHALMIC; TOPICAL at 21:27

## 2020-01-01 RX ADMIN — Medication 100 MILLIGRAM(S): at 23:31

## 2020-01-01 RX ADMIN — Medication 25 MILLIGRAM(S): at 18:27

## 2020-01-01 RX ADMIN — Medication 37.5 MILLIGRAM(S): at 18:43

## 2020-01-01 RX ADMIN — Medication 40 MILLIGRAM(S): at 03:20

## 2020-01-01 RX ADMIN — Medication 120 MILLIGRAM(S): at 06:17

## 2020-01-01 RX ADMIN — Medication 37.5 MILLIGRAM(S): at 18:18

## 2020-01-01 RX ADMIN — PANTOPRAZOLE SODIUM 40 MILLIGRAM(S): 20 TABLET, DELAYED RELEASE ORAL at 05:49

## 2020-01-01 RX ADMIN — HEPARIN SODIUM 10 UNIT(S)/HR: 5000 INJECTION INTRAVENOUS; SUBCUTANEOUS at 02:25

## 2020-01-01 RX ADMIN — SODIUM CHLORIDE 3 MILLILITER(S): 9 INJECTION INTRAMUSCULAR; INTRAVENOUS; SUBCUTANEOUS at 12:13

## 2020-01-01 RX ADMIN — Medication 25 MILLIGRAM(S): at 06:01

## 2020-01-01 RX ADMIN — Medication 650 MILLIGRAM(S): at 14:54

## 2020-01-01 RX ADMIN — SODIUM CHLORIDE 3 MILLILITER(S): 9 INJECTION INTRAMUSCULAR; INTRAVENOUS; SUBCUTANEOUS at 05:00

## 2020-01-01 RX ADMIN — Medication 243 MILLIGRAM(S): at 09:34

## 2020-01-01 RX ADMIN — MAGNESIUM HYDROXIDE 30 MILLILITER(S): 400 TABLET, CHEWABLE ORAL at 12:19

## 2020-01-01 RX ADMIN — LIDOCAINE 2 PATCH: 4 CREAM TOPICAL at 12:12

## 2020-01-01 RX ADMIN — APIXABAN 5 MILLIGRAM(S): 2.5 TABLET, FILM COATED ORAL at 06:48

## 2020-01-01 RX ADMIN — ERTAPENEM SODIUM 120 MILLIGRAM(S): 1 INJECTION, POWDER, LYOPHILIZED, FOR SOLUTION INTRAMUSCULAR; INTRAVENOUS at 11:25

## 2020-01-01 RX ADMIN — Medication 25 MILLIGRAM(S): at 06:50

## 2020-01-01 RX ADMIN — Medication 200 GRAM(S): at 00:11

## 2020-01-01 RX ADMIN — APIXABAN 2.5 MILLIGRAM(S): 2.5 TABLET, FILM COATED ORAL at 18:00

## 2020-01-01 RX ADMIN — APIXABAN 2.5 MILLIGRAM(S): 2.5 TABLET, FILM COATED ORAL at 05:15

## 2020-01-01 RX ADMIN — ATORVASTATIN CALCIUM 40 MILLIGRAM(S): 80 TABLET, FILM COATED ORAL at 23:38

## 2020-01-01 RX ADMIN — Medication 20 MILLIGRAM(S): at 06:21

## 2020-01-01 RX ADMIN — PANTOPRAZOLE SODIUM 40 MILLIGRAM(S): 20 TABLET, DELAYED RELEASE ORAL at 05:28

## 2020-01-01 RX ADMIN — PANTOPRAZOLE SODIUM 40 MILLIGRAM(S): 20 TABLET, DELAYED RELEASE ORAL at 06:14

## 2020-01-01 RX ADMIN — Medication 20 MILLIGRAM(S): at 09:27

## 2020-01-01 RX ADMIN — ATORVASTATIN CALCIUM 40 MILLIGRAM(S): 80 TABLET, FILM COATED ORAL at 22:45

## 2020-01-01 RX ADMIN — PANTOPRAZOLE SODIUM 40 MILLIGRAM(S): 20 TABLET, DELAYED RELEASE ORAL at 10:30

## 2020-01-01 RX ADMIN — PANTOPRAZOLE SODIUM 40 MILLIGRAM(S): 20 TABLET, DELAYED RELEASE ORAL at 11:05

## 2020-01-01 RX ADMIN — ATORVASTATIN CALCIUM 40 MILLIGRAM(S): 80 TABLET, FILM COATED ORAL at 22:30

## 2020-01-01 RX ADMIN — AMIODARONE HYDROCHLORIDE 200 MILLIGRAM(S): 400 TABLET ORAL at 18:09

## 2020-01-01 RX ADMIN — Medication 81 MILLIGRAM(S): at 11:02

## 2020-01-01 RX ADMIN — HEPARIN SODIUM 7500 UNIT(S): 5000 INJECTION INTRAVENOUS; SUBCUTANEOUS at 22:04

## 2020-01-01 RX ADMIN — PANTOPRAZOLE SODIUM 40 MILLIGRAM(S): 20 TABLET, DELAYED RELEASE ORAL at 05:57

## 2020-01-01 RX ADMIN — ATORVASTATIN CALCIUM 40 MILLIGRAM(S): 80 TABLET, FILM COATED ORAL at 22:26

## 2020-01-01 RX ADMIN — SENNA PLUS 2 TABLET(S): 8.6 TABLET ORAL at 22:25

## 2020-01-01 RX ADMIN — ATORVASTATIN CALCIUM 40 MILLIGRAM(S): 80 TABLET, FILM COATED ORAL at 21:52

## 2020-01-01 RX ADMIN — Medication 100 MILLIGRAM(S): at 14:24

## 2020-01-01 RX ADMIN — Medication 5 MILLIGRAM(S): at 23:11

## 2020-01-01 RX ADMIN — CHLORHEXIDINE GLUCONATE 1 APPLICATION(S): 213 SOLUTION TOPICAL at 06:46

## 2020-01-01 RX ADMIN — APIXABAN 5 MILLIGRAM(S): 2.5 TABLET, FILM COATED ORAL at 06:00

## 2020-01-01 RX ADMIN — APIXABAN 5 MILLIGRAM(S): 2.5 TABLET, FILM COATED ORAL at 06:29

## 2020-01-01 RX ADMIN — Medication 81 MILLIGRAM(S): at 10:22

## 2020-01-01 RX ADMIN — AMIODARONE HYDROCHLORIDE 133.33 MILLIGRAM(S): 400 TABLET ORAL at 01:15

## 2020-01-01 RX ADMIN — HEPARIN SODIUM 12 UNIT(S)/HR: 5000 INJECTION INTRAVENOUS; SUBCUTANEOUS at 17:27

## 2020-01-01 RX ADMIN — Medication 25 MILLIGRAM(S): at 11:48

## 2020-01-01 RX ADMIN — Medication 12.5 MILLIGRAM(S): at 18:29

## 2020-01-01 RX ADMIN — Medication 120 MILLIGRAM(S): at 06:39

## 2020-01-01 RX ADMIN — Medication 10 MILLIGRAM(S): at 05:32

## 2020-01-01 RX ADMIN — Medication 5 MILLIGRAM(S): at 17:00

## 2020-01-01 RX ADMIN — Medication 37.5 MILLIGRAM(S): at 07:07

## 2020-01-01 RX ADMIN — POLYETHYLENE GLYCOL 3350 17 GRAM(S): 17 POWDER, FOR SOLUTION ORAL at 09:57

## 2020-01-01 RX ADMIN — APIXABAN 5 MILLIGRAM(S): 2.5 TABLET, FILM COATED ORAL at 18:08

## 2020-01-01 RX ADMIN — Medication 25 MILLIGRAM(S): at 17:39

## 2020-01-01 RX ADMIN — Medication 20 MILLIGRAM(S): at 17:50

## 2020-01-01 RX ADMIN — Medication 20 MILLIGRAM(S): at 06:08

## 2020-01-01 RX ADMIN — LATANOPROST 1 DROP(S): 0.05 SOLUTION/ DROPS OPHTHALMIC; TOPICAL at 21:46

## 2020-01-01 RX ADMIN — FENTANYL CITRATE 25 MICROGRAM(S): 50 INJECTION INTRAVENOUS at 18:43

## 2020-01-01 RX ADMIN — Medication 25 MILLIGRAM(S): at 23:02

## 2020-01-01 RX ADMIN — SENNA PLUS 2 TABLET(S): 8.6 TABLET ORAL at 21:46

## 2020-01-01 RX ADMIN — Medication 100 MILLIGRAM(S): at 05:49

## 2020-01-01 RX ADMIN — Medication 120 MILLIGRAM(S): at 06:43

## 2020-01-01 RX ADMIN — Medication 12.5 MILLIGRAM(S): at 06:08

## 2020-01-01 RX ADMIN — LIDOCAINE 2 PATCH: 4 CREAM TOPICAL at 18:35

## 2020-01-01 RX ADMIN — MAGNESIUM OXIDE 400 MG ORAL TABLET 800 MILLIGRAM(S): 241.3 TABLET ORAL at 07:23

## 2020-01-01 RX ADMIN — APIXABAN 2.5 MILLIGRAM(S): 2.5 TABLET, FILM COATED ORAL at 17:48

## 2020-01-01 RX ADMIN — Medication 1 APPLICATION(S): at 16:59

## 2020-01-01 RX ADMIN — SENNA PLUS 2 TABLET(S): 8.6 TABLET ORAL at 22:37

## 2020-01-01 RX ADMIN — ATORVASTATIN CALCIUM 40 MILLIGRAM(S): 80 TABLET, FILM COATED ORAL at 22:03

## 2020-01-01 RX ADMIN — Medication 100 MILLIGRAM(S): at 13:38

## 2020-01-01 RX ADMIN — PANTOPRAZOLE SODIUM 40 MILLIGRAM(S): 20 TABLET, DELAYED RELEASE ORAL at 05:50

## 2020-01-01 RX ADMIN — LATANOPROST 1 DROP(S): 0.05 SOLUTION/ DROPS OPHTHALMIC; TOPICAL at 23:05

## 2020-01-01 RX ADMIN — Medication 10 MILLIEQUIVALENT(S): at 05:27

## 2020-01-01 RX ADMIN — Medication 5 MILLIGRAM(S): at 05:00

## 2020-01-01 RX ADMIN — Medication 37.5 MILLIGRAM(S): at 06:47

## 2020-01-01 RX ADMIN — Medication 50 GRAM(S): at 19:40

## 2020-01-01 RX ADMIN — Medication 50 GRAM(S): at 07:00

## 2020-01-01 RX ADMIN — Medication 5 MILLIGRAM(S): at 14:31

## 2020-01-01 RX ADMIN — Medication 25 MILLIGRAM(S): at 17:51

## 2020-01-01 RX ADMIN — PANTOPRAZOLE SODIUM 40 MILLIGRAM(S): 20 TABLET, DELAYED RELEASE ORAL at 06:07

## 2020-01-01 RX ADMIN — Medication 81 MILLIGRAM(S): at 11:20

## 2020-01-01 RX ADMIN — SENNA PLUS 2 TABLET(S): 8.6 TABLET ORAL at 22:49

## 2020-01-01 RX ADMIN — Medication 37.5 MILLIGRAM(S): at 06:58

## 2020-01-01 RX ADMIN — AMIODARONE HYDROCHLORIDE 400 MILLIGRAM(S): 400 TABLET ORAL at 14:34

## 2020-01-01 RX ADMIN — SENNA PLUS 2 TABLET(S): 8.6 TABLET ORAL at 21:26

## 2020-01-01 RX ADMIN — Medication 81 MILLIGRAM(S): at 14:32

## 2020-01-01 RX ADMIN — Medication 81 MILLIGRAM(S): at 13:39

## 2020-01-01 RX ADMIN — PANTOPRAZOLE SODIUM 40 MILLIGRAM(S): 20 TABLET, DELAYED RELEASE ORAL at 06:16

## 2020-01-01 RX ADMIN — SODIUM CHLORIDE 3 MILLILITER(S): 9 INJECTION INTRAMUSCULAR; INTRAVENOUS; SUBCUTANEOUS at 14:49

## 2020-01-01 RX ADMIN — Medication 81 MILLIGRAM(S): at 12:19

## 2020-01-01 RX ADMIN — Medication 25 MILLIGRAM(S): at 18:35

## 2020-01-01 RX ADMIN — Medication 25 MILLIGRAM(S): at 06:14

## 2020-01-01 RX ADMIN — Medication 250 MILLILITER(S): at 21:30

## 2020-01-01 RX ADMIN — APIXABAN 5 MILLIGRAM(S): 2.5 TABLET, FILM COATED ORAL at 17:45

## 2020-01-01 RX ADMIN — Medication 25 MILLIGRAM(S): at 05:14

## 2020-01-01 RX ADMIN — AMIODARONE HYDROCHLORIDE 400 MILLIGRAM(S): 400 TABLET ORAL at 07:01

## 2020-01-01 RX ADMIN — APIXABAN 5 MILLIGRAM(S): 2.5 TABLET, FILM COATED ORAL at 18:18

## 2020-01-01 RX ADMIN — AMIODARONE HYDROCHLORIDE 133.33 MILLIGRAM(S): 400 TABLET ORAL at 01:59

## 2020-01-01 RX ADMIN — Medication 25 MILLIGRAM(S): at 18:00

## 2020-01-01 RX ADMIN — Medication 81 MILLIGRAM(S): at 12:36

## 2020-01-01 RX ADMIN — Medication 81 MILLIGRAM(S): at 11:09

## 2020-01-01 RX ADMIN — Medication 650 MILLIGRAM(S): at 13:38

## 2020-01-01 RX ADMIN — Medication 30 MILLIGRAM(S): at 17:48

## 2020-01-01 RX ADMIN — ATORVASTATIN CALCIUM 40 MILLIGRAM(S): 80 TABLET, FILM COATED ORAL at 21:16

## 2020-01-01 RX ADMIN — APIXABAN 5 MILLIGRAM(S): 2.5 TABLET, FILM COATED ORAL at 05:32

## 2020-01-01 RX ADMIN — CHLORHEXIDINE GLUCONATE 15 MILLILITER(S): 213 SOLUTION TOPICAL at 05:50

## 2020-01-01 RX ADMIN — Medication 120 MILLIGRAM(S): at 06:29

## 2020-01-01 RX ADMIN — Medication 20 MILLIGRAM(S): at 18:28

## 2020-01-01 RX ADMIN — LIDOCAINE 2 PATCH: 4 CREAM TOPICAL at 12:44

## 2020-01-01 RX ADMIN — PANTOPRAZOLE SODIUM 40 MILLIGRAM(S): 20 TABLET, DELAYED RELEASE ORAL at 06:43

## 2020-01-01 RX ADMIN — SODIUM CHLORIDE 3 MILLILITER(S): 9 INJECTION INTRAMUSCULAR; INTRAVENOUS; SUBCUTANEOUS at 06:29

## 2020-01-01 RX ADMIN — SENNA PLUS 2 TABLET(S): 8.6 TABLET ORAL at 21:34

## 2020-01-01 RX ADMIN — LATANOPROST 1 DROP(S): 0.05 SOLUTION/ DROPS OPHTHALMIC; TOPICAL at 22:34

## 2020-01-01 RX ADMIN — APIXABAN 5 MILLIGRAM(S): 2.5 TABLET, FILM COATED ORAL at 17:39

## 2020-01-01 RX ADMIN — Medication 50 GRAM(S): at 07:58

## 2020-01-01 RX ADMIN — Medication 120 MILLIGRAM(S): at 06:46

## 2020-01-08 ENCOUNTER — RX RENEWAL (OUTPATIENT)
Age: 75
End: 2020-01-08

## 2020-01-31 ENCOUNTER — APPOINTMENT (OUTPATIENT)
Dept: HEART AND VASCULAR | Facility: CLINIC | Age: 75
End: 2020-01-31
Payer: MEDICARE

## 2020-01-31 ENCOUNTER — NON-APPOINTMENT (OUTPATIENT)
Age: 75
End: 2020-01-31

## 2020-01-31 VITALS — WEIGHT: 254 LBS | HEART RATE: 71 BPM | HEIGHT: 62 IN | BODY MASS INDEX: 46.74 KG/M2

## 2020-01-31 VITALS — SYSTOLIC BLOOD PRESSURE: 130 MMHG | DIASTOLIC BLOOD PRESSURE: 80 MMHG

## 2020-01-31 PROCEDURE — 93306 TTE W/DOPPLER COMPLETE: CPT

## 2020-01-31 PROCEDURE — 93000 ELECTROCARDIOGRAM COMPLETE: CPT

## 2020-01-31 PROCEDURE — 36415 COLL VENOUS BLD VENIPUNCTURE: CPT

## 2020-01-31 PROCEDURE — 93880 EXTRACRANIAL BILAT STUDY: CPT

## 2020-01-31 PROCEDURE — 99215 OFFICE O/P EST HI 40 MIN: CPT

## 2020-01-31 NOTE — ASSESSMENT
[FreeTextEntry1] : Assessment \par 1 AV stenosis Followup echo today reveals a progression of aortic valve disease she did have optimal imaging today which revealed a peak gradient of 60 mm hg  and a mean gradient of 30mmhg \par Calculated aortic valve is 0.75 cm². Overall the function is well-preserved. There was at least moderate aortic moderate mitral insufficiency with signs of pulmonary hypertension noted. He is on the above findings and patient's symptom complex she would be a candidate for any Tavr  procedure. With the help of a  we discussed the options for this procedure as opposed to surgical intervention and she is agreeable to light speech her daughter before proceeding with this procedure I gave her the risks and benefits which compared to surgical procedure is far superior in light of her comorbidities of prior she TIA obesity and hypertension.\par Routine lab work including kidney function and a pro BMP were obtained.\par \par 2 Prior CEA . Patient had a prior left carotid endarterectomy followup duplex today revealed nearly total resolution of the plaque on the left there is still some moderate irregular plaque noted on the right which is nonobstructive in nature cholesterol reductions were LDL of less than 70 would be ideal along with continued use of antiplatelet agents which he is currently on and Plavix

## 2020-01-31 NOTE — HISTORY OF PRESENT ILLNESS
[FreeTextEntry1] : Patient is a 74-year-old moderately obese hypertensive female with prior history of carotid endarterectomy on the left side for significant carotid stenosis. Patient has been followed here for progressive aortic stenosis. Her effort capacity is markedly diminished due to both aortic stenosis and morbid obesity but over the last 6 months has had worsening shortness of breath even with household activities getting out of breath with minimal activity. She has had no recent hospitalizations for heart failure. She denies any worsening edema.

## 2020-02-03 LAB
ANION GAP SERPL CALC-SCNC: 12 MMOL/L
BUN SERPL-MCNC: 32 MG/DL
CALCIUM SERPL-MCNC: 9.6 MG/DL
CHLORIDE SERPL-SCNC: 106 MMOL/L
CO2 SERPL-SCNC: 25 MMOL/L
CREAT SERPL-MCNC: 1.45 MG/DL
GLUCOSE SERPL-MCNC: 114 MG/DL
NT-PROBNP SERPL-MCNC: 1695 PG/ML
POTASSIUM SERPL-SCNC: 4.3 MMOL/L
SODIUM SERPL-SCNC: 144 MMOL/L

## 2020-02-19 ENCOUNTER — APPOINTMENT (OUTPATIENT)
Dept: CT IMAGING | Facility: HOSPITAL | Age: 75
End: 2020-02-19
Payer: MEDICARE

## 2020-02-19 ENCOUNTER — OUTPATIENT (OUTPATIENT)
Dept: OUTPATIENT SERVICES | Facility: HOSPITAL | Age: 75
LOS: 1 days | End: 2020-02-19
Payer: MEDICARE

## 2020-02-19 ENCOUNTER — APPOINTMENT (OUTPATIENT)
Dept: CARDIOTHORACIC SURGERY | Facility: CLINIC | Age: 75
End: 2020-02-19
Payer: MEDICARE

## 2020-02-19 VITALS
TEMPERATURE: 97.5 F | RESPIRATION RATE: 16 BRPM | OXYGEN SATURATION: 94 % | HEIGHT: 62 IN | WEIGHT: 245 LBS | SYSTOLIC BLOOD PRESSURE: 132 MMHG | DIASTOLIC BLOOD PRESSURE: 69 MMHG | HEART RATE: 60 BPM | BODY MASS INDEX: 45.08 KG/M2

## 2020-02-19 DIAGNOSIS — I35.0 NONRHEUMATIC AORTIC (VALVE) STENOSIS: ICD-10-CM

## 2020-02-19 DIAGNOSIS — Z82.49 FAMILY HISTORY OF ISCHEMIC HEART DISEASE AND OTHER DISEASES OF THE CIRCULATORY SYSTEM: ICD-10-CM

## 2020-02-19 LAB
ALBUMIN SERPL ELPH-MCNC: 4.3 G/DL — SIGNIFICANT CHANGE UP (ref 3.3–5)
ALP SERPL-CCNC: 53 U/L — SIGNIFICANT CHANGE UP (ref 40–120)
ALT FLD-CCNC: 16 U/L — SIGNIFICANT CHANGE UP (ref 10–45)
ANION GAP SERPL CALC-SCNC: 16 MMOL/L — SIGNIFICANT CHANGE UP (ref 5–17)
APTT BLD: 24 SEC — LOW (ref 27.5–36.3)
AST SERPL-CCNC: 19 U/L — SIGNIFICANT CHANGE UP (ref 10–40)
BASOPHILS # BLD AUTO: 0.05 K/UL — SIGNIFICANT CHANGE UP (ref 0–0.2)
BASOPHILS NFR BLD AUTO: 0.7 % — SIGNIFICANT CHANGE UP (ref 0–2)
BILIRUB SERPL-MCNC: 0.8 MG/DL — SIGNIFICANT CHANGE UP (ref 0.2–1.2)
BUN SERPL-MCNC: 45 MG/DL — HIGH (ref 7–23)
CALCIUM SERPL-MCNC: 9.8 MG/DL — SIGNIFICANT CHANGE UP (ref 8.4–10.5)
CHLORIDE SERPL-SCNC: 103 MMOL/L — SIGNIFICANT CHANGE UP (ref 96–108)
CO2 SERPL-SCNC: 23 MMOL/L — SIGNIFICANT CHANGE UP (ref 22–31)
CREAT SERPL-MCNC: 1.84 MG/DL — HIGH (ref 0.5–1.3)
EOSINOPHIL # BLD AUTO: 0.25 K/UL — SIGNIFICANT CHANGE UP (ref 0–0.5)
EOSINOPHIL NFR BLD AUTO: 3.3 % — SIGNIFICANT CHANGE UP (ref 0–6)
GLUCOSE SERPL-MCNC: 98 MG/DL — SIGNIFICANT CHANGE UP (ref 70–99)
HCT VFR BLD CALC: 44 % — SIGNIFICANT CHANGE UP (ref 34.5–45)
HGB BLD-MCNC: 13.9 G/DL — SIGNIFICANT CHANGE UP (ref 11.5–15.5)
IMM GRANULOCYTES NFR BLD AUTO: 0.3 % — SIGNIFICANT CHANGE UP (ref 0–1.5)
INR BLD: 1.06 — SIGNIFICANT CHANGE UP (ref 0.88–1.16)
LYMPHOCYTES # BLD AUTO: 1.82 K/UL — SIGNIFICANT CHANGE UP (ref 1–3.3)
LYMPHOCYTES # BLD AUTO: 23.9 % — SIGNIFICANT CHANGE UP (ref 13–44)
MCHC RBC-ENTMCNC: 31 PG — SIGNIFICANT CHANGE UP (ref 27–34)
MCHC RBC-ENTMCNC: 31.6 GM/DL — LOW (ref 32–36)
MCV RBC AUTO: 98 FL — SIGNIFICANT CHANGE UP (ref 80–100)
MONOCYTES # BLD AUTO: 0.8 K/UL — SIGNIFICANT CHANGE UP (ref 0–0.9)
MONOCYTES NFR BLD AUTO: 10.5 % — SIGNIFICANT CHANGE UP (ref 2–14)
NEUTROPHILS # BLD AUTO: 4.69 K/UL — SIGNIFICANT CHANGE UP (ref 1.8–7.4)
NEUTROPHILS NFR BLD AUTO: 61.3 % — SIGNIFICANT CHANGE UP (ref 43–77)
NRBC # BLD: 0 /100 WBCS — SIGNIFICANT CHANGE UP (ref 0–0)
NT-PROBNP SERPL-SCNC: 923 PG/ML — HIGH (ref 0–300)
PLATELET # BLD AUTO: 218 K/UL — SIGNIFICANT CHANGE UP (ref 150–400)
POTASSIUM SERPL-MCNC: 4 MMOL/L — SIGNIFICANT CHANGE UP (ref 3.5–5.3)
POTASSIUM SERPL-SCNC: 4 MMOL/L — SIGNIFICANT CHANGE UP (ref 3.5–5.3)
PROT SERPL-MCNC: 7.8 G/DL — SIGNIFICANT CHANGE UP (ref 6–8.3)
PROTHROM AB SERPL-ACNC: 12.1 SEC — SIGNIFICANT CHANGE UP (ref 10–12.9)
RBC # BLD: 4.49 M/UL — SIGNIFICANT CHANGE UP (ref 3.8–5.2)
RBC # FLD: 12.3 % — SIGNIFICANT CHANGE UP (ref 10.3–14.5)
SODIUM SERPL-SCNC: 142 MMOL/L — SIGNIFICANT CHANGE UP (ref 135–145)
WBC # BLD: 7.63 K/UL — SIGNIFICANT CHANGE UP (ref 3.8–10.5)
WBC # FLD AUTO: 7.63 K/UL — SIGNIFICANT CHANGE UP (ref 3.8–10.5)

## 2020-02-19 PROCEDURE — 85730 THROMBOPLASTIN TIME PARTIAL: CPT

## 2020-02-19 PROCEDURE — 36415 COLL VENOUS BLD VENIPUNCTURE: CPT

## 2020-02-19 PROCEDURE — 86901 BLOOD TYPING SEROLOGIC RH(D): CPT

## 2020-02-19 PROCEDURE — 99205 OFFICE O/P NEW HI 60 MIN: CPT

## 2020-02-19 PROCEDURE — 86850 RBC ANTIBODY SCREEN: CPT

## 2020-02-19 PROCEDURE — 83880 ASSAY OF NATRIURETIC PEPTIDE: CPT

## 2020-02-19 PROCEDURE — 80053 COMPREHEN METABOLIC PANEL: CPT

## 2020-02-19 PROCEDURE — 85610 PROTHROMBIN TIME: CPT

## 2020-02-19 PROCEDURE — 85025 COMPLETE CBC W/AUTO DIFF WBC: CPT

## 2020-02-19 RX ORDER — OMEPRAZOLE 40 MG/1
40 CAPSULE, DELAYED RELEASE ORAL DAILY
Refills: 0 | Status: ACTIVE | COMMUNITY

## 2020-02-21 ENCOUNTER — APPOINTMENT (OUTPATIENT)
Dept: HEART AND VASCULAR | Facility: CLINIC | Age: 75
End: 2020-02-21
Payer: MEDICARE

## 2020-02-21 VITALS — BODY MASS INDEX: 45.45 KG/M2 | HEIGHT: 62 IN | WEIGHT: 247 LBS

## 2020-02-21 VITALS — SYSTOLIC BLOOD PRESSURE: 125 MMHG | DIASTOLIC BLOOD PRESSURE: 70 MMHG

## 2020-02-21 PROCEDURE — 99214 OFFICE O/P EST MOD 30 MIN: CPT

## 2020-02-21 PROCEDURE — 36415 COLL VENOUS BLD VENIPUNCTURE: CPT

## 2020-02-21 NOTE — ASSESSMENT
[FreeTextEntry1] : Assessment \par Preop form TAVR \par rise in creat likely related to ARB and torsemide \par will reduce torsemide  to tiw not qd \par \par rpt Sma7 pending \par ct of chest pending for preop eval \par renal consult prior to dye load

## 2020-02-21 NOTE — PHYSICAL EXAM
[General Appearance - Well Developed] : well developed [Normal Appearance] : normal appearance [Well Groomed] : well groomed [General Appearance - Well Nourished] : well nourished [General Appearance - In No Acute Distress] : no acute distress [No Deformities] : no deformities [Normal Conjunctiva] : the conjunctiva exhibited no abnormalities [Eyelids - No Xanthelasma] : the eyelids demonstrated no xanthelasmas [No Oral Pallor] : no oral pallor [No Oral Cyanosis] : no oral cyanosis [Normal Oral Mucosa] : normal oral mucosa [Normal Jugular Venous A Waves Present] : normal jugular venous A waves present [Normal Jugular Venous V Waves Present] : normal jugular venous V waves present [No Jugular Venous Valle A Waves] : no jugular venous valle A waves [Respiration, Rhythm And Depth] : normal respiratory rhythm and effort [Abdomen Soft] : soft [Auscultation Breath Sounds / Voice Sounds] : lungs were clear to auscultation bilaterally [Exaggerated Use Of Accessory Muscles For Inspiration] : no accessory muscle use [Abdomen Tenderness] : non-tender [Abdomen Mass (___ Cm)] : no abdominal mass palpated [Abnormal Walk] : normal gait [Gait - Sufficient For Exercise Testing] : the gait was sufficient for exercise testing [Petechial Hemorrhages (___cm)] : no petechial hemorrhages [Cyanosis, Localized] : no localized cyanosis [Nail Clubbing] : no clubbing of the fingernails [] : no ischemic changes [Normal S1] : normal S1 [IV] : a grade 4 [Right Carotid Bruit] : right carotid bruit heard [Normal S2] : normal S2 [Left Carotid Bruit] : left carotid bruit heard

## 2020-02-21 NOTE — HISTORY OF PRESENT ILLNESS
[FreeTextEntry1] : The patient is an obese 74-year-old white female with progressive history of aortic stenosis and symptoms of dyspnea. Patient is currently under evaluation for potential TAVR procedure. She had been placed on a low-dose of diuretics to help with some symptoms of dyspnea he was a slight rise in her creatinine.

## 2020-02-22 ENCOUNTER — NON-APPOINTMENT (OUTPATIENT)
Age: 75
End: 2020-02-22

## 2020-02-24 LAB
ANION GAP SERPL CALC-SCNC: 14 MMOL/L
BUN SERPL-MCNC: 38 MG/DL
CALCIUM SERPL-MCNC: 9.6 MG/DL
CHLORIDE SERPL-SCNC: 107 MMOL/L
CO2 SERPL-SCNC: 25 MMOL/L
CREAT SERPL-MCNC: 1.65 MG/DL
GLUCOSE SERPL-MCNC: 89 MG/DL
POTASSIUM SERPL-SCNC: 4.3 MMOL/L
SODIUM SERPL-SCNC: 145 MMOL/L

## 2020-03-06 ENCOUNTER — APPOINTMENT (OUTPATIENT)
Dept: CARDIOTHORACIC SURGERY | Facility: CLINIC | Age: 75
End: 2020-03-06

## 2020-03-11 ENCOUNTER — APPOINTMENT (OUTPATIENT)
Dept: CT IMAGING | Facility: HOSPITAL | Age: 75
End: 2020-03-11

## 2020-05-20 NOTE — H&P ADULT - ASSESSMENT
74 year old F, PMHx obesity, HTN, HLD, GERD, TIA with carotid artery disease s/p left carotid endarterectomy, Chronic Kidney disease (baseline creatinine 1.45) chronic diastolic heart failure with severe aortic stenosis (PG 60, PV 4 from ECHO in 1/31/20) followed by Dr. Dubois, presented to [facility] chest pain, r/i NSTEMI, pral troponin of 10, started on a heparin gtt. Echo revealed severe AS and patient was transferred to [facility] for surgical evaluation.     A/P:  Neurovascular: No delirium. Pain well controlled with current regimen.  -PRN's.    Cardiovascular: Hemodynamically stable. HR controlled.  -BP. HR. EKG. TTE. Cardiac Panel. Lipid Panel. BNP.   -ASA. Plavix. BBlocker. Statin.      Respiratory: 02 Sat = 98% on RA.  -If on oxygen wean to RA from for O2 Sat > 93%.  -Encourage ambulation, C+DB and Use of IS 10x / hr while awake.  -CXR-    GI: Stable.  -PPX.  -Continue bowel regimen  -PO Diet.    Renal / : BUN/Cr  -Monitor renal function.  -Monitor I/O's.  -Replete lytes PRN    Endocrine:    -A1c.  -TSH.    Hematologic:  -f/u AM CBC    ID: Afebrile, WBC   -Observe for SIRS/Sepsis Syndrome.    Prophylaxis:  -DVT prophylaxis with 5000 SubQ Heparin q8h.  -SCD's    Disposition:  -Home when medically ready 74 year old F, PMHx obesity, HTN, HLD, GERD, TIA with carotid artery disease s/p left carotid endarterectomy, Chronic Kidney disease (baseline creatinine 1.45) chronic diastolic heart failure with severe aortic stenosis (PG 60, PV 4 from ECHO in 1/31/20) followed by Dr. Dubois, presented to [facility] chest pain, r/i NSTEMI, pral troponin of 10, started on a heparin gtt. Echo revealed severe AS and patient was transferred to [facility] for surgical evaluation.     A/P:  Neurovascular: No delirium. Pain well controlled with current regimen.  -PRN's.  -Hx TIA, carotid stenosis w/ hx left carotid endarterectomy - f/u cartids- continue statin/asa  -?CT head    Cardiovascular: Hemodynamically stable. HR controlled.  -Hx HTn HLD Severe AS, NSTEMI  -Fellow echo  -Diuresis/BiPAP  -BB/ASA/STATIN/Heparin  -cath tomorrow with Dr. Mercedes  -admission labs  -access  -TAVR w/u    Respiratory: Tachynepic on admission with non rebreather  -Diuresis/CXR/BiPAP  -Labs  -If on oxygen wean to RA from for O2 Sat > 93%.  -Encourage ambulation, C+DB and Use of IS 10x / hr while awake.    GI: Stable.  -PPX.  -Continue bowel regimen  -PO Diet.    Renal / : BUN/Cr  -Monitor renal function.  -Monitor I/O's.  -Replete lytes PRN  - hubbard insertion  -f/u labs    Endocrine:    -A1c.  -TSH.    Hematologic:  -f/u AM CBC    ID: Afebrile, WBC   -u/a  -Observe for SIRS/Sepsis Syndrome.    Prophylaxis:  -heparin gtt  -SCD's    Disposition:  -ICU 74 year old F, PMHx obesity, HTN, HLD, GERD, TIA with carotid artery disease s/p left carotid endarterectomy, Chronic Kidney disease (baseline creatinine 1.45) chronic diastolic heart failure with severe aortic stenosis (PG 60, PV 4 from ECHO in 1/31/20) followed by Dr. Dubois, presented to [facility] chest pain, r/i NSTEMI, peak troponin of 10, started on a heparin gtt. Echo revealed severe AS and patient was transferred to [facility] for surgical evaluation.     A/P:  Neurovascular: No delirium. Pain well controlled with current regimen.  -PRN's.  -Hx TIA, carotid stenosis w/ hx left carotid endarterectomy - f/u cartids- continue statin/asa  -?CT head    Cardiovascular: Hemodynamically stable. HR controlled.  -Hx HTn HLD Severe AS, NSTEMI  -Fellow echo  -Diuresis/BiPAP  -BB/ASA/STATIN/Heparin  -cath tomorrow with Dr. Mercedes  -admission labs  -access  -TAVR w/u    Respiratory: Tachynepic on admission with non rebreather  -Diuresis/CXR/BiPAP  -Labs  -If on oxygen wean to RA from for O2 Sat > 93%.  -Encourage ambulation, C+DB and Use of IS 10x / hr while awake.    GI: Stable.  -PPX.  -Continue bowel regimen  -PO Diet.    Renal / : BUN/Cr  -Monitor renal function.  -Monitor I/O's.  -Replete lytes PRN  - hubbard insertion  -f/u labs    Endocrine:    -A1c.  -TSH.    Hematologic:  -f/u AM CBC    ID: Afebrile, WBC   -u/a  -Observe for SIRS/Sepsis Syndrome.    Prophylaxis:  -heparin gtt  -SCD's    Disposition:  -ICU

## 2020-05-20 NOTE — H&P ADULT - NSICDXPASTMEDICALHX_GEN_ALL_CORE_FT
PAST MEDICAL HISTORY:  Aortic stenosis     Chronic kidney disease (CKD)     H/O carotid stenosis     HLD (hyperlipidemia)     HTN (hypertension)     TIA (transient ischemic attack)

## 2020-05-20 NOTE — H&P ADULT - NSHPPHYSICALEXAM_GEN_ALL_CORE
Physical Exam  CONSTITUTIONAL:                                                              WNL  NEURO:                                                                       WNL                      EYES:                                                                                WNL  ENMT:                                                                               WNL  CV:                                                                                   WNL  RESPIRATORY:                                                                 WNL  GI:                                                                                     WNL  : KOO + / -                                                                  WNL  MUSKULOSKELETAL:                                                       WNL  SKIN / BREAST:                                                                  WNL Physical Exam  CONSTITUTIONAL:  morbidly Obese, sitting in stretcher, tachypneic                                                              NEURO:  AAOx3, no neuro defictits noted, CN grossly intact         EYES:  WNL  ENMT  WNL  CV:   S1S2, Tachycardic, irregular rhythm   RESPIRATORY:  b/l crackles, no wheeze  GI:obese, + BS/soft/NT  : no hubbard   MUSKULOSKELETAL:  WWP, 2+ b/l LE edema, palpabler periperpal pulses b/l   SKIN / BREAST:        WNL

## 2020-05-20 NOTE — PROGRESS NOTE ADULT - SUBJECTIVE AND OBJECTIVE BOX
INTERVAL HPI/OVERNIGHT EVENTS:    transfer from [facility] for aortic stenosis and NSTEMI  EF 40%    73yo Estonian speaking Female Hx Obesity, HTN, HLD, GERD, TIA, L CEA, CKD (1.45), CHF (chronic systolic), severe Aortic stenosis (PG 60, PV 4 from ECHO 1/31/20) presented to [facility] 5/20 w/ CP - (+)NSTEMI - trop 10  started on heparin qtt/ASA/statin/metoprolol/lasix given   COVID-19 testing (OSH) negative    CTa Chest 5/20: no PE; pulm trunk enlarged; cardiomegaly    Transferred  for surgical evaluation    patient in atrial fib (140's); ; tachypneic - Sa02 95% - immediately placed on BIPAP  lopressor 2.5/5/2.5/2.5 (total 10mg given) - with improvement in rate  lasix 40 mg and Amiodarone 150 mg IV    ECHO bedside - EF 40%; mod MR; mod-severe AS mild AI; lateral wall hypokinesis    CT Head 5/19: sxs of dizziness - ? outpatient study as admit date reported 5/20  no acute infarct/chronic ischemia/hemorrhage - 2.6 x 2.1cm mass lesion with coarse calcification in suprasellar region; causing mild hydrocephalus; mild transependymal flow - no shift/herniation    PMHx includes but is not limited to:   H/O carotid stenosis  TIA (transient ischemic attack)  Chronic kidney disease (CKD)  Aortic stenosis  HLD (hyperlipidemia)  HTN (hypertension)  H/O carotid endarterectomy    ICU Vital Signs Last 24 Hrs  T(C): 37.2 (20 May 2020 16:45), Max: 37.2 (20 May 2020 16:45)  T(F): 98.9 (20 May 2020 16:45), Max: 98.9 (20 May 2020 16:45)  HR: 126 (20 May 2020 17:15) (126 - 136)  BP: 143/86 (20 May 2020 17:15) (143/86 - 155/101)  BP(mean): 106 (20 May 2020 17:15) (106 - 124)  RR: 31 (20 May 2020 17:15) (30 - 33)  SpO2: 96% (20 May 2020 17:15) (96% - 99%) now on BIPAP 12/5 40%    Qtts: None     Physical Exam    Heart - tachy/irregular irregular - no rub/gallop (+)murmuir 4/6  Lungs - rales appreciated; no wheeze  Abd - (+)BS obese - large pannus - soft NTND (-)r/r/g  Ext - warm to touch; no cyanosis/clubbing  Neuro - alert and interactive via  phone/ipad - following simple commands  Skin - no rash     labs pending   xray reviewed  EKG pending - tele - atrial fib     RADIOLOGY & ADDITIONAL STUDIES: reviewed from OSH - no images sent - called to request disc/images and report from ECHO    patient with aortic stenosis presnenting with chest pain - (+)STEMI - transferred to [facility] for CTS evaluation - atrial fib/RVR; resp distress/pulm edema and accelerate HTN    1. CV - NSTEMI and new onset atrial fib in setting of known aortic stenosis  AFib RVR - rate control - No known Hx  given several doses of metoprolol on arrival and will start standing  ASA/statin  heparin infusion to be restarted - titrate per pTT  nitro for chest pain   BP control   NPO after MN for planned cardiac cath (Mago)      2. Pulm   BIPAP initiated on arrival with improvement in tachypnea - to cont for now  lasix/diuresis - obtain negative fluid balance  hubbard catheter placed    3. Renal   CKD - reported baseline Cr 1.45  avoid nephrotoxins as able   monitor UO/Lytes and XCr  hubbard in place    4. Neuro  sxs dizziness prompting CT Head   mass  - ? meningionma on CT Head 5/19  avoid sedatives  Hx carotid disease - left CEA    await labs/carotid testing/EKG    d/w patient via /staff/cardiology fellow and CTS    I have spent/provided stated minutes of critical care time to this patient: 90 min

## 2020-05-20 NOTE — H&P ADULT - NSHPREVIEWOFSYSTEMS_GEN_ALL_CORE
Review of Systems  CONSTITUTIONAL:  Denies Fevers / chills, sweats, fatigue, weight loss, weight gain                                      NEURO:  Denies parathesias, seizures, syncope, confusion                                                                                EYES:  Denies Blurry vision, discharge, pain, loss of vision                                                                                    ENMT:  Denies Difficulty hearing, vertigo, dysphagia, epistaxis, recent dental work                                       CV:  + chset pain, MIRZA, Denies palpitations, orthopnea                                                                                          RESPIRATORY:  + SOB Denies Wheezing, cough / sputum, hemoptysis                                                                GI:  Denies Nausea, vomiting, diarrhea, constipation, melena, difficulty swallowing                                               : Denies Hematuria, dysuria, urgency, incontinence                                                                                         MUSKULOSKELETAL:  Denies arthritis, joint swelling, muscle weakness                                                             SKIN/BREAST:  Denies rash, itching, gilberto loss, masses                                                                                            PSYCH:  Denies depresion, anxiety, suicidal ideation                                                                                               HEME/LYMPH:  Denies bruises easily, enlarged lymph nodes, tender lymph nodes                                        ENDOCRINE:  Denies cold intolerance, heat intolerance, polydipsia

## 2020-05-20 NOTE — H&P ADULT - NSHPSOCIALHISTORY_GEN_ALL_CORE
Social History  Smoker:   YES / NO       Pack Years:       When Quit:  ETOH Use:   YES / NO   Frequency / Quantity:  Ilicit Drug Use:   YES / NO  Occupation:  Assistant Devices:   None / Walker / Cane  Lives with: Social History  Smoker:   No  ETOH Use:  No  Ilicit Drug Use:  No  Lives with: With daugther , does not walk much per patient

## 2020-05-20 NOTE — PROCEDURE NOTE - NSPROCDETAILS_GEN_ALL_CORE
sutured in place/location identified, draped/prepped, sterile technique used, needle inserted/introduced/connected to a pressurized flush line/Seldinger technique/ultrasound guidance/positive blood return obtained via catheter/all materials/supplies accounted for at end of procedure

## 2020-05-20 NOTE — H&P ADULT - HISTORY OF PRESENT ILLNESS
74 year old F, PMHx obesity, HTN, HLD, GERD, TIA with carotid artery disease s/p left carotid endarterectomy, Chronic Kidney disease (baseline creatinine 1.45) chronic diastolic heart failure with severe aortic stenosis (PG 60, PV 4 from ECHO in 1/31/20) followed by Dr. Dubois, presented to [facility] chest pain, r/i NSTEMI, pral troponin of 10, started on a heparin gtt. Echo revealed severe AS and patient was transferred to [facility] for surgical evaluation. 74 year old F, PMHx morbid obesity, HTN, HLD, GERD, TIA with carotid artery disease s/p left carotid endarterectomy, Chronic Kidney disease (baseline creatinine 1.45) chronic diastolic heart failure with severe aortic stenosis (PG 60, PV 4 from ECHO in 1/31/20) followed by Dr. Dubois, presented to [facility] chest pain, r/i NSTEMI, preak troponin of 10. Pt interviewed with pacific  [id number]. She states sports having cp and SOB for several days associated with PND. She states the pain started while she was walking, though she is unable to quantify how much exertion prompted the symptoms. Denies orthopnea, LE edema. She was admitted for further workup and started on a heparin gtt. CT chest negative for PE. Echo revealed severe AS and patient was transferred to [facility] for surgical evaluation. Upon arrival pt is afebrile , /101, SpO2 97% on 100% NRB RR 33, endorsing SOB and CP. Of note pt states she had an episode of dizziness associated with b/l LE weakness, aphasia and left facial droop prompting head CT significant for 2.6cm x 2.1cm mass lesion with mild hydrocephalus. Pt interviewed with pacific  [id number].    74 year old F, PMHx morbid obesity, HTN, HLD, GERD, TIA with carotid artery disease s/p left carotid endarterectomy, Chronic Kidney disease (baseline creatinine 1.45) chronic diastolic heart failure with severe aortic stenosis (PG 60, PV 4 from ECHO in 1/31/20) followed by Dr. Dubois, presented to [facility] with symptoms of dizziness, weakness, chest pain, r/i NSTEMI, peak troponin of 10.  She reports having CP and SOB for several days associated with PND. She states the pain started while she was walking, though she is unable to quantify how much exertion prompted the symptoms. Denies orthopnea, LE edema. She was admitted for further workup and started on a heparin gtt. CT chest negative for PE, CT head significant for 2.6cm x 2.1cm mass lesion with mild hydrocephalus. Echo revealed severe AS and patient was transferred to [facility] for surgical evaluation. Upon arrival pt is afebrile , /101, SpO2 97% on 100% NRB RR 33, endorsing SOB and CP.

## 2020-05-21 NOTE — PROGRESS NOTE ADULT - SUBJECTIVE AND OBJECTIVE BOX
Interventional Cardiology PA Sheath Pull Note    Pt without complaints. VSS    Pre-Sheath Removal:  Right 7Fr Venous sheath in place    No Hematoma, No Bleed    Pulses:  Right DP: 2+     Hemostasis Achieved with: 17 minutes manual pressure    Vasovagal Reaction: No    Meds Given: None.    Post-Sheath Removal:  Right Groin: no hematoma, no bleed     Pulses:  Right DP: 2+     A/P:  s/p Dx Cath     -Continue bedrest (pt given instructions)  -Continue to monitor

## 2020-05-21 NOTE — PROGRESS NOTE ADULT - SUBJECTIVE AND OBJECTIVE BOX
Interventional Cardiology Radial band Removal Note    s/p Angiomax    Pt without complaints.  VSS.    Right Radial access site TR band in place, no hematoma, no bleed  Radial pulse: 2+     Hemostasis achieved with manual release of hemoband.    No  Vasovagal reaction.    Meds given: None.     Right Radial access site no hematoma, no bleed  Radial pulse: 2+    A/P:  s/p Dx Coronary Angiogram  -	continue to monitor  -	OOB as tolerated  -	Post Procedure Instructions given  -           Call [phone] if any access site issues. Interventional Cardiology Radial band Removal Note    s/p Angiomax    Pt without complaints.  VSS.    Right Radial access site TR band in place, no hematoma, no bleed  Radial pulse: 1+     Hemostasis achieved with manual release of hemoband.    No  Vasovagal reaction.    Meds given: None.     Right Radial access site no hematoma, no bleed  Radial pulse: 2+    A/P:  s/p Dx Coronary Angiogram  -	continue to monitor  -	OOB as tolerated  -	Post Procedure Instructions given  -           Call [phone] if any access site issues.

## 2020-05-21 NOTE — PROGRESS NOTE ADULT - SUBJECTIVE AND OBJECTIVE BOX
CTICU  CRITICAL  CARE  attending     Hand off received 					   Pertinent clinical, laboratory, radiographic, hemodynamic, echocardiographic, respiratory data, microbiologic data and chart were reviewed and analyzed frequently throughout the course of the day and night  Patient seen and examined with CTS/ SH attending at bedside  Pt is a 74y , Female, HEALTH ISSUES - PROBLEM Dx:      , FAMILY HISTORY:  PAST MEDICAL & SURGICAL HISTORY:  H/O carotid stenosis  TIA (transient ischemic attack)  Chronic kidney disease (CKD)  Aortic stenosis  HLD (hyperlipidemia)  HTN (hypertension)  H/O carotid endarterectomy    Patient is a 74y old  Female who presents with a chief complaint of Severe AS (22 May 2020 13:09)      14 system review was unremarkable    Vital signs, hemodynamic and respiratory parameters were reviewed from the bedside nursing flowsheet.  ICU Vital Signs Last 24 Hrs  T(C): 36.8 (22 May 2020 14:00), Max: 36.8 (22 May 2020 14:00)  T(F): 98.2 (22 May 2020 14:00), Max: 98.2 (22 May 2020 14:00)  HR: 96 (22 May 2020 17:00) (86 - 106)  BP: --  BP(mean): --  ABP: 141/90 (22 May 2020 17:00) (109/74 - 144/90)  ABP(mean): 109 (22 May 2020 17:00) (88 - 119)  RR: 12 (22 May 2020 13:19) (12 - 20)  SpO2: 97% (22 May 2020 17:00) (95% - 100%)    Adult Advanced Hemodynamics Last 24 Hrs  CVP(mm Hg): --  CVP(cm H2O): --  CO: --  CI: --  PA: --  PA(mean): --  PCWP: --  SVR: --  SVRI: --  PVR: --  PVRI: --, ABG - ( 22 May 2020 02:28 )  pH, Arterial: 7.44  pH, Blood: x     /  pCO2: 39    /  pO2: 143   / HCO3: 26    / Base Excess: 1.7   /  SaO2: 99                  Intake and output was reviewed and the fluid balance was calculated  Daily     Daily   I&O's Summary    21 May 2020 07:01  -  22 May 2020 07:00  --------------------------------------------------------  IN: 888.4 mL / OUT: 1681 mL / NET: -792.6 mL    22 May 2020 07:01  -  22 May 2020 17:31  --------------------------------------------------------  IN: 694 mL / OUT: 386 mL / NET: 308 mL        All lines and drain sites were assessed  [facility]      POCT Blood Glucose.: 107 mg/dL (22 May 2020 17:15)    No acute change in mental status  Auscultation of the chest reveals equal bs  Abdomen is soft  Extremities are warm and well perfused  Wounds appear clean and unremarkable  Antibiotics are periop    labs  CBC Full  -  ( 22 May 2020 02:32 )  WBC Count : 8.70 K/uL  RBC Count : 3.74 M/uL  Hemoglobin : 11.5 g/dL  Hematocrit : 36.2 %  Platelet Count - Automated : 146 K/uL  Mean Cell Volume : 96.8 fl  Mean Cell Hemoglobin : 30.7 pg  Mean Cell Hemoglobin Concentration : 31.8 gm/dL  Auto Neutrophil # : x  Auto Lymphocyte # : x  Auto Monocyte # : x  Auto Eosinophil # : x  Auto Basophil # : x  Auto Neutrophil % : x  Auto Lymphocyte % : x  Auto Monocyte % : x  Auto Eosinophil % : x  Auto Basophil % : x    05-22    143  |  104  |  24<H>  ----------------------------<  92  4.1   |  25  |  1.20    Ca    9.0      22 May 2020 02:32  Phos  3.6     05-22  Mg     2.0     05-22    TPro  6.5  /  Alb  3.4  /  TBili  0.7  /  DBili  x   /  AST  19  /  ALT  12  /  AlkPhos  45  05-22    PT/INR - ( 22 May 2020 02:32 )   PT: 15.0 sec;   INR: 1.31          PTT - ( 22 May 2020 02:32 )  PTT:49.4 sec  The current medications were reviewed   MEDICATIONS  (STANDING):  aMIOdarone    Tablet   Oral   aMIOdarone    Tablet 400 milliGRAM(s) Oral every 8 hours  aspirin enteric coated 81 milliGRAM(s) Oral daily  atorvastatin 40 milliGRAM(s) Oral at bedtime  chlorhexidine 4% Liquid 1 Application(s) Topical <User Schedule>  dextrose 5%. 1000 milliLiter(s) (50 mL/Hr) IV Continuous <Continuous>  dextrose 50% Injectable 12.5 Gram(s) IV Push once  dextrose 50% Injectable 25 Gram(s) IV Push once  dextrose 50% Injectable 25 Gram(s) IV Push once  heparin  Infusion 1300 Unit(s)/Hr (13 mL/Hr) IV Continuous <Continuous>  insulin lispro (HumaLOG) corrective regimen sliding scale   SubCutaneous Before meals and at bedtime  lactated ringers. 1000 milliLiter(s) (50 mL/Hr) IV Continuous <Continuous>  metoprolol tartrate Injectable 5 milliGRAM(s) IV Push every 6 hours  pantoprazole  Injectable 40 milliGRAM(s) IV Push daily    MEDICATIONS  (PRN):  dextrose 40% Gel 15 Gram(s) Oral once PRN Blood Glucose LESS THAN 70 milliGRAM(s)/deciliter  glucagon  Injectable 1 milliGRAM(s) IntraMuscular once PRN Glucose LESS THAN 70 milligrams/deciliter       PROBLEM LIST/ ASSESSMENT:  HEALTH ISSUES - PROBLEM Dx:      ,   Patient is a 74y old  Female who presents with a chief complaint of Severe AS (22 May 2020 13:09)     s/p cardiac surgery                My plan includes :  close hemodynamic, ventilatory and drain monitoring and management per post op routine    Monitor for arrhythmias and monitor parameters for organ perfusion  beta blockade not administered due to hemodynamic instability and bradycardia  monitor neurologic status  Head of the bed should remain elevated to 45 deg .   chest PT and IS will be encouraged  monitor adequacy of oxygenation and ventilation and attempt to wean oxygen  antibiotic regimen will be tailored to the clinical, laboratory and microbiologic data  Nutritional goals will be met using po eventually , ensure adequate caloric intake and montior the same  Stress ulcer and VTE prophylaxis will be achieved    Glycemic control is satisfactory  Electrolytes have been repleted as necessary and wound care has been carried out. Pain control has been achieved.   agressive physical therapy and early mobility and ambulation goals will be met   The family was updated about the course and plan  CRITICAL CARE TIME personally provided by me  in evaluation and management, reassessments, review and interpretation of labs and x-rays, ventilator and hemodynamic management, formulating a plan and coordinating care: ___90____ MIN.  Time does not include procedural time. Time spent was non routine post-operarive caRE and included multiple and repeated evaluations at the bedside  CTICU ATTENDING     					    Kris Grimm MD

## 2020-05-21 NOTE — CHART NOTE - NSCHARTNOTEFT_GEN_A_CORE
Upon Nutritional Assessment by the Registered Dietitian your patient was determined to meet criteria / has evidence of the following diagnosis/diagnoses:          [ ]  Mild Protein Calorie Malnutrition        [ ]  Moderate Protein Calorie Malnutrition        [ ] Severe Protein Calorie Malnutrition        [ ] Unspecified Protein Calorie Malnutrition        [ ] Underweight / BMI <19        [x ] Morbid Obesity / BMI > 40      Findings as based on:  •  Comprehensive nutrition assessment and consultation        Treatment:    The following diet has been recommended:    PO per team     PROVIDER Section:     By signing this assessment you are acknowledging and agree with the diagnosis/diagnoses assigned by the Registered Dietitian    Comments:

## 2020-05-21 NOTE — PROGRESS NOTE ADULT - SUBJECTIVE AND OBJECTIVE BOX
INCOMPLETE NOTE    Interventional Cardiology PA Precath Note    Hospital Course: 74 year old F, PMHx morbid obesity, HTN, HLD, GERD, TIA with carotid artery disease s/p left carotid endarterectomy (on Plavix), CKD (baseline creatinine 1.45) chronic diastolic heart failure with severe aortic stenosis (PG 60, PV 4 from ECHO in 1/31/20) followed by Dr. Dubois, presented to [facility] on 5/2020 with symptoms of dizziness, weakness, chest pain, r/i NSTEMI, peak troponin of 10.  She reports having CP and SOB for several days associated with PND. She states the pain started while she was walking, though she is unable to quantify how much exertion prompted the symptoms. Denies orthopnea, LE edema. She was admitted for further workup and started on a heparin gtt. CT chest negative for PE, CT head significant for 2.6cm x 2.1cm mass lesion with mild hydrocephalus. Echo revealed severe AS and patient was transferred to [facility] for surgical evaluation. Upon arrival pt is afebrile , /101, SpO2 97% on 100% NRB RR 33, endorsing SOB and CP. Labs significant for Trop 0.9, BNP 9788. Pt noted to be in new onset AFib w/ RVR. Started Amiodarone gtt, given Lasix IV and Metoprolol IV, placed on BiPAP. Bedside Echo revealed EF 40%, mod MR, mod-severe AS, mild AI, lateral wall hypokinesis.   Of note, pt Covid negative x 1 at [facility]  Interventional Cardiology consulted for cardiac cath in light of pt's risk factors, NSTEMI, and severe AS, as part of pre-op w/u for surgical evaluation.    PMH: As above  PSH: Left Carotid Endarterectomy   ALL: NKDA, NKFA. Denies shellfish/Contrast dye allergy.  SocHX: Denies EtoH/TOB/IVDU  FHx: Denies pertinent family history    MEDS:   aMIOdarone Infusion 1 mG/Min IV Continuous <Continuous>  aMIOdarone Infusion 0.5 mG/Min IV Continuous <Continuous>  aspirin enteric coated 81 milliGRAM(s) Oral daily  atorvastatin 40 milliGRAM(s) Oral at bedtime  chlorhexidine 4% Liquid 1 Application(s) Topical <User Schedule>  dextrose 40% Gel 15 Gram(s) Oral once PRN  dextrose 5%. 1000 milliLiter(s) IV Continuous <Continuous>  dextrose 50% Injectable 12.5 Gram(s) IV Push once  dextrose 50% Injectable 25 Gram(s) IV Push once  dextrose 50% Injectable 25 Gram(s) IV Push once  glucagon  Injectable 1 milliGRAM(s) IntraMuscular once PRN  heparin  Infusion 1300 Unit(s)/Hr IV Continuous <Continuous>  insulin lispro (HumaLOG) corrective regimen sliding scale   SubCutaneous Before meals and at bedtime  metoprolol tartrate Injectable 5 milliGRAM(s) IV Push every 6 hours  pantoprazole  Injectable 40 milliGRAM(s) IV Push daily    T(C): 36.4 (05-21-20 @ 05:01), Max: 37.2 (05-20-20 @ 16:45)  HR: 105 (05-21-20 @ 07:00) (84 - 136)  BP: 143/86 (05-20-20 @ 17:15) (143/86 - 155/101)  RR: 24 (05-21-20 @ 07:00) (22 - 33)  SpO2: 95% (05-21-20 @ 07:00) (95% - 99%)  Wt(kg): --      Heart - tachy/irregular irregular - no rub/gallop (+)murmuir 4/6  Lungs - rales appreciated; no wheeze  Abd - (+)BS obese - large pannus - soft NTND (-)r/r/g  Ext - warm to touch; no cyanosis/clubbing    CONSTITUTIONAL:  morbidly obese female, NAD                                                    NEURO:  AAOx3, no neuro deficits noted, CN grossly intact         HEENT: NCAT, EOMI, PERRLA  NECK: No JVD, No carotid bruits B/L, +2 Carotid pulses B/L  CV:   +S1/S2, Tachycardic, irregular rhythm, 4/6 systolic murmur   RESPIRATORY:  b/l crackles, no wheeze  GI: obese, + BS/soft/NT  EXT: Warm, pedal edema yes/no, pitting/non-pitting  PULSES:	B	    R	      FEM         	                                            DP                          PT  Right		                                                  Yes/No     Bruit	    Left		                                                  Yes/No     Bruit                          12.4   10.80 )-----------( 169      ( 21 May 2020 02:31 )             39.1     05-21    138  |  100  |  25<H>  ----------------------------<  141<H>  3.6   |  25  |  1.30    Ca    9.6      21 May 2020 02:31  Phos  4.8     05-21  Mg     2.4     05-21    TPro  6.6  /  Alb  3.4  /  TBili  0.7  /  DBili  x   /  AST  30  /  ALT  16  /  AlkPhos  47  05-21    CARDIAC MARKERS ( 20 May 2020 17:52 )  x     / 0.90 ng/mL / 287 U/L / x     / 14.2 ng/mL    EKG:					    ASA: IV				Mallampati class: III	            Anginal Class: NSTEMI    A/P:        Sedation Plan: Moderate  Patient Is Suitable Candidate For Sedation: Yes    Risks & benefits of procedure and sedation and risks and benefits for the alternative therapy have been explained to the patient in layman’s terms including but not limited to: allergic reaction, bleeding, infection, arrhythmia, respiratory compromise, renal and vascular compromise, limb damage, MI, CVA, emergent CABG/Vascular Surgery and death. Informed consent obtained and in chart. Interventional Cardiology PA Precath Note    Hospital Course: 74 year old Citizen of [place] speaking, morbidly obese Female, PMHx HTN, HLD, GERD, TIA with carotid artery disease s/p left carotid endarterectomy (on Plavix), CKD (baseline creatinine 1.45) chronic diastolic heart failure with severe aortic stenosis (PG 60, PV 4 from ECHO in 1/31/20) followed by Dr. Dubois, presented to [facility] on 5/2020 with symptoms of dizziness, weakness, chest pain, r/i NSTEMI, peak troponin of 10.  She reports having CP and SOB for several days associated with PND. She states the pain started while she was walking, though she is unable to quantify how much exertion prompted the symptoms. Denies orthopnea, LE edema. She was admitted for further workup and started on a heparin gtt. CT chest negative for PE, CT head significant for 2.6cm x 2.1cm mass lesion with mild hydrocephalus. Echo revealed severe AS and patient was transferred to [facility] for surgical evaluation. Upon arrival pt is afebrile , /101, SpO2 97% on 100% NRB RR 33, endorsing SOB and CP. Labs significant for Trop 0.9, BNP 9788. Pt noted to be in new onset AFib w/ RVR. Started Amiodarone gtt, given Lasix IV and Metoprolol IV, placed on BiPAP. Bedside Echo revealed EF 40%, mod MR, mod-severe AS, mild AI, lateral wall hypokinesis.   Of note, pt Covid negative x 1 at [facility]  Interventional Cardiology consulted for cardiac cath in light of pt's risk factors, NSTEMI, and severe AS, as part of pre-op w/u for surgical evaluation.    PMH: As above  PSH: Left Carotid Endarterectomy   ALL: NKDA, NKFA. Denies shellfish/Contrast dye allergy.  SocHX: Denies EtoH/TOB/IVDU  FHx: Denies pertinent family history    MEDS:   aMIOdarone Infusion 1 mG/Min IV Continuous <Continuous>  aMIOdarone Infusion 0.5 mG/Min IV Continuous <Continuous>  aspirin enteric coated 81 milliGRAM(s) Oral daily  atorvastatin 40 milliGRAM(s) Oral at bedtime  chlorhexidine 4% Liquid 1 Application(s) Topical <User Schedule>  dextrose 40% Gel 15 Gram(s) Oral once PRN  dextrose 5%. 1000 milliLiter(s) IV Continuous <Continuous>  dextrose 50% Injectable 12.5 Gram(s) IV Push once  dextrose 50% Injectable 25 Gram(s) IV Push once  dextrose 50% Injectable 25 Gram(s) IV Push once  glucagon  Injectable 1 milliGRAM(s) IntraMuscular once PRN  heparin  Infusion 1300 Unit(s)/Hr IV Continuous <Continuous>  insulin lispro (HumaLOG) corrective regimen sliding scale   SubCutaneous Before meals and at bedtime  metoprolol tartrate Injectable 5 milliGRAM(s) IV Push every 6 hours  pantoprazole  Injectable 40 milliGRAM(s) IV Push daily    T(C): 36.4 (05-21-20 @ 05:01), Max: 37.2 (05-20-20 @ 16:45)  HR: 105 (05-21-20 @ 07:00) (84 - 136)  BP: 143/86 (05-20-20 @ 17:15) (143/86 - 155/101)  RR: 24 (05-21-20 @ 07:00) (22 - 33)  SpO2: 95% (05-21-20 @ 07:00) (95% - 99%)  Wt(kg): --    CONSTITUTIONAL:  morbidly obese female, NAD                                                    NEURO:  AAOx3, no neuro deficits noted, CN grossly intact         HEENT: NCAT, EOMI, PERRLA  NECK: No JVD, No carotid bruits B/L, +2 Carotid pulses B/L  CV:   +S1/S2, Tachycardic, irregular rhythm, 4/6 systolic murmur radiates to neck  RESPIRATORY:  b/l crackles, no wheeze  GI: morbidly obese, large pannus + BS/soft/NT  EXT: Warm, no pedal edema  PULSES:	 Radial 1+ R, A-line present in L; Femoral 1+ b/l no audible bruits; DP 2+ b/l; PT 1+ b/l                          12.4   10.80 )-----------( 169      ( 21 May 2020 02:31 )             39.1     05-21    138  |  100  |  25<H>  ----------------------------<  141<H>  3.6   |  25  |  1.30    Ca    9.6      21 May 2020 02:31  Phos  4.8     05-21  Mg     2.4     05-21    TPro  6.6  /  Alb  3.4  /  TBili  0.7  /  DBili  x   /  AST  30  /  ALT  16  /  AlkPhos  47  05-21    CARDIAC MARKERS ( 20 May 2020 17:52 )  x     / 0.90 ng/mL / 287 U/L / x     / 14.2 ng/mL    EKG: Afib RVR, no acute ischemic changes noted				    ASA: IV				Mallampati class: III	            Anginal Class: NSTEMI    A/P:  74 year old Citizen of [place] speaking, morbidly obese Female, PMHx HTN, HLD, GERD, TIA with carotid artery disease s/p left carotid endarterectomy (on Plavix), CKD (baseline creatinine 1.45) chronic diastolic heart failure with severe aortic stenosis (PG 60, PV 4 from ECHO in 1/31/20) followed by Dr. Dubois, presented to [facility] on 5/2020 with symptoms of dizziness, weakness, chest pain, r/i NSTEMI, peak troponin of 10. Pt transferred to [facility] for sugical evaluation of severe AS.  Interventional Cardiology consulted for cardiac cath in light of pt's risk factors, NSTEMI, and severe AS, as part of pre-op w/u for surgical evaluation.    Pt already ordered for ASA 81mg QD and takes Plavix 75mg QD at home which has been held since admission. As d/w Dr. Cedeno and Dr. Mercedes, loaded with Plavix 300mg PO x 1 and ASA 243mg PO x 1 prior to cath in light of NSTEMI  No IVF in setting of severe AS requiring IV Lasix since admission  K 3.6, to be repleted by primary team per d/w CTS    Sedation Plan: Moderate  Patient Is Suitable Candidate For Sedation: Yes    Risks & benefits of procedure and sedation and risks and benefits for the alternative therapy have been explained to the patient in layman’s terms including but not limited to: allergic reaction, bleeding, infection, arrhythmia, respiratory compromise, renal and vascular compromise, limb damage, MI, CVA, emergent CABG/Vascular Surgery and death. Informed consent obtained and in chart. Interventional Cardiology PA Precath Note    Hospital Course: 74 year old [place]ron[place] speaking, morbidly obese Female, PMHx HTN, HLD, GERD, TIA with carotid artery disease s/p left carotid endarterectomy (on Plavix), CKD (baseline creatinine 1.45) chronic diastolic heart failure with severe aortic stenosis (PG 60, PV 4 from ECHO in 1/31/20) followed by Dr. Dubois, presented to [facility] on 5/2020 with symptoms of dizziness, weakness, chest pain, r/i NSTEMI, peak troponin of 10.  She reports having CP and SOB for several days associated with PND. She states the pain started while she was walking, though she is unable to quantify how much exertion prompted the symptoms. Denies orthopnea, LE edema. She was admitted for further workup and started on a heparin gtt. CT chest negative for PE, CT head significant for 2.6cm x 2.1cm mass lesion with mild hydrocephalus. Echo revealed severe AS and patient was transferred to [facility] for surgical evaluation. Upon arrival pt is afebrile , /101, SpO2 97% on 100% NRB RR 33, endorsing SOB and CP. Labs significant for Trop 0.9, BNP 9788. Pt noted to be in new onset AFib w/ RVR. Started Amiodarone gtt, given Lasix IV and Metoprolol IV, placed on BiPAP. Bedside Echo revealed EF 40%, mod MR, mod-severe AS, mild AI, lateral wall hypokinesis.   Of note, pt Covid negative x 1 at [facility]  Interventional Cardiology consulted for cardiac cath in light of pt's risk factors, NSTEMI, and severe AS, as part of pre-op w/u for surgical evaluation.    PMH: As above  PSH: Left Carotid Endarterectomy   ALL: NKDA, NKFA. Denies shellfish/Contrast dye allergy.  SocHX: Denies EtoH/TOB/IVDU  FHx: Denies pertinent family history    MEDS:   aMIOdarone Infusion 1 mG/Min IV Continuous <Continuous>  aMIOdarone Infusion 0.5 mG/Min IV Continuous <Continuous>  aspirin enteric coated 81 milliGRAM(s) Oral daily  atorvastatin 40 milliGRAM(s) Oral at bedtime  chlorhexidine 4% Liquid 1 Application(s) Topical <User Schedule>  dextrose 40% Gel 15 Gram(s) Oral once PRN  dextrose 5%. 1000 milliLiter(s) IV Continuous <Continuous>  dextrose 50% Injectable 12.5 Gram(s) IV Push once  dextrose 50% Injectable 25 Gram(s) IV Push once  dextrose 50% Injectable 25 Gram(s) IV Push once  glucagon  Injectable 1 milliGRAM(s) IntraMuscular once PRN  heparin  Infusion 1300 Unit(s)/Hr IV Continuous <Continuous>  insulin lispro (HumaLOG) corrective regimen sliding scale   SubCutaneous Before meals and at bedtime  metoprolol tartrate Injectable 5 milliGRAM(s) IV Push every 6 hours  pantoprazole  Injectable 40 milliGRAM(s) IV Push daily    T(C): 36.4 (05-21-20 @ 05:01), Max: 37.2 (05-20-20 @ 16:45)  HR: 105 (05-21-20 @ 07:00) (84 - 136)  BP: 143/86 (05-20-20 @ 17:15) (143/86 - 155/101)  RR: 24 (05-21-20 @ 07:00) (22 - 33)  SpO2: 95% (05-21-20 @ 07:00) (95% - 99%)  Wt(kg): --    CONSTITUTIONAL:  morbidly obese female, NAD                                                    NEURO:  AAOx3, no neuro deficits noted, CN grossly intact         HEENT: NCAT, EOMI, PERRLA  NECK: No JVD, No carotid bruits B/L, +2 Carotid pulses B/L  CV:   +S1/S2, Tachycardic, irregular rhythm, 4/6 systolic murmur radiates to neck  RESPIRATORY:  b/l crackles, no wheeze  GI: morbidly obese, large pannus + BS/soft/NT  EXT: Warm, no pedal edema  PULSES:	 Radial 1+ R, A-line present in L; Femoral 1+ b/l no audible bruits; DP 2+ b/l; PT 1+ b/l                          12.4   10.80 )-----------( 169      ( 21 May 2020 02:31 )             39.1     05-21    138  |  100  |  25<H>  ----------------------------<  141<H>  3.6   |  25  |  1.30    Ca    9.6      21 May 2020 02:31  Phos  4.8     05-21  Mg     2.4     05-21    TPro  6.6  /  Alb  3.4  /  TBili  0.7  /  DBili  x   /  AST  30  /  ALT  16  /  AlkPhos  47  05-21    CARDIAC MARKERS ( 20 May 2020 17:52 )  x     / 0.90 ng/mL / 287 U/L / x     / 14.2 ng/mL    EKG: Afib RVR, no acute ischemic changes noted				    ASA: IV				Mallampati class: III	            Anginal Class: NSTEMI    A/P:  74 year old [place] speaking, morbidly obese Female, PMHx HTN, HLD, GERD, TIA with carotid artery disease s/p left carotid endarterectomy (on Plavix), CKD (baseline creatinine 1.45) chronic diastolic heart failure with severe aortic stenosis (PG 60, PV 4 from ECHO in 1/31/20) followed by Dr. Dubois, presented to [facility] on 5/2020 with symptoms of dizziness, weakness, chest pain, r/i NSTEMI, peak troponin of 10. Pt transferred to [facility] for sugical evaluation of severe AS.  Interventional Cardiology consulted for cardiac cath in light of pt's risk factors, NSTEMI, and severe AS, as part of pre-op w/u for surgical evaluation.    Pt already ordered for ASA 81mg QD and takes Plavix 75mg QD at home which has been held since admission. As d/w Dr. Cedeno and Dr. Mercedes, loaded with Plavix 300mg PO x 1 and ASA 243mg PO x 1 prior to cath in light of NSTEMI  No IVF in setting of severe AS requiring IV Lasix since admission  K 3.6, to be repleted by primary team per d/w CTS    Consent obtained via [employer]  Marian, ID# [id number]  Consent also verbally obtained via phone call to pt's HCP/daughter, Perla Martin    Sedation Plan: Moderate  Patient Is Suitable Candidate For Sedation: Yes    Risks & benefits of procedure and sedation and risks and benefits for the alternative therapy have been explained to the patient in layman’s terms including but not limited to: allergic reaction, bleeding, infection, arrhythmia, respiratory compromise, renal and vascular compromise, limb damage, MI, CVA, emergent CABG/Vascular Surgery and death. Informed consent obtained and in chart. Interventional Cardiology PA Precath Note    Hospital Course: 74 year old [place] Island[place] speaking, morbidly obese Female, PMHx HTN, HLD, GERD, TIA with carotid artery disease s/p left carotid endarterectomy (on Plavix), CKD (baseline creatinine 1.45) chronic diastolic heart failure with severe aortic stenosis (PG 60, PV 4 from ECHO in 1/31/20) followed by Dr. Dubois, presented to [facility] on 5/2020 with symptoms of dizziness, weakness, chest pain, r/i NSTEMI, peak troponin of 10.  She reports having CP and SOB for several days associated with PND. She states the pain started while she was walking, though she is unable to quantify how much exertion prompted the symptoms. Denies orthopnea, LE edema. She was admitted for further workup and started on a heparin gtt. CT chest negative for PE, CT head significant for 2.6cm x 2.1cm mass lesion with mild hydrocephalus. Echo revealed severe AS and patient was transferred to [facility] for surgical evaluation. Upon arrival pt is afebrile , /101, SpO2 97% on 100% NRB RR 33, endorsing SOB and CP. Labs significant for Trop 0.9, BNP 9788. Pt noted to be in new onset AFib w/ RVR. Started Amiodarone gtt, given Lasix IV and Metoprolol IV, placed on BiPAP. Bedside Echo revealed EF 40%, mod MR, mod-severe AS, mild AI, lateral wall hypokinesis.   Of note, pt Covid negative x 1 at [facility]  Interventional Cardiology consulted for R+L cardiac cath in light of pt's risk factors, NSTEMI, and severe AS, as part of pre-op w/u for surgical evaluation.    PMH: As above  PSH: Left Carotid Endarterectomy   ALL: NKDA, NKFA. Denies shellfish/Contrast dye allergy.  SocHX: Denies EtoH/TOB/IVDU  FHx: Denies pertinent family history    MEDS:   aMIOdarone Infusion 1 mG/Min IV Continuous <Continuous>  aMIOdarone Infusion 0.5 mG/Min IV Continuous <Continuous>  aspirin enteric coated 81 milliGRAM(s) Oral daily  atorvastatin 40 milliGRAM(s) Oral at bedtime  chlorhexidine 4% Liquid 1 Application(s) Topical <User Schedule>  dextrose 40% Gel 15 Gram(s) Oral once PRN  dextrose 5%. 1000 milliLiter(s) IV Continuous <Continuous>  dextrose 50% Injectable 12.5 Gram(s) IV Push once  dextrose 50% Injectable 25 Gram(s) IV Push once  dextrose 50% Injectable 25 Gram(s) IV Push once  glucagon  Injectable 1 milliGRAM(s) IntraMuscular once PRN  heparin  Infusion 1300 Unit(s)/Hr IV Continuous <Continuous>  insulin lispro (HumaLOG) corrective regimen sliding scale   SubCutaneous Before meals and at bedtime  metoprolol tartrate Injectable 5 milliGRAM(s) IV Push every 6 hours  pantoprazole  Injectable 40 milliGRAM(s) IV Push daily    T(C): 36.4 (05-21-20 @ 05:01), Max: 37.2 (05-20-20 @ 16:45)  HR: 105 (05-21-20 @ 07:00) (84 - 136)  BP: 143/86 (05-20-20 @ 17:15) (143/86 - 155/101)  RR: 24 (05-21-20 @ 07:00) (22 - 33)  SpO2: 95% (05-21-20 @ 07:00) (95% - 99%)  Wt(kg): --    CONSTITUTIONAL:  morbidly obese female, NAD                                                    NEURO:  AAOx3, no neuro deficits noted, CN grossly intact         HEENT: NCAT, EOMI, PERRLA  NECK: No JVD, No carotid bruits B/L, +2 Carotid pulses B/L  CV:   +S1/S2, Tachycardic, irregular rhythm, 4/6 systolic murmur radiates to neck  RESPIRATORY:  b/l crackles, no wheeze  GI: morbidly obese, large pannus + BS/soft/NT  EXT: Warm, no pedal edema  PULSES:	 Radial 1+ R, A-line present in L; Femoral 1+ b/l no audible bruits; DP 2+ b/l; PT 1+ b/l                          12.4   10.80 )-----------( 169      ( 21 May 2020 02:31 )             39.1     05-21    138  |  100  |  25<H>  ----------------------------<  141<H>  3.6   |  25  |  1.30    Ca    9.6      21 May 2020 02:31  Phos  4.8     05-21  Mg     2.4     05-21    TPro  6.6  /  Alb  3.4  /  TBili  0.7  /  DBili  x   /  AST  30  /  ALT  16  /  AlkPhos  47  05-21    CARDIAC MARKERS ( 20 May 2020 17:52 )  x     / 0.90 ng/mL / 287 U/L / x     / 14.2 ng/mL    EKG: Afib RVR, no acute ischemic changes noted				    ASA: IV				Mallampati class: III	            Anginal Class: NSTEMI    A/P:  74 year old [place] Islander speaking, morbidly obese Female, PMHx HTN, HLD, GERD, TIA with carotid artery disease s/p left carotid endarterectomy (on Plavix), CKD (baseline creatinine 1.45) chronic diastolic heart failure with severe aortic stenosis (PG 60, PV 4 from ECHO in 1/31/20) followed by Dr. Dubois, presented to [facility] on 5/2020 with symptoms of dizziness, weakness, chest pain, r/i NSTEMI, peak troponin of 10. Pt transferred to [facility] for sugical evaluation of severe AS.  Interventional Cardiology consulted for R+L cardiac cath in light of pt's risk factors, NSTEMI, and severe AS, as part of pre-op w/u for surgical evaluation.    Pt already ordered for ASA 81mg QD and takes Plavix 75mg QD at home which has been held since admission. As d/w Dr. Cedeno and Dr. Mercedes, loaded with Plavix 300mg PO x 1 and ASA 243mg PO x 1 prior to cath in light of NSTEMI  No IVF in setting of severe AS requiring IV Lasix since admission  K 3.6, repleted by CTS team with KCl 40mEq PO x 1    Consent obtained via [employer]  Marian, ID# [id number]  Consent also verbally obtained via phone call to pt's HCP/daughter, Perla Martin    Sedation Plan: Moderate  Patient Is Suitable Candidate For Sedation: Yes    Risks & benefits of procedure and sedation and risks and benefits for the alternative therapy have been explained to the patient in layman’s terms including but not limited to: allergic reaction, bleeding, infection, arrhythmia, respiratory compromise, renal and vascular compromise, limb damage, MI, CVA, emergent CABG/Vascular Surgery and death. Informed consent obtained and in chart. Interventional Cardiology PA Precath Note    Hospital Course: 74 year old American speaking, morbidly obese Female, PMHx HTN, HLD, GERD, TIA with carotid artery disease s/p left carotid endarterectomy (on Plavix), CKD (baseline creatinine 1.45) chronic diastolic heart failure with severe aortic stenosis (PG 60, PV 4 from ECHO in 1/31/20) followed by Dr. Dubois, presented to [facility] on 5/2020 with symptoms of dizziness, weakness, chest pain, r/i NSTEMI, peak troponin of 10.  She reports having CP and SOB for several days associated with PND. She states the pain started while she was walking, though she is unable to quantify how much exertion prompted the symptoms. Denies orthopnea, LE edema. She was admitted for further workup and started on a heparin gtt. CT chest negative for PE, CT head significant for 2.6cm x 2.1cm mass lesion with mild hydrocephalus. Echo revealed severe AS and patient was transferred to [facility] for surgical evaluation. Upon arrival pt is afebrile , /101, SpO2 97% on 100% NRB RR 33, endorsing SOB and CP. Labs significant for Trop 0.9, BNP 9788. Pt noted to be in new onset AFib w/ RVR. Started Amiodarone gtt, given Lasix IV and Metoprolol IV, placed on BiPAP. Bedside Echo revealed EF 40%, mod MR, mod-severe AS, mild AI, lateral wall hypokinesis.   Of note, pt Covid negative x 1 at [facility]  Interventional Cardiology consulted for R+L cardiac cath in light of pt's risk factors, NSTEMI, and severe AS, as part of pre-op w/u for surgical evaluation.    Subjective: Pt seen and examined at bedside this AM without complaints. Pt states she feels better and denies any current CP, SOB, HA, dizziness, fevers, chills, cough, or any other symptoms at this time. Interview performed via [employer]  Marian, ID# [id number]    PMH: As above  PSH: Left Carotid Endarterectomy   ALL: NKDA, NKFA. Denies shellfish/Contrast dye allergy.  SocHX: Denies EtoH/TOB/IVDU  FHx: Denies pertinent family history    MEDS:   aMIOdarone Infusion 1 mG/Min IV Continuous <Continuous>  aMIOdarone Infusion 0.5 mG/Min IV Continuous <Continuous>  aspirin enteric coated 81 milliGRAM(s) Oral daily  atorvastatin 40 milliGRAM(s) Oral at bedtime  chlorhexidine 4% Liquid 1 Application(s) Topical <User Schedule>  dextrose 40% Gel 15 Gram(s) Oral once PRN  dextrose 5%. 1000 milliLiter(s) IV Continuous <Continuous>  dextrose 50% Injectable 12.5 Gram(s) IV Push once  dextrose 50% Injectable 25 Gram(s) IV Push once  dextrose 50% Injectable 25 Gram(s) IV Push once  glucagon  Injectable 1 milliGRAM(s) IntraMuscular once PRN  heparin  Infusion 1300 Unit(s)/Hr IV Continuous <Continuous>  insulin lispro (HumaLOG) corrective regimen sliding scale   SubCutaneous Before meals and at bedtime  metoprolol tartrate Injectable 5 milliGRAM(s) IV Push every 6 hours  pantoprazole  Injectable 40 milliGRAM(s) IV Push daily    T(C): 36.4 (05-21-20 @ 05:01), Max: 37.2 (05-20-20 @ 16:45)  HR: 105 (05-21-20 @ 07:00) (84 - 136)  BP: 143/86 (05-20-20 @ 17:15) (143/86 - 155/101)  RR: 24 (05-21-20 @ 07:00) (22 - 33)  SpO2: 95% (05-21-20 @ 07:00) (95% - 99%)  Wt(kg): --    CONSTITUTIONAL:  morbidly obese female, NAD                                                    NEURO:  AAOx3, no neuro deficits noted, CN grossly intact         HEENT: NCAT, EOMI, PERRLA  NECK: No JVD, No carotid bruits B/L, +2 Carotid pulses B/L  CV:   +S1/S2, Tachycardic, irregular rhythm, 4/6 systolic murmur radiates to neck  RESPIRATORY:  b/l crackles, no wheeze  GI: morbidly obese, large pannus + BS/soft/NT  EXT: Warm, no pedal edema  PULSES:	 Radial 1+ R, A-line present in L; Femoral 1+ b/l no audible bruits; DP 2+ b/l; PT 1+ b/l                          12.4   10.80 )-----------( 169      ( 21 May 2020 02:31 )             39.1     05-21    138  |  100  |  25<H>  ----------------------------<  141<H>  3.6   |  25  |  1.30    Ca    9.6      21 May 2020 02:31  Phos  4.8     05-21  Mg     2.4     05-21    TPro  6.6  /  Alb  3.4  /  TBili  0.7  /  DBili  x   /  AST  30  /  ALT  16  /  AlkPhos  47  05-21    CARDIAC MARKERS ( 20 May 2020 17:52 )  x     / 0.90 ng/mL / 287 U/L / x     / 14.2 ng/mL    EKG: Afib RVR, no acute ischemic changes noted				    ASA: IV				Mallampati class: III	            Anginal Class: NSTEMI    A/P:  74 year old American speaking, morbidly obese Female, PMHx HTN, HLD, GERD, TIA with carotid artery disease s/p left carotid endarterectomy (on Plavix), CKD (baseline creatinine 1.45) chronic diastolic heart failure with severe aortic stenosis (PG 60, PV 4 from ECHO in 1/31/20) followed by Dr. Dubois, presented to [facility] on 5/2020 with symptoms of dizziness, weakness, chest pain, r/i NSTEMI, peak troponin of 10. Pt transferred to [facility] for sugical evaluation of severe AS.  Interventional Cardiology consulted for R+L cardiac cath in light of pt's risk factors, NSTEMI, and severe AS, as part of pre-op w/u for surgical evaluation.    Pt already ordered for ASA 81mg QD and takes Plavix 75mg QD at home which has been held since admission. As d/w Dr. Cedeno and Dr. Mercedes, loaded with Plavix 300mg PO x 1 and ASA 243mg PO x 1 prior to cath in light of NSTEMI  No IVF in setting of severe AS requiring IV Lasix since admission  K 3.6, repleted by CTS team with KCl 40mEq PO x 1    Consent obtained via [employer]  Marian, ID# [id number]  Consent also verbally obtained via phone call to pt's HCP/daughter, Perla Martin    Sedation Plan: Moderate  Patient Is Suitable Candidate For Sedation: Yes    Risks & benefits of procedure and sedation and risks and benefits for the alternative therapy have been explained to the patient in layman’s terms including but not limited to: allergic reaction, bleeding, infection, arrhythmia, respiratory compromise, renal and vascular compromise, limb damage, MI, CVA, emergent CABG/Vascular Surgery and death. Informed consent obtained and in chart.

## 2020-05-21 NOTE — CHART NOTE - NSCHARTNOTEFT_GEN_A_CORE
Admitting Diagnosis:   Patient is a 74y old  Female who presents with a chief complaint of Severe AS (21 May 2020 07:20)      Consult: Yes [   ]  No [ x  ]    Reason for Initial Nutrition Assessment: LOS       PAST MEDICAL & SURGICAL HISTORY:  H/O carotid stenosis  TIA (transient ischemic attack)  Chronic kidney disease (CKD)  Aortic stenosis  HLD (hyperlipidemia)  HTN (hypertension)  H/O carotid endarterectomy      Current Nutrition Order: NPO     PO Intake: Good (%) [   ]  Fair (50-75%) [   ] Poor (<25%) [   ] - n/a NPO     GI Issues: no n/v/d/c per flow sheet     Pain: no pain noted     Skin Integrity: jacoby score 13     Labs:   05-21    137  |  100  |  25<H>  ----------------------------<  116<H>  4.3   |  25  |  1.35<H>    Ca    9.2      21 May 2020 12:17  Phos  3.6     05-21  Mg     2.0     05-21    TPro  6.6  /  Alb  3.5  /  TBili  0.9  /  DBili  x   /  AST  30  /  ALT  14  /  AlkPhos  48  05-21    CAPILLARY BLOOD GLUCOSE      POCT Blood Glucose.: 113 mg/dL (21 May 2020 10:15)  POCT Blood Glucose.: 122 mg/dL (21 May 2020 07:02)  POCT Blood Glucose.: 130 mg/dL (20 May 2020 22:48)  POCT Blood Glucose.: 160 mg/dL (20 May 2020 17:42)    Nutritionally Pertinent Lab Values: Glu 116, BUN 25, Cr 1.25     Medications:  MEDICATIONS  (STANDING):  aMIOdarone Infusion 0.5 mG/Min (16.7 mL/Hr) IV Continuous <Continuous>  aMIOdarone IVPB 150 milliGRAM(s) IV Intermittent once  aspirin enteric coated 81 milliGRAM(s) Oral daily  atorvastatin 40 milliGRAM(s) Oral at bedtime  chlorhexidine 4% Liquid 1 Application(s) Topical <User Schedule>  dextrose 5%. 1000 milliLiter(s) (50 mL/Hr) IV Continuous <Continuous>  dextrose 50% Injectable 12.5 Gram(s) IV Push once  dextrose 50% Injectable 25 Gram(s) IV Push once  dextrose 50% Injectable 25 Gram(s) IV Push once  heparin  Infusion 1300 Unit(s)/Hr (13 mL/Hr) IV Continuous <Continuous>  insulin lispro (HumaLOG) corrective regimen sliding scale   SubCutaneous Before meals and at bedtime  lactated ringers. 1000 milliLiter(s) (50 mL/Hr) IV Continuous <Continuous>  metoprolol tartrate Injectable 5 milliGRAM(s) IV Push every 6 hours  pantoprazole  Injectable 40 milliGRAM(s) IV Push daily    MEDICATIONS  (PRN):  dextrose 40% Gel 15 Gram(s) Oral once PRN Blood Glucose LESS THAN 70 milliGRAM(s)/deciliter  glucagon  Injectable 1 milliGRAM(s) IntraMuscular once PRN Glucose LESS THAN 70 milligrams/deciliter      Admitted Anthropometrics: 110.7kg, IBW 50kg, 220% IBW, ht 62", BMI 44.6     Weight Change: no noted wt changes     Nutrition Focused Physical Exam: Completed [   ]  Unable to complete [   ]- n/a     Estimated energy needs:   Ideal body weight used for calculations as pt >120% of IBW  Nutrient needs based on [facility] standards of care for maintenance in adults, adjusted for age and obesity, pre- op needs   1250- 1500kcal (25-30kcal/kg)   60- 70g pro (1.2-1.4g/kg pro)   1250- 1500kcal (25-30ml/kg)     Subjective:   74 year old F, PMHx morbid obesity, HTN, HLD, GERD, TIA with carotid artery disease s/p left carotid endarterectomy, Chronic Kidney disease (baseline creatinine 1.45) chronic diastolic heart failure with severe aortic stenosis, presented to [facility] with symptoms of dizziness, weakness, chest pain, r/i NSTEMI, peak troponin of 10.  She reports having CP and SOB for several days associated with PND. She states the pain started while she was walking, though she is unable to quantify how much exertion prompted the symptoms. Denies orthopnea, LE edema. She was admitted for further workup and started on a heparin gtt. CT chest negative for PE, CT head significant for 2.6cm x 2.1cm mass lesion with mild hydrocephalus. Echo revealed severe AS and patient was transferred to [facility] for surgical evaluation. Upon arrival pt is afebrile , /101, SpO2 97% on 100% NRB RR 33, endorsing SOB and CP. Ruled in for NSTEMI and severe AS. Pt remains NPO for sx w/u at this time. NKFA or recent wt changes. Will continue to follow per protocol.     Nutrition Diagnosis: Inadequate energy intake r/t NPO for testing AEB 0% EER     Goal: advance diet and meet >75% EER     Recommendations:  1. Trend wts   2. Manage pain   3. Monitor and replete lytes  4. Encourage intake through day    Education: n/a NPO     Risk Level: High [ x  ] Moderate [   ] Low [   ]

## 2020-05-22 PROBLEM — N18.9 CHRONIC KIDNEY DISEASE, UNSPECIFIED: Chronic | Status: ACTIVE | Noted: 2020-01-01

## 2020-05-22 PROBLEM — E78.5 HYPERLIPIDEMIA, UNSPECIFIED: Chronic | Status: ACTIVE | Noted: 2020-01-01

## 2020-05-22 PROBLEM — G45.9 TRANSIENT CEREBRAL ISCHEMIC ATTACK, UNSPECIFIED: Chronic | Status: ACTIVE | Noted: 2020-01-01

## 2020-05-22 PROBLEM — I35.0 NONRHEUMATIC AORTIC (VALVE) STENOSIS: Chronic | Status: ACTIVE | Noted: 2020-01-01

## 2020-05-22 PROBLEM — I10 ESSENTIAL (PRIMARY) HYPERTENSION: Chronic | Status: ACTIVE | Noted: 2020-01-01

## 2020-05-22 PROBLEM — Z86.79 PERSONAL HISTORY OF OTHER DISEASES OF THE CIRCULATORY SYSTEM: Chronic | Status: ACTIVE | Noted: 2020-01-01

## 2020-05-22 NOTE — CONSULT NOTE ADULT - SUBJECTIVE AND OBJECTIVE BOX
Reason for consult: incidental brain mass    History of Present Illness:  74-year-old woman with prior TIA 2/2 carotid stenosis s/p left carotid endarterectomy, severe aortic stenosis/diastolic heart failure who presents to [facility] yesterday with dizziness, weakness, and chest pain and was found to have NSTEMI with a peak troponin of 10.  She was transferred to [facility] for further management.  Prior to transfer she had a CT of the head which reportedly showed a 2.6 x 2.1 cm mass lesion with mild hydrocephalus (unknown location of lesion, unknown whether obstructive or non-obstructive hydro).    Cardiac cath was performed yesterday afternoon which showed 2 vessel CAD (50% LCx, 70% pLAD) and mild pulmonary hypertension.    Neurology is consulted regarding etiology and management of brain lesion.  Patient denies any recent headaches or changes in vision.  She does report generalized weakness which she says is worse on the right side.  No numbness or weakness in the right side or elsewhere.  No difficulty speaking or swallowing.  No seizures.    Review of Systems:  CONSTITUTIONAL: +fatigue  HEENT:  Eyes:  No visual loss, blurred vision, double vision  Ears, Nose, Throat:  No hearing loss  SKIN:  No rash or itching.  CARDIOVASCULAR:  see HPI  RESPIRATORY:  +SOB  GASTROINTESTINAL:  No nausea, vomiting or diarrhea   NEUROLOGICAL:  see HPI  MUSCULOSKELETAL:  No pain  HEMATOLOGIC:  No bleeding or bruising.  PSYCHIATRIC:  No history of depression or anxiety    PAST MEDICAL & SURGICAL HISTORY:  H/O carotid stenosis  TIA (transient ischemic attack)  Chronic kidney disease (CKD)  Aortic stenosis  HLD (hyperlipidemia)  HTN (hypertension)  H/O carotid endarterectomy    MEDICATIONS  (STANDING):  aMIOdarone    Tablet   Oral   aMIOdarone    Tablet 400 milliGRAM(s) Oral every 8 hours  aspirin enteric coated 81 milliGRAM(s) Oral daily  atorvastatin 40 milliGRAM(s) Oral at bedtime  chlorhexidine 4% Liquid 1 Application(s) Topical <User Schedule>  dextrose 5%. 1000 milliLiter(s) (50 mL/Hr) IV Continuous <Continuous>  dextrose 50% Injectable 12.5 Gram(s) IV Push once  dextrose 50% Injectable 25 Gram(s) IV Push once  dextrose 50% Injectable 25 Gram(s) IV Push once  heparin  Infusion 1300 Unit(s)/Hr (13 mL/Hr) IV Continuous <Continuous>  insulin lispro (HumaLOG) corrective regimen sliding scale   SubCutaneous Before meals and at bedtime  lactated ringers. 1000 milliLiter(s) (50 mL/Hr) IV Continuous <Continuous>  metoprolol tartrate Injectable 5 milliGRAM(s) IV Push every 6 hours  pantoprazole  Injectable 40 milliGRAM(s) IV Push daily    MEDICATIONS  (PRN):  dextrose 40% Gel 15 Gram(s) Oral once PRN Blood Glucose LESS THAN 70 milliGRAM(s)/deciliter  glucagon  Injectable 1 milliGRAM(s) IntraMuscular once PRN Glucose LESS THAN 70 milligrams/deciliter      Allergies:  No Known Allergies    Family History:  Non-contributory    Social History:  Lives with daughter, no tobacco, alcohol, or illicit drug use    Physical Exam:    T(C): 36.6 (05-22-20 @ 09:00), Max: 36.6 (05-22-20 @ 09:00)  HR: 100 (05-22-20 @ 13:19) (87 - 112)  BP: 111/71 (05-21-20 @ 15:30) (111/71 - 111/71)  RR: 12 (05-22-20 @ 13:19) (12 - 20)  SpO2: 98% (05-22-20 @ 13:19) (95% - 100%)    Constitutional: no apparent distress  Psychiatric: normal affect, euthyhmic, alert and oriented x 3  HEENT: moist mucous membranes, oropharynx clear  Neck: supple, no lymphadenopathy  Respiratory: clear to auscultation bilaterally, no crackles or wheezing  Cardiovascular: regular rate and rhythm, +systolic ejection murmur  GI: soft, non-tender, no distended, bowel sounds present; no hepatosplenomegaly on palpation  Musculoskeletal: extremities warm, well-perfused, normal range of motion  Skin: no rashes, bruises, or lesions    Neurologic Exam:  Mental Status: awake and alert, speech fluent and prosodic with no paraphasic errors (via [place] )  Cranial Nerves: I: deferred; II: pupils equal round and reactive, blinks to threat bilaterally III, IV, VI: extraocular movements full with no nystagmus, no upgaze limitation; V: facial sensation intact and symmetric; VII: facial power symmetric; VIII: hearing intact to voice; IX/X: palate elevates symmetrically, no dysarthria; XI: shoulder shrug symmetric; XII: tongue protrudes midline  Motor: normal bulk and tone, no orbiting or pronator drift, power 5/5 to confrontation throughout including shoulder abduction, elbow flexion and extension, wrist flexion and extension, hip flexion, knee flexion and extension, plantarflexion, and dorsiflexion, no abnormal movements  Sensation: intact to light touch in distal upper and lower extremities bilaterally  Coordination: finger-nose-finger intact bilaterally  Reflexes: 2+ biceps, triceps, brachioradialis, patella.  Toes downgoing bilaterally  Gait: deferred      Labs:                          11.5   8.70  )-----------( 146      ( 22 May 2020 02:32 )             36.2     05-22    143  |  104  |  24<H>  ----------------------------<  92  4.1   |  25  |  1.20    Ca    9.0      22 May 2020 02:32  Phos  3.6     05-22  Mg     2.0     05-22    TPro  6.5  /  Alb  3.4  /  TBili  0.7  /  DBili  x   /  AST  19  /  ALT  12  /  AlkPhos  45  05-22    LIVER FUNCTIONS - ( 22 May 2020 02:32 )  Alb: 3.4 g/dL / Pro: 6.5 g/dL / ALK PHOS: 45 U/L / ALT: 12 U/L / AST: 19 U/L / GGT: x           PT/INR - ( 22 May 2020 02:32 )   PT: 15.0 sec;   INR: 1.31     PTT - ( 22 May 2020 02:32 )  PTT:49.4 sec    A1c 5.7%  LDL 72    Imaging:  No brain imaging yet at [facility], reported 2x2 cm lesion with mild hydrocephalus noted on outside scan    Cardiology:  Cath as above

## 2020-05-22 NOTE — CONSULT NOTE ADULT - ASSESSMENT
74 year old F, PMHx morbid obesity, HTN, HLD, GERD, TIA with carotid artery disease s/p left carotid endarterectomy, Chronic Kidney disease (baseline creatinine 1.45) chronic diastolic heart failure with severe aortic stenosis here for TAVR.    -No Acute Vasc intervention  -Can proceed with Transfemoral TAVR as scheduled

## 2020-05-22 NOTE — CONSULT NOTE ADULT - ASSESSMENT
74-year-old woman with prior TIA 2/2 carotid stenosis s/p left carotid endarterectomy, severe aortic stenosis/diastolic heart failure, admitted to [facility] for management or NSTEMI and severe aortic stenosis, with head CT prior to admission reportedly showing 2x2 cm brain lesion and mild hydrocephalus.    Her neurologic exam is normal (gait exam deferred due to catheter sheath, but no other abnormalities noted on exam; notably she has no confusion or upgaze limitation to suggest symptomatic acute hydrocephalus).  She also does not report any neurologic symptoms other than R > L generalized weakness, however this may be fatigue rather than truly focal as strength was symmetric on exam.  Thus I suspect that the lesion is incidental and unrelated to her reason for admission, but as it has not been investigated previously would recommend MRI brain to determine what this lesion is and whether she needs any additional work up or treatment at this time.    # Brain lesion  -Please obtain images or at least full report of CT scan from [facility]  -MRI brain without contrast    # Prior TIA s/p L carotid endarterectomy  -Continue aspirin, atorvastatin

## 2020-05-22 NOTE — PROGRESS NOTE ADULT - SUBJECTIVE AND OBJECTIVE BOX
CTICU  CRITICAL  CARE  attending     Hand off received 					   Pertinent clinical, laboratory, radiographic, hemodynamic, echocardiographic, respiratory data, microbiologic data and chart were reviewed and analyzed frequently throughout the course of the day and night  Patient seen and examined with CTS/ SH attending at bedside  Pt is a 74y , Female, HEALTH ISSUES - PROBLEM Dx:      , FAMILY HISTORY:  PAST MEDICAL & SURGICAL HISTORY:  H/O carotid stenosis  TIA (transient ischemic attack)  Chronic kidney disease (CKD)  Aortic stenosis  HLD (hyperlipidemia)  HTN (hypertension)  H/O carotid endarterectomy    Patient is a 74y old  Female who presents with a chief complaint of Severe AS (22 May 2020 13:09)      14 system review was unremarkable    Vital signs, hemodynamic and respiratory parameters were reviewed from the bedside nursing flowsheet.  ICU Vital Signs Last 24 Hrs  T(C): 36.8 (22 May 2020 14:00), Max: 36.8 (22 May 2020 14:00)  T(F): 98.2 (22 May 2020 14:00), Max: 98.2 (22 May 2020 14:00)  HR: 96 (22 May 2020 17:00) (86 - 106)  BP: --  BP(mean): --  ABP: 141/90 (22 May 2020 17:00) (109/74 - 144/90)  ABP(mean): 109 (22 May 2020 17:00) (88 - 119)  RR: 12 (22 May 2020 13:19) (12 - 20)  SpO2: 97% (22 May 2020 17:00) (95% - 100%)    Adult Advanced Hemodynamics Last 24 Hrs  CVP(mm Hg): --  CVP(cm H2O): --  CO: --  CI: --  PA: --  PA(mean): --  PCWP: --  SVR: --  SVRI: --  PVR: --  PVRI: --, ABG - ( 22 May 2020 02:28 )  pH, Arterial: 7.44  pH, Blood: x     /  pCO2: 39    /  pO2: 143   / HCO3: 26    / Base Excess: 1.7   /  SaO2: 99                  Intake and output was reviewed and the fluid balance was calculated  Daily     Daily   I&O's Summary    21 May 2020 07:01  -  22 May 2020 07:00  --------------------------------------------------------  IN: 888.4 mL / OUT: 1681 mL / NET: -792.6 mL    22 May 2020 07:01  -  22 May 2020 17:31  --------------------------------------------------------  IN: 694 mL / OUT: 386 mL / NET: 308 mL        All lines and drain sites were assessed  [facility]      POCT Blood Glucose.: 107 mg/dL (22 May 2020 17:15)    No acute change in mental status  Auscultation of the chest reveals equal bs  Abdomen is soft  Extremities are warm and well perfused  Wounds appear clean and unremarkable  Antibiotics are periop    labs  CBC Full  -  ( 22 May 2020 02:32 )  WBC Count : 8.70 K/uL  RBC Count : 3.74 M/uL  Hemoglobin : 11.5 g/dL  Hematocrit : 36.2 %  Platelet Count - Automated : 146 K/uL  Mean Cell Volume : 96.8 fl  Mean Cell Hemoglobin : 30.7 pg  Mean Cell Hemoglobin Concentration : 31.8 gm/dL  Auto Neutrophil # : x  Auto Lymphocyte # : x  Auto Monocyte # : x  Auto Eosinophil # : x  Auto Basophil # : x  Auto Neutrophil % : x  Auto Lymphocyte % : x  Auto Monocyte % : x  Auto Eosinophil % : x  Auto Basophil % : x    05-22    143  |  104  |  24<H>  ----------------------------<  92  4.1   |  25  |  1.20    Ca    9.0      22 May 2020 02:32  Phos  3.6     05-22  Mg     2.0     05-22    TPro  6.5  /  Alb  3.4  /  TBili  0.7  /  DBili  x   /  AST  19  /  ALT  12  /  AlkPhos  45  05-22    PT/INR - ( 22 May 2020 02:32 )   PT: 15.0 sec;   INR: 1.31          PTT - ( 22 May 2020 02:32 )  PTT:49.4 sec  The current medications were reviewed   MEDICATIONS  (STANDING):  aMIOdarone    Tablet   Oral   aMIOdarone    Tablet 400 milliGRAM(s) Oral every 8 hours  aspirin enteric coated 81 milliGRAM(s) Oral daily  atorvastatin 40 milliGRAM(s) Oral at bedtime  chlorhexidine 4% Liquid 1 Application(s) Topical <User Schedule>  dextrose 5%. 1000 milliLiter(s) (50 mL/Hr) IV Continuous <Continuous>  dextrose 50% Injectable 12.5 Gram(s) IV Push once  dextrose 50% Injectable 25 Gram(s) IV Push once  dextrose 50% Injectable 25 Gram(s) IV Push once  heparin  Infusion 1300 Unit(s)/Hr (13 mL/Hr) IV Continuous <Continuous>  insulin lispro (HumaLOG) corrective regimen sliding scale   SubCutaneous Before meals and at bedtime  lactated ringers. 1000 milliLiter(s) (50 mL/Hr) IV Continuous <Continuous>  metoprolol tartrate Injectable 5 milliGRAM(s) IV Push every 6 hours  pantoprazole  Injectable 40 milliGRAM(s) IV Push daily    MEDICATIONS  (PRN):  dextrose 40% Gel 15 Gram(s) Oral once PRN Blood Glucose LESS THAN 70 milliGRAM(s)/deciliter  glucagon  Injectable 1 milliGRAM(s) IntraMuscular once PRN Glucose LESS THAN 70 milligrams/deciliter       PROBLEM LIST/ ASSESSMENT:  HEALTH ISSUES - PROBLEM Dx:      ,   Patient is a 74y old  Female who presents with a chief complaint of Severe AS (22 May 2020 13:09)     s/p preop for tavr                My plan includes :  close hemodynamic, ventilatory and drain monitoring and management per post op routine    Monitor for arrhythmias and monitor parameters for organ perfusion  beta blockade not administered due to hemodynamic instability and bradycardia  monitor neurologic status  Head of the bed should remain elevated to 45 deg .   chest PT and IS will be encouraged  monitor adequacy of oxygenation and ventilation and attempt to wean oxygen  antibiotic regimen will be tailored to the clinical, laboratory and microbiologic data  Nutritional goals will be met using po eventually , ensure adequate caloric intake and montior the same  Stress ulcer and VTE prophylaxis will be achieved    Glycemic control is satisfactory  Electrolytes have been repleted as necessary and wound care has been carried out. Pain control has been achieved.   agressive physical therapy and early mobility and ambulation goals will be met   The family was updated about the course and plan  CRITICAL CARE TIME personally provided by me  in evaluation and management, reassessments, review and interpretation of labs and x-rays, ventilator and hemodynamic management, formulating a plan and coordinating care: ___90____ MIN.  Time does not include procedural time. Time spent was non routine post-operarive caRE and included multiple and repeated evaluations at the bedside  CTICU ATTENDING     					    Kris Grimm MD

## 2020-05-23 NOTE — PROGRESS NOTE ADULT - SUBJECTIVE AND OBJECTIVE BOX
Reason for consult: incidental brain mass    History of Present Illness:  74-year-old woman with prior TIA 2/2 carotid stenosis s/p left carotid endarterectomy, severe aortic stenosis/diastolic heart failure who presents to [facility] yesterday with dizziness, weakness, and chest pain and was found to have NSTEMI with a peak troponin of 10.  She was transferred to [facility] for further management.  Prior to transfer she had a CT of the head which reportedly showed a 2.6 x 2.1 cm mass lesion with mild hydrocephalus (unknown location of lesion, unknown whether obstructive or non-obstructive hydro).    Cardiac cath was performed yesterday afternoon which showed 2 vessel CAD (50% LCx, 70% pLAD) and mild pulmonary hypertension.    Neurology consulted regarding etiology and management of brain lesion.  Patient denies any recent headaches or changes in vision.  She does report generalized weakness which she says is worse on the right side.  No numbness or weakness in the right side or elsewhere.  No difficulty speaking or swallowing.  No seizures.    MRI requested, but CT head was performed.    PAST MEDICAL & SURGICAL HISTORY:  H/O carotid stenosis  TIA (transient ischemic attack)  Chronic kidney disease (CKD)  Aortic stenosis  HLD (hyperlipidemia)  HTN (hypertension)  H/O carotid endarterectomy    MEDICATIONS  (STANDING):  aMIOdarone    Tablet   Oral   aMIOdarone    Tablet 400 milliGRAM(s) Oral every 8 hours  aspirin enteric coated 81 milliGRAM(s) Oral daily  atorvastatin 40 milliGRAM(s) Oral at bedtime  chlorhexidine 4% Liquid 1 Application(s) Topical <User Schedule>  dextrose 5%. 1000 milliLiter(s) (50 mL/Hr) IV Continuous <Continuous>  dextrose 50% Injectable 12.5 Gram(s) IV Push once  dextrose 50% Injectable 25 Gram(s) IV Push once  dextrose 50% Injectable 25 Gram(s) IV Push once  heparin  Infusion 1300 Unit(s)/Hr (9 mL/Hr) IV Continuous <Continuous>  insulin lispro (HumaLOG) corrective regimen sliding scale   SubCutaneous Before meals and at bedtime  metoprolol tartrate 25 milliGRAM(s) Oral every 6 hours  pantoprazole    Tablet 40 milliGRAM(s) Oral before breakfast    MEDICATIONS  (PRN):  acetaminophen   Tablet .. 650 milliGRAM(s) Oral every 6 hours PRN Mild Pain (1 - 3)  dextrose 40% Gel 15 Gram(s) Oral once PRN Blood Glucose LESS THAN 70 milliGRAM(s)/deciliter  glucagon  Injectable 1 milliGRAM(s) IntraMuscular once PRN Glucose LESS THAN 70 milligrams/deciliter      Allergies:  No Known Allergies    Family History:  Non-contributory    Social History:  Lives with daughter, no tobacco, alcohol, or illicit drug use    Physical Exam:  Vital Signs Last 24 Hrs  T(C): 36.8 (23 May 2020 17:44), Max: 36.8 (23 May 2020 17:44)  T(F): 98.2 (23 May 2020 17:44), Max: 98.2 (23 May 2020 17:44)  HR: 101 (23 May 2020 23:00) (84 - 125)  BP: 156/88 (23 May 2020 21:00) (156/88 - 160/91)  BP(mean): 106 (23 May 2020 21:00) (106 - 120)  RR: 15 (23 May 2020 23:00) (15 - 25)  SpO2: 97% (23 May 2020 23:00) (93% - 100%)        Labs:                          11.5   8.70  )-----------( 146      ( 22 May 2020 02:32 )             36.2     05-22    143  |  104  |  24<H>  ----------------------------<  92  4.1   |  25  |  1.20    Ca    9.0      22 May 2020 02:32  Phos  3.6     05-22  Mg     2.0     05-22    TPro  6.5  /  Alb  3.4  /  TBili  0.7  /  DBili  x   /  AST  19  /  ALT  12  /  AlkPhos  45  05-22    LIVER FUNCTIONS - ( 22 May 2020 02:32 )  Alb: 3.4 g/dL / Pro: 6.5 g/dL / ALK PHOS: 45 U/L / ALT: 12 U/L / AST: 19 U/L / GGT: x           PT/INR - ( 22 May 2020 02:32 )   PT: 15.0 sec;   INR: 1.31     PTT - ( 22 May 2020 02:32 )  PTT:49.4 sec    A1c 5.7%  LDL 72    Imaging:    < from: CT Head No Cont (05.22.20 @ 14:06) >  There is no evidence of acute intracranial hemorrhage or acute transcortical infarct. There are patchy areas of low density within the periventricular white matter consistent with mild microvascular disease. There is enlargement the sulci, cisterns and ventricles consistent with mild cerebral and cerebellar volume loss. However there is superimposed prominence of the ventricles suspicious for hydrocephalus. There is an approximately 1.7 cm x 1.2 cm extra-axial calcified mass lesion within the right anterior parasellar region adjacent and medial to the right anterior clinoid suspicious for a calcified meningioma. The ventricles are normal in size and caliber.  There is no evidence of mass-effect or midline shift. There is no evidence of an intra or extra-axial fluid collection.    Visualized paranasal sinuses and bilateral mastoid air cells are clear.    Impression: Approximately 1.7 cm right paraclinoid calcification likely an ossified meningioma. Prominence of the ventricles suspicious for hydrocephalus. No acute intracranial injury.      < end of copied text >      Cardiology:  Cath as above

## 2020-05-23 NOTE — PHYSICAL THERAPY INITIAL EVALUATION ADULT - GAIT DEVIATIONS NOTED, PT EVAL
decreased velocity of limb motion/decreased step length/decreased stride length/decreased homar/increased time in double stance

## 2020-05-23 NOTE — PHYSICAL THERAPY INITIAL EVALUATION ADULT - PERTINENT HX OF CURRENT PROBLEM, REHAB EVAL
74 year old F, PMHx obesity, HTN, HLD, GERD, TIA with carotid artery disease s/p left carotid endarterectomy, Chronic Kidney disease (baseline creatinine 1.45) chronic diastolic heart failure with severe aortic stenosis (PG 60, PV 4 from ECHO in 1/31/20) followed by Dr. Dubois, presented to [facility] chest pain, r/i NSTEMI, peak troponin of 10, started on a heparin gtt. Echo revealed severe AS and patient was transferred to [facility] for surgical evaluation.

## 2020-05-23 NOTE — PROGRESS NOTE ADULT - SUBJECTIVE AND OBJECTIVE BOX
INTERVAL HPI/OVERNIGHT EVENTS:    transfer from [facility] for aortic stenosis and NSTEMI    Preop for TAVR 5/26  EF 40%    75yo Croatian speaking Female Hx Obesity, HTN, HLD, GERD, TIA, L CEA, CKD (1.45), CHF (chronic systolic), severe Aortic stenosis (PG 60, PV 4 from ECHO 1/31/20) presented to [facility] 5/20 w/ CP - (+)NSTEMI - trop 10  started on heparin qtt/ASA/statin/metoprolol/lasix given   COVID-19 testing (OSH) negative    CTa Chest 5/20: no PE; pulm trunk enlarged; cardiomegaly    Transferred  for surgical evaluation    patient in atrial fib (140's); ; tachypneic - Sa02 95% - immediately placed on BIPAP  lopressor 2.5/5/2.5/2.5 (total 10mg given) - with improvement in rate  lasix 40 mg and Amiodarone 150 mg IV    ECHO bedside - EF 40%; mod MR; mod-severe AS mild AI; lateral wall hypokinesis    CT Head 5/19: sxs of dizziness - ? outpatient study as admit date reported 5/20  no acute infarct/chronic ischemia/hemorrhage - 2.6 x 2.1cm mass lesion with coarse calcification in suprasellar region; causing mild hydrocephalus; mild transependymal flow - no shift/herniation    Heparin/amiodarone infusion started for fib/RVR    Cath: mild pulmonary HTN and 2v CAD - oLCx 50%, pLAD 70% severly calcified iFR 0.96  ECHO 5/21: Mild-mod LVH; LV mildly reduced. Biatrial enlargement. Mod AS; mild AR. mod MR/TR  Pulm HTN (52 mmHg.) No pericardial effusion    CT Head 5/22: Approximately 1.7 cm right paraclinoid calcification likely an ossified meningioma. Prominence of the ventricles suspicious for hydrocephalus. No acute intracranial injury.    CTa Heart/Abd/Pelvis 5/22: Pulmonary edema. Small bilateral pleural effusions. 4.8 cm ascending thoracic aortic aneurysm.  Moderate hiatal hernia.    persistent atrial fib/RVR - given dosings of cardizem; on metoprolol 5mg IV q6h  patient intermittently refusing medications - CTS aware    PMHx includes but is not limited to:   H/O carotid stenosis  TIA (transient ischemic attack)  Chronic kidney disease (CKD)  Aortic stenosis  HLD (hyperlipidemia)  HTN (hypertension)  H/O carotid endarterectomy    ICU Vital Signs Last 24 Hrs  T(C): 36.4 (23 May 2020 09:00), Max: 36.8 (22 May 2020 14:00)  T(F): 97.5 (23 May 2020 09:00), Max: 98.2 (22 May 2020 14:00)  HR: 107 (23 May 2020 09:15) (84 - 125) fib   BP: 146/81 (22 May 2020 21:00) (146/81 - 146/81)  BP(mean): 102 (22 May 2020 21:00) (102 - 102)  ABP: 137/89 (23 May 2020 09:00) (91/80 - 144/92)  ABP(mean): 106 (23 May 2020 09:00) (86 - 111)  RR: 21 (23 May 2020 09:15) (12 - 25)  SpO2: 98% (23 May 2020 09:15) (94% - 100%) 4L NC    Qtts:   Amiodarone - transitioned to po load  heparin - 1200 - dec to 10.5 based on pTT    I&O's Summary    22 May 2020 07:01  -  23 May 2020 07:00  --------------------------------------------------------  IN: 1914 mL / OUT: 1151 mL / NET: 763 mL    23 May 2020 07:01  -  23 May 2020 09:50  --------------------------------------------------------  IN: 181.5 mL / OUT: 125 mL / NET: 56.5 mL    Physical Exam    Heart - irregular irregular (-)rub (+)murmur  Lungs - poor inspiratory effort - no rhonci/rales  Abd - (+)BS soft NTND (-)r/r/g  Ext - warm to touch; no cyanosis/clubbing  Neuro - alert/interactive with Mongolian ; non-focal  Skin - no rash     LABS:                        10.6   7.73  )-----------( 147      ( 23 May 2020 02:57 )             33.9     05-23    142  |  105  |  20  ----------------------------<  100<H>  3.5   |  25  |  1.07    Ca    8.5      23 May 2020 02:57  Phos  2.8     05-22  Mg     1.9     05-23    TPro  6.0  /  Alb  3.2<L>  /  TBili  0.4  /  DBili  x   /  AST  17  /  ALT  12  /  AlkPhos  44  05-22    PT/INR - ( 23 May 2020 02:57 )   PT: 14.5 sec;   INR: 1.26     PTT - ( 23 May 2020 02:57 )  PTT:103.5 sec - heparin qtt adjusted     ABG - ( 23 May 2020 02:55 ) 7.45/38/113/98    RADIOLOGY & ADDITIONAL STUDIES: reviewed    Patient with aortic stenosis presenting with chest pain - (+)NSTEMI - transferred to [facility] for CTS evaluation - atrial fib/RVR; resp distress/pulm edema and accelerate HTN - now preop for planned TAVR 5/26 with Fib/RVR    1. CV - NSTEMI and new onset atrial fib in setting of known aortic stenosis  AFib RVR - rate control - No known Hx; biatrial enlargement noted on ECHO  amiodarone infusion transitioned to po load  standing metoprolol - transition to po and titrate up  given several doses of cardizem bu tlow dose - give additional 10mg IV now to hopefully obtain rate control  ASA/statin  heparin infusion - titrate per pTT    2. Pulm   required BIPAP at presentation - now on 4L NC  plan OOB and incentive spirometry - PT today    3. Renal   CKD - reported baseline Cr 1.45  avoid nephrotoxins as able   monitor UO/Lytes and Cr  hubbard in place  Cr today 1.07  plan even to slightly negative fluid balance - per CTS - LR at 50/hr - to assess ongoing use    4. Neuro  sxs dizziness prompting CT Head   mass  - ? meningioma on CT Head 5/19 - seen on CT 5/22 as well   avoid sedatives  Hx carotid disease - left CEA  Neuro consulted - MRI recommended to better evaluate     5. Vascular  vascular consulted for occluded subclavian on imaging  input reviewed - no acute intervention required    d/w nighttime intensivist; staff and CTS    I have spent/provided stated minutes of critical care time to this patient: 60

## 2020-05-23 NOTE — PHYSICAL THERAPY INITIAL EVALUATION ADULT - GENERAL OBSERVATIONS, REHAB EVAL
Patient receivedin bed in no acute distress, +O2, A-line, EKG, hubbard, SCD. Patient without complaints.

## 2020-05-23 NOTE — PROGRESS NOTE ADULT - ASSESSMENT
74-year-old woman with prior TIA 2/2 carotid stenosis s/p left carotid endarterectomy, severe aortic stenosis/diastolic heart failure, admitted to [facility] for management or NSTEMI and severe aortic stenosis, with head CT prior to admission reportedly showing 2x2 cm brain lesion and mild hydrocephalus, similar to follow-up imaging yesterday.    # Brain lesion  CT head here: 1.7 cm right paraclinoid calcification likely an ossified meningioma. Prominence of the ventricles suspicious for hydrocephalus.  - will require outpatient follow-up to follow-up on meningioma growth and potential hydrocephalus.      # Prior TIA s/p L carotid endarterectomy  -Continue aspirin, atorvastatin

## 2020-05-24 NOTE — PROGRESS NOTE ADULT - SUBJECTIVE AND OBJECTIVE BOX
Neurology Stroke Progress Note    INTERVAL HPI/OVERNIGHT EVENTS:  Patient seen and examined today, difficulty with understanding English but able to mimic well, plan for TAVR on 5/26. Denies pain.     MEDICATIONS  (STANDING):  aMIOdarone    Tablet   Oral   aMIOdarone    Tablet 400 milliGRAM(s) Oral every 8 hours  aspirin enteric coated 81 milliGRAM(s) Oral daily  atorvastatin 40 milliGRAM(s) Oral at bedtime  chlorhexidine 4% Liquid 1 Application(s) Topical <User Schedule>  dextrose 5%. 1000 milliLiter(s) (50 mL/Hr) IV Continuous <Continuous>  dextrose 50% Injectable 12.5 Gram(s) IV Push once  dextrose 50% Injectable 25 Gram(s) IV Push once  dextrose 50% Injectable 25 Gram(s) IV Push once  heparin  Infusion 1300 Unit(s)/Hr (9 mL/Hr) IV Continuous <Continuous>  insulin lispro (HumaLOG) corrective regimen sliding scale   SubCutaneous Before meals and at bedtime  metoprolol tartrate 25 milliGRAM(s) Oral every 6 hours  pantoprazole    Tablet 40 milliGRAM(s) Oral before breakfast  polyethylene glycol 3350 17 Gram(s) Oral daily  senna 2 Tablet(s) Oral at bedtime    MEDICATIONS  (PRN):  acetaminophen   Tablet .. 650 milliGRAM(s) Oral every 6 hours PRN Mild Pain (1 - 3)  dextrose 40% Gel 15 Gram(s) Oral once PRN Blood Glucose LESS THAN 70 milliGRAM(s)/deciliter  glucagon  Injectable 1 milliGRAM(s) IntraMuscular once PRN Glucose LESS THAN 70 milligrams/deciliter      Allergies    No Known Allergies    Intolerances        Vital Signs Last 24 Hrs  T(C): 36.6 (24 May 2020 13:25), Max: 36.8 (23 May 2020 17:44)  T(F): 97.8 (24 May 2020 13:25), Max: 98.2 (23 May 2020 17:44)  HR: 105 (24 May 2020 17:00) (80 - 118)  BP: 156/88 (23 May 2020 21:00) (156/88 - 156/88)  BP(mean): 106 (23 May 2020 21:00) (106 - 106)  RR: 33 (24 May 2020 17:00) (14 - 33)  SpO2: 93% (24 May 2020 16:00) (93% - 100%)    Physical exam:  Neurologic:  -Mental status: Awake, alert, oriented to person. Speaks minimal English but briskly follows mimicking commands. Attention/concentration intact.   -Cranial nerves:   II: Visual fields are full to confrontation.  III, IV, VI: Extraocular movements are intact without nystagmus. Pupils equally round and reactive to light  VII: Left facial droop  Motor: Normal bulk and tone. Left pronator drift. left arm 5-/5 strength throughout. Right arm 5/5 throughout. Bilateral legs 4/5 throughout.   Sensation: Intact to light touch bilaterally  Coordination: No dysmetria on finger-to-nose   Reflexes: left upgoing toe    LABS:                        10.6   7.55  )-----------( 150      ( 24 May 2020 02:45 )             33.7     05-24    139  |  103  |  16  ----------------------------<  105<H>  4.2   |  25  |  1.23    Ca    8.7      24 May 2020 02:45  Phos  2.7     05-24  Mg     2.0     05-24    TPro  6.2  /  Alb  3.0<L>  /  TBili  0.4  /  DBili  x   /  AST  15  /  ALT  10  /  AlkPhos  44  05-24    PT/INR - ( 24 May 2020 06:14 )   PT: 13.0 sec;   INR: 1.14          PTT - ( 24 May 2020 06:14 )  PTT:72.2 sec      RADIOLOGY & ADDITIONAL TESTS:    < from: CT Head No Cont (05.22.20 @ 14:06) >  Impression: Approximately 1.7 cm right paraclinoid calcification likely an ossified meningioma. Prominence of the ventricles suspicious for hydrocephalus. No acute intracranial injury.    < end of copied text >

## 2020-05-24 NOTE — PROGRESS NOTE ADULT - SUBJECTIVE AND OBJECTIVE BOX
Discussed with Dr. Calle     HPI  74 year old F, PMHx morbid obesity, HTN, HLD, GERD, TIA with carotid artery disease s/p left carotid endarterectomy, Chronic Kidney disease (baseline creatinine 1.45) chronic diastolic heart failure with severe aortic stenosis (PG 60, PV 4 from ECHO in 1/31/20) followed by Dr. Dubois, presented to [facility] with symptoms of dizziness, weakness, chest pain, r/i NSTEMI, peak troponin of 10.  She reports having CP and SOB for several days associated with PND. She states the pain started while she was walking, though she is unable to quantify how much exertion prompted the symptoms. Denies orthopnea, LE edema. She was admitted for further workup and started on a heparin gtt. CT chest negative for PE, CT head significant for 2.6cm x 2.1cm mass lesion with mild hydrocephalus. Echo revealed severe AS and patient was transferred to [facility] for surgical evaluation. Upon arrival pt is afebrile , /101, SpO2 97% on 100% NRB RR 33, endorsing SOB and CP.      T(C): 36.5 (05-24-20 @ 09:00), Max: 36.8 (05-23-20 @ 17:44)  HR: 81 (05-24-20 @ 12:00) (80 - 118)  BP: 156/88 (05-23-20 @ 21:00) (156/88 - 156/88)  RR: 21 (05-24-20 @ 12:00) (14 - 27)  SpO2: 95% (05-24-20 @ 12:00) (93% - 100%)  Wt(kg): --  Daily     Daily     Physical Exam  General: Patient lying comfortably in bed, no acute distress     Neurological: Alert and oriented. Moves all extremities to commands. UE strength 4/5 bilaterally. No focal neurological deficits     Cardiovascular: S1S2, Irregular rhythm. Systolic murmur appreciated at LUSB     Respiratory: Clear to ausculation bilaterally     Gastrointestinal: Abdomen soft, non tender, non distended     Extremities: Warm and well perfused. Bilateral 1+ pitting edema bilaterally     Vascular: Peripheral pulses 2+ bilaterally                             10.6   7.55  )-----------( 150      ( 24 May 2020 02:45 )             33.7     05-24    139  |  103  |  16  ----------------------------<  105<H>  4.2   |  25  |  1.23    Ca    8.7      24 May 2020 02:45  Phos  2.7     05-24  Mg     2.0     05-24    TPro  6.2  /  Alb  3.0<L>  /  TBili  0.4  /  DBili  x   /  AST  15  /  ALT  10  /  AlkPhos  44  05-24

## 2020-05-24 NOTE — PROGRESS NOTE ADULT - ASSESSMENT
74-year-old woman with prior TIA 2/2 carotid stenosis s/p left carotid endarterectomy, severe aortic stenosis/diastolic heart failure, admitted to [facility] for management or NSTEMI and severe aortic stenosis, with head CT prior to admission reportedly showing 2x2 cm brain lesion and mild hydrocephalus, similar to follow-up imaging yesterday. At baseline patient walks with walker.     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    # Prior TIA s/p L carotid endarterectomy  -Continue aspirin 81mg, atorvastatin 40mg    - will continue to follow, likely follow up after TAVR

## 2020-05-24 NOTE — PROGRESS NOTE ADULT - SUBJECTIVE AND OBJECTIVE BOX
INTERVAL HPI/OVERNIGHT EVENTS:    PreOp for TAVR  EF 40%    75yo French speaking Female Hx Obesity, HTN, HLD, GERD, TIA, L CEA, CKD (1.45), CHF (chronic systolic), severe Aortic stenosis (PG 60, PV 4 from ECHO 1/31/20) presented to [facility] 5/20 w/ CP - (+)NSTEMI - trop 10  started on heparin qtt/ASA/statin/metoprolol/lasix given   COVID-19 testing (OSH) negative    CTa Chest 5/20: no PE; pulm trunk enlarged; cardiomegaly    Transferred  for surgical evaluation    patient in atrial fib (140's); ; tachypneic - Sa02 95% - immediately placed on BIPAP  lopressor 2.5/5/2.5/2.5 (total 10mg given) - with improvement in rate  lasix 40 mg and Amiodarone 150 mg IV    ECHO bedside - EF 40%; mod MR; mod-severe AS mild AI; lateral wall hypokinesis    CT Head 5/19: sxs of dizziness - ? outpatient study as admit date reported 5/20  no acute infarct/chronic ischemia/hemorrhage - 2.6 x 2.1cm mass lesion with coarse calcification in suprasellar region; causing mild hydrocephalus; mild transependymal flow - no shift/herniation    Heparin/amiodarone infusion started for fib/RVR    Cath: mild pulmonary HTN and 2v CAD - oLCx 50%, pLAD 70% severly calcified iFR 0.96  ECHO 5/21: Mild-mod LVH; LV mildly reduced. Biatrial enlargement. Mod AS; mild AR. mod MR/TR  Pulm HTN (52 mmHg.) No pericardial effusion    CT Head 5/22: Approximately 1.7 cm right paraclinoid calcification likely an ossified meningioma. Prominence of the ventricles suspicious for hydrocephalus. No acute intracranial injury.    CTa Heart/Abd/Pelvis 5/22: Pulmonary edema. Small bilateral pleural effusions. 4.8 cm ascending thoracic aortic aneurysm.  Moderate hiatal hernia.    persistent atrial fib/RVR - given dosings of cardizem; on metoprolol 5mg IV q6h  rate control markedly better today  hubbard removed this am and patient up and ambulating with walker (ambulates with walker PTA)  no acute events reported overnight     PMHx includes but is not limited to:   H/O carotid stenosis  TIA (transient ischemic attack)  Chronic kidney disease (CKD)  Aortic stenosis  HLD (hyperlipidemia)  HTN (hypertension)  H/O carotid endarterectomy    ICU Vital Signs Last 24 Hrs  T(C): 36.5 (24 May 2020 09:00), Max: 36.8 (23 May 2020 17:44)  T(F): 97.7 (24 May 2020 09:00), Max: 98.2 (23 May 2020 17:44)  HR: 88 (24 May 2020 10:00) (80 - 113) Fib   BP: 156/88 (23 May 2020 21:00) (156/88 - 160/91)  BP(mean): 106 (23 May 2020 21:00) (106 - 120)  ABP: 127/78 (24 May 2020 10:00) (111/66 - 149/99)  ABP(mean): 97 (24 May 2020 10:00) (82 - 124)  RR: 15 (24 May 2020 07:00) (14 - 20)  SpO2: 96% (24 May 2020 10:00) (93% - 100%) RA    Qtts: Heparin 900 U/Hour    I&O's Summary    23 May 2020 07:01  -  24 May 2020 07:00  --------------------------------------------------------  IN: 1423.5 mL / OUT: 1820 mL / NET: -396.5 mL    24 May 2020 07:01  -  24 May 2020 11:29  --------------------------------------------------------  IN: 9 mL / OUT: 155 mL / NET: -146 mL    Physical Exam    Heart - irregular irregular (-)rub (+)murmur  Lungs - poor inspiratory effort - no rhonci/rales  Abd - (+)BS soft NTND (-)r/r/g  Ext - warm to touch; no cyanosis/clubbing  Neuro - alert/interactive with Burmese ; non-focal  Skin - no rash     LABS:                        10.6   7.55  )-----------( 150      ( 24 May 2020 02:45 )             33.7     05-24    139  |  103  |  16  ----------------------------<  105<H>  4.2   |  25  |  1.23    Ca    8.7      24 May 2020 02:45  Phos  2.7     05-24  Mg     2.0     05-24    TPro  6.2  /  Alb  3.0<L>  /  TBili  0.4  /  DBili  x   /  AST  15  /  ALT  10  /  AlkPhos  44  05-24    PT/INR - ( 24 May 2020 06:14 )   PT: 13.0 sec;   INR: 1.14     PTT - ( 24 May 2020 06:14 )  PTT:72.2 sec    ABG - ( 24 May 2020 02:51 )  pH, Arterial: 7.45  pH, Blood: x     /  pCO2: 36    /  pO2: 111   / HCO3: 25    / Base Excess: 0.7   /  SaO2: 98        RADIOLOGY & ADDITIONAL STUDIES: reviewed    Patient with aortic stenosis presenting with chest pain - (+)NSTEMI - transferred to [facility] for CTS evaluation - atrial fib/RVR; resp distress/pulm edema and accelerate HTN - now preop for planned TAVR 5/26 with Fib/RVR    1. CV - NSTEMI and new onset atrial fib in setting of known aortic stenosis  AFib RVR - rate control - No known Hx; biatrial enlargement noted on ECHO  amiodarone infusion transitioned to po load  standing metoprolol - transition to po and titrate up  given several doses of cardizem bu tlow dose - give additional 10mg IV now to hopefully obtain rate control  ASA/statin  heparin infusion - titrate per pTT    2. Pulm   required BIPAP at presentation - now on RA and breathing comfortably/ambulating several times daily  plan OOB and incentive spirometry - working with PT     3. Renal   CKD - reported baseline Cr 1.45  avoid nephrotoxins as able   monitor UO/Lytes and Cr  hubbard removed - TOV  Cr today 1.23  plan maintain even to slightly negative fluid balance     4. Neuro  sxs dizziness prompting CT Head   mass  - ? meningioma on CT Head 5/19 - seen on CT 5/22 as well   avoid sedatives  Hx carotid disease - left CEA  Neuro consulted - MRI recommended to better evaluate     5. Vascular  vascular consulted for occluded subclavian on imaging  input reviewed - no acute intervention required    d/w patient; staff and CTS    I have spent/provided stated minutes of critical care time to this patient: 60 min

## 2020-05-24 NOTE — CONSULT NOTE ADULT - SUBJECTIVE AND OBJECTIVE BOX
Surgeon: Dr. Cedeno     Requesting Physician: Dr. Dubois     HISTORY OF PRESENT ILLNESS:  74 year old F, PMHx morbid obesity, HTN, HLD, GERD, TIA with carotid artery disease s/p left carotid endarterectomy, Chronic Kidney disease (baseline creatinine 1.45) chronic diastolic heart failure with severe aortic stenosis (PG 60, PV 4 from ECHO in 1/31/20) followed by Dr. Dubois, presented to [facility] with symptoms of dizziness, weakness, chest pain, r/i NSTEMI, peak troponin of 10.  She reports having CP and SOB for several days associated with PND. She states the pain started while she was walking, though she is unable to quantify how much exertion prompted the symptoms. Denies orthopnea, LE edema. She was admitted for further workup and started on a heparin gtt. CT chest negative for PE, CT head significant for 2.6cm x 2.1cm mass lesion with mild hydrocephalus. Echo revealed severe AS and patient was transferred to [facility] for surgical evaluation. Upon arrival pt is afebrile , /101, SpO2 97% on 100% NRB RR 33, endorsing SOB and CP.      PAST MEDICAL & SURGICAL HISTORY:  H/O carotid stenosis  TIA (transient ischemic attack)  Chronic kidney disease (CKD)  Aortic stenosis  HLD (hyperlipidemia)  HTN (hypertension)  H/O carotid endarterectomy      MEDICATIONS  (STANDING):  aMIOdarone    Tablet   Oral   aMIOdarone    Tablet 400 milliGRAM(s) Oral every 8 hours  aspirin enteric coated 81 milliGRAM(s) Oral daily  atorvastatin 40 milliGRAM(s) Oral at bedtime  chlorhexidine 4% Liquid 1 Application(s) Topical <User Schedule>  heparin  Infusion 1300 Unit(s)/Hr (9 mL/Hr) IV Continuous <Continuous>  insulin lispro (HumaLOG) corrective regimen sliding scale   SubCutaneous Before meals and at bedtime  metoprolol tartrate 25 milliGRAM(s) Oral every 6 hours  pantoprazole    Tablet 40 milliGRAM(s) Oral before breakfast  polyethylene glycol 3350 17 Gram(s) Oral daily  senna 2 Tablet(s) Oral at bedtime    MEDICATIONS  (PRN):  acetaminophen   Tablet .. 650 milliGRAM(s) Oral every 6 hours PRN Mild Pain (1 - 3)  dextrose 40% Gel 15 Gram(s) Oral once PRN Blood Glucose LESS THAN 70 milliGRAM(s)/deciliter  glucagon  Injectable 1 milliGRAM(s) IntraMuscular once PRN Glucose LESS THAN 70 milligrams/deciliter      Allergies    No Known Allergies    Intolerances        SOCIAL HISTORY:  Smoker: No  ETOH use: No  Ilicit Drug use:  No  Assisted device use (Cane / Walker): Walks with walker at baseline   Live with: Daughter     FAMILY HISTORY:      Review of Systems:  CONSTITUTIONAL: Denies fevers / chills, sweats, fatigue, weight loss, weight gain                                       NEURO:  Denies parathesias, seizures, syncope, confusion                                                                                  EYES:  Denies blurry vision, discharge, pain, loss of vision                                                                                    ENMT:  Denies difficulty hearing, vertigo, dysphagia, epistaxis, recent dental work                                       CV:  Denies chest pain, palpitations, MIRZA, orthopnea                                                                                           RESPIRATORY:  Denies wWheezing, SOB, cough / sputum, hemoptysis                                                               GI:  Denies nausea, vomiting, diarrhea, constipation, melena                                                                          : Denies hematuria, dysuria, urgency, incontinence                                                                                          MUSKULOSKELETAL:  Denies arthritis, joint swelling, muscle weakness                                                             SKIN/BREAST:  Denies rash, itching, hair loss, masses                                                                                              PSYCH:  Denies depression, anxiety, suicidal ideation                                                                                                HEME/LYMPH:  Denies bruises easily, enlarged lymph nodes, tender lymph nodes                                          ENDOCRINE:  Denies cold intolerance, heat intolerance, polydipsia                                                                      Vital Signs Last 24 Hrs  T(C): 36.6 (24 May 2020 13:25), Max: 36.8 (23 May 2020 17:44)  T(F): 97.8 (24 May 2020 13:25), Max: 98.2 (23 May 2020 17:44)  HR: 93 (24 May 2020 14:00) (80 - 118)  BP: 156/88 (23 May 2020 21:00) (156/88 - 156/88)  BP(mean): 106 (23 May 2020 21:00) (106 - 106)  RR: 22 (24 May 2020 13:00) (14 - 27)  SpO2: 97% (24 May 2020 14:00) (93% - 100%)    Physical Exam (Need 8)  CONSTITUTIONAL: Patient lying comfortably in bed, no acute distress   NEURO:  Alert and oriented. Moves all extremities to commands. UE strength 4/5 bilaterally.   EYES:  WNL  ENMT: WNL  CV:   S1S2, irregular rhythm. Systolic II/IV murmur appreciated at LUSB   RESPIRATORY:  Clear to ausculation bilaterally   GI:  Abdomen soft, non tender, non distended   : Rodriguez removed this morning, exam deferred   MUSKULOSKELETAL:  DIETZ   SKIN / BREAST:  No rashes noted   Extremities: warm and well perfused. Bilateral 1+ pitting edema. No calf tenderness                                                           LABS:                        10.6   7.55  )-----------( 150      ( 24 May 2020 02:45 )             33.7     05-24    139  |  103  |  16  ----------------------------<  105<H>  4.2   |  25  |  1.23    Ca    8.7      24 May 2020 02:45  Phos  2.7     05-24  Mg     2.0     05-24    TPro  6.2  /  Alb  3.0<L>  /  TBili  0.4  /  DBili  x   /  AST  15  /  ALT  10  /  AlkPhos  44  05-24    PT/INR - ( 24 May 2020 06:14 )   PT: 13.0 sec;   INR: 1.14          PTT - ( 24 May 2020 06:14 )  PTT:72.2 sec      RADIOLOGY & ADDITIONAL STUDIES:  CAROTID U/S: < from: US Duplex Carotid Arteries Complete, Bilateral (05.21.20 @ 10:11) >  IMPRESSION:  1.  No evidence of hemodynamically significant stenosis in the bilateral carotid arteries.  2.   Heterogeneous, ulcerated plaque right internal carotid artery.    < end of copied text >    CXR: < from: Xray Chest 1 View- PORTABLE-Routine (05.23.20 @ 03:52) >  Impression: No interval change    < end of copied text >    CT Scan: < from: CT Head No Cont (05.22.20 @ 14:06) >  Impression: Approximately 1.7 cm right paraclinoid calcification likely an ossified meningioma. Prominence of the ventricles suspicious for hydrocephalus. No acute intracranial injury.    < end of copied text >    < from: CT Angio Abdomen and Pelvis w/ IV Cont (05.22.20 @ 14:05) >  IMPRESSION:  Cardiomegaly. Aortic valvular calcification.    Pulmonary congestion. Small pleural effusions.    Aneurysmal dilatation of ascending aorta 4.8 cm.    Tortuous but patent abdominal aorta and iliac arteries.    Moderate size hiatal hernia.    < end of copied text >    EKG: < from: 12 Lead ECG (05.20.20 @ 17:57) >  Diagnosis Line Atrial fibrillation with rapid ventricular response  Abnormal QRS-T angle, consider primary T wave abnormality  Abnormal ECG    < end of copied text >    TTE / SABRINA: < from: TTE Echo Complete w/o contrast w/ Doppler (05.21.20 @ 10:11) >  CONCLUSIONS:     1. Mild to moderate symmetric left ventricular hypertrophy.   2. Patient was tachycardic during the study.   3. Left ventricular endocardium is not well visualized. Grossly, left ventricular function appears mildly reduced.   4. Normal right ventricular size and systolic function.   5. Biatrial enlargement.   6. Moderate aortic stenosis.   7. Mild aortic regurgitation.   8. Moderate mitral regurgitation.   9. Moderate tricuspid regurgitation.  10. Pulmonary hypertension present, pulmonary artery systolic pressure is 52 mmHg.  11. No pericardial effusion.    < end of copied text >    Cardiac Cath: 5/21 mild pulmonary HTN and 2VCAD  oLCx 50%, pLAD 70% severly calcified iFR 0.96 (not significant)

## 2020-05-24 NOTE — CONSULT NOTE ADULT - ASSESSMENT
Assessment  74 year old F, PMHx morbid obesity, HTN, HLD, GERD, TIA with carotid artery disease s/p left carotid endarterectomy, Chronic Kidney disease (baseline creatinine 1.45) chronic diastolic heart failure with severe aortic stenosis (PG 60, PV 4 from ECHO in 1/31/20) followed by Dr. Dubois, presented to [facility] with symptoms of dizziness, weakness, chest pain, r/i NSTEMI, peak troponin of 10.  She was admitted for further workup and started on a heparin gtt. CT chest negative for PE, CT head significant for 2.6cm x 2.1cm mass lesion with mild hydrocephalus. Echo revealed severe AS and patient was transferred to [facility] for surgical evaluation.     Plan:  Problem 1: Severe aortic stenosis   - Structural heart consulted for surgical intervention, patient candidate for TAVR procedure this admission. Preoperative workup has been completed including CBC, CMP, Coags, Lipid Panel, TSH, Hemoglobin A1c, Pro-BNP, Cardiac Enzymes, Type & Screen x 2,  Room air ABG, UA, Carotid US, TTE, CXR , EKG, Bedside PFTS, Structural CT scan. Plan for TAVR Tuesday 5/27/2020     Problem 2: Atrial fibrillation   - Continue PO amiodarone load   - Rate controlled with BB 25mg Q6 currently. Titrate as tolerated   - Heparin gtt for AC, goal PTT 60-80. PTT currently therapeutic on 9cc/hr     Problem 3: Brain lesion  - Neurology consulted, no intervention required  - Recommend MRI as outpatient to further evaluate     Problem 4: Subclavian occlusion  - L subclavian occlusion seen on structural CT scan  - Vascular consulted, no intervention required     Problem 5: Ascending aortic aneurysm   - 4.8 cm ascending aortic aneurysm noted on Structural CT scan   - Discussed with Dr. Calle, patient is not a surgical candidate due to high risk of periop morbidity and mortality due to multiple comorbidities including but not limited to obesity, hydrocephalus, age. No contraindication for TAVR at this time, will continue to evaluate post TAVR      I have reviewed clinical labs tests and reports, radiology tests and reports, as well as old patient medical records, and discussed with the refering physician.

## 2020-05-24 NOTE — PROGRESS NOTE ADULT - SUBJECTIVE AND OBJECTIVE BOX
INTERVAL HPI/OVERNIGHT EVENTS:    transfer from [facility] for aortic stenosis and NSTEMI    Preop for TAVR 5/26  EF 40%    73yo Gibraltarian speaking Female Hx Obesity, HTN, HLD, GERD, TIA, L CEA, CKD (1.45), CHF (chronic systolic), severe Aortic stenosis (PG 60, PV 4 from ECHO 1/31/20) presented to [facility] 5/20 w/ CP - (+)NSTEMI - trop 10  started on heparin qtt/ASA/statin/metoprolol/lasix given   COVID-19 testing (OSH) negative    CTa Chest 5/20: no PE; pulm trunk enlarged; cardiomegaly    Transferred  for surgical evaluation    patient in atrial fib (140's); ; tachypneic - Sa02 95% - immediately placed on BIPAP  lopressor 2.5/5/2.5/2.5 (total 10mg given) - with improvement in rate  lasix 40 mg and Amiodarone 150 mg IV    ECHO bedside - EF 40%; mod MR; mod-severe AS mild AI; lateral wall hypokinesis    CT Head 5/19: sxs of dizziness - ? outpatient study as admit date reported 5/20  no acute infarct/chronic ischemia/hemorrhage - 2.6 x 2.1cm mass lesion with coarse calcification in suprasellar region; causing mild hydrocephalus; mild transependymal flow - no shift/herniation    Heparin/amiodarone infusion started for fib/RVR    Cath: mild pulmonary HTN and 2v CAD - oLCx 50%, pLAD 70% severly calcified iFR 0.96  ECHO 5/21: Mild-mod LVH; LV mildly reduced. Biatrial enlargement. Mod AS; mild AR. mod MR/TR  Pulm HTN (52 mmHg.) No pericardial effusion    CT Head 5/22: Approximately 1.7 cm right paraclinoid calcification likely an ossified meningioma. Prominence of the ventricles suspicious for hydrocephalus. No acute intracranial injury.    CTa Heart/Abd/Pelvis 5/22: Pulmonary edema. Small bilateral pleural effusions. 4.8 cm ascending thoracic aortic aneurysm.  Moderate hiatal hernia.    persistent atrial fib/RVR - given dosings of cardizem; on metoprolol 5mg IV q6h  patient intermittently refusing medications - CTS aware    PMHx includes but is not limited to:   H/O carotid stenosis  TIA (transient ischemic attack)  Chronic kidney disease (CKD)  Aortic stenosis  HLD (hyperlipidemia)  HTN (hypertension)  H/O carotid endarterectomy    I&O's Summary    22 May 2020 07:01  -  23 May 2020 07:00  --------------------------------------------------------  IN: 1914 mL / OUT: 1151 mL / NET: 763 mL    23 May 2020 07:01  -  24 May 2020 06:03  --------------------------------------------------------  IN: 1305.5 mL / OUT: 1595 mL / NET: -289.5 mL        Qtts:   Amiodarone - transitioned to po load  heparin - 1200 - dec to 10.5 based on pTT    I&O's Summary    22 May 2020 07:01  -  23 May 2020 07:00  --------------------------------------------------------  IN: 1914 mL / OUT: 1151 mL / NET: 763 mL    23 May 2020 07:01  -  23 May 2020 09:50  --------------------------------------------------------  IN: 181.5 mL / OUT: 125 mL / NET: 56.5 mL    Physical Exam    Heart - irregular irregular (-)rub (+)murmur  Lungs - poor inspiratory effort - no rhonci/rales  Abd - (+)BS soft NTND (-)r/r/g  Ext - warm to touch; no cyanosis/clubbing  Neuro - alert/interactive with Armenian ; non-focal  Skin - no rash     LABS:             CBC Full  -  ( 24 May 2020 02:45 )  WBC Count : 7.55 K/uL  RBC Count : 3.48 M/uL  Hemoglobin : 10.6 g/dL  Hematocrit : 33.7 %  Platelet Count - Automated : 150 K/uL  Mean Cell Volume : 96.8 fl  Mean Cell Hemoglobin : 30.5 pg  Mean Cell Hemoglobin Concentration : 31.5 gm/dL    PT/INR - ( 24 May 2020 02:45 )   PT: 13.1 sec;   INR: 1.14          PTT - ( 24 May 2020 00:03 )  PTT:61.6 sec    ABG - ( 24 May 2020 02:51 )  pH, Arterial: 7.45  pH, Blood: x     /  pCO2: 36    /  pO2: 111   / HCO3: 25    / Base Excess: 0.7   /  SaO2: 98             RADIOLOGY & ADDITIONAL STUDIES: reviewed    Patient with aortic stenosis presenting with chest pain - (+)NSTEMI - transferred to [facility] for CTS evaluation - atrial fib/RVR; resp distress/pulm edema and accelerate HTN - now preop for planned TAVR 5/26 with Fib/RVR    1. CV - NSTEMI and new onset atrial fib in setting of known aortic stenosis  AFib RVR - rate control - No known Hx; biatrial enlargement noted on ECHO  amiodarone infusion transitioned to po load  standing metoprolol - transition to po and titrate up  given several doses of cardizem bu tlow dose - give additional 10mg IV now to hopefully obtain rate control  ASA/statin  heparin infusion - titrate per pTT    2. Pulm   required BIPAP at presentation - now on 4L NC  plan OOB and incentive spirometry - PT today    3. Renal   CKD - reported baseline Cr 1.45  avoid nephrotoxins as able   monitor UO/Lytes and Cr  hubbard in place  Cr today 1.07  plan even to slightly negative fluid balance - per CTS - LR at 50/hr - to assess ongoing use    4. Neuro  sxs dizziness prompting CT Head   mass  - ? meningioma on CT Head 5/19 - seen on CT 5/22 as well   avoid sedatives  Hx carotid disease - left CEA  Neuro consulted - MRI recommended to better evaluate     5. Vascular  vascular consulted for occluded subclavian on imaging  input reviewed - no acute intervention required    d/w nighttime intensivist; staff and CTS    I have spent/provided stated minutes of critical care time to this patient: 60

## 2020-05-25 NOTE — PROGRESS NOTE ADULT - SUBJECTIVE AND OBJECTIVE BOX
CTICU  CRITICAL  CARE  attending     Hand off received 					   Pertinent clinical, laboratory, radiographic, hemodynamic, echocardiographic, respiratory data, microbiologic data and chart were reviewed and analyzed frequently throughout the course of the day and night  Patient seen and examined with CTS/ SH attending at bedside    Pt is a 74y , Female, admitted with severe AS; refractory Afib;    for TAVR tmrw      , FAMILY HISTORY:  PAST MEDICAL & SURGICAL HISTORY:  H/O carotid stenosis  TIA (transient ischemic attack)  Chronic kidney disease (CKD)  Aortic stenosis  HLD (hyperlipidemia)  HTN (hypertension)  H/O carotid endarterectomy    Patient is a 74y old  Female who presents with a chief complaint of Severe AS (25 May 2020 08:16)      14 system review was unremarkable  acute changes include acute respiratory failure  Vital signs, hemodynamic and respiratory parameters were reviewed from the bedside nursing flowsheet.  ICU Vital Signs Last 24 Hrs  T(C): 36.8 (25 May 2020 17:25), Max: 36.8 (25 May 2020 17:25)  T(F): 98.2 (25 May 2020 17:25), Max: 98.2 (25 May 2020 17:25)  HR: 71 (25 May 2020 17:00) (66 - 100)  BP: --  BP(mean): --  ABP: 152/83 (25 May 2020 17:00) (118/59 - 159/96)  ABP(mean): 109 (25 May 2020 17:00) (81 - 120)  RR: 20 (25 May 2020 17:00) (16 - 23)  SpO2: 96% (25 May 2020 17:00) (90% - 98%)    Adult Advanced Hemodynamics Last 24 Hrs  CVP(mm Hg): --  CVP(cm H2O): --  CO: --  CI: --  PA: --  PA(mean): --  PCWP: --  SVR: --  SVRI: --  PVR: --  PVRI: --, ABG - ( 25 May 2020 02:47 )  pH, Arterial: 7.46  pH, Blood: x     /  pCO2: 33    /  pO2: 74    / HCO3: 23    / Base Excess: -0.9  /  SaO2: 95                  Intake and output was reviewed and the fluid balance was calculated  Daily     Daily   I&O's Summary    24 May 2020 07:01  -  25 May 2020 07:00  --------------------------------------------------------  IN: 798 mL / OUT: 1357 mL / NET: -559 mL    25 May 2020 07:01  -  25 May 2020 17:33  --------------------------------------------------------  IN: 558 mL / OUT: 450 mL / NET: 108 mL        All lines and drain sites were assessed  [facility]      POCT Blood Glucose.: 104 mg/dL (25 May 2020 06:48)    No acute change in mental status  Auscultation of the chest reveals equal bs  Abdomen is soft  Extremities are warm and well perfused  Wounds appear clean and unremarkable  Antibiotics are periop    labs  CBC Full  -  ( 25 May 2020 08:59 )  WBC Count : 6.69 K/uL  RBC Count : 3.31 M/uL  Hemoglobin : 10.0 g/dL  Hematocrit : 32.2 %  Platelet Count - Automated : 147 K/uL  Mean Cell Volume : 97.3 fl  Mean Cell Hemoglobin : 30.2 pg  Mean Cell Hemoglobin Concentration : 31.1 gm/dL  Auto Neutrophil # : x  Auto Lymphocyte # : x  Auto Monocyte # : x  Auto Eosinophil # : x  Auto Basophil # : x  Auto Neutrophil % : x  Auto Lymphocyte % : x  Auto Monocyte % : x  Auto Eosinophil % : x  Auto Basophil % : x    05-25    137  |  103  |  16  ----------------------------<  132<H>  4.9   |  22  |  1.26    Ca    8.6      25 May 2020 15:39  Phos  3.0     05-25  Mg     1.8     05-25    TPro  6.3  /  Alb  3.1<L>  /  TBili  0.4  /  DBili  x   /  AST  16  /  ALT  11  /  AlkPhos  41  05-25    PT/INR - ( 25 May 2020 08:59 )   PT: 13.0 sec;   INR: 1.14          PTT - ( 25 May 2020 15:39 )  PTT:67.7 sec  The current medications were reviewed   MEDICATIONS  (STANDING):  aMIOdarone    Tablet   Oral   aMIOdarone    Tablet 200 milliGRAM(s) Oral daily  aspirin enteric coated 81 milliGRAM(s) Oral daily  atorvastatin 40 milliGRAM(s) Oral at bedtime  ceFAZolin   IVPB 2000 milliGRAM(s) IV Intermittent every 8 hours  chlorhexidine 0.12% Liquid 15 milliLiter(s) Swish and Spit once  chlorhexidine 4% Liquid 1 Application(s) Topical once  chlorhexidine 4% Liquid 1 Application(s) Topical once  chlorhexidine 4% Liquid 1 Application(s) Topical <User Schedule>  dextrose 5%. 1000 milliLiter(s) (50 mL/Hr) IV Continuous <Continuous>  dextrose 50% Injectable 12.5 Gram(s) IV Push once  dextrose 50% Injectable 25 Gram(s) IV Push once  dextrose 50% Injectable 25 Gram(s) IV Push once  heparin  Infusion 1000 Unit(s)/Hr (11 mL/Hr) IV Continuous <Continuous>  insulin lispro (HumaLOG) corrective regimen sliding scale   SubCutaneous Before meals and at bedtime  metoprolol tartrate 25 milliGRAM(s) Oral every 6 hours  pantoprazole    Tablet 40 milliGRAM(s) Oral before breakfast  polyethylene glycol 3350 17 Gram(s) Oral daily  senna 2 Tablet(s) Oral at bedtime    MEDICATIONS  (PRN):  acetaminophen   Tablet .. 650 milliGRAM(s) Oral every 6 hours PRN Mild Pain (1 - 3)  dextrose 40% Gel 15 Gram(s) Oral once PRN Blood Glucose LESS THAN 70 milliGRAM(s)/deciliter  glucagon  Injectable 1 milliGRAM(s) IntraMuscular once PRN Glucose LESS THAN 70 milligrams/deciliter       PROBLEM LIST/ ASSESSMENT:  HEALTH ISSUES - PROBLEM Dx:      ,   Patient is a 74y old  Female who presents with a chief complaint of Severe AS (25 May 2020 08:16)      My plan includes :  close hemodynamic, ventilatory and drain monitoring and management per post op routine    Monitor for arrhythmias and monitor parameters for organ perfusion  monitor neurologic status  Head of the bed should remain elevated to 45 deg .   chest PT and IS will be encouraged  monitor adequacy of oxygenation and ventilation and attempt to wean oxygen  Nutritional goals will be met using po eventually , ensure adequate caloric intake and montior the same  Stress ulcer and VTE prophylaxis will be achieved    Glycemic control is satisfactory  Electrolytes have been repleted as necessary and wound care has been carried out. Pain control has been achieved.   agressive physical therapy and early mobility and ambulation goals will be met   The family was updated about the course and plan  CRITICAL CARE TIME SPENT in evaluation and management, reassessments, review and interpretation of labs and x-rays, ventilator and hemodynamic management, formulating a plan and coordinating care: __45____ MIN.  Time does not include procedural time.  CTICU ATTENDING     					    Leeroy Zapien MD

## 2020-05-25 NOTE — PROGRESS NOTE ADULT - SUBJECTIVE AND OBJECTIVE BOX
Planned Date of Surgery:       5/26/2020                                                                                                           Surgeon: Dr. Dubois     Procedure: TAVR    HPI:  74 year old F, PMHx morbid obesity, HTN, HLD, GERD, TIA with carotid artery disease s/p left carotid endarterectomy, Chronic Kidney disease (baseline creatinine 1.45) chronic diastolic heart failure with severe aortic stenosis (PG 60, PV 4 from ECHO in 1/31/20) followed by Dr. Dubois, presented to [facility] with symptoms of dizziness, weakness, chest pain, r/i NSTEMI, peak troponin of 10.  She reports having CP and SOB for several days associated with PND. She states the pain started while she was walking, though she is unable to quantify how much exertion prompted the symptoms. Denies orthopnea, LE edema. She was admitted for further workup and started on a heparin gtt. CT chest negative for PE, CT head significant for 2.6cm x 2.1cm mass lesion with mild hydrocephalus. Echo revealed severe AS and patient was transferred to [facility] for surgical evaluation. Upon arrival pt is afebrile , /101, SpO2 97% on 100% NRB RR 33, endorsing SOB and CP.  Pre-op for TAVR on 5/26/2020    PAST MEDICAL & SURGICAL HISTORY:  H/O carotid stenosis  TIA (transient ischemic attack)  Chronic kidney disease (CKD)  Aortic stenosis  HLD (hyperlipidemia)  HTN (hypertension)  H/O carotid endarterectomy      No Known Allergies      MEDICATIONS  (STANDING):  aMIOdarone    Tablet   Oral   aMIOdarone    Tablet 400 milliGRAM(s) Oral every 8 hours  aMIOdarone    Tablet 200 milliGRAM(s) Oral daily  aspirin enteric coated 81 milliGRAM(s) Oral daily  atorvastatin 40 milliGRAM(s) Oral at bedtime  chlorhexidine 4% Liquid 1 Application(s) Topical <User Schedule>  dextrose 5%. 1000 milliLiter(s) (50 mL/Hr) IV Continuous <Continuous>  dextrose 50% Injectable 12.5 Gram(s) IV Push once  dextrose 50% Injectable 25 Gram(s) IV Push once  dextrose 50% Injectable 25 Gram(s) IV Push once  heparin  Infusion 1000 Unit(s)/Hr (10 mL/Hr) IV Continuous <Continuous>  insulin lispro (HumaLOG) corrective regimen sliding scale   SubCutaneous Before meals and at bedtime  metoprolol tartrate 25 milliGRAM(s) Oral every 6 hours  pantoprazole    Tablet 40 milliGRAM(s) Oral before breakfast  polyethylene glycol 3350 17 Gram(s) Oral daily  senna 2 Tablet(s) Oral at bedtime    MEDICATIONS  (PRN):  acetaminophen   Tablet .. 650 milliGRAM(s) Oral every 6 hours PRN Mild Pain (1 - 3)  dextrose 40% Gel 15 Gram(s) Oral once PRN Blood Glucose LESS THAN 70 milliGRAM(s)/deciliter  glucagon  Injectable 1 milliGRAM(s) IntraMuscular once PRN Glucose LESS THAN 70 milligrams/deciliter      On Beta Blocker? YES     Labs:                        10.4   7.40  )-----------( 147      ( 25 May 2020 02:49 )             32.7     05-25    138  |  104  |  18  ----------------------------<  103<H>  4.4   |  24  |  1.28    Ca    8.9      25 May 2020 02:49  Phos  2.7     05-24  Mg     1.9     05-25    TPro  6.2  /  Alb  3.0<L>  /  TBili  0.4  /  DBili  x   /  AST  15  /  ALT  10  /  AlkPhos  44  05-24    PT/INR - ( 25 May 2020 02:49 )   PT: 13.8 sec;   INR: 1.20          PTT - ( 25 May 2020 02:49 )  PTT:39.9 sec    ABO Interpretation: AB (05-20-20 @ 16:44)    ABG - ( 25 May 2020 02:47 )  pH, Arterial: 7.46  pH, Blood: x     /  pCO2: 33    /  pO2: 74    / HCO3: 23    / Base Excess: -0.9  /  SaO2: 95          Hgb A1C:    EKG: < from: 12 Lead ECG (05.20.20 @ 17:57) >   Atrial fibrillation with rapid ventricular response  Abnormal QRS-T angle, consider primary T wave abnormality  Abnormal ECG      CXR: < from: Xray Chest 1 View- PORTABLE-Routine (05.23.20 @ 03:52) >  Impression: No interval change      CT Scans: < from: CT Head No Cont (05.22.20 @ 14:06) >  Impression: Approximately 1.7 cm right paraclinoid calcification likely an ossified meningioma. Prominence of the ventricles suspicious for hydrocephalus. No acute intracranial injury.    < from: CT Angio Abdomen and Pelvis w/ IV Cont (05.22.20 @ 14:05) >  IMPRESSION:  Cardiomegaly. Aortic valvular calcification.  Pulmonary congestion. Small pleural effusions.  Aneurysmal dilatation of ascending aorta 4.8 cm.  Tortuous but patent abdominal aorta and iliac arteries.  Moderate size hiatal hernia.      Cath Report: : mild pulmonary HTN and 2VCAD  oLCx 50%, pLAD 70% severly calcified iFR 0.96 (not significant)    Echo: < from: TTE Echo Complete w/o contrast w/ Doppler (05.21.20 @ 10:11) >   1. Mild to moderate symmetric left ventricular hypertrophy.   2. Patient was tachycardic during the study.   3. Left ventricular endocardium is not well visualized. Grossly, left ventricular function appears mildly reduced.   4. Normal right ventricular size and systolic function.   5. Biatrial enlargement.   6. Moderate aortic stenosis.   7. Mild aortic regurgitation.   8. Moderate mitral regurgitation.   9. Moderate tricuspid regurgitation.  10. Pulmonary hypertension present, pulmonary artery systolic pressure is 52 mmHg.  11. No pericardial effusion.        PFT's:     Carotid Duplex: < from: US Duplex Carotid Arteries Complete, Bilateral (05.21.20 @ 10:11) >  IMPRESSION:  1.  No evidence of hemodynamically significant stenosis in the bilateral carotid arteries.  2.   Heterogeneous, ulcerated plaque right internal carotid artery.          Consult in Chart?  YES   Consent in Chart? YES   Pre-op Orders Placed? YES   Blood Prodeucts Ordered? YES   NPO ordered? YES Planned Date of Surgery:       5/26/2020                                                                                                           Surgeon: Dr. Dubois     Procedure: TAVR    HPI:  74 year old F, PMHx morbid obesity, HTN, HLD, GERD, TIA with carotid artery disease s/p left carotid endarterectomy, Chronic Kidney disease (baseline creatinine 1.45) chronic diastolic heart failure with severe aortic stenosis (PG 60, PV 4 from ECHO in 1/31/20) followed by Dr. Dubois, presented to [facility] with symptoms of dizziness, weakness, chest pain, r/i NSTEMI, peak troponin of 10.  She reports having CP and SOB for several days associated with PND. She states the pain started while she was walking, though she is unable to quantify how much exertion prompted the symptoms. Denies orthopnea, LE edema. She was admitted for further workup and started on a heparin gtt. CT chest negative for PE, CT head significant for 2.6cm x 2.1cm mass lesion with mild hydrocephalus. Echo revealed severe AS and patient was transferred to [facility] for surgical evaluation. Upon arrival pt is afebrile , /101, SpO2 97% on 100% NRB RR 33, endorsing SOB and CP.  Pre-op for TAVR on 5/26/2020      PHYSICAL EXAM:  General: NAD, sitting upright in bed   Neuro: baseline left facial droop, (neuro exam stated in previous note)  CV: RRR, + systolic murmur IV/VI   Lungs: CTA b/l   PV: +2 DP b/l   : Premafit in place   GI: Obese abdomen, non tender     PAST MEDICAL & SURGICAL HISTORY:  H/O carotid stenosis  TIA (transient ischemic attack)  Chronic kidney disease (CKD)  Aortic stenosis  HLD (hyperlipidemia)  HTN (hypertension)  H/O carotid endarterectomy      No Known Allergies      MEDICATIONS  (STANDING):  aMIOdarone    Tablet   Oral   aMIOdarone    Tablet 400 milliGRAM(s) Oral every 8 hours  aMIOdarone    Tablet 200 milliGRAM(s) Oral daily  aspirin enteric coated 81 milliGRAM(s) Oral daily  atorvastatin 40 milliGRAM(s) Oral at bedtime  chlorhexidine 4% Liquid 1 Application(s) Topical <User Schedule>  dextrose 5%. 1000 milliLiter(s) (50 mL/Hr) IV Continuous <Continuous>  dextrose 50% Injectable 12.5 Gram(s) IV Push once  dextrose 50% Injectable 25 Gram(s) IV Push once  dextrose 50% Injectable 25 Gram(s) IV Push once  heparin  Infusion 1000 Unit(s)/Hr (10 mL/Hr) IV Continuous <Continuous>  insulin lispro (HumaLOG) corrective regimen sliding scale   SubCutaneous Before meals and at bedtime  metoprolol tartrate 25 milliGRAM(s) Oral every 6 hours  pantoprazole    Tablet 40 milliGRAM(s) Oral before breakfast  polyethylene glycol 3350 17 Gram(s) Oral daily  senna 2 Tablet(s) Oral at bedtime    MEDICATIONS  (PRN):  acetaminophen   Tablet .. 650 milliGRAM(s) Oral every 6 hours PRN Mild Pain (1 - 3)  dextrose 40% Gel 15 Gram(s) Oral once PRN Blood Glucose LESS THAN 70 milliGRAM(s)/deciliter  glucagon  Injectable 1 milliGRAM(s) IntraMuscular once PRN Glucose LESS THAN 70 milligrams/deciliter      On Beta Blocker? YES     Labs:                        10.4   7.40  )-----------( 147      ( 25 May 2020 02:49 )             32.7     05-25    138  |  104  |  18  ----------------------------<  103<H>  4.4   |  24  |  1.28    Ca    8.9      25 May 2020 02:49  Phos  2.7     05-24  Mg     1.9     05-25    TPro  6.2  /  Alb  3.0<L>  /  TBili  0.4  /  DBili  x   /  AST  15  /  ALT  10  /  AlkPhos  44  05-24    PT/INR - ( 25 May 2020 02:49 )   PT: 13.8 sec;   INR: 1.20          PTT - ( 25 May 2020 02:49 )  PTT:39.9 sec    ABO Interpretation: AB (05-20-20 @ 16:44)    ABG - ( 25 May 2020 02:47 )  pH, Arterial: 7.46  pH, Blood: x     /  pCO2: 33    /  pO2: 74    / HCO3: 23    / Base Excess: -0.9  /  SaO2: 95          Hgb A1C:    EKG: < from: 12 Lead ECG (05.20.20 @ 17:57) >   Atrial fibrillation with rapid ventricular response  Abnormal QRS-T angle, consider primary T wave abnormality  Abnormal ECG      CXR: < from: Xray Chest 1 View- PORTABLE-Routine (05.23.20 @ 03:52) >  Impression: No interval change      CT Scans: < from: CT Head No Cont (05.22.20 @ 14:06) >  Impression: Approximately 1.7 cm right paraclinoid calcification likely an ossified meningioma. Prominence of the ventricles suspicious for hydrocephalus. No acute intracranial injury.    < from: CT Angio Abdomen and Pelvis w/ IV Cont (05.22.20 @ 14:05) >  IMPRESSION:  Cardiomegaly. Aortic valvular calcification.  Pulmonary congestion. Small pleural effusions.  Aneurysmal dilatation of ascending aorta 4.8 cm.  Tortuous but patent abdominal aorta and iliac arteries.  Moderate size hiatal hernia.      Cath Report: : mild pulmonary HTN and 2VCAD  oLCx 50%, pLAD 70% severly calcified iFR 0.96 (not significant)    Echo: < from: TTE Echo Complete w/o contrast w/ Doppler (05.21.20 @ 10:11) >   1. Mild to moderate symmetric left ventricular hypertrophy.   2. Patient was tachycardic during the study.   3. Left ventricular endocardium is not well visualized. Grossly, left ventricular function appears mildly reduced.   4. Normal right ventricular size and systolic function.   5. Biatrial enlargement.   6. Moderate aortic stenosis.   7. Mild aortic regurgitation.   8. Moderate mitral regurgitation.   9. Moderate tricuspid regurgitation.  10. Pulmonary hypertension present, pulmonary artery systolic pressure is 52 mmHg.  11. No pericardial effusion.      Carotid Duplex: < from: US Duplex Carotid Arteries Complete, Bilateral (05.21.20 @ 10:11) >  IMPRESSION:  1.  No evidence of hemodynamically significant stenosis in the bilateral carotid arteries.  2.   Heterogeneous, ulcerated plaque right internal carotid artery.          Consult in Chart?  YES   Consent in Chart? YES   Pre-op Orders Placed? YES   Blood Prodeucts Ordered? YES   NPO ordered? YES

## 2020-05-26 NOTE — CHART NOTE - NSCHARTNOTEFT_GEN_A_CORE
Pt is s/p TAVR. On exam, palpable R radial/ulnar pulses. Neuro intact in RUE (normal strength/sensation). Vascular will sign off, reconsult if needed. [phone].

## 2020-05-26 NOTE — PROGRESS NOTE ADULT - SUBJECTIVE AND OBJECTIVE BOX
CTICU  CRITICAL  CARE  attending     Hand off received 					   Pertinent clinical, laboratory, radiographic, hemodynamic, echocardiographic, respiratory data, microbiologic data and chart were reviewed and analyzed frequently throughout the course of the day and night  Patient seen and examined with CTS/ SH attending at bedside  Pt is a 74y , Female, HEALTH ISSUES - PROBLEM Dx:      , FAMILY HISTORY:  PAST MEDICAL & SURGICAL HISTORY:  H/O carotid stenosis  TIA (transient ischemic attack)  Chronic kidney disease (CKD)  Aortic stenosis  HLD (hyperlipidemia)  HTN (hypertension)  H/O carotid endarterectomy    Patient is a 74y old  Female who presents with a chief complaint of Severe AS (29 May 2020 08:11)      14 system review was unremarkable    Vital signs, hemodynamic and respiratory parameters were reviewed from the bedside nursing flowsheet.  ICU Vital Signs Last 24 Hrs  T(C): 36.1 (29 May 2020 04:55), Max: 36.7 (28 May 2020 17:40)  T(F): 97 (29 May 2020 04:55), Max: 98.1 (28 May 2020 21:18)  HR: 106 (29 May 2020 08:00) (56 - 133)  BP: 107/85 (29 May 2020 06:00) (101/69 - 159/74)  BP(mean): 93 (29 May 2020 06:00) (77 - 111)  ABP: --  ABP(mean): --  RR: 18 (29 May 2020 06:00) (16 - 25)  SpO2: 99% (29 May 2020 07:00) (92% - 99%)    Adult Advanced Hemodynamics Last 24 Hrs  CVP(mm Hg): --  CVP(cm H2O): --  CO: --  CI: --  PA: --  PA(mean): --  PCWP: --  SVR: --  SVRI: --  PVR: --  PVRI: --, ABG - ( 28 May 2020 02:54 )  pH, Arterial: 7.48  pH, Blood: x     /  pCO2: 42    /  pO2: 121   / HCO3: 30    / Base Excess: 6.5   /  SaO2: 99                  Intake and output was reviewed and the fluid balance was calculated  Daily     Daily   I&O's Summary    28 May 2020 07:01  -  29 May 2020 07:00  --------------------------------------------------------  IN: 1160 mL / OUT: 1600 mL / NET: -440 mL        All lines and drain sites were assessed  [facility]      POCT Blood Glucose.: 91 mg/dL (29 May 2020 05:33)    No acute change in mental status  Auscultation of the chest reveals equal bs  Abdomen is soft  Extremities are warm and well perfused  Wounds appear clean and unremarkable  Antibiotics are periop    labs  CBC Full  -  ( 29 May 2020 05:36 )  WBC Count : 7.45 K/uL  RBC Count : 3.57 M/uL  Hemoglobin : 10.6 g/dL  Hematocrit : 34.7 %  Platelet Count - Automated : 189 K/uL  Mean Cell Volume : 97.2 fl  Mean Cell Hemoglobin : 29.7 pg  Mean Cell Hemoglobin Concentration : 30.5 gm/dL  Auto Neutrophil # : x  Auto Lymphocyte # : x  Auto Monocyte # : x  Auto Eosinophil # : x  Auto Basophil # : x  Auto Neutrophil % : x  Auto Lymphocyte % : x  Auto Monocyte % : x  Auto Eosinophil % : x  Auto Basophil % : x    05-29    138  |  97  |  28<H>  ----------------------------<  93  4.6   |  29  |  1.46<H>    Ca    9.3      29 May 2020 05:36  Phos  4.3     05-29  Mg     2.3     05-29    TPro  7.1  /  Alb  3.8  /  TBili  0.8  /  DBili  x   /  AST  22  /  ALT  <5<L>  /  AlkPhos  47  05-29    PT/INR - ( 29 May 2020 05:36 )   PT: 15.1 sec;   INR: 1.31          PTT - ( 29 May 2020 05:36 )  PTT:25.5 sec  The current medications were reviewed   MEDICATIONS  (STANDING):  apixaban 2.5 milliGRAM(s) Oral every 12 hours  aspirin enteric coated 81 milliGRAM(s) Oral daily  atorvastatin 40 milliGRAM(s) Oral at bedtime  chlorhexidine 4% Liquid 1 Application(s) Topical <User Schedule>  dextrose 5%. 1000 milliLiter(s) (50 mL/Hr) IV Continuous <Continuous>  dextrose 50% Injectable 12.5 Gram(s) IV Push once  dextrose 50% Injectable 25 Gram(s) IV Push once  dextrose 50% Injectable 25 Gram(s) IV Push once  furosemide   Injectable 20 milliGRAM(s) IV Push every 12 hours  insulin lispro (HumaLOG) corrective regimen sliding scale   SubCutaneous Before meals and at bedtime  metoprolol tartrate 12.5 milliGRAM(s) Oral every 12 hours  pantoprazole    Tablet 40 milliGRAM(s) Oral before breakfast  polyethylene glycol 3350 17 Gram(s) Oral daily  potassium chloride    Tablet ER 10 milliEquivalent(s) Oral every 12 hours  senna 2 Tablet(s) Oral at bedtime  sodium chloride 0.9%. 1000 milliLiter(s) (10 mL/Hr) IV Continuous <Continuous>    MEDICATIONS  (PRN):  acetaminophen   Tablet .. 650 milliGRAM(s) Oral every 6 hours PRN Mild Pain (1 - 3)  dextrose 40% Gel 15 Gram(s) Oral once PRN Blood Glucose LESS THAN 70 milliGRAM(s)/deciliter  glucagon  Injectable 1 milliGRAM(s) IntraMuscular once PRN Glucose LESS THAN 70 milligrams/deciliter       PROBLEM LIST/ ASSESSMENT:  HEALTH ISSUES - PROBLEM Dx:      ,   Patient is a 74y old  Female who presents with a chief complaint of Severe AS (29 May 2020 08:11)     s/p tavr                My plan includes :  close hemodynamic, ventilatory and drain monitoring and management per post op routine    Monitor for arrhythmias and monitor parameters for organ perfusion  beta blockade not administered due to hemodynamic instability and bradycardia  monitor neurologic status  Head of the bed should remain elevated to 45 deg .   chest PT and IS will be encouraged  monitor adequacy of oxygenation and ventilation and attempt to wean oxygen  antibiotic regimen will be tailored to the clinical, laboratory and microbiologic data  Nutritional goals will be met using po eventually , ensure adequate caloric intake and montior the same  Stress ulcer and VTE prophylaxis will be achieved    Glycemic control is satisfactory  Electrolytes have been repleted as necessary and wound care has been carried out. Pain control has been achieved.   agressive physical therapy and early mobility and ambulation goals will be met   The family was updated about the course and plan  CRITICAL CARE TIME personally provided by me  in evaluation and management, reassessments, review and interpretation of labs and x-rays, ventilator and hemodynamic management, formulating a plan and coordinating care: ___90____ MIN.  Time does not include procedural time. Time spent was non routine post-operarive caRE and included multiple and repeated evaluations at the bedside  CTICU ATTENDING     					    Kris Grimm MD

## 2020-05-26 NOTE — PRE-OP CHECKLIST - SELECT TESTS ORDERED
Urinalysis/BMP/POCT Blood Glucose/EKG/PT/PTT/Hepatic Function/Type and Cross/Type and Screen/CXR/CBC/CMP/INR Type and Cross/115/CBC/POCT Blood Glucose/INR/EKG/CMP/PT/PTT/Hepatic Function/Type and Screen/Urinalysis/CXR/BMP

## 2020-05-26 NOTE — CHART NOTE - NSCHARTNOTEFT_GEN_A_CORE
Patient  arrived to ICU post op TAVR  in NSR and repeat EKG show no indication of BBB.  Right groin TVP with cordis was removed without difficulty by Dr. Galicia and manual pressure held for 35mins with good hemostasis.  A hemostatic dressing was applied and patient was instructed to stay flat for 2hrs before she can get out of bed.

## 2020-05-26 NOTE — BRIEF OPERATIVE NOTE - COMMENTS
Dr. Tidwell was the first assistant for this case including but not limited to establish access, valve deployment and hemostasis.    I was present for this procedure and participated as first assistant as described by the PA above, unless otherwise noted below.

## 2020-05-27 NOTE — OCCUPATIONAL THERAPY INITIAL EVALUATION ADULT - VISUAL ASSESSMENT: TRACKING
up gaze/down gaze/left to right/right to left/Decreased smooth pursuits and saccadic eye movements/normal

## 2020-05-27 NOTE — OCCUPATIONAL THERAPY INITIAL EVALUATION ADULT - MANUAL MUSCLE TESTING RESULTS, REHAB EVAL
2/2 decreased novel/complex command following; b/l shoulder and elbow flex/ext at least 3+/5 as observed functionally against gravity and during functional mobility assessment, b/l  5/5/grossly assessed due to

## 2020-05-27 NOTE — PROGRESS NOTE ADULT - SUBJECTIVE AND OBJECTIVE BOX
CTICU  CRITICAL  CARE  attending     Hand off received 					   Pertinent clinical, laboratory, radiographic, hemodynamic, echocardiographic, respiratory data, microbiologic data and chart were reviewed and analyzed frequently throughout the course of the day and night  Patient seen and examined with CTS/ SH attending at bedside    Pt is a 74y , Female, admitted with severe AS; refractory Afib;    s/p TF-TAVR yesterday;      today:  remains extubated  occasional PVC's          , FAMILY HISTORY:  PAST MEDICAL & SURGICAL HISTORY:  H/O carotid stenosis  TIA (transient ischemic attack)  Chronic kidney disease (CKD)  Aortic stenosis  HLD (hyperlipidemia)  HTN (hypertension)  H/O carotid endarterectomy    Patient is a 74y old  Female who presents with a chief complaint of Severe AS (27 May 2020 10:50)      14 system review was unremarkable  acute changes include acute respiratory failure  Vital signs, hemodynamic and respiratory parameters were reviewed from the bedside nursing flowsheet.  ICU Vital Signs Last 24 Hrs  T(C): 36.6 (27 May 2020 13:52), Max: 36.9 (27 May 2020 05:01)  T(F): 97.9 (27 May 2020 13:52), Max: 98.4 (27 May 2020 05:01)  HR: 64 (27 May 2020 14:00) (56 - 81)  BP: 139/69 (27 May 2020 13:30) (139/69 - 150/75)  BP(mean): 96 (27 May 2020 13:30) (96 - 106)  ABP: 116/64 (27 May 2020 14:00) (115/54 - 159/86)  ABP(mean): 84 (27 May 2020 14:00) (77 - 115)  RR: 12 (27 May 2020 14:00) (12 - 20)  SpO2: 97% (27 May 2020 14:00) (88% - 100%)    Adult Advanced Hemodynamics Last 24 Hrs  CVP(mm Hg): --  CVP(cm H2O): --  CO: --  CI: --  PA: --  PA(mean): --  PCWP: --  SVR: --  SVRI: --  PVR: --  PVRI: --, ABG - ( 27 May 2020 09:45 )  pH, Arterial: 7.45  pH, Blood: x     /  pCO2: 39    /  pO2: 118   / HCO3: 26    / Base Excess: 2.0   /  SaO2: 98                  Intake and output was reviewed and the fluid balance was calculated  Daily     Daily   I&O's Summary    26 May 2020 07:01  -  27 May 2020 07:00  --------------------------------------------------------  IN: 2545 mL / OUT: 2710 mL / NET: -165 mL    27 May 2020 07:01  -  27 May 2020 14:42  --------------------------------------------------------  IN: 150 mL / OUT: 1575 mL / NET: -1425 mL        All lines and drain sites were assessed  Glycemic trend was reviewedCAPILLARY BLOOD GLUCOSE      POCT Blood Glucose.: 124 mg/dL (27 May 2020 10:50)    No acute change in mental status  Auscultation of the chest reveals equal bs  Abdomen is soft  Extremities are warm and well perfused  Wounds appear clean and unremarkable  Antibiotics are periop    labs  CBC Full  -  ( 27 May 2020 09:50 )  WBC Count : 9.72 K/uL  RBC Count : 2.98 M/uL  Hemoglobin : 9.1 g/dL  Hematocrit : 29.1 %  Platelet Count - Automated : 153 K/uL  Mean Cell Volume : 97.7 fl  Mean Cell Hemoglobin : 30.5 pg  Mean Cell Hemoglobin Concentration : 31.3 gm/dL  Auto Neutrophil # : x  Auto Lymphocyte # : x  Auto Monocyte # : x  Auto Eosinophil # : x  Auto Basophil # : x  Auto Neutrophil % : x  Auto Lymphocyte % : x  Auto Monocyte % : x  Auto Eosinophil % : x  Auto Basophil % : x    05-27    138  |  100  |  18  ----------------------------<  144<H>  4.9   |  24  |  1.33<H>    Ca    9.7      27 May 2020 09:50  Phos  3.6     05-27  Mg     3.2     05-27    TPro  6.9  /  Alb  4.1  /  TBili  0.6  /  DBili  x   /  AST  18  /  ALT  <5<L>  /  AlkPhos  41  05-27    PT/INR - ( 27 May 2020 09:50 )   PT: 12.1 sec;   INR: 1.06          PTT - ( 27 May 2020 09:50 )  PTT:26.2 sec  The current medications were reviewed   MEDICATIONS  (STANDING):  aspirin enteric coated 81 milliGRAM(s) Oral daily  atorvastatin 40 milliGRAM(s) Oral at bedtime  ceFAZolin   IVPB 2000 milliGRAM(s) IV Intermittent every 8 hours  chlorhexidine 4% Liquid 1 Application(s) Topical <User Schedule>  clopidogrel Tablet 75 milliGRAM(s) Oral daily  dextrose 5%. 1000 milliLiter(s) (50 mL/Hr) IV Continuous <Continuous>  dextrose 50% Injectable 12.5 Gram(s) IV Push once  dextrose 50% Injectable 25 Gram(s) IV Push once  dextrose 50% Injectable 25 Gram(s) IV Push once  furosemide   Injectable 20 milliGRAM(s) IV Push every 12 hours  heparin   Injectable 7500 Unit(s) SubCutaneous every 8 hours  insulin lispro (HumaLOG) corrective regimen sliding scale   SubCutaneous Before meals and at bedtime  metoprolol tartrate 12.5 milliGRAM(s) Oral every 12 hours  pantoprazole    Tablet 40 milliGRAM(s) Oral before breakfast  polyethylene glycol 3350 17 Gram(s) Oral daily  potassium chloride    Tablet ER 10 milliEquivalent(s) Oral every 12 hours  senna 2 Tablet(s) Oral at bedtime  sodium chloride 0.9%. 1000 milliLiter(s) (10 mL/Hr) IV Continuous <Continuous>    MEDICATIONS  (PRN):  acetaminophen   Tablet .. 650 milliGRAM(s) Oral every 6 hours PRN Mild Pain (1 - 3)  dextrose 40% Gel 15 Gram(s) Oral once PRN Blood Glucose LESS THAN 70 milliGRAM(s)/deciliter  glucagon  Injectable 1 milliGRAM(s) IntraMuscular once PRN Glucose LESS THAN 70 milligrams/deciliter       PROBLEM LIST/ ASSESSMENT:  HEALTH ISSUES - PROBLEM Dx:      ,   Patient is a 74y old  Female who presents with a chief complaint of Severe AS (27 May 2020 10:50)     s/p TF-TAVR    My plan includes :  close hemodynamic, ventilatory and drain monitoring and management per post op routine    Monitor for arrhythmias and monitor parameters for organ perfusion  monitor neurologic status  Head of the bed should remain elevated to 45 deg .   chest PT and IS will be encouraged  monitor adequacy of oxygenation and ventilation and attempt to wean oxygen  Nutritional goals will be met using po eventually , ensure adequate caloric intake and montior the same  Stress ulcer and VTE prophylaxis will be achieved    Glycemic control is satisfactory  Electrolytes have been repleted as necessary and wound care has been carried out. Pain control has been achieved.   agressive physical therapy and early mobility and ambulation goals will be met   The family was updated about the course and plan  CRITICAL CARE TIME SPENT in evaluation and management, reassessments, review and interpretation of labs and x-rays, ventilator and hemodynamic management, formulating a plan and coordinating care: ___55____ MIN.  Time does not include procedural time.  CTICU ATTENDING     					    Leeroy Zapien MD

## 2020-05-27 NOTE — PROGRESS NOTE ADULT - ATTENDING COMMENTS
I was physically present for the key portions of the evaluation and managemnent (E/M) service provided.  I agree with the above history, physical, and plan which I have reviewed and edited where appropriate, with the exceptions as per my note.    normal neuro exam.

## 2020-05-27 NOTE — PROGRESS NOTE ADULT - ASSESSMENT
74-year-old woman with prior TIA 2/2 carotid stenosis s/p left carotid endarterectomy, severe aortic stenosis/diastolic heart failure, admitted to [facility] for management or NSTEMI and severe aortic stenosis, with head CT prior to admission reportedly showing 2x2 cm brain lesion and mild hydrocephalus, similar to follow-up imaging yesterday. At baseline patient walks with walker.     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    # Prior TIA s/p L carotid endarterectomy  -Continue aspirin 81mg, atorvastatin 40mg    - will continue to follow, likely follow up after TAVR 74-year-old woman with prior TIA 2/2 carotid stenosis s/p left carotid endarterectomy, severe aortic stenosis/diastolic heart failure, admitted to [facility] for management or NSTEMI and severe aortic stenosis, with head CT prior to admission reportedly showing 2x2 cm brain lesion and mild hydrocephalus, similar to follow-up imaging yesterday. At baseline patient walks with walker.     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    # Prior TIA s/p L carotid endarterectomy  -Continue aspirin 81mg, atorvastatin 40mg    - Neurology will sign off, please call with questions. 74-year-old woman with prior TIA 2/2 carotid stenosis s/p left carotid endarterectomy, severe aortic stenosis/diastolic heart failure, admitted to [facility] for management or NSTEMI and severe aortic stenosis, with head CT prior to admission reportedly showing 2x2 cm brain lesion and mild hydrocephalus, similar to follow-up imaging yesterday. At baseline patient walks with walker.     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  - will require outpatient follow-up to follow-up on meningioma growth and findings of ventriculomegaly.     # Prior TIA s/p L carotid endarterectomy  -Continue aspirin 81mg, atorvastatin 40mg    - Neurology will sign off, please call with questions.

## 2020-05-27 NOTE — PROGRESS NOTE ADULT - SUBJECTIVE AND OBJECTIVE BOX
Neurology Progress Note    INTERVAL HPI/OVERNIGHT EVENTS:  Patient seen and examined.     MEDICATIONS  (STANDING):  aspirin enteric coated 81 milliGRAM(s) Oral daily  atorvastatin 40 milliGRAM(s) Oral at bedtime  ceFAZolin   IVPB 2000 milliGRAM(s) IV Intermittent every 8 hours  chlorhexidine 4% Liquid 1 Application(s) Topical <User Schedule>  clopidogrel Tablet 75 milliGRAM(s) Oral daily  dextrose 5%. 1000 milliLiter(s) (50 mL/Hr) IV Continuous <Continuous>  dextrose 50% Injectable 12.5 Gram(s) IV Push once  dextrose 50% Injectable 25 Gram(s) IV Push once  dextrose 50% Injectable 25 Gram(s) IV Push once  furosemide   Injectable 20 milliGRAM(s) IV Push every 12 hours  heparin   Injectable 7500 Unit(s) SubCutaneous every 8 hours  insulin lispro (HumaLOG) corrective regimen sliding scale   SubCutaneous Before meals and at bedtime  metoprolol tartrate 12.5 milliGRAM(s) Oral every 12 hours  pantoprazole    Tablet 40 milliGRAM(s) Oral before breakfast  polyethylene glycol 3350 17 Gram(s) Oral daily  potassium chloride    Tablet ER 10 milliEquivalent(s) Oral every 12 hours  senna 2 Tablet(s) Oral at bedtime  sodium chloride 0.9%. 1000 milliLiter(s) (10 mL/Hr) IV Continuous <Continuous>    MEDICATIONS  (PRN):  acetaminophen   Tablet .. 650 milliGRAM(s) Oral every 6 hours PRN Mild Pain (1 - 3)  dextrose 40% Gel 15 Gram(s) Oral once PRN Blood Glucose LESS THAN 70 milliGRAM(s)/deciliter  glucagon  Injectable 1 milliGRAM(s) IntraMuscular once PRN Glucose LESS THAN 70 milligrams/deciliter      Allergies    No Known Allergies    Intolerances    ROS:    Vital Signs Last 24 Hrs  T(C): 36.9 (27 May 2020 09:28), Max: 36.9 (27 May 2020 05:01)  T(F): 98.4 (27 May 2020 09:28), Max: 98.4 (27 May 2020 05:01)  HR: 74 (27 May 2020 10:00) (53 - 81)  BP: --  BP(mean): --  RR: 12 (27 May 2020 10:00) (12 - 20)  SpO2: 97% (27 May 2020 10:00) (88% - 100%)    Physical exam:      COVID LABS:                                     9.1    9.72  )-----------( 153      ( 27 May 2020 09:50 )             29.1     05-27    138  |  100  |  18  ----------------------------<  144<H>  4.9   |  24  |  1.33<H>    Ca    9.7      27 May 2020 09:50  Phos  3.6     05-27  Mg     3.2     05-27    TPro  6.9  /  Alb  4.1  /  TBili  0.6  /  DBili  x   /  AST  18  /  ALT  <5<L>  /  AlkPhos  41  05-27    PT/INR - ( 27 May 2020 09:50 )   PT: 12.1 sec;   INR: 1.06          PTT - ( 27 May 2020 09:50 )  PTT:26.2 sec      RADIOLOGY & ADDITIONAL TESTS: Neurology Progress Note    INTERVAL HPI/OVERNIGHT EVENTS:  Patient seen and examined at bedside by Dr. Pisano, sitting in chair. [place] speaking only.  States she is feeling okay, not bad but not good. The beeping of all the machines are annoying her.     MEDICATIONS  (STANDING):  aspirin enteric coated 81 milliGRAM(s) Oral daily  atorvastatin 40 milliGRAM(s) Oral at bedtime  ceFAZolin   IVPB 2000 milliGRAM(s) IV Intermittent every 8 hours  chlorhexidine 4% Liquid 1 Application(s) Topical <User Schedule>  clopidogrel Tablet 75 milliGRAM(s) Oral daily  dextrose 5%. 1000 milliLiter(s) (50 mL/Hr) IV Continuous <Continuous>  dextrose 50% Injectable 12.5 Gram(s) IV Push once  dextrose 50% Injectable 25 Gram(s) IV Push once  dextrose 50% Injectable 25 Gram(s) IV Push once  furosemide   Injectable 20 milliGRAM(s) IV Push every 12 hours  heparin   Injectable 7500 Unit(s) SubCutaneous every 8 hours  insulin lispro (HumaLOG) corrective regimen sliding scale   SubCutaneous Before meals and at bedtime  metoprolol tartrate 12.5 milliGRAM(s) Oral every 12 hours  pantoprazole    Tablet 40 milliGRAM(s) Oral before breakfast  polyethylene glycol 3350 17 Gram(s) Oral daily  potassium chloride    Tablet ER 10 milliEquivalent(s) Oral every 12 hours  senna 2 Tablet(s) Oral at bedtime  sodium chloride 0.9%. 1000 milliLiter(s) (10 mL/Hr) IV Continuous <Continuous>    MEDICATIONS  (PRN):  acetaminophen   Tablet .. 650 milliGRAM(s) Oral every 6 hours PRN Mild Pain (1 - 3)  dextrose 40% Gel 15 Gram(s) Oral once PRN Blood Glucose LESS THAN 70 milliGRAM(s)/deciliter  glucagon  Injectable 1 milliGRAM(s) IntraMuscular once PRN Glucose LESS THAN 70 milligrams/deciliter      Allergies    No Known Allergies    Intolerances    ROS: As noted in HPI    Vital Signs Last 24 Hrs  T(C): 36.9 (27 May 2020 09:28), Max: 36.9 (27 May 2020 05:01)  T(F): 98.4 (27 May 2020 09:28), Max: 98.4 (27 May 2020 05:01)  HR: 74 (27 May 2020 10:00) (53 - 81)  BP: --  BP(mean): --  RR: 12 (27 May 2020 10:00) (12 - 20)  SpO2: 97% (27 May 2020 10:00) (88% - 100%)    Physical exam:  Neurologic Exam:  -Mental Status: Awake and alert.   Cranial Nerves:   -II: pupils equal round and reactive  - III, IV, VI: extraocular movements full with no nystagmus  - V: facial sensation intact and symmetric  - XII: tongue protrudes midline  Motor: normal bulk and tone, no orbiting or pronator drift, power 5/5 to confrontation throughout including shoulder abduction  Coordination: Rapid finger movements intact  Reflexes: Toes downgoing bilaterally  Gait: deferred      COVID LABS:                         9.1    9.72  )-----------( 153      ( 27 May 2020 09:50 )             29.1     05-27    138  |  100  |  18  ----------------------------<  144<H>  4.9   |  24  |  1.33<H>    Ca    9.7      27 May 2020 09:50  Phos  3.6     05-27  Mg     3.2     05-27    TPro  6.9  /  Alb  4.1  /  TBili  0.6  /  DBili  x   /  AST  18  /  ALT  <5<L>  /  AlkPhos  41  05-27    PT/INR - ( 27 May 2020 09:50 )   PT: 12.1 sec;   INR: 1.06          PTT - ( 27 May 2020 09:50 )  PTT:26.2 sec      RADIOLOGY & ADDITIONAL TESTS: no neuroimaging to be reviewed at this time. Neurology Progress Note    INTERVAL HPI/OVERNIGHT EVENTS:  Patient seen and examined at bedside by Dr. Pisano, sitting in chair. [place] speaking only.  States she is feeling okay, not bad but not good. The beeping of all the machines are annoying her. S/p TAVR 5/26.     MEDICATIONS  (STANDING):  aspirin enteric coated 81 milliGRAM(s) Oral daily  atorvastatin 40 milliGRAM(s) Oral at bedtime  ceFAZolin   IVPB 2000 milliGRAM(s) IV Intermittent every 8 hours  chlorhexidine 4% Liquid 1 Application(s) Topical <User Schedule>  clopidogrel Tablet 75 milliGRAM(s) Oral daily  dextrose 5%. 1000 milliLiter(s) (50 mL/Hr) IV Continuous <Continuous>  dextrose 50% Injectable 12.5 Gram(s) IV Push once  dextrose 50% Injectable 25 Gram(s) IV Push once  dextrose 50% Injectable 25 Gram(s) IV Push once  furosemide   Injectable 20 milliGRAM(s) IV Push every 12 hours  heparin   Injectable 7500 Unit(s) SubCutaneous every 8 hours  insulin lispro (HumaLOG) corrective regimen sliding scale   SubCutaneous Before meals and at bedtime  metoprolol tartrate 12.5 milliGRAM(s) Oral every 12 hours  pantoprazole    Tablet 40 milliGRAM(s) Oral before breakfast  polyethylene glycol 3350 17 Gram(s) Oral daily  potassium chloride    Tablet ER 10 milliEquivalent(s) Oral every 12 hours  senna 2 Tablet(s) Oral at bedtime  sodium chloride 0.9%. 1000 milliLiter(s) (10 mL/Hr) IV Continuous <Continuous>    MEDICATIONS  (PRN):  acetaminophen   Tablet .. 650 milliGRAM(s) Oral every 6 hours PRN Mild Pain (1 - 3)  dextrose 40% Gel 15 Gram(s) Oral once PRN Blood Glucose LESS THAN 70 milliGRAM(s)/deciliter  glucagon  Injectable 1 milliGRAM(s) IntraMuscular once PRN Glucose LESS THAN 70 milligrams/deciliter      Allergies    No Known Allergies    Intolerances    ROS: As noted in HPI    Vital Signs Last 24 Hrs  T(C): 36.9 (27 May 2020 09:28), Max: 36.9 (27 May 2020 05:01)  T(F): 98.4 (27 May 2020 09:28), Max: 98.4 (27 May 2020 05:01)  HR: 74 (27 May 2020 10:00) (53 - 81)  BP: --  BP(mean): --  RR: 12 (27 May 2020 10:00) (12 - 20)  SpO2: 97% (27 May 2020 10:00) (88% - 100%)    Physical exam:  Neurologic Exam:  -Mental Status: Awake and alert.   Cranial Nerves:   -II: pupils equal round and reactive  - III, IV, VI: extraocular movements full with no nystagmus  - V: facial sensation intact and symmetric  - XII: tongue protrudes midline  Motor: normal bulk and tone, no orbiting or pronator drift, power 5/5 to confrontation throughout including shoulder abduction  Coordination: Rapid finger movements intact  Reflexes: Toes downgoing bilaterally  Gait: deferred      COVID LABS:                         9.1    9.72  )-----------( 153      ( 27 May 2020 09:50 )             29.1     05-27    138  |  100  |  18  ----------------------------<  144<H>  4.9   |  24  |  1.33<H>    Ca    9.7      27 May 2020 09:50  Phos  3.6     05-27  Mg     3.2     05-27    TPro  6.9  /  Alb  4.1  /  TBili  0.6  /  DBili  x   /  AST  18  /  ALT  <5<L>  /  AlkPhos  41  05-27    PT/INR - ( 27 May 2020 09:50 )   PT: 12.1 sec;   INR: 1.06          PTT - ( 27 May 2020 09:50 )  PTT:26.2 sec      RADIOLOGY & ADDITIONAL TESTS: no neuroimaging to be reviewed at this time.

## 2020-05-27 NOTE — OCCUPATIONAL THERAPY INITIAL EVALUATION ADULT - GENERAL OBSERVATIONS, REHAB EVAL
Patient right hand dominant. Chart reviewed, VERNON Duncan cleared patient for OT evaluation. Patient received seated in chair, NAD, +IV heplock, +tele, +a-line, +SCDs.

## 2020-05-27 NOTE — OCCUPATIONAL THERAPY INITIAL EVALUATION ADULT - ADDITIONAL COMMENTS
Patient reports independence PTA with use of SC. Patient reports owning a RW however denies recent use.

## 2020-05-27 NOTE — CHART NOTE - NSCHARTNOTEFT_GEN_A_CORE
Admitting Diagnosis:   Patient is a 74y old  Female who presents with a chief complaint of Severe AS (27 May 2020 14:41)      PAST MEDICAL & SURGICAL HISTORY:  H/O carotid stenosis  TIA (transient ischemic attack)  Chronic kidney disease (CKD)  Aortic stenosis  HLD (hyperlipidemia)  HTN (hypertension)  H/O carotid endarterectomy      Current Nutrition Order: DASH/TLC + Cst Cho no Snack        PO Intake: Good (%) [   ]  Fair (50-75%) [ x  ] Poor (<25%) [   ]    GI Issues: no n/v/d/c     Pain: no pain noted     Skin Integrity: jacoby score 17, ecchymotic     Labs:   05-27    138  |  100  |  18  ----------------------------<  144<H>  4.9   |  24  |  1.33<H>    Ca    9.7      27 May 2020 09:50  Phos  3.6     05-27  Mg     3.2     05-27    TPro  6.9  /  Alb  4.1  /  TBili  0.6  /  DBili  x   /  AST  18  /  ALT  <5<L>  /  AlkPhos  41  05-27    CAPILLARY BLOOD GLUCOSE      POCT Blood Glucose.: 124 mg/dL (27 May 2020 10:50)  POCT Blood Glucose.: 135 mg/dL (27 May 2020 06:56)  POCT Blood Glucose.: 163 mg/dL (26 May 2020 21:08)      Medications:  MEDICATIONS  (STANDING):  aspirin enteric coated 81 milliGRAM(s) Oral daily  atorvastatin 40 milliGRAM(s) Oral at bedtime  ceFAZolin   IVPB 2000 milliGRAM(s) IV Intermittent every 8 hours  chlorhexidine 4% Liquid 1 Application(s) Topical <User Schedule>  clopidogrel Tablet 75 milliGRAM(s) Oral daily  dextrose 5%. 1000 milliLiter(s) (50 mL/Hr) IV Continuous <Continuous>  dextrose 50% Injectable 12.5 Gram(s) IV Push once  dextrose 50% Injectable 25 Gram(s) IV Push once  dextrose 50% Injectable 25 Gram(s) IV Push once  furosemide   Injectable 20 milliGRAM(s) IV Push every 12 hours  heparin   Injectable 7500 Unit(s) SubCutaneous every 8 hours  insulin lispro (HumaLOG) corrective regimen sliding scale   SubCutaneous Before meals and at bedtime  metoprolol tartrate 12.5 milliGRAM(s) Oral every 12 hours  pantoprazole    Tablet 40 milliGRAM(s) Oral before breakfast  polyethylene glycol 3350 17 Gram(s) Oral daily  potassium chloride    Tablet ER 10 milliEquivalent(s) Oral every 12 hours  senna 2 Tablet(s) Oral at bedtime  sodium chloride 0.9%. 1000 milliLiter(s) (10 mL/Hr) IV Continuous <Continuous>    MEDICATIONS  (PRN):  acetaminophen   Tablet .. 650 milliGRAM(s) Oral every 6 hours PRN Mild Pain (1 - 3)  dextrose 40% Gel 15 Gram(s) Oral once PRN Blood Glucose LESS THAN 70 milliGRAM(s)/deciliter  glucagon  Injectable 1 milliGRAM(s) IntraMuscular once PRN Glucose LESS THAN 70 milligrams/deciliter      Weight: 107.7kg     Weight Change: no new wts     Nutrition Focused Physical Exam: Completed [   ]  Not Pertinent [ x  ]    Estimated energy needs:   Ideal body weight used for calculations as pt >120% of IBW  Nutrient needs based on [facility] standards of care for maintenance in adults, adjusted for age and obesity, pre- op needs   1250- 1500kcal (25-30kcal/kg)   60- 70g pro (1.2-1.4g/kg pro)   1250- 1500kcal (25-30ml/kg)     Subjective:   74 year old F, PMHx morbid obesity, HTN, HLD, GERD, TIA with carotid artery disease s/p left carotid endarterectomy, Chronic Kidney disease (baseline creatinine 1.45) chronic diastolic heart failure with severe aortic stenosis, presented to [facility] with symptoms of dizziness, weakness, chest pain, r/i NSTEMI, peak troponin of 10.  She reports having CP and SOB for several days associated with PND. She states the pain started while she was walking, though she is unable to quantify how much exertion prompted the symptoms. Denies orthopnea, LE edema. She was admitted for further workup and started on a heparin gtt. CT chest negative for PE, CT head significant for 2.6cm x 2.1cm mass lesion with mild hydrocephalus. Echo revealed severe AS and patient was transferred to [facility] for surgical evaluation. Ruled in for NSTEMI and severe AS. On 5/26 pt in OR thus unable to assess pt yesterday, underwent TAVR now POD1. Pt observed resting in chair NAD, tolerating PO fairly. Encouraged intake and reinforced diet restrictions. Will follow per protocol.     Previous Nutrition Diagnosis:  Inadequate energy intake r/t NPO for testing AEB 0% EER       Active [   ]  Resolved [  x ]    If resolved, new PES: Increased nutrient needs r/t post op demand AEB hypermetabolic state     Goal: meet >75% EER     Recommendations:  1. Trend wts   2. Manage pain   3. Monitor and replete lytes  4. Encourage intake through day  5. Reinforce ed     Education: encouraged intake through day     Risk Level: High [   ] Moderate [ x  ] Low [   ]

## 2020-05-27 NOTE — OCCUPATIONAL THERAPY INITIAL EVALUATION ADULT - MD ORDER
74-year-old woman with prior TIA 2/2 carotid stenosis s/p left carotid endarterectomy, severe aortic stenosis/diastolic heart failure, admitted to [facility] for management or NSTEMI and severe aortic stenosis, with head CT prior to admission reportedly showing 2x2 cm brain lesion and mild hydrocephalus, similar to follow-up imaging yesterday.

## 2020-05-27 NOTE — OCCUPATIONAL THERAPY INITIAL EVALUATION ADULT - RANGE OF MOTION EXAMINATION
CN Testing: b/l frontalis intact; b/l buccinator intact; L facial droop (mild); tongue protrusion at midline; b/l eyes open/close intact; b/l shoulder elevation intact

## 2020-05-27 NOTE — OCCUPATIONAL THERAPY INITIAL EVALUATION ADULT - STANDING BALANCE: DYNAMIC, REHAB EVAL
fair minus/Pt ambulated 80ftx2 with BUE support on portable monitor, requiring CGA and ~2min seated rest break between trials.

## 2020-05-28 NOTE — PROGRESS NOTE ADULT - SUBJECTIVE AND OBJECTIVE BOX
INTERVAL HPI/OVERNIGHT EVENTS:    5/26 TAVR (brandee)  EF 40%    73yo Faroese speaking Female Hx Obesity, HTN, HLD, GERD, TIA, L CEA, CKD (1.45), CHF (chronic systolic), severe Aortic stenosis (PG 60, PV 4 from ECHO 1/31/20) presented to [facility] 5/20 w/ CP - (+)NSTEMI - trop 10  started on heparin qtt/ASA/statin/metoprolol/lasix given   COVID-19 testing (OSH) negative    CTa Chest 5/20: no PE; pulm trunk enlarged; cardiomegaly    Transferred  for surgical evaluation    patient in atrial fib (140's); ; tachypneic - Sa02 95% - immediately placed on BIPAP  lopressor 2.5/5/2.5/2.5 (total 10mg given) - with improvement in rate  lasix 40 mg and Amiodarone 150 mg IV    ECHO bedside - EF 40%; mod MR; mod-severe AS mild AI; lateral wall hypokinesis    CT Head 5/19: sxs of dizziness - ? outpatient study as admit date reported 5/20  no acute infarct/chronic ischemia/hemorrhage - 2.6 x 2.1cm mass lesion with coarse calcification in suprasellar region; causing mild hydrocephalus; mild transependymal flow - no shift/herniation    Heparin/amiodarone infusion started for fib/RVR    Cath: mild pulmonary HTN and 2v CAD - oLCx 50%, pLAD 70% severly calcified iFR 0.96  ECHO 5/21: Mild-mod LVH; LV mildly reduced. Biatrial enlargement. Mod AS; mild AR. mod MR/TR  Pulm HTN (52 mmHg.) No pericardial effusion    CT Head 5/22: Approximately 1.7 cm right paraclinoid calcification likely an ossified meningioma. Prominence of the ventricles suspicious for hydrocephalus. No acute intracranial injury.    CTa Heart/Abd/Pelvis 5/22: Pulmonary edema. Small bilateral pleural effusions. 4.8 cm ascending thoracic aortic aneurysm.  Moderate hiatal hernia.    persistent atrial fib/RVR - given dosings of cardizem; on metoprolol 5mg IV q6h  To OR 5/26 0 arrived extubated'    5/27 - low dose metoprolol started for ectopy  up and ambulating with staff/walker - no acute events reported overnight     PMHx includes but is not limited to:   H/O carotid stenosis  TIA (transient ischemic attack)  Chronic kidney disease (CKD)  Aortic stenosis  HLD (hyperlipidemia)  HTN (hypertension)  H/O carotid endarterectomy    ICU Vital Signs Last 24 Hrs  T(C): 36.3 (28 May 2020 13:30), Max: 36.3 (27 May 2020 17:26)  T(F): 97.3 (28 May 2020 13:30), Max: 97.4 (27 May 2020 17:26)  HR: 59 (28 May 2020 14:00) (58 - 72)  BP: 131/69 (28 May 2020 14:00) (113/63 - 159/74)  BP(mean): 93 (28 May 2020 14:00) (81 - 106)  ABP: 122/69 (28 May 2020 08:00) (115/86 - 158/81)  ABP(mean): 89 (28 May 2020 08:00) (85 - 112)  RR: 16 (28 May 2020 14:00) (12 - 20)  SpO2: 96% (28 May 2020 14:00) (91% - 100%)    Qtts: None    I&O's Summary    27 May 2020 07:01  -  28 May 2020 07:00  --------------------------------------------------------  IN: 450 mL / OUT: 4275 mL / NET: -3825 mL    28 May 2020 07:01  -  28 May 2020 14:39  --------------------------------------------------------  IN: 590 mL / OUT: 450 mL / NET: 140 mL            Physical Exam    Heart  Lungs  Abd  Ext  Chest  Neuro  Skin    LABS:                        10.3   9.74  )-----------( 194      ( 28 May 2020 08:58 )             32.5     05-28    137  |  95<L>  |  22  ----------------------------<  128<H>  4.8   |  28  |  1.40<H>    Ca    9.9      28 May 2020 08:58  Phos  3.5     05-28  Mg     2.4     05-28    TPro  7.2  /  Alb  4.1  /  TBili  0.7  /  DBili  x   /  AST  20  /  ALT  <5<L>  /  AlkPhos  46  05-28    PT/INR - ( 28 May 2020 02:57 )   PT: 13.7 sec;   INR: 1.20          PTT - ( 28 May 2020 02:57 )  PTT:34.5 sec    ABG - ( 28 May 2020 02:54 )  pH, Arterial: 7.48  pH, Blood: x     /  pCO2: 42    /  pO2: 121   / HCO3: 30    / Base Excess: 6.5   /  SaO2: 99                  RADIOLOGY & ADDITIONAL STUDIES:    I have spent/provided stated minutes of critical care time to this patient: INTERVAL HPI/OVERNIGHT EVENTS:    5/26 TAVR (brandee)  EF 40%    73yo Finnish speaking Female Hx Obesity, HTN, HLD, GERD, TIA, L CEA, CKD (1.45), CHF (chronic systolic), severe Aortic stenosis (PG 60, PV 4 from ECHO 1/31/20) presented to [facility] 5/20 w/ CP - (+)NSTEMI - trop 10  started on heparin qtt/ASA/statin/metoprolol/lasix given   COVID-19 testing (OSH) negative    CTa Chest 5/20: no PE; pulm trunk enlarged; cardiomegaly    Transferred  for surgical evaluation    patient in atrial fib (140's); ; tachypneic - Sa02 95% - immediately placed on BIPAP  lopressor 2.5/5/2.5/2.5 (total 10mg given) - with improvement in rate  lasix 40 mg and Amiodarone 150 mg IV    ECHO bedside - EF 40%; mod MR; mod-severe AS mild AI; lateral wall hypokinesis    CT Head 5/19: sxs of dizziness - ? outpatient study as admit date reported 5/20  no acute infarct/chronic ischemia/hemorrhage - 2.6 x 2.1cm mass lesion with coarse calcification in suprasellar region; causing mild hydrocephalus; mild transependymal flow - no shift/herniation    Heparin/amiodarone infusion started for fib/RVR    Cath: mild pulmonary HTN and 2v CAD - oLCx 50%, pLAD 70% severly calcified iFR 0.96  ECHO 5/21: Mild-mod LVH; LV mildly reduced. Biatrial enlargement. Mod AS; mild AR. mod MR/TR  Pulm HTN (52 mmHg.) No pericardial effusion    CT Head 5/22: Approximately 1.7 cm right paraclinoid calcification likely an ossified meningioma. Prominence of the ventricles suspicious for hydrocephalus. No acute intracranial injury.    CTa Heart/Abd/Pelvis 5/22: Pulmonary edema. Small bilateral pleural effusions. 4.8 cm ascending thoracic aortic aneurysm.  Moderate hiatal hernia.    persistent atrial fib/RVR - given dosings of cardizem; on metoprolol 5mg IV q6h  To OR 5/26 0 arrived extubated'    5/27 - low dose metoprolol started for ectopy  up and ambulating with staff/walker - no acute events reported overnight     PMHx includes but is not limited to:   H/O carotid stenosis  TIA (transient ischemic attack)  Chronic kidney disease (CKD)  Aortic stenosis  HLD (hyperlipidemia)  HTN (hypertension)  H/O carotid endarterectomy    ICU Vital Signs Last 24 Hrs  T(C): 36.3 (28 May 2020 13:30), Max: 36.3 (27 May 2020 17:26)  T(F): 97.3 (28 May 2020 13:30), Max: 97.4 (27 May 2020 17:26)  HR: 59 (28 May 2020 14:00) (58 - 72)  BP: 131/69 (28 May 2020 14:00) (113/63 - 159/74)  BP(mean): 93 (28 May 2020 14:00) (81 - 106)  ABP: 122/69 (28 May 2020 08:00) (115/86 - 158/81)  ABP(mean): 89 (28 May 2020 08:00) (85 - 112)  RR: 16 (28 May 2020 14:00) (12 - 20)  SpO2: 96% (28 May 2020 14:00) (91% - 100%)    Qtts: None    I&O's Summary    27 May 2020 07:01  -  28 May 2020 07:00  --------------------------------------------------------  IN: 450 mL / OUT: 4275 mL / NET: -3825 mL    28 May 2020 07:01  -  28 May 2020 14:39  --------------------------------------------------------  IN: 590 mL / OUT: 450 mL / NET: 140 mL    Exam:  Heart - regular (-)rub/gallop  Lungs - poor inspiratory effort - no rhonci/rales  Abd - (+)BS soft NTND (-)r/r/g  Ext - warm to touch; no cyanosis/clubbing  Neuro - alert/interactive with Japanese ; non-focal  Skin - no rash     LABS:                        10.3   9.74  )-----------( 194      ( 28 May 2020 08:58 )             32.5     05-28    137  |  95<L>  |  22  ----------------------------<  128<H>  4.8   |  28  |  1.40<H>    Ca    9.9      28 May 2020 08:58  Phos  3.5     05-28  Mg     2.4     05-28    TPro  7.2  /  Alb  4.1  /  TBili  0.7  /  DBili  x   /  AST  20  /  ALT  <5<L>  /  AlkPhos  46  05-28    PT/INR - ( 28 May 2020 02:57 )   PT: 13.7 sec;   INR: 1.20     PTT - ( 28 May 2020 02:57 )  PTT:34.5 sec    ABG - ( 28 May 2020 02:54 ) 7.48/42/121/99    RADIOLOGY & ADDITIONAL STUDIES: reviewed     Patient with aortic stenosis presenting with chest pain - (+)NSTEMI - transferred to [facility] for CTS evaluation - atrial fib/RVR; resp distress/pulm edema and accelerate HTN - now s/p TAVR 5/26 with post-op ectopy - improved     1. CV - NSTEMI and new onset atrial fib in setting of known aortic stenosis  AFib RVR - intially rate control - Now in sinus  prior amio now off; metoprolol   ASA/statin/eliquis    2. Pulm   tolerating NC  OOB and incentive spirometry  ongoing work with PT; ambulates well with walker - several times already today    3. Renal   CKD - reported baseline Cr 1.45  avoid nephrotoxins as able   monitor UO/Lytes and Cr  hubbard in place  Cr today 1.40  ongoing diuresis - plan 1 L negative fluid balance    4. Neuro  sxs dizziness prompting CT Head   mass  - ? meningioma on CT Head 5/19 - seen on CT 5/22 as well   avoid sedatives  Hx carotid disease - left CEA  Neuro consulted - MRI recommended to better evaluate     5. Vascular  vascular consulted for occluded subclavian on imaging  input reviewed - no acute intervention required    DVT and GI prophylaxis   anticipate transfer to floor soon    d/w patient via , staff and CTS      I have spent/provided stated minutes of critical care time to this patient: 60 min

## 2020-05-29 NOTE — CHART NOTE - NSCHARTNOTEFT_GEN_A_CORE
Ms. Martin is a 74 y.o female with a PMHx of morbid obesity, HTN, HLD, GERD, TIA with carotid artery disease s/p left carotid endartectomy, CKD (baseline Cr 1.45) and chronic diastolic heart failure with severe aortic stenosis who underwent an uncomplicated TAVR (Edward 23 mm), EF 40% on 5/26/20 with Dr. Dubois. She was planned for discharge today but went into atrial fibrillation with RVR so decision was made to stay overnight for observation. She was examined in the evening after transfer from the CTICU with [employer] phone  ID#[id number]. She states she got short of breath with ambulation from the ICU to the floor but that resolved with rest. She denies chest pain, nausea, vomiting, diarrhea, cough. She On exam she is warm and well perfused, laying comfortably on room air, and bilateral groins are without hematoma.

## 2020-05-29 NOTE — PROGRESS NOTE ADULT - SUBJECTIVE AND OBJECTIVE BOX
CTICU  CRITICAL  CARE  attending     Hand off received 					   Pertinent clinical, laboratory, radiographic, hemodynamic, echocardiographic, respiratory data, microbiologic data and chart were reviewed and analyzed frequently throughout the course of the day and night  Patient seen and examined with CTS/ SH attending at bedside  Pt is a 74y , Female, HEALTH ISSUES - PROBLEM Dx:      , FAMILY HISTORY:  PAST MEDICAL & SURGICAL HISTORY:  H/O carotid stenosis  TIA (transient ischemic attack)  Chronic kidney disease (CKD)  Aortic stenosis  HLD (hyperlipidemia)  HTN (hypertension)  H/O carotid endarterectomy    Patient is a 74y old  Female who presents with a chief complaint of Severe AS (28 May 2020 14:39)      14 system review was unremarkable    Vital signs, hemodynamic and respiratory parameters were reviewed from the bedside nursing flowsheet.  ICU Vital Signs Last 24 Hrs  T(C): 36.1 (29 May 2020 04:55), Max: 36.7 (28 May 2020 17:40)  T(F): 97 (29 May 2020 04:55), Max: 98.1 (28 May 2020 21:18)  HR: 99 (29 May 2020 06:00) (56 - 133)  BP: 107/85 (29 May 2020 06:00) (101/69 - 159/74)  BP(mean): 93 (29 May 2020 06:00) (77 - 111)  ABP: --  ABP(mean): --  RR: 18 (29 May 2020 06:00) (16 - 25)  SpO2: 99% (29 May 2020 06:00) (92% - 99%)    Adult Advanced Hemodynamics Last 24 Hrs  CVP(mm Hg): --  CVP(cm H2O): --  CO: --  CI: --  PA: --  PA(mean): --  PCWP: --  SVR: --  SVRI: --  PVR: --  PVRI: --, ABG - ( 28 May 2020 02:54 )  pH, Arterial: 7.48  pH, Blood: x     /  pCO2: 42    /  pO2: 121   / HCO3: 30    / Base Excess: 6.5   /  SaO2: 99                  Intake and output was reviewed and the fluid balance was calculated  Daily     Daily   I&O's Summary    28 May 2020 07:01  -  29 May 2020 07:00  --------------------------------------------------------  IN: 1160 mL / OUT: 1600 mL / NET: -440 mL        All lines and drain sites were assessed  [facility]      POCT Blood Glucose.: 91 mg/dL (29 May 2020 05:33)    No acute change in mental status  Auscultation of the chest reveals equal bs  Abdomen is soft  Extremities are warm and well perfused  Wounds appear clean and unremarkable  Antibiotics are periop    labs  CBC Full  -  ( 29 May 2020 05:36 )  WBC Count : 7.45 K/uL  RBC Count : 3.57 M/uL  Hemoglobin : 10.6 g/dL  Hematocrit : 34.7 %  Platelet Count - Automated : 189 K/uL  Mean Cell Volume : 97.2 fl  Mean Cell Hemoglobin : 29.7 pg  Mean Cell Hemoglobin Concentration : 30.5 gm/dL  Auto Neutrophil # : x  Auto Lymphocyte # : x  Auto Monocyte # : x  Auto Eosinophil # : x  Auto Basophil # : x  Auto Neutrophil % : x  Auto Lymphocyte % : x  Auto Monocyte % : x  Auto Eosinophil % : x  Auto Basophil % : x    05-29    138  |  97  |  28<H>  ----------------------------<  93  4.6   |  29  |  1.46<H>    Ca    9.3      29 May 2020 05:36  Phos  4.3     05-29  Mg     2.3     05-29    TPro  7.1  /  Alb  3.8  /  TBili  0.8  /  DBili  x   /  AST  22  /  ALT  <5<L>  /  AlkPhos  47  05-29    PT/INR - ( 29 May 2020 05:36 )   PT: 15.1 sec;   INR: 1.31          PTT - ( 29 May 2020 05:36 )  PTT:25.5 sec  The current medications were reviewed   MEDICATIONS  (STANDING):  apixaban 2.5 milliGRAM(s) Oral every 12 hours  aspirin enteric coated 81 milliGRAM(s) Oral daily  atorvastatin 40 milliGRAM(s) Oral at bedtime  chlorhexidine 4% Liquid 1 Application(s) Topical <User Schedule>  dextrose 5%. 1000 milliLiter(s) (50 mL/Hr) IV Continuous <Continuous>  dextrose 50% Injectable 12.5 Gram(s) IV Push once  dextrose 50% Injectable 25 Gram(s) IV Push once  dextrose 50% Injectable 25 Gram(s) IV Push once  diltiazem Injectable 10 milliGRAM(s) IV Push once  furosemide   Injectable 20 milliGRAM(s) IV Push every 12 hours  insulin lispro (HumaLOG) corrective regimen sliding scale   SubCutaneous Before meals and at bedtime  metoprolol tartrate 12.5 milliGRAM(s) Oral every 12 hours  pantoprazole    Tablet 40 milliGRAM(s) Oral before breakfast  polyethylene glycol 3350 17 Gram(s) Oral daily  potassium chloride    Tablet ER 10 milliEquivalent(s) Oral every 12 hours  senna 2 Tablet(s) Oral at bedtime  sodium chloride 0.9%. 1000 milliLiter(s) (10 mL/Hr) IV Continuous <Continuous>    MEDICATIONS  (PRN):  acetaminophen   Tablet .. 650 milliGRAM(s) Oral every 6 hours PRN Mild Pain (1 - 3)  dextrose 40% Gel 15 Gram(s) Oral once PRN Blood Glucose LESS THAN 70 milliGRAM(s)/deciliter  glucagon  Injectable 1 milliGRAM(s) IntraMuscular once PRN Glucose LESS THAN 70 milligrams/deciliter       PROBLEM LIST/ ASSESSMENT:  HEALTH ISSUES - PROBLEM Dx:      ,   Patient is a 74y old  Female who presents with a chief complaint of Severe AS (28 May 2020 14:39)     s/p tavr                My plan includes :  close hemodynamic, ventilatory and drain monitoring and management per post op routine    Monitor for arrhythmias and monitor parameters for organ perfusion  beta blockade not administered due to hemodynamic instability and bradycardia  monitor neurologic status  Head of the bed should remain elevated to 45 deg .   chest PT and IS will be encouraged  monitor adequacy of oxygenation and ventilation and attempt to wean oxygen  antibiotic regimen will be tailored to the clinical, laboratory and microbiologic data  Nutritional goals will be met using po eventually , ensure adequate caloric intake and montior the same  Stress ulcer and VTE prophylaxis will be achieved    Glycemic control is satisfactory  Electrolytes have been repleted as necessary and wound care has been carried out. Pain control has been achieved.   agressive physical therapy and early mobility and ambulation goals will be met   The family was updated about the course and plan  CRITICAL CARE TIME personally provided by me  in evaluation and management, reassessments, review and interpretation of labs and x-rays, ventilator and hemodynamic management, formulating a plan and coordinating care: ___90____ MIN.  Time does not include procedural time. Time spent was non routine post-operarive caRE and included multiple and repeated evaluations at the bedside  CTICU ATTENDING     					    Kris Grimm MD

## 2020-05-30 NOTE — DISCHARGE NOTE PROVIDER - CARE PROVIDERS DIRECT ADDRESSES
,DirectAddress_Unknown,DirectAddress_Unknown,[email or web address]

## 2020-05-30 NOTE — PROGRESS NOTE ADULT - SUBJECTIVE AND OBJECTIVE BOX
CTICU  CRITICAL  CARE  attending     Hand off received 					   Pertinent clinical, laboratory, radiographic, hemodynamic, echocardiographic, respiratory data, microbiologic data and chart were reviewed and analyzed frequently throughout the course of the day and night  Patient seen and examined with CTS/ SH attending at bedside  Pt is a 74y , Female, HEALTH ISSUES - PROBLEM Dx:      , FAMILY HISTORY:  PAST MEDICAL & SURGICAL HISTORY:  H/O carotid stenosis  TIA (transient ischemic attack)  Chronic kidney disease (CKD)  Aortic stenosis  HLD (hyperlipidemia)  HTN (hypertension)  H/O carotid endarterectomy    Patient is a 74y old  Female who presents with a chief complaint of Severe AS (30 May 2020 15:32)      14 system review was unremarkable    Vital signs, hemodynamic and respiratory parameters were reviewed from the bedside nursing flowsheet.  ICU Vital Signs Last 24 Hrs  T(C): 36.2 (30 May 2020 17:02), Max: 36.3 (30 May 2020 05:24)  T(F): 97.2 (30 May 2020 17:02), Max: 97.4 (30 May 2020 05:24)  HR: 151 (30 May 2020 21:00) (94 - 151)  BP: 126/72 (30 May 2020 21:00) (98/62 - 149/69)  BP(mean): 94 (30 May 2020 21:00) (75 - 110)  ABP: --  ABP(mean): --  RR: 20 (30 May 2020 21:00) (16 - 20)  SpO2: 98% (30 May 2020 21:00) (92% - 98%)    Adult Advanced Hemodynamics Last 24 Hrs  CVP(mm Hg): --  CVP(cm H2O): --  CO: --  CI: --  PA: --  PA(mean): --  PCWP: --  SVR: --  SVRI: --  PVR: --  PVRI: --,     Intake and output was reviewed and the fluid balance was calculated  Daily     Daily Weight in k.3 (30 May 2020 06:00)  I&O's Summary    29 May 2020 07:01  -  30 May 2020 07:00  --------------------------------------------------------  IN: 570 mL / OUT: 1250 mL / NET: -680 mL    30 May 2020 07:01  -  30 May 2020 21:27  --------------------------------------------------------  IN: 0 mL / OUT: 500 mL / NET: -500 mL        All lines and drain sites were assessed  [facility]      POCT Blood Glucose.: 92 mg/dL (30 May 2020 12:04)    No acute change in mental status  Auscultation of the chest reveals equal bs  Abdomen is soft  Extremities are warm and well perfused  Wounds appear clean and unremarkable  Antibiotics are periop    labs  CBC Full  -  ( 30 May 2020 07:51 )  WBC Count : 6.58 K/uL  RBC Count : 3.86 M/uL  Hemoglobin : 11.9 g/dL  Hematocrit : 38.0 %  Platelet Count - Automated : 224 K/uL  Mean Cell Volume : 98.4 fl  Mean Cell Hemoglobin : 30.8 pg  Mean Cell Hemoglobin Concentration : 31.3 gm/dL  Auto Neutrophil # : x  Auto Lymphocyte # : x  Auto Monocyte # : x  Auto Eosinophil # : x  Auto Basophil # : x  Auto Neutrophil % : x  Auto Lymphocyte % : x  Auto Monocyte % : x  Auto Eosinophil % : x  Auto Basophil % : x    05-30    138  |  96  |  33<H>  ----------------------------<  108<H>  4.7   |  26  |  1.65<H>    Ca    9.6      30 May 2020 07:51  Phos  4.3     05-  Mg     2.2     05-30    TPro  7.1  /  Alb  3.8  /  TBili  0.8  /  DBili  x   /  AST  22  /  ALT  <5<L>  /  AlkPhos  47  05-29    PT/INR - ( 29 May 2020 05:36 )   PT: 15.1 sec;   INR: 1.31          PTT - ( 29 May 2020 05:36 )  PTT:25.5 sec  The current medications were reviewed   MEDICATIONS  (STANDING):  apixaban 2.5 milliGRAM(s) Oral every 12 hours  aspirin enteric coated 81 milliGRAM(s) Oral daily  atorvastatin 40 milliGRAM(s) Oral at bedtime  chlorhexidine 4% Liquid 1 Application(s) Topical <User Schedule>  diltiazem    Tablet 30 milliGRAM(s) Oral every 6 hours  furosemide   Injectable 20 milliGRAM(s) IV Push every 12 hours  metoprolol tartrate 12.5 milliGRAM(s) Oral every 6 hours  pantoprazole    Tablet 40 milliGRAM(s) Oral before breakfast  polyethylene glycol 3350 17 Gram(s) Oral daily  senna 2 Tablet(s) Oral at bedtime  sodium chloride 0.9% lock flush 3 milliLiter(s) IV Push every 8 hours    MEDICATIONS  (PRN):  acetaminophen   Tablet .. 650 milliGRAM(s) Oral every 6 hours PRN Mild Pain (1 - 3)  magnesium hydroxide Suspension 30 milliLiter(s) Oral daily PRN Constipation       PROBLEM LIST/ ASSESSMENT:  HEALTH ISSUES - PROBLEM Dx:      ,   Patient is a 74y old  Female who presents with a chief complaint of Severe AS (30 May 2020 15:32)     s/p cardiac surgery tavr                My plan includes :  close hemodynamic, ventilatory and drain monitoring and management per post op routine    Monitor for arrhythmias and monitor parameters for organ perfusion  beta blockade not administered due to hemodynamic instability and bradycardia  monitor neurologic status  Head of the bed should remain elevated to 45 deg .   chest PT and IS will be encouraged  monitor adequacy of oxygenation and ventilation and attempt to wean oxygen  antibiotic regimen will be tailored to the clinical, laboratory and microbiologic data  Nutritional goals will be met using po eventually , ensure adequate caloric intake and montior the same  Stress ulcer and VTE prophylaxis will be achieved    Glycemic control is satisfactory  Electrolytes have been repleted as necessary and wound care has been carried out. Pain control has been achieved.   agressive physical therapy and early mobility and ambulation goals will be met   The family was updated about the course and plan  CRITICAL CARE TIME personally provided by me  in evaluation and management, reassessments, review and interpretation of labs and x-rays, ventilator and hemodynamic management, formulating a plan and coordinating care: ___90____ MIN.  Time does not include procedural time. Time spent was non routine post-operarive caRE and included multiple and repeated evaluations at the bedside  CTICU ATTENDING     					    Kris Grimm MD

## 2020-05-30 NOTE — DISCHARGE NOTE PROVIDER - INSTRUCTIONS
DASH Diet:  1. Grains: 6-8 servings per day. Examples: Whole grains/pasta, brown rice.  2. Fruits and Vegetables: 4-5 servings per day each.  3. Dairy: 2-3 servings per day. Examples: Low-fat or fat free rogurt, low-fat or skim milk or cheeses.   4. Lean Meat (Fish & Poultry): No more than 6oz. in a day. Avoid Red Meat.   5. Nuts/seeds: 4-5 servings per WEEK. Examples: Almonds, sunflower seeds, lentils. Avoid salted nuts.   6. Fats/oils: 2-3 servings per day. Examples: Olive oil, fat-free low-sodium spreads. Avoid fried foods, lard or butter.  7. Sweets: Sparingly, less than 2-3 servings per week.    For More Information, visit: [email or web address].com/nutrition/[email or web address]-diet

## 2020-05-30 NOTE — DISCHARGE NOTE PROVIDER - HOSPITAL COURSE
This is a 74 y.o female with a PMHx of morbid obesity, HTN, HLD, GERD, TIA with carotid artery disease s/p left carotid endartectomy, CKD (baseline Cr 1.45) and chronic diastolic heart failure with severe aortic stenosis. Pt initially r/i for NSTEMI at OSH after complaints of CP and SOB for several days. Patient follows with Dr. Dubois as an outpatient. Pt transferred to [facility]. On arrival, pt noted to be in AF with RVR (rate 140s) and tachypenic and was placed on BiPAP with lopressor, amio and lasix gtts. ECHO showing EF 40% and moderate MR, severe AS and lateral wall hypokinesis. Pt deemed not a good surgical candidate and determined to be a good TAVR candidate. underwent an uncomplicated TAVR (Edward 23 mm), EF 40% on 5/26/20 with Dr. Dubois. On POD#1 pt with mild ectopy and started on low-dose BB. Patient remained stable the rest of her post-op course and was originally planned for discharge yesterday but went into atrial fibrillation with RVR again so decision was made to stay overnight for observation. Pt started on Cardizem in addition to BB. Today on POD#4 pt remains in AF but is rate controlled (low 100s). Moved her bowels today after 5 days of no BM. Being held for observation after AF w/ RVR early this morning ______________ POD#5_______    *incomplete* This is a 74 y.o female with history of morbid obesity, HTN, HLD, GERD, TIA with carotid artery disease s/p left carotid endartectomy, CKD (baseline Cr 1.45) and chronic diastolic heart failure with severe aortic stenosis who initially presented to OSH with acute onset weakness and chest pain, ruled in for NSTEMI at that time. She was transferred to [facility] on 5/20/20 under the care of Dr. Dubois, found to be in Afib with RVR on arrival, with HR 140s, tachypnea and acute hypoxic respiratory failure improved with BiPAP and diuresis.  She underwent repeat ECHO which demonstrated EF 40%, moderate MR, severe AS with lateral wall hypokinesis.  On 5/21/20, she underwent cardiac cath which 2vCAD (LCx 50% and pLAD 70%), medications optimized.  She completed pre-procedure evaluation which incidentally found a 1.7cm brain lesion for which neurology was consulted, diagnosed as ossified meningioma with recommendation for outpatient follow-up.  She was deemed a candidate for TAVR and on 5/26/20, and underwent uncomplicated TF TAVR (Parada Adelaida 23mm).  She arrived to CTICU in stable condition.  TVP removed in PM.  POD 1, Afib with RVR, HD stable.  POD 2, stable, ambulating. Afib with RVR, Cardizem started in addition to Metoprolol, rate controlled afterward.  Transferred to floor care.  She remained stable in rate controlled AFib on POD 3-4.  On 6/1/20, POD 5, she was evaluated in AM by Dr. Dubois and medically cleared for discharge to home with plan for outpatient follow-up.  Of note, on day of discharge, patient was noted to have a slight uptrend in Cr, 1.57, from baseline 1.2.  She maintains adequate UOP and stable hemodynamics, presumed 2/2 overdiuresis.  She will be discharged home and outpatient lab draw for repeat BMP on 6/3/20 arranged, to be followed-up by outpatient office. Over 35 minutes was spent with the patient reviewing the discharge material including medications, follow up appointments, recovery, concerning symptoms, and how to contact their health care providers if they have questions.

## 2020-05-30 NOTE — PROGRESS NOTE ADULT - ASSESSMENT
This is a 74 y.o female with a PMHx of morbid obesity, HTN, HLD, GERD, TIA with carotid artery disease s/p left carotid endartectomy, CKD (baseline Cr 1.45) and chronic diastolic heart failure with severe aortic stenosis. Pt initially r/i for NSTEMI at OSH after complaints of CP and SOB for several days. Patient follows with Dr. Dubois as an outpatient. Pt transferred to [facility]. On arrival, pt noted to be in AF with RVR (rate 140s) and tachypenic and was placed on BiPAP with lopressor, amio and lasix gtts. ECHO showing EF 40% and moderate MR, severe AS and lateral wall hypokinesis. Pt deemed not a good surgical candidate and determined to be a good TAVR candidate. underwent an uncomplicated TAVR (Edward 23 mm), EF 40% on 5/26/20 with Dr. Dubois. On POD#1 pt with mild ectopy and started on low-dose BB. Patient remained stable the rest of her post-op course and was originally planned for discharge yesterday but went into atrial fibrillation with RVR again so decision was made to stay overnight for observation. Pt started on Cardizem in addition to BB. Today on POD#4 pt remains in AF but is rate controlled (low 100s). She states she generally doesn't feel well and hasn't moved her bowels yet.     Plan:    Neurovascular:   -Pain well controlled with current regimen. PRN's: acetaminophen     Cardiovascular: POD#4 from TAVER (Adelaida valve)   -Tachycardic (low 100s) in AF. Remains rate controlled at this time (episode of AF with RVR yesterday) -- Remains on Cardizem 30mg Q6 hours and Metoprolol tartrate 12.5mg Q8 hours (increased from Q12 hours today). (Ok with this for now per Dr. Grimm, possibly getting rid of one agent and optimizing the other this afternoon.)  -Monitor: BP, HR, tele  -No post-operative conduction abnormalities. Get daily EKGs to monitor for changes.   -Patient has MCOT in place already (she was planned for discharge yesterday intially)     Respiratory:   -Patient on 2lpm via NC, sating low 80s without the NC and low 90s with.   -Encourage continued use of IS 10x/hr and frequent ambulation  -CXR: small left sided effusion. No acute pathology. Encouraging patient to breath and walk.     GI:  -GI PPX: pantoprazole   -PO Diet: DASH/TLC   -Bowel Regimen: senna, miralax, gave magnesium citrate today and prune juice   -Constipation: (NO BM IN 5 DAYS). F/u when has BM. Pt should dulcolax suppository if no BM in next few hours. Passing gas but is "infrequent" per patient.     Renal / :  -Continue to monitor renal function: BUN/Cr 33/1.65 (small rise in Cr, up from 1.46 yesterday), monitor daily. Encourage oral intake (poor oral intake)   -Monitor I/O's daily     Endocrine:    -No hx of DM or thyroid dx  -A1c: 5.7   -TSH: 0.54     Hematologic: Stable  -CBC: H/H- 11/38    ID:  -Temperature: afebrile   -CBC: WBC- 6   -Continue to observe for SIRS/Sepsis Syndrome.    Prophylaxis:  -DVT prophylaxis with 5000 SubQ Heparin q8h.  -Continue with SCD's b/l while patient is at rest     Disposition:  -Discharge home once patient is medically ready  -Remains for observation due to AF with RVR & has not had BM yet This is a 74 y.o female with a PMHx of morbid obesity, HTN, HLD, GERD, TIA with carotid artery disease s/p left carotid endartectomy, CKD (baseline Cr 1.45) and chronic diastolic heart failure with severe aortic stenosis. Pt initially r/i for NSTEMI at OSH after complaints of CP and SOB for several days. Patient follows with Dr. Dubois as an outpatient. Pt transferred to [facility]. On arrival, pt noted to be in AF with RVR (rate 140s) and tachypenic and was placed on BiPAP with lopressor, amio and lasix gtts. ECHO showing EF 40% and moderate MR, severe AS and lateral wall hypokinesis. Pt deemed not a good surgical candidate and determined to be a good TAVR candidate. underwent an uncomplicated TAVR (Edward 23 mm), EF 40% on 5/26/20 with Dr. Dubois. On POD#1 pt with mild ectopy and started on low-dose BB. Patient remained stable the rest of her post-op course and was originally planned for discharge yesterday but went into atrial fibrillation with RVR again so decision was made to stay overnight for observation. Pt started on Cardizem in addition to BB. Today on POD#4 pt remains in AF but is rate controlled (low 100s). She states she generally doesn't feel well and hasn't moved her bowels yet.     Plan:    Neurovascular:   -Pain well controlled with current regimen. PRN's: acetaminophen     Cardiovascular: POD#4 from TAVER (Adelaida valve)   -Tachycardic (low 100s) in AF. Remains rate controlled at this time (episode of AF with RVR yesterday) -- Remains on Cardizem 30mg Q6 hours, Metoprolol tartrate 12.5mg Q8 hours, and Eliquis 2.5mg BID (increased from Q12 hours today). (Ok with this for now per Dr. Grimm, possibly getting rid of one agent and optimizing the other this afternoon.)  -Monitor: BP, HR, tele  -No post-operative conduction abnormalities. Get daily EKGs to monitor for changes.   -Patient has MCOT in place already (she was planned for discharge yesterday intially)     Respiratory:   -Patient on 2lpm via NC, sating low 80s without the NC and low 90s with.   -Encourage continued use of IS 10x/hr and frequent ambulation  -CXR: small left sided effusion. No acute pathology. Encouraging patient to breath and walk.     GI:  -GI PPX: pantoprazole   -PO Diet: DASH/TLC   -Bowel Regimen: senna, miralax, gave magnesium citrate today and prune juice   -Constipation: (NO BM IN 5 DAYS). F/u when has BM. Pt should dulcolax suppository if no BM in next few hours. Passing gas but is "infrequent" per patient.     Renal / :  -Continue to monitor renal function: BUN/Cr 33/1.65 (small rise in Cr, up from 1.46 yesterday), monitor daily. Encourage oral intake (poor oral intake)   -Monitor I/O's daily     Endocrine:    -No hx of DM or thyroid dx  -A1c: 5.7   -TSH: 0.54     Hematologic: Stable  -CBC: H/H- 11/38    ID:  -Temperature: afebrile   -CBC: WBC- 6   -Continue to observe for SIRS/Sepsis Syndrome.    Prophylaxis:  -DVT prophylaxis with 5000 SubQ Heparin q8h.  -Continue with SCD's b/l while patient is at rest     Disposition:  -Discharge home once patient is medically ready  -Remains for observation due to AF with RVR & has not had BM yet This is a 74 y.o female with a PMHx of morbid obesity, HTN, HLD, GERD, TIA with carotid artery disease s/p left carotid endartectomy, CKD (baseline Cr 1.45) and chronic diastolic heart failure with severe aortic stenosis. Pt initially r/i for NSTEMI at OSH after complaints of CP and SOB for several days. Patient follows with Dr. Dubois as an outpatient. Pt transferred to [facility]. On arrival, pt noted to be in AF with RVR (rate 140s) and tachypenic and was placed on BiPAP with lopressor, amio and lasix gtts. ECHO showing EF 40% and moderate MR, severe AS and lateral wall hypokinesis. Pt deemed not a good surgical candidate and determined to be a good TAVR candidate. underwent an uncomplicated TAVR (Edward 23 mm), EF 40% on 5/26/20 with Dr. Dubois. On POD#1 pt with mild ectopy and started on low-dose BB. Patient remained stable the rest of her post-op course and was originally planned for discharge yesterday but went into atrial fibrillation with RVR again so decision was made to stay overnight for observation. Pt started on Cardizem in addition to BB. Today on POD#4 pt remains in AF but is rate controlled (low 100s). She states she generally doesn't feel well and hasn't moved her bowels yet.     Plan:  Neurovascular:   -Pain well controlled with current regimen. PRN's: acetaminophen     Cardiovascular: POD#4 from TAVER (Adelaida valve)   -Tachycardic (low 100s) in AF. Remains rate controlled at this time (episode of AF with RVR yesterday) -- Remains on Cardizem 30mg Q6 hours, Metoprolol tartrate 12.5mg Q8 hours, and Eliquis 2.5mg BID (increased from Q12 hours today). (Ok with this for now per Dr. Grimm, possibly getting rid of one agent and optimizing the other this afternoon.)  -Monitor: BP, HR, tele  -No post-operative conduction abnormalities. Get daily EKGs to monitor for changes.   -Patient has MCOT in place already (she was planned for discharge yesterday intially)     Respiratory:   -Patient on 2lpm via NC, sating low 80s without the NC and low 90s with.   -Encourage continued use of IS 10x/hr and frequent ambulation  -CXR: small left sided effusion. No acute pathology. Encouraging patient to breath and walk.     GI:  -GI PPX: pantoprazole   -PO Diet: DASH/TLC   -Bowel Regimen: senna, miralax, gave magnesium citrate today and prune juice   -Constipation: (NO BM IN 5 DAYS). F/u when has BM. Pt should dulcolax suppository if no BM in next few hours. Passing gas but is "infrequent" per patient.     Renal / :  -Continue to monitor renal function: BUN/Cr 33/1.65 (small rise in Cr, up from 1.46 yesterday), monitor daily. Encourage oral intake (poor oral intake)   -Monitor I/O's daily     Endocrine:    -No hx of DM or thyroid dx  -A1c: 5.7   -TSH: 0.54     Hematologic: Stable  -CBC: H/H- 11/38    ID:  -Temperature: afebrile   -CBC: WBC- 6   -Continue to observe for SIRS/Sepsis Syndrome.    Prophylaxis:  -DVT prophylaxis with SCDs and eliquis   -Continue with SCD's b/l while patient is at rest     Disposition:  -Discharge home once patient is medically ready  -Remains for observation due to AF with RVR & has not had BM yet

## 2020-05-30 NOTE — DISCHARGE NOTE PROVIDER - NSDCMRMEDTOKEN_GEN_ALL_CORE_FT
Lipitor 10 mg oral tablet: 1 tab(s) orally once a day  Lopressor 100 mg oral tablet: 1 tab(s) orally once a day  losartan-hydrochlorothiazide 100mg-12.5mg oral tablet: 1 tab(s) orally once a day  nisoldipine 10 mg oral tablet, extended release: 1 tab(s) orally once a day  omeprazole 40 mg oral delayed release capsule: 1 cap(s) orally once a day  Plavix 75 mg oral tablet: 1 tab(s) orally once a day  torsemide 10 mg oral tablet: 1 tab(s) orally once a day Home Lab Draw: [facility] on 6/3/20.  Please fax to Dr. Dubois at [phone].  Phone [phone]: Home Lab Draw: [facility] on 6/3/20.  Please fax to Dr. Dubois at [phone].  Phone [phone]  Lipitor 10 mg oral tablet: 1 tab(s) orally once a day  Lopressor 100 mg oral tablet: 1 tab(s) orally once a day  losartan-hydrochlorothiazide 100mg-12.5mg oral tablet: 1 tab(s) orally once a day  nisoldipine 10 mg oral tablet, extended release: 1 tab(s) orally once a day  omeprazole 40 mg oral delayed release capsule: 1 cap(s) orally once a day  Plavix 75 mg oral tablet: 1 tab(s) orally once a day  torsemide 10 mg oral tablet: 1 tab(s) orally once a day acetaminophen 325 mg oral tablet: 2 tab(s) orally every 6 hours, As needed, Mild Pain (1 - 3) MDD:8 tabs  apixaban 2.5 mg oral tablet: 1 tab(s) orally every 12 hours  aspirin 81 mg oral delayed release tablet: 1 tab(s) orally once a day  atorvastatin 40 mg oral tablet: 1 tab(s) orally once a day (at bedtime)  dilTIAZem 120 mg/24 hours oral capsule, extended release: 1 cap(s) orally once a day  metoprolol tartrate 25 mg oral tablet: 1 tab(s) orally every 12 hours  omeprazole 40 mg oral delayed release capsule: 1 cap(s) orally once a day  senna oral tablet: 2 tab(s) orally once a day (at bedtime)

## 2020-05-30 NOTE — DISCHARGE NOTE PROVIDER - PROVIDER TOKENS
PROVIDER:[TOKEN:[[id number]],SCHEDULEDAPPT:[06/08/2020],SCHEDULEDAPPTTIME:[09:00 AM]],PROVIDER:[TOKEN:[[id number]],SCHEDULEDAPPT:[06/15/2020],SCHEDULEDAPPTTIME:[08:40 AM]],PROVIDER:[TOKEN:[[id number]],SCHEDULEDAPPT:[06/02/2020],SCHEDULEDAPPTTIME:[03:00 PM]]

## 2020-05-30 NOTE — PROGRESS NOTE ADULT - SUBJECTIVE AND OBJECTIVE BOX
Patient discussed on morning rounds with Dr. Pizarro     Operation / Date:  5/26/20 - TAVR (Adelaida-Edward 23mm), EF 40%     SUBJECTIVE ASSESSMENT:  ID: (K[place] ) [id number]  Patient is feeling ok this morning. She doesn't have any specific complaints be states that she isn't "feeling well" overall. Appetite and oral fluid intake is poor. Denies any CP, palpitations, SOB, wheezing, abd pain, n/v/d/c, fevers or chills.    Vital Signs Last 24 Hrs  T(C): 36.3 (30 May 2020 09:00), Max: 36.4 (29 May 2020 21:22)  T(F): 97.3 (30 May 2020 09:00), Max: 97.6 (29 May 2020 21:22)  HR: 109 (30 May 2020 12:17) (90 - 140)  BP: 149/69 (30 May 2020 12:17) (98/59 - 149/69)  BP(mean): 89 (30 May 2020 12:17) (74 - 110)  RR: 16 (30 May 2020 12:17) (16 - 20)  SpO2: 96% (30 May 2020 12:17) (91% - 100%)  I&O's Detail    29 May 2020 07:01  -  30 May 2020 07:00  --------------------------------------------------------  IN:    Albumin 5%  - 250 mL: 250 mL    Oral Fluid: 320 mL  Total IN: 570 mL    OUT:    Voided: 1250 mL  Total OUT: 1250 mL    Total NET: -680 mL    CHEST TUBE:  no  JUAN DANIEL DRAIN:  no  EPICARDIAL WIRES: no  TIE DOWNS: no  KOO: no    PHYSICAL EXAM:  General: sitting in chair, somnolent no acute distress   Neurological: AOx3. Motor skills grossly intact  Cardiovascular: Normal S1/S2. Regular rhythm, tachycardic. No murmurs  Respiratory: Lungs CTA bilaterally. No wheezing or rales  Gastrointestinal: +BS heard but diminished and infrequent. +mild distention. Soft. Non-tender  Extremities: Strength 5/5 b/l upper/lower extremities. Sensation grossly intact upper/lower extremities. 2+ LE edema. No calf tenderness.  Vascular: Radial 2+bilaterally, DP 2+ b/l  Incision Sites: Groin incision healing well no erythema, purulence or ecchymosis.       LABS:                        11.9   6.58  )-----------( 224      ( 30 May 2020 07:51 )             38.0       COUMADIN:  No.     PT/INR - ( 29 May 2020 05:36 )   PT: 15.1 sec;   INR: 1.31     PTT - ( 29 May 2020 05:36 )  PTT:25.5 sec    05-30    138  |  96  |  33<H>  ----------------------------<  108<H>  4.7   |  26  |  1.65<H>    Ca    9.6      30 May 2020 07:51  Phos  4.3     05-29  Mg     2.2     05-30    TPro  7.1  /  Alb  3.8  /  TBili  0.8  /  DBili  x   /  AST  22  /  ALT  <5<L>  /  AlkPhos  47  05-29          MEDICATIONS  (STANDING):  apixaban 2.5 milliGRAM(s) Oral every 12 hours  aspirin enteric coated 81 milliGRAM(s) Oral daily  atorvastatin 40 milliGRAM(s) Oral at bedtime  chlorhexidine 4% Liquid 1 Application(s) Topical <User Schedule>  diltiazem    Tablet 30 milliGRAM(s) Oral every 6 hours  furosemide   Injectable 20 milliGRAM(s) IV Push every 12 hours  insulin lispro (HumaLOG) corrective regimen sliding scale   SubCutaneous Before meals and at bedtime  metoprolol tartrate 12.5 milliGRAM(s) Oral every 8 hours  pantoprazole    Tablet 40 milliGRAM(s) Oral before breakfast  polyethylene glycol 3350 17 Gram(s) Oral daily  potassium chloride    Tablet ER 10 milliEquivalent(s) Oral every 12 hours  senna 2 Tablet(s) Oral at bedtime  sodium chloride 0.9% lock flush 3 milliLiter(s) IV Push every 8 hours    MEDICATIONS  (PRN):  acetaminophen   Tablet .. 650 milliGRAM(s) Oral every 6 hours PRN Mild Pain (1 - 3)  dextrose 40% Gel 15 Gram(s) Oral once PRN Blood Glucose LESS THAN 70 milliGRAM(s)/deciliter  glucagon  Injectable 1 milliGRAM(s) IntraMuscular once PRN Glucose LESS THAN 70 milligrams/deciliter  magnesium hydroxide Suspension 30 milliLiter(s) Oral daily PRN Constipation        RADIOLOGY & ADDITIONAL TESTS:  < from: Xray Chest 1 View- PORTABLE-Routine (05.30.20 @ 05:11) >  Impression: Improvement discoid changes  < end of copied text >

## 2020-05-30 NOTE — DISCHARGE NOTE PROVIDER - NSDCFUSCHEDAPPT_GEN_ALL_CORE_FT
GRACE KEY ; 06/02/2020 ; NPP Neuro [address]   SHAHID GRACE ; 06/08/2020 ; NPP CT Surg [address]   GRACE KEY ; 06/15/2020 ; NP HeartVasc [address] [facility] GRACE KEY ; 06/02/2020 ; NPP Neuro [address]   SHAHID GRACE ; 06/08/2020 ; NPP CT Surg [address]   GRACE KEY ; 06/15/2020 ; NP HeartVasc [address] [facility] GRACE KEY ; 06/02/2020 ; NPP Neuro [address]   SHAHID GRACE ; 06/08/2020 ; NPP CT Surg [address]   GRACE KEY ; 06/15/2020 ; NP HeartVasc [address] [facility] GRACE KEY ; 06/02/2020 ; NPP Neuro [address]   SHAHID GRACE ; 06/08/2020 ; NPP CT Surg [address]   GRACE KEY ; 06/15/2020 ; NP HeartVasc [address] [facility] GRACE KEY ; 06/02/2020 ; NPP Neuro [address]   SHAHID GRACE ; 06/08/2020 ; NPP CT Surg [address]   GRACE KEY ; 06/15/2020 ; NP HeartVasc [address] [facility]

## 2020-05-30 NOTE — DISCHARGE NOTE PROVIDER - NSDCHHHOMEBOUNDOTHER_GEN_ALL_CORE_FT
Ensure patient is taking medications appropriately, check groin incisions, ensure able to perform ADLs well

## 2020-05-30 NOTE — DISCHARGE NOTE PROVIDER - CARE PROVIDER_API CALL
Jax Dubois  CARDIOVASCULAR SURGERY  [address] [place] [address]  Phone: [phone]  Fax: [phone]  Scheduled Appointment: 06/08/2020 09:00 AM    Kevan Veliz  Cardiology  [facility] [place] [address]  Phone: [phone]  Fax: [phone]  Scheduled Appointment: 06/15/2020 08:40 AM    Teresa Pisano  NEUROLOGY  [address] [address]  Phone: [phone]  Fax: [phone]  Scheduled Appointment: 06/02/2020 03:00 PM

## 2020-05-30 NOTE — DISCHARGE NOTE PROVIDER - NSDCFUADDAPPT_GEN_ALL_CORE_FT
Some of your outpatient appointments will be through Telehealth where you will be contacted by phone for your follow-up appointment.  Dr. Dubois's office will contact you prior to your appointment for further instructions regarding your appointment.

## 2020-05-30 NOTE — DISCHARGE NOTE PROVIDER - NSDCCPCAREPLAN_GEN_ALL_CORE_FT
PRINCIPAL DISCHARGE DIAGNOSIS  Diagnosis: Aortic stenosis  Assessment and Plan of Treatment: It is very important that you continue your medications as prescribed after you are discharged.  You should refer to the medication reconciliation form provided to you on your discharge to information as to what medications to take and when.    Additionally, your medications have been sent to [facility] at [facility] and can be picked up as you are leaving the hospital.  The pharmacy is located next to the admitting office on the first floor.  If you have any issues obtaining your medications on the day of your discharge, please call [phone] for further assistance.   Additionally, you are being sent home with a wireless monitor that monitors your heart rate and rhythm, called an MCOT device.  This device will send information to Dr. Dubois wirelessly and should be worn at all times, except when charging.    If you have any questions regarding using or maintaining this device, you can call Dr. Dubois's office at [phone].      SECONDARY DISCHARGE DIAGNOSES  Diagnosis: Atrial fibrillation  Assessment and Plan of Treatment: You are being sent home on a blood thinning medication called Eliquis (Apixaban) to reduce the risk of blood clot formation and stroke associated with Atrial Fibrillation.   Because you are on this medication, you are at a higher risk for bleeding.  If you are involved in a car accident, fall and hit your head, notice bright red blood in your stool, notice large amounts of unexplained bruising, or have a cut that won't stop bleeding after holding pressure, please seek emergent medical care at your nearest emergency room and call 9-1-1.    Diagnosis: Meningioma  Assessment and Plan of Treatment: You were noted to have a 1.7cm brain lesion which was evaulated on multiple images while you were in the hospital.  You were also seen by neurology while inpatient and cleared to undergo TAVR as there are no planned procedures or interventions for this lesion.    We have arranged for you to follow-up with Dr. Teresa Pisano (Neurologist) on 6/2/2020.

## 2020-05-31 NOTE — PROGRESS NOTE ADULT - SUBJECTIVE AND OBJECTIVE BOX
Patient discussed on morning rounds with Dr. Pizarro    Operation / Date: 5/26/2020 - TAVR (Adelaida-Edward 23mm), EF 40%     SUBJECTIVE ASSESSMENT:  ID: [id number]  Patient is complaining she is very tired and "doesn't feel well" but doesn't have any way to describe how she is feeling further. She refuses to ambulate despite RN staff attempting to ambulate her throughout the day. Eating/drinking well. Using her IS only occasionally. Denies any CP, palpitations, SOB, wheezing, abd pain, n/v/d/c, fevers or chills.     Vital Signs Last 24 Hrs  T(C): 36.5 (31 May 2020 13:44), Max: 36.5 (31 May 2020 01:01)  T(F): 97.7 (31 May 2020 13:44), Max: 97.7 (31 May 2020 01:01)  HR: 94 (31 May 2020 12:00) (77 - 151)  BP: 112/75 (31 May 2020 12:00) (112/75 - 132/88)  BP(mean): 85 (31 May 2020 12:00) (80 - 106)  RR: 18 (31 May 2020 12:00) (16 - 20)  SpO2: 96% (31 May 2020 12:00) (93% - 98%)  I&O's Detail    30 May 2020 07:01  -  31 May 2020 07:00  --------------------------------------------------------  IN:    Lactated Ringers IV Bolus: 500 mL  Total IN: 500 mL    OUT:    Stool: 2 mL    Voided: 1590 mL  Total OUT: 1592 mL    Total NET: -1092 mL      31 May 2020 07:01  -  31 May 2020 16:38  --------------------------------------------------------  IN:    Lactated Ringers IV Bolus: 500 mL    Oral Fluid: 350 mL  Total IN: 850 mL    OUT:    Stool: 1 mL    Voided: 450 mL  Total OUT: 451 mL    Total NET: 399 mL    CHEST TUBE:  no  JUAN DANIEL DRAIN:  no  EPICARDIAL WIRES: no  TIE DOWNS: no  KOO: no    PHYSICAL EXAM:  General: sitting in chair in NAD   Neurological: AOx3. Motor skills grossly intact  Cardiovascular: Normal S1/S2. Regular rate/rhythm. No murmurs  Respiratory: Lungs CTA bilaterally. No wheezing or rales  Gastrointestinal: +BS in all 4 quadrants. Mildly distended. Soft. Non-tender  Extremities: Strength 5/5 b/l upper/lower extremities. Sensation grossly intact upper/lower extremities. 1+ edema in LE b/l. No calf tenderness.  Vascular: Radial 2+bilaterally, DP 2+ b/l  Incision Sites: Right groin incision with ecchymosis present, no hematoma, no purulence. Healing well.     LABS:                        11.3   7.29  )-----------( 229      ( 31 May 2020 07:00 )             36.3       COUMADIN:  no        05-31    138  |  100  |  35<H>  ----------------------------<  108<H>  4.4   |  27  |  1.81<H>    Ca    9.3      31 May 2020 11:48  Mg     2.3     05-31            MEDICATIONS  (STANDING):  apixaban 2.5 milliGRAM(s) Oral every 12 hours  aspirin enteric coated 81 milliGRAM(s) Oral daily  atorvastatin 40 milliGRAM(s) Oral at bedtime  chlorhexidine 4% Liquid 1 Application(s) Topical <User Schedule>  diltiazem    Tablet 30 milliGRAM(s) Oral every 6 hours  lactated ringers. 1000 milliLiter(s) (50 mL/Hr) IV Continuous <Continuous>  metoprolol tartrate 12.5 milliGRAM(s) Oral every 12 hours  pantoprazole    Tablet 40 milliGRAM(s) Oral before breakfast  polyethylene glycol 3350 17 Gram(s) Oral daily  senna 2 Tablet(s) Oral at bedtime  sodium chloride 0.9% lock flush 3 milliLiter(s) IV Push every 8 hours    MEDICATIONS  (PRN):  acetaminophen   Tablet .. 650 milliGRAM(s) Oral every 6 hours PRN Mild Pain (1 - 3)  magnesium hydroxide Suspension 30 milliLiter(s) Oral daily PRN Constipation        RADIOLOGY & ADDITIONAL TESTS:  < from: Xray Chest 1 View- PORTABLE-Routine (05.31.20 @ 05:54) >  Impression: Bilateral discoid changes  < end of copied text >

## 2020-05-31 NOTE — PROGRESS NOTE ADULT - ASSESSMENT
This is a 73 y/o female with a PMHx of morbid obesity, HTN, HLD, GERD, TIA with carotid artery disease s/p left carotid endartectomy, CKD (baseline Cr 1.45) and chronic diastolic heart failure with severe aortic stenosis. Pt initially r/i for NSTEMI at OSH after complaints of CP and SOB for several days. Patient follows with Dr. Dubois as an outpatient. Pt transferred to [facility]. On arrival, pt noted to be in AF with RVR (rate 140s) and tachypenic and was placed on BiPAP with lopressor, amio and lasix gtts. ECHO showing EF 40% and moderate MR, severe AS and lateral wall hypokinesis. Pt deemed not a good surgical candidate and determined to be a good TAVR candidate. underwent an uncomplicated TAVR (Edward 23 mm), EF 40% on 5/26/20 with Dr. Dubois. On POD#1 pt with mild ectopy and started on low-dose BB. Patient remained stable the rest of her post-op course and was originally planned for discharge yesterday but went into atrial fibrillation with RVR again so decision was made to stay overnight for observation. Pt started on Cardizem in addition to BB. POD#4 pt remains in AF but is rate controlled (low 100s). She states she generally doesn't feel well and moved her bowels in the afternoon. On POD#5, CR trending upwards, lasix d/c'ed and gentle hydration being given. Repeat Cr in morning and is stable/downtrending, pt can be discharged home.     Plan:    Neurovascular:   -Pain well controlled with current regimen. PRN's: acetaminophen     Cardiovascular: POD#5 from TAVER (Adelaida valve)   -Tachycardia resolved most of the day, remains in 80-90s.   -Remains rate-controlled AF at this time (episode of AF with RVR x2 days ago)       -Remains on Cardizem 30mg Q6 hours, last dose at 18:00 tonight, switching to Cardizem ER starting in AM in anticipation of d/c.       -c/w Eliquis 2.5mg BID        -Metoprolol tartrate switched back to 12.5mg Q12 hours (pt wasn't receiving the majority of her doses due to parameters)   -Monitor: BP, HR, tele  -5 beat run of NSVT this afternoon, electrolytes stable, pt remained asymptomatic.   -Patient has MCOT in place already (she was planned for discharge yesterday initally. Charged phone and device today (5/31). Needs activation.     Respiratory:   -Patient oxygenating well on RA   -Encourage continued use of IS 10x/hr and frequent ambulation  -CXR: no acute pathology, no PTX    GI:  -GI PPX: pantoprazole   -PO Diet: DASH/TLC   -Bowel Regimen: senna, miralax, gave magnesium citrate today and prune juice   -Constipation:  pt had BM in afternoon yesterday and again today. c/w senna and miralax     Renal / :  -Continue to monitor renal function: BUN/Cr 35/1.78 (Cr continues to rise throughout the day from yesterday, Lasix has been stopped, and gentle hydration has been started. Trend BMP at 9pm and in AM)   -Monitor I/O's daily     Endocrine:    -No hx of DM or thyroid dx  -A1c: 5.7   -TSH: 0.54     Hematologic: Stable  -CBC: H/H- 11.3/36.3    ID:  -Temperature: afebrile   -CBC: WBC-7  -Continue to observe for SIRS/Sepsis Syndrome.    Prophylaxis:  -DVT prophylaxis with SCDs and eliquis   -Continue with SCD's b/l while patient is at rest     Disposition:  -Discharge home once patient is medically ready- tomorrow (daughter is aware)

## 2020-06-01 NOTE — PROGRESS NOTE ADULT - SUBJECTIVE AND OBJECTIVE BOX
Patient discussed on morning rounds with Dr. Dubois    Operation / Date: TF TAVR (Adelaida) on 5/26/20    SUBJECTIVE ASSESSMENT:  74y Female seen at bedside this AM with Micronesian  ([id number]).  She feels well overall, denies any acute complaints at this time and feels ready to go home.  Denies HA, AMS, CP, palpitations, SOB, cough, hemoptysis, n/v/d, fever.     HOSPITAL COURSE:  This is a 74 y.o female with history of morbid obesity, HTN, HLD, GERD, TIA with carotid artery disease s/p left carotid endartectomy, CKD (baseline Cr 1.45) and chronic diastolic heart failure with severe aortic stenosis who initially presented to OSH with acute onset weakness and chest pain, ruled in for NSTEMI at that time. She was transferred to [facility] on 5/20/20 under the care of Dr. Dubois, found to be in Afib with RVR on arrival, with HR 140s, tachypnea and acute hypoxic respiratory failure improved with BiPAP and diuresis.  She underwent repeat ECHO which demonstrated EF 40%, moderate MR, severe AS with lateral wall hypokinesis.  On 5/21/20, she underwent cardiac cath which 2vCAD (LCx 50% and pLAD 70%), medications optimized.  She completed pre-procedure evaluation which incidentally found a 1.7cm brain lesion for which neurology was consulted, diagnosed as ossified meningioma with recommendation for outpatient follow-up.  She was deemed a candidate for TAVR and on 5/26/20, and underwent uncomplicated TF TAVR (Parada Adelaida 23mm).  She arrived to CTICU in stable condition.  TVP removed in PM.  POD 1, Afib with RVR, HD stable.  POD 2, stable, ambulating. Afib with RVR, Cardizem started in addition to Metoprolol, rate controlled afterward.  Transferred to floor care.  She remained stable in rate controlled AFib on POD 3-4.  On 6/1/20, POD 5, she was evaluated in AM by Dr. Dubois and medically cleared for discharge to home with plan for outpatient follow-up.  Of note, on day of discharge, patient was noted to have a slight uptrend in Cr, 1.57, from baseline 1.2.  She maintains adequate UOP and stable hemodynamics, presumed 2/2 overdiuresis.  She will be discharged home and outpatient lab draw for repeat BMP on 6/3/20 arranged, to be followed-up by outpatient office. Over 35 minutes was spent with the patient reviewing the discharge material including medications, follow up appointments, recovery, concerning symptoms, and how to contact their health care providers if they have questions.     Vital Signs Last 24 Hrs  T(C): 37.1 (01 Jun 2020 09:00), Max: 37.1 (31 May 2020 22:14)  T(F): 98.7 (01 Jun 2020 09:00), Max: 98.7 (31 May 2020 22:14)  HR: 94 (01 Jun 2020 09:00) (77 - 96)  BP: 129/80 (01 Jun 2020 09:00) (100/66 - 129/80)  BP(mean): 99 (01 Jun 2020 09:00) (79 - 99)  RR: 18 (01 Jun 2020 09:00) (18 - 20)  SpO2: 97% (01 Jun 2020 09:00) (94% - 99%)  I&O's Detail    31 May 2020 07:01  -  01 Jun 2020 07:00  --------------------------------------------------------  IN:    Lactated Ringers IV Bolus: 500 mL    lactated ringers.: 100 mL    Oral Fluid: 550 mL  Total IN: 1150 mL    OUT:    Stool: 2 mL    Voided: 2100 mL  Total OUT: 2102 mL    Total NET: -952 mL    Tie down out    PHYSICAL EXAM:  General: OOB to chair, no acute distress.  Neurological: AAOx3, no AMS or focal deficits.   Cardiovascular: Irregular, rate controlled , S1/S2, no m/r/g.   Respiratory: No acute distress on RA.  CTA b/l, no w/r/r.   Gastrointestinal: ND, NBS, non-TTP.   Extremities: Warm and well perfused, no calf ttp or edema b/l.   Vascular: Pulses 2+ throughout  Incision Sites: B/l groin: C/D/I, no hematoma.     LABS:                        10.9   9.03  )-----------( 234      ( 01 Jun 2020 06:33 )             34.8       COUMADIN:  No. - Eliquis for Afib    06-01    138  |  100  |  33<H>  ----------------------------<  100<H>  4.7   |  27  |  1.57<H>    Ca    9.4      01 Jun 2020 06:33  Mg     2.2     06-01      MEDICATIONS  (STANDING):  apixaban 2.5 milliGRAM(s) Oral every 12 hours  aspirin enteric coated 81 milliGRAM(s) Oral daily  atorvastatin 40 milliGRAM(s) Oral at bedtime  chlorhexidine 4% Liquid 1 Application(s) Topical <User Schedule>  diltiazem    milliGRAM(s) Oral daily  lactated ringers. 1000 milliLiter(s) (50 mL/Hr) IV Continuous <Continuous>  metoprolol tartrate 25 milliGRAM(s) Oral every 12 hours  pantoprazole    Tablet 40 milliGRAM(s) Oral before breakfast  polyethylene glycol 3350 17 Gram(s) Oral daily  senna 2 Tablet(s) Oral at bedtime  sodium chloride 0.9% lock flush 3 milliLiter(s) IV Push every 8 hours    MEDICATIONS  (PRN):  acetaminophen   Tablet .. 650 milliGRAM(s) Oral every 6 hours PRN Mild Pain (1 - 3)  magnesium hydroxide Suspension 30 milliLiter(s) Oral daily PRN Constipation      RADIOLOGY & ADDITIONAL TESTS:  < from: Xray Chest 1 View- PORTABLE-Routine (05.31.20 @ 05:54) >    EXAM:  XR CHEST PORTABLE ROUTINE 1V                          PROCEDURE DATE:  05/31/2020          INTERPRETATION:  Clinical History: Postop    Portable examination of the chest demonstrates cardiomegaly. Hardware noted overlying left chest. Bilateral discoid changes Calcification aortic knob.    Impression: Bilateral discoid changes    < end of copied text > Patient discussed on morning rounds with Dr. Dubois    Operation / Date: TF TAVR (Adelaida) on 5/26/20    SUBJECTIVE ASSESSMENT:  74y Female seen at bedside this AM with Tuvaluan  ([id number]).  She feels well overall, denies any acute complaints at this time and feels ready to go home.  Denies HA, AMS, CP, palpitations, SOB, cough, hemoptysis, n/v/d, fever.     HOSPITAL COURSE:  This is a 74 y.o female with history of morbid obesity, HTN, HLD, GERD, TIA with carotid artery disease s/p left carotid endartectomy, CKD (baseline Cr 1.45) and chronic diastolic heart failure with severe aortic stenosis who initially presented to OSH with acute onset weakness and chest pain, ruled in for NSTEMI at that time. She was transferred to [facility] on 5/20/20 under the care of Dr. Dubois, found to be in Afib with RVR on arrival, with HR 140s, tachypnea and acute hypoxic respiratory failure improved with BiPAP and diuresis.  She underwent repeat ECHO which demonstrated EF 40%, moderate MR, severe AS with lateral wall hypokinesis.  On 5/21/20, she underwent cardiac cath which 2vCAD (LCx 50% and pLAD 70%), medications optimized.  She completed pre-procedure evaluation which incidentally found a 1.7cm brain lesion for which neurology was consulted, diagnosed as ossified meningioma with recommendation for outpatient follow-up.  She was deemed a candidate for TAVR and on 5/26/20, and underwent uncomplicated TF TAVR (Parada Adelaida 23mm).  She arrived to CTICU in stable condition.  TVP removed in PM.  POD 1, Afib with RVR, HD stable.  POD 2, stable, ambulating. Afib with RVR, Cardizem started in addition to Metoprolol, rate controlled afterward.  Transferred to floor care.  She remained stable in rate controlled AFib on POD 3-4.  On 6/1/20, POD 5, she was evaluated in AM by Dr. Dubois and medically cleared for discharge to home with plan for outpatient follow-up.  Of note, on day of discharge, patient was noted to have a slight uptrend in Cr, 1.57, from baseline 1.2.  She maintains adequate UOP and stable hemodynamics, presumed 2/2 overdiuresis.  She will be discharged home and outpatient lab draw for repeat BMP on 6/3/20 arranged, to be followed-up by outpatient office. Over 35 minutes was spent with the patient reviewing the discharge material including medications, follow up appointments, recovery, concerning symptoms, and how to contact their health care providers if they have questions.     Vital Signs Last 24 Hrs  T(C): 37.1 (01 Jun 2020 09:00), Max: 37.1 (31 May 2020 22:14)  T(F): 98.7 (01 Jun 2020 09:00), Max: 98.7 (31 May 2020 22:14)  HR: 94 (01 Jun 2020 09:00) (77 - 96)  BP: 129/80 (01 Jun 2020 09:00) (100/66 - 129/80)  BP(mean): 99 (01 Jun 2020 09:00) (79 - 99)  RR: 18 (01 Jun 2020 09:00) (18 - 20)  SpO2: 97% (01 Jun 2020 09:00) (94% - 99%)  I&O's Detail    31 May 2020 07:01  -  01 Jun 2020 07:00  --------------------------------------------------------  IN:    Lactated Ringers IV Bolus: 500 mL    lactated ringers.: 100 mL    Oral Fluid: 550 mL  Total IN: 1150 mL    OUT:    Stool: 2 mL    Voided: 2100 mL  Total OUT: 2102 mL    Total NET: -952 mL    Tie down out    PHYSICAL EXAM:  General: OOB to chair, no acute distress.  Neurological: AAOx3, no AMS or focal deficits.   Cardiovascular: Irregular, rate controlled , S1/S2, no m/r/g.   Respiratory: No acute distress on RA.  CTA b/l, no w/r/r.   Gastrointestinal: ND, NBS, non-TTP.   Extremities: Warm and well perfused, no calf ttp or edema b/l.   Vascular: Pulses 2+ throughout  Incision Sites: B/l groin: C/D/I, no hematoma.     LABS:                        10.9   9.03  )-----------( 234      ( 01 Jun 2020 06:33 )             34.8       COUMADIN:  No. - Eliquis for Afib    06-01    138  |  100  |  33<H>  ----------------------------<  100<H>  4.7   |  27  |  1.57<H>    Ca    9.4      01 Jun 2020 06:33  Mg     2.2     06-01      Discharge Medications	acetaminophen 325 mg oral tablet: 2 tab(s) orally every 6 hours, As needed, Mild Pain (1 - 3) MDD:8 tabs apixaban 2.5 mg oral tablet: 1 tab(s) orally every 12 hours aspirin 81 mg oral delayed release tablet: 1 tab(s) orally once a day atorvastatin 40 mg oral tablet: 1 tab(s) orally once a day (at bedtime) dilTIAZem 120 mg/24 hours oral capsule, extended release: 1 cap(s) orally once a day metoprolol tartrate 25 mg oral tablet: 1 tab(s) orally every 12 hours omeprazole 40 mg oral delayed release capsule: 1 cap(s) orally once a day senna oral tablet: 2 tab(s) orally once a day (at bedtime)	        RADIOLOGY & ADDITIONAL TESTS:  < from: Xray Chest 1 View- PORTABLE-Routine (05.31.20 @ 05:54) >    EXAM:  XR CHEST PORTABLE ROUTINE 1V                          PROCEDURE DATE:  05/31/2020          INTERPRETATION:  Clinical History: Postop    Portable examination of the chest demonstrates cardiomegaly. Hardware noted overlying left chest. Bilateral discoid changes Calcification aortic knob.    Impression: Bilateral discoid changes    < end of copied text >

## 2020-06-01 NOTE — DISCHARGE NOTE NURSING/CASE MANAGEMENT/SOCIAL WORK - PATIENT PORTAL LINK FT
You can access the FollowMyHealth Patient Portal offered by [facility] by registering at the following website: [email or web address]. By joining [facility]’s FollowMyHealth portal, you will also be able to view your health information using other applications (apps) compatible with our system.

## 2020-06-01 NOTE — PROGRESS NOTE ADULT - ASSESSMENT
Discharge Diagnoses, Assessment and Plan of Treatment	PRINCIPAL DISCHARGE DIAGNOSIS Diagnosis: Aortic stenosis Assessment and Plan of Treatment: It is very important that you continue your medications as prescribed after you are discharged.  You should refer to the medication reconciliation form provided to you on your discharge to information as to what medications to take and when.   Additionally, your medications have been sent to [facility] at [facility] and can be picked up as you are leaving the hospital.  The pharmacy is located next to the admitting office on the first floor.  If you have any issues obtaining your medications on the day of your discharge, please call [phone] for further assistance.  Additionally, you are being sent home with a wireless monitor that monitors your heart rate and rhythm, called an MCOT device.  This device will send information to Dr. Dubois wirelessly and should be worn at all times, except when charging.   If you have any questions regarding using or maintaining this device, you can call Dr. Dubois's office at [phone].   SECONDARY DISCHARGE DIAGNOSES Diagnosis: Atrial fibrillation Assessment and Plan of Treatment: You are being sent home on a blood thinning medication called Eliquis (Apixaban) to reduce the risk of blood clot formation and stroke associated with Atrial Fibrillation.  Because you are on this medication, you are at a higher risk for bleeding.  If you are involved in a car accident, fall and hit your head, notice bright red blood in your stool, notice large amounts of unexplained bruising, or have a cut that won't stop bleeding after holding pressure, please seek emergent medical care at your nearest emergency room and call 9-1-1.  Diagnosis: Meningioma Assessment and Plan of Treatment: You were noted to have a 1.7cm brain lesion which was evaulated on multiple images while you were in the hospital.  You were also seen by neurology while inpatient and cleared to undergo TAVR as there are no planned procedures or interventions for this lesion.   We have arranged for you to follow-up with Dr. Teresa Pisano (Neurologist) on 6/2/2020.    	    Care Providers for Follow up (PCP/Outpatient Provider)	[facility] [address] [place] [address] Phone: [phone] Fax: [phone] Scheduled Appointment: 06/08/2020 09:00 AM  [facility] [address] [place] [address] Phone: [phone] Fax: [phone] Scheduled Appointment: 06/15/2020 08:40 AM  [facility] [address] [place] [address] Phone: [phone] Fax: [phone] Scheduled Appointment: 06/02/2020 03:00 PM

## 2020-06-02 NOTE — PHYSICAL THERAPY INITIAL EVALUATION ADULT - PERTINENT HX OF CURRENT PROBLEM, REHAB EVAL
74 year old female recently discharged home from [facility]. Once home patient  went to [facility] complaining of dizziness and weakness, She was transferred to [facility] hemodynamically stable this morning for evaluation.

## 2020-06-02 NOTE — H&P ADULT - HISTORY OF PRESENT ILLNESS
This is a 74 y.o female with history of morbid obesity, HTN, HLD, GERD, TIA with carotid artery disease s/p left carotid endartectomy, CKD (baseline Cr 1.45) and chronic diastolic heart failure with severe aortic stenosis who initially presented to OSH with acute onset weakness and chest pain, ruled in for NSTEMI at that time. She was transferred to [facility] on 5/20/20 under the care of Dr. Dubois, found to be in Afib with RVR on arrival, with HR 140s, tachypnea and acute hypoxic respiratory failure improved with BiPAP and diuresis.  She underwent repeat ECHO which demonstrated EF 40%, moderate MR, severe AS with lateral wall hypokinesis.  On 5/21/20, she underwent cardiac cath which 2vCAD (LCx 50% and pLAD 70%), medications optimized.  She completed pre-procedure evaluation which incidentally found a 1.7cm brain lesion for which neurology was consulted, diagnosed as ossified meningioma with recommendation for outpatient follow-up.  She was deemed a candidate for TAVR and on 5/26/20, and underwent uncomplicated TF TAVR (Parada Adelaida 23mm).  She arrived to CTICU in stable condition.  TVP removed in PM.  POD 1, Afib with RVR, HD stable.  POD 2, stable, ambulating. Afib with RVR, Cardizem started in addition to Metoprolol, rate controlled afterward then she was transferred to floor care.  Post operative developed NESTOR on CKD, with a creatine that peaked to 1.8 on POD 5.  On 6/1/20, POD 6 creatinine started to trend downward and was deemed medically cleared for discharge to home with plan for outpatient follow-up.  Of note, on day of discharge, patient was noted to have a slight uptrend in Cr, 1.57, from baseline 1.2.  She maintains adequate UOP and stable hemodynamics, presumed 2/2 overdiuresis.  She will be discharged home and outpatient lab draw for repeat BMP on 6/3/20 arranged, to be followed-up by outpatient office. This is a 74 y.o female with history of morbid obesity, HTN, HLD, GERD, TIA with carotid artery disease s/p left carotid endartectomy, CKD (baseline Cr 1.45) and chronic diastolic heart failure with severe aortic stenosis who initially presented to OSH with acute onset weakness and chest pain, ruled in for NSTEMI at that time. She was transferred to [facility] on 5/20/20 under the care of Dr. Dubois, found to be in Afib with RVR on arrival, with HR 140s, tachypnea and acute hypoxic respiratory failure improved with BiPAP and diuresis.  She underwent repeat ECHO which demonstrated EF 40%, moderate MR, severe AS with lateral wall hypokinesis.  On 5/21/20, she underwent cardiac cath which 2vCAD (LCx 50% and pLAD 70%), medications optimized.  She completed pre-procedure evaluation which incidentally found a 1.7cm brain lesion for which neurology was consulted, diagnosed as ossified meningioma with recommendation for outpatient follow-up.  She was deemed a candidate for TAVR and on 5/26/20, and underwent uncomplicated TF TAVR (Parada Adelaida 23mm).  She arrived to CTICU in stable condition.  TVP removed in PM.  POD 1, Afib with RVR, HD stable.  POD 2, stable, ambulating. Afib with RVR, Cardizem started in addition to Metoprolol, rate controlled afterward then she was transferred to floor care.  Post operative developed NESTOR on CKD, with a creatine that peaked to 1.8 on POD 5.  On 6/1/20, POD 6 creatinine started to trend downward and she was deemed medically cleared for discharge to home with plan for outpatient follow-up for outpatient lab draw to repeat BMP on 6/3/20. This is a 74 year old female with history of morbid obesity, HTN, HLD, GERD, TIA with carotid artery disease s/p left carotid endarterectomy, CKD (baseline Cr 1.45) and chronic diastolic heart failure with severe aortic stenosis who initially presented to OSH with acute onset weakness and chest pain, ruled in for NSTEMI at that time. She was transferred to [facility] on 5/20/20 under the care of Dr. Dubois, found to be in Afib with RVR on arrival, with HR 140s, tachypnea and acute hypoxic respiratory failure improved with BiPAP and diuresis.  She underwent repeat ECHO which demonstrated EF 40%, moderate MR, severe AS with lateral wall hypokinesis.  On 5/21/20, she underwent cardiac cath which 2vCAD (LCx 50% and pLAD 70%), medications optimized.  She completed pre-procedure evaluation which incidentally found a 1.7cm brain lesion for which neurology was consulted, diagnosed as ossified meningioma with recommendation for outpatient follow-up.  She was deemed a candidate for TAVR and on 5/26/20, and underwent uncomplicated TF TAVR (Parada Adelaida 23mm).  She arrived to CTICU in stable condition.  TVP removed in PM.  POD 1, Afib with RVR, HD stable.  POD 2, stable, ambulating. Afib with RVR, Cardizem started in addition to Metoprolol, rate controlled afterward then she was transferred to floor care.  Post operative developed NESTOR on CKD, with a creatine that peaked to 1.8 on POD 5.  On 6/1/20, POD 6 creatinine started to trend downward and she was deemed medically cleared for discharge to home with plan for outpatient follow-up for outpatient lab draw to repeat BMP on 6/3/20.  Once home patient  went to [facility] complaining of dizziness and weakness,  She was transferred to [facility] hemodynamically stable this morning for evaluation.

## 2020-06-02 NOTE — H&P ADULT - NSHPREVIEWOFSYSTEMS_GEN_ALL_CORE
Review of Systems  CONSTITUTIONAL:  Denies Fevers / chills, sweats, fatigue, weight loss, weight gain                                      NEURO:  Denies parathesias, seizures, syncope, confusion, DIZZINESS                                                                                EYES:  Denies Blurry vision, discharge, pain, loss of vision                                                                                    ENMT:  Denies Difficulty hearing, vertigo, dysphagia, epistaxis, recent dental work                                       CV:  Denies Chest pain, palpitations, MIRZA, orthopnea                                                                                          RESPIRATORY:  Denies Wheezing, SOB, cough / sputum, hemoptysis                                                                GI:  Denies Nausea, vomiting, diarrhea, constipation, melena, difficulty swallowing                                               : Denies Hematuria, dysuria, urgency, incontinence                                                                                         MUSKULOSKELETAL:  Denies arthritis, joint swelling, muscle weakness   /Admits generalized weakness                                                          SKIN/BREAST:  Denies rash, itching, hair loss, masses                                                                                            PSYCH:  Denies depression, anxiety, suicidal ideation                                                                                               HEME/LYMPH:  Denies bruises easily, enlarged lymph nodes, tender lymph nodes                                        ENDOCRINE:  Denies cold intolerance, heat intolerance, polydipsia

## 2020-06-02 NOTE — PHYSICAL THERAPY INITIAL EVALUATION ADULT - ADDITIONAL COMMENTS
Pt states she previously used RW when community ambulating and SC inside home. She has ~6 steps to enter one entrance and none at another. She reports a recent instance with falling/sliding off chair couple days ago, however denies hx of other falls. Pt states she previously used RW when community ambulating and SC inside home. She has ~6 steps to enter one entrance and none at another. She reports a recent instance with falling/sliding off chair couple days ago, however denies hx of other falls. Pt is [place] speaking with  used.

## 2020-06-02 NOTE — PHYSICAL THERAPY INITIAL EVALUATION ADULT - GAIT DEVIATIONS NOTED, PT EVAL
decreased step length/decreased weight-shifting ability/decreased homar/increased time in double stance/decreased velocity of limb motion/decreased stride length

## 2020-06-02 NOTE — H&P ADULT - NSHPSOCIALHISTORY_GEN_ALL_CORE
Social History  Smoker:  NO         ETOH Use:   NO     Ilicit Drug Use:   NO  Occupation:  Assistant Devices:   [employer]   Lives with: daughter

## 2020-06-02 NOTE — H&P ADULT - ASSESSMENT
74 year old female with history of morbid obesity, HTN, HLD, GERD, TIA with carotid artery disease s/p left carotid endarterectomy, CKD (baseline Cr 1.45) and chronic diastolic heart failure with severe aortic stenosis who initially presented to OSH with acute onset weakness and chest pain, ruled in for NSTEMI at that time. She was transferred to [facility] on 5/20/20 under the care of Dr. Dubois, found to be in Afib with RVR on arrival, with HR 140s, tachypnea and acute hypoxic respiratory failure improved with BiPAP and diuresis.  She underwent repeat ECHO which demonstrated EF 40%, moderate MR, severe AS with lateral wall hypokinesis.  On 5/21/20, she underwent cardiac cath which 2vCAD (LCx 50% and pLAD 70%), medications optimized.  She completed pre-procedure evaluation which incidentally found a 1.7cm brain lesion for which neurology was consulted, diagnosed as ossified meningioma with recommendation for outpatient follow-up.  She was deemed a candidate for TAVR and on 5/26/20, and underwent uncomplicated TF TAVR (Parada Adelaida 23mm).  She arrived to CTICU in stable condition.  TVP removed in PM.  POD 1, Afib with RVR, HD stable.  POD 2, stable, ambulating. Afib with RVR, Cardizem started in addition to Metoprolol, rate controlled afterward then she was transferred to floor care.  Post operative developed NESTOR on CKD, with a creatine that peaked to 1.8 on POD 5.  On 6/1/20, POD 6 creatinine started to trend downward and she was deemed medically cleared for discharge to home with a MCOT and a plan for outpatient follow-up for outpatient lab draw to repeat BMP on 6/3/20.  Once home patient  went to [facility] complaining of dizziness and weakness,  She was transferred to [facility] hemodynamically stable this morning for evaluation.    Neurovascular:   -Pain well controlled with current regimen. PRN's: acetaminophen     Cardiovascular: POD#5 from TAVR (Adelaida valve)                          Afib now rate controlled  - continue Cardizem 120mg/24 hours, Lopressor 25mg q12h.  -continue Eliquis 2.5mg BID   -Monitor: BP, HR, tele  -continue MCOT   (phone is activated)    Respiratory:   -Patient oxygenating well on RA   -Encourage continued use of IS 10x/hr and frequent ambulation  -CXR: no acute pathology, no PTX    GI:  -GI PPX: pantoprazole   -PO Diet: DASH/TLC   -Bowel Regimen: senna, miralax, gave magnesium citrate today and prune juice   -Constipation:  pt had BM in afternoon yesterday and again today. c/w senna and miralax     Renal / :  -Continue to monitor renal function: BUN/Cr 35/1.78 (Cr continues to rise throughout the day from yesterday, Lasix has been stopped, and gentle hydration has been started. Trend BMP at 9pm and in AM)   -Monitor I/O's daily     Endocrine:    -No hx of DM or thyroid dx  -A1c: 5.7   -TSH: 0.54     Hematologic: Stable  -CBC: H/H- 11.3/36.3    ID:  -Temperature: afebrile   -CBC: WBC-7  -Continue to observe for SIRS/Sepsis Syndrome.    Prophylaxis:  -DVT prophylaxis with SCDs and eliquis   -Continue with SCD's b/l while patient is at rest     Disposition:  -Discharge home once patient is medically ready- tomorrow (daughter is aware) 74 year old female with history of morbid obesity, HTN, HLD, GERD, TIA with carotid artery disease s/p left carotid endarterectomy, CKD (baseline Cr 1.45) and chronic diastolic heart failure with severe aortic stenosis who initially presented to OSH with acute onset weakness and chest pain, ruled in for NSTEMI at that time. She was transferred to [facility] on 5/20/20 under the care of Dr. Dubois, found to be in Afib with RVR on arrival, with HR 140s, tachypnea and acute hypoxic respiratory failure improved with BiPAP and diuresis.  She underwent repeat ECHO which demonstrated EF 40%, moderate MR, severe AS with lateral wall hypokinesis.  On 5/21/20, she underwent cardiac cath which 2vCAD (LCx 50% and pLAD 70%), medications optimized.  She completed pre-procedure evaluation which incidentally found a 1.7cm brain lesion for which neurology was consulted, diagnosed as ossified meningioma with recommendation for outpatient follow-up.  She was deemed a candidate for TAVR and on 5/26/20, and underwent uncomplicated TF TAVR (Parada Adelaida 23mm).  She arrived to CTICU in stable condition.  TVP removed in PM.  POD 1, Afib with RVR, HD stable.  POD 2, stable, ambulating. Afib with RVR, Cardizem started in addition to Metoprolol, rate controlled afterward then she was transferred to floor care.  Post operative developed NESTOR on CKD, with a creatine that peaked to 1.8 on POD 5.  On 6/1/20, POD 6 creatinine started to trend downward and she was deemed medically cleared for discharge to home with a MCOT and a plan for outpatient follow-up for outpatient lab draw to repeat BMP on 6/3/20.  Once home patient  went to [facility] complaining of dizziness and weakness,  She was transferred to [facility] hemodynamically stable this morning for evaluation.    Neurovascular:   -Pain well controlled with current regimen. PRN's: acetaminophen     Cardiovascular: POD#5 from TAVR (Adelaida valve)                          Afib now rate controlled  - continue Cardizem 120mg/24 hours, Lopressor 25mg q12h.  -continue Eliquis 2.5mg BID   -Monitor: BP, HR, tele  -continue MCOT   (phone is activated)    Respiratory:   -Patient oxygenating well on RA   -Encourage continued use of IS 10x/hr and frequent ambulation  -CXR: no pulmonary edema, small left effusion, no PTX    GI:  -PPX: pantoprazole   -PO Diet: DASH/TLC   -Bowel Regimen: senna,     Renal / : post op NESTOR resolving peaked at 1.8 possibly 2/2 to over diuresing  -BUN/Cr 29/1.46   -Continue to monitor renal function:   -Monitor I/O's daily     Endocrine:    -No hx of DM or thyroid dx  -A1c: 5.7   -TSH: 0.54     Hematologic: Stable  -CBC: H/H- 9.8/33 trending down- will to monitor  -no signs of bleeding    ID:  - afebrile   -CBC: WBC-8.1  -Continue to observe for SIRS/Sepsis Syndrome.    Prophylaxis:  -DVT prophylaxis with SCDs on Eliquis   -Continue with SCD's b/l while patient is at rest     Disposition:  -Discharge to [facility] if accepted

## 2020-06-02 NOTE — H&P ADULT - NSHPPHYSICALEXAM_GEN_ALL_CORE
Physical Exam  CONSTITUTIONAL:  Patient seen bedside seems sleepy but responsive                                                     NEURO:  oriented x 3 but sleepy initially. no gross deficits appreciated                   EYES:      PERRL           ENMT:  supple neck, no lymphadenopathy, no bruit B/L carotids appreciated  CV:     RRR, S1S2, no murmur  RESPIRATORY:  equal breath sounds, no rales, no rhonchi, no wheeze  GI:  soft, nontender, positive bowel sounds  : KOO + / -  primafit bladder collection system  MUSKULOSKELETAL:  moves all extremities  SKIN / BREAST:    right arm resolving hematoma at old IV sites  Vascular: all pulses intact Physical Exam  CONSTITUTIONAL:  Patient seen bedside seems sleepy but responsive                                                     NEURO:  oriented x 3 but sleepy initially. no gross deficits appreciated                   EYES:      PERRL           ENMT:  supple neck, no lymphadenopathy, no bruit B/L carotids appreciated  CV:    irregular rate and rhythm, S1S2, no murmur  RESPIRATORY:  equal breath sounds, no rales, no rhonchi, no wheeze  GI:  soft, nontender, positive bowel sounds  : KOO + / -  primafit bladder collection system  MUSKULOSKELETAL:  moves all extremities  SKIN / BREAST:    right arm resolving hematoma at old IV sites  Vascular: all pulses intact

## 2020-06-03 NOTE — DISCHARGE NOTE PROVIDER - CARE PROVIDER_API CALL
Jax Dubois  CARDIOVASCULAR SURGERY  [address] [place] [address]  Phone: [phone]  Fax: [phone]  Scheduled Appointment: 06/08/2020 09:00 AM    Kevan Veliz  Cardiology  [facility] [place] [address]  Phone: [phone]  Fax: [phone]  Scheduled Appointment: 06/15/2020 08:40 AM    Teresa Pisano  NEUROLOGY  [address] [address]  Phone: [phone]  Fax: [phone]  Follow Up Time: Jax Dubois  CARDIOVASCULAR SURGERY  [address] [place] [address]  Phone: [phone]  Fax: [phone]  Scheduled Appointment: 06/08/2020 09:00 AM    Kevan Veliz  Cardiology  [facility] [place] [address]  Phone: [phone]  Fax: [phone]  Scheduled Appointment: 06/15/2020 08:40 AM    Teresa Pisano  NEUROLOGY  [address] [address]  Phone: [phone]  Fax: [phone]  Scheduled Appointment: 06/11/2020 03:00 PM

## 2020-06-03 NOTE — DISCHARGE NOTE PROVIDER - PROVIDER TOKENS
PROVIDER:[TOKEN:[[id number]],SCHEDULEDAPPT:[06/08/2020],SCHEDULEDAPPTTIME:[09:00 AM]],PROVIDER:[TOKEN:[[id number]],SCHEDULEDAPPT:[06/15/2020],SCHEDULEDAPPTTIME:[08:40 AM]],PROVIDER:[TOKEN:[[id number]]] PROVIDER:[TOKEN:[[id number]],SCHEDULEDAPPT:[06/08/2020],SCHEDULEDAPPTTIME:[09:00 AM]],PROVIDER:[TOKEN:[[id number]],SCHEDULEDAPPT:[06/15/2020],SCHEDULEDAPPTTIME:[08:40 AM]],PROVIDER:[TOKEN:[[id number]],SCHEDULEDAPPT:[06/11/2020],SCHEDULEDAPPTTIME:[03:00 PM]]

## 2020-06-03 NOTE — DISCHARGE NOTE PROVIDER - NSDCMRMEDTOKEN_GEN_ALL_CORE_FT
acetaminophen 325 mg oral tablet: 2 tab(s) orally every 6 hours, As needed, Mild Pain (1 - 3) MDD:8 tabs  apixaban 2.5 mg oral tablet: 1 tab(s) orally every 12 hours  aspirin 81 mg oral delayed release tablet: 1 tab(s) orally once a day  atorvastatin 40 mg oral tablet: 1 tab(s) orally once a day (at bedtime)  dilTIAZem 120 mg/24 hours oral capsule, extended release: 1 cap(s) orally once a day  metoprolol tartrate 25 mg oral tablet: 1 tab(s) orally every 12 hours  omeprazole 40 mg oral delayed release capsule: 1 cap(s) orally once a day  senna oral tablet: 2 tab(s) orally once a day (at bedtime)  Travatan Z 0.004% ophthalmic solution: 1 drop(s) to each affected eye once a day (in the evening) acetaminophen 325 mg oral tablet: 2 tab(s) orally every 6 hours, As needed, Mild Pain (1 - 3)  apixaban 5 mg oral tablet: 1 tab(s) orally every 12 hours  aspirin 81 mg oral delayed release tablet: 1 tab(s) orally once a day  atorvastatin 40 mg oral tablet: 1 tab(s) orally once a day (at bedtime)  dilTIAZem 120 mg/24 hours oral capsule, extended release: 1 cap(s) orally once a day  metoprolol tartrate 37.5 mg oral tablet: 1 tab(s) orally every 12 hours  pantoprazole 40 mg oral delayed release tablet: 1 tab(s) orally once a day (before a meal)  senna oral tablet: 2 tab(s) orally once a day (at bedtime)  Travatan Z 0.004% ophthalmic solution: 1 drop(s) to each affected eye once a day (in the evening)

## 2020-06-03 NOTE — DISCHARGE NOTE PROVIDER - NSDCFUSCHEDAPPT_GEN_ALL_CORE_FT
GRACE KEY ; 06/15/2020 ; [facility] [address] GRACE KEY ; 06/11/2020 ; NPP Neuro [address]   GRACE EKY ; 06/15/2020 ; NPP [facility] [address] [facility] GRACE KEY ; 06/22/2020 ; NP CT Surg [address]  GRACE KEY ; 06/22/2020 ; [facility] Heart[facility]asc [address]  GRACE KEY ; 06/26/2020 ; [facility] Neuro [address] GRACE KEY ; 06/22/2020 ; NP CT Surg [address]  GRACE KEY ; 06/22/2020 ; [facility] Heart[facility]asc [address]  GRACE KEY ; 06/26/2020 ; [facility] Neuro [address] GRACE KEY ; 06/22/2020 ; NP CT Surg [address]  GRACE KEY ; 06/22/2020 ; [facility] HeartVasc [address]  GRACE KEY ; 06/26/2020 ; [facility] Neuro [address] GRACE KEY ; 06/22/2020 ; NP CT Surg [address]  GRACE KEY ; 06/22/2020 ; [facility] Heart[facility]asc [address]  GRACE KEY ; 06/26/2020 ; [facility] Neuro [address] GRACE KEY ; 06/22/2020 ; NP CT Surg [address]  GRACE KEY ; 06/22/2020 ; [facility] Heart[facility]asc [address]  GRACE KEY ; 06/26/2020 ; [facility] Neuro [address]

## 2020-06-03 NOTE — DISCHARGE NOTE PROVIDER - NSDCFUADDINST_GEN_ALL_CORE_FT
-Walk daily as tolerated and use your incentive spirometer every hour.     -No driving or strenuous activity/exercise until cleared by your surgeon.    -Gently clean your incisions with unscented/antibacterial soap and water, pat dry.  You may leave them open to air.    -Call your doctor if you have shortness of breath, chest pain not relieved by pain medication, dizziness, fever >101.5, or increased redness or drainage from incisions.

## 2020-06-03 NOTE — DISCHARGE NOTE PROVIDER - NSDCFUADDAPPT_GEN_ALL_CORE_FT
-Please attend your discharge appointments -Please attend your discharge appointments  -Please ignore appointment TIMES listed above. The following at the correct appointment times:  -Dr. Dubois: Telehealth follow up visit - 6/22/20 at 10am  -Dr. Pisano (neurologist): Telehealth follow up visit -6/26/20 at 3:00pm  -Dr. Veliz (referring): 6/22/20 at 12:20pm

## 2020-06-03 NOTE — PROGRESS NOTE ADULT - SUBJECTIVE AND OBJECTIVE BOX
Patient discussed on morning rounds with Dr. Dubois    Operation / Date:  readmitted on 5/2/20  s/p TAVR on 5/26/2020 - TAVR (Adelaida-Edward 23mm), EF 40%     SUBJECTIVE ASSESSMENT:  ID: [id number]   Patient is feeling okay this morning and has no complaints. She states she still feels tired but she feels much improved from when she came in yesterday. Denies any CP, palpitations, SOB, wheezing, abd pain, n/v/d/c, fevers or chills.     Vital Signs Last 24 Hrs  T(C): 36 (03 Jun 2020 13:25), Max: 36.8 (02 Jun 2020 22:32)  T(F): 96.8 (03 Jun 2020 13:25), Max: 98.2 (02 Jun 2020 22:32)  HR: 80 (03 Jun 2020 08:20) (73 - 97)  BP: 126/74 (03 Jun 2020 08:20) (98/63 - 141/70)  BP(mean): 93 (03 Jun 2020 08:20) (76 - 100)  RR: 18 (03 Jun 2020 08:20) (10 - 23)  SpO2: 98% (03 Jun 2020 08:20) (95% - 98%)  I&O's Detail    02 Jun 2020 07:01  -  03 Jun 2020 07:00  --------------------------------------------------------  IN:  Total IN: 0 mL    OUT:    Voided: 500 mL  Total OUT: 500 mL    Total NET: -500 mL    CHEST TUBE:  no  JUAN DANIEL DRAIN: no  EPICARDIAL WIRES: no  TIE DOWNS: no  KOO: no    PHYSICAL EXAM:  General: well appearing sitting in chair in NAD   Neurological: AOx3. Motor skills grossly intact  Cardiovascular: Normal S1/S2. Regular rate/rhythm. No murmurs  Respiratory: Lungs CTA bilaterally. No wheezing or rales  Gastrointestinal: +BS in all 4 quadrants. Non-distended. Soft. Non-tender  Extremities: Strength 5/5 b/l upper/lower extremities. Sensation grossly intact upper/lower extremities. +trace LE. No calf tenderness.  Vascular: Radial 2+bilaterally, DP 2+ b/l  Incision Sites: Groin incisions without erythema, purulence. +ecchymosis, improved from previous admission.     LABS:                        10.4   9.47  )-----------( 247      ( 03 Jun 2020 09:18 )             34.0       COUMADIN:  No    PT/INR - ( 02 Jun 2020 11:28 )   PT: 14.4 sec;   INR: 1.25          PTT - ( 02 Jun 2020 11:28 )  PTT:24.5 sec    06-03    140  |  104  |  28<H>  ----------------------------<  185<H>  4.5   |  21<L>  |  1.57<H>    Ca    9.4      03 Jun 2020 09:18  Mg     2.0     06-03    TPro  6.1  /  Alb  3.6  /  TBili  0.8  /  DBili  x   /  AST  22  /  ALT  11  /  AlkPhos  45  06-02          MEDICATIONS  (STANDING):  apixaban 5 milliGRAM(s) Oral every 12 hours  aspirin enteric coated 81 milliGRAM(s) Oral daily  atorvastatin 40 milliGRAM(s) Oral at bedtime  diltiazem    milliGRAM(s) Oral daily  latanoprost 0.005% Ophthalmic Solution 1 Drop(s) Both EYES at bedtime  metoprolol tartrate 25 milliGRAM(s) Oral every 12 hours  pantoprazole    Tablet 40 milliGRAM(s) Oral before breakfast  senna 2 Tablet(s) Oral at bedtime  sodium chloride 0.9% lock flush 3 milliLiter(s) IV Push every 8 hours    MEDICATIONS  (PRN):  acetaminophen   Tablet .. 650 milliGRAM(s) Oral every 6 hours PRN Mild Pain (1 - 3)        RADIOLOGY & ADDITIONAL TESTS:  < from: Xray Chest 1 View- PORTABLE-Routine (06.03.20 @ 02:58) >  Impression: Linear atelectasis bilateral lung bases.  < end of copied text > Patient discussed on morning rounds with Dr. Dubois    Operation / Date:  readmitted on 5/2/20  s/p TAVR on 5/26/2020 - TAVR (Adelaida-Edward 23mm), EF 40%     SUBJECTIVE ASSESSMENT:  ID: [id number]   Patient is feeling okay this morning and has no complaints. She states she still feels tired but she feels much improved from when she came in yesterday. Denies any CP, palpitations, SOB, wheezing, abd pain, n/v/d/c, fevers or chills.     Vital Signs Last 24 Hrs  T(C): 36 (03 Jun 2020 13:25), Max: 36.8 (02 Jun 2020 22:32)  T(F): 96.8 (03 Jun 2020 13:25), Max: 98.2 (02 Jun 2020 22:32)  HR: 80 (03 Jun 2020 08:20) (73 - 97)  BP: 126/74 (03 Jun 2020 08:20) (98/63 - 141/70)  BP(mean): 93 (03 Jun 2020 08:20) (76 - 100)  RR: 18 (03 Jun 2020 08:20) (10 - 23)  SpO2: 98% (03 Jun 2020 08:20) (95% - 98%)  I&O's Detail    02 Jun 2020 07:01  -  03 Jun 2020 07:00  --------------------------------------------------------  IN:  Total IN: 0 mL    OUT:    Voided: 500 mL  Total OUT: 500 mL    Total NET: -500 mL    CHEST TUBE:  no  JUAN DANIEL DRAIN: no  EPICARDIAL WIRES: no  TIE DOWNS: no  KOO: no    PHYSICAL EXAM:  General: well appearing sitting in chair in NAD   Neurological: AOx3. Motor skills grossly intact  Cardiovascular: Normal S1/S2. Regular rate/rhythm. No murmurs  Respiratory: Lungs CTA bilaterally. No wheezing or rales  Gastrointestinal: +BS in all 4 quadrants. Non-distended. Soft. Non-tender  Extremities: Strength 5/5 b/l upper/lower extremities. Sensation grossly intact upper/lower extremities. +trace LE. No calf tenderness.  Vascular: Radial 2+bilaterally, DP 2+ b/l  Incision Sites: Groin incisions without erythema, purulence. +ecchymosis, improved from previous admission.     LABS:                        10.4   9.47  )-----------( 247      ( 03 Jun 2020 09:18 )             34.0       COUMADIN:  No    PT/INR - ( 02 Jun 2020 11:28 )   PT: 14.4 sec;   INR: 1.25     PTT - ( 02 Jun 2020 11:28 )  PTT:24.5 sec    06-03    140  |  104  |  28<H>  ----------------------------<  185<H>  4.5   |  21<L>  |  1.57<H>    Ca    9.4      03 Jun 2020 09:18  Mg     2.0     06-03    TPro  6.1  /  Alb  3.6  /  TBili  0.8  /  DBili  x   /  AST  22  /  ALT  11  /  AlkPhos  45  06-02          MEDICATIONS  (STANDING):  apixaban 5 milliGRAM(s) Oral every 12 hours  aspirin enteric coated 81 milliGRAM(s) Oral daily  atorvastatin 40 milliGRAM(s) Oral at bedtime  diltiazem    milliGRAM(s) Oral daily  latanoprost 0.005% Ophthalmic Solution 1 Drop(s) Both EYES at bedtime  metoprolol tartrate 25 milliGRAM(s) Oral every 12 hours  pantoprazole    Tablet 40 milliGRAM(s) Oral before breakfast  senna 2 Tablet(s) Oral at bedtime  sodium chloride 0.9% lock flush 3 milliLiter(s) IV Push every 8 hours    MEDICATIONS  (PRN):  acetaminophen   Tablet .. 650 milliGRAM(s) Oral every 6 hours PRN Mild Pain (1 - 3)        RADIOLOGY & ADDITIONAL TESTS:  < from: Xray Chest 1 View- PORTABLE-Routine (06.03.20 @ 02:58) >  Impression: Linear atelectasis bilateral lung bases.  < end of copied text >

## 2020-06-03 NOTE — PROGRESS NOTE ADULT - ASSESSMENT
This is a 75 y/o female with a PMHx of morbid obesity, HTN, HLD, GERD, TIA with carotid artery disease s/p left carotid endartectomy, CKD (baseline Cr 1.45) and chronic diastolic heart failure with severe aortic stenosis. Pt initially r/i for NSTEMI at OSH after complaints of CP and SOB for several days. Patient follows with Dr. Dubois as an outpatient. Pt transferred to [facility]. On arrival, pt noted to be in AF with RVR (rate 140s) and tachypenic and was placed on BiPAP with lopressor, amio and lasix gtts. ECHO showing EF 40% and moderate MR, severe AS and lateral wall hypokinesis. Pt deemed not a good surgical candidate and determined to be a good TAVR candidate. underwent an uncomplicated TAVR (Edward 23 mm), EF 40% on 5/26/20 with Dr. Dubois. On POD#1 pt with mild ectopy and started on low-dose BB. Patient remained stable the rest of her post-op course and was originally planned for discharge yesterday but went into atrial fibrillation with RVR again so decision was made to stay overnight for observation. Pt started on Cardizem in addition to BB. POD#4 pt remains in AF but is rate controlled (low 100s). She states she generally doesn't feel well and moved her bowels in the afternoon. On POD#5, Cr trended upwards, lasix d/c'ed and gentle hydration being given. Repeat Cr in morning and is stable/downtrending. On POD#6, pt was discharged home. Pt then re-presented to ED on 6/2/20 complaining of dizziness. Pt readmitted for observation with no objective findings and can be discharged pending DIANA placement.     Plan:  Neurovascular:   -Pain well controlled with current regimen. PRN's: acetaminophen     Cardiovascular: POD#5 from TAVER (Adelaida valve)   -Tachycardia resolved most of the day, remains in 80-90s.   -Remains rate-controlled AF at this time (episode of AF with RVR x2 days ago)       -Remains on Cardizem ER 120mg       -c/w Eliquis 2.5mg BID        -c/w lopressor    -Monitor: BP, HR, tele  -Patient has MCOT in place, activated on previous admission, make sure devices are charged at all times.      Respiratory:   -Patient oxygenating well on RA   -Encourage continued use of IS 10x/hr and frequent ambulation  -CXR: no acute pathology, no PTX    GI:  -GI PPX: pantoprazole   -PO Diet: DASH/TLC   -Bowel Regimen: senna, miralax, gave magnesium citrate today and prune juice   -Constipation:  pt had BM in afternoon yesterday and again today. c/w senna and miralax     Renal / :  -Continue to monitor renal function: BUN/Cr  28/1.57  -- monitor kidney function while here. Ok with Dr. Dubois for patient to go home tomorrow with home lab draw early next week.   -Monitor I/O's daily     Endocrine:    -No hx of DM or thyroid dx  -A1c: 5.7   -TSH: 0.54     Hematologic: Stable  -CBC: H/H- 10.4/34    ID:  -Temperature: afebrile   -CBC: WBC- 9.4  -Continue to observe for SIRS/Sepsis Syndrome.    Prophylaxis:  -DVT prophylaxis with SCDs and eliquis   -Continue with SCD's b/l while patient is at rest     Disposition:  -Discharge home once patient is medically ready- tomorrow This is a 75 y/o female with a PMHx of morbid obesity, HTN, HLD, GERD, TIA with carotid artery disease s/p left carotid endartectomy, CKD (baseline Cr 1.45) and chronic diastolic heart failure with severe aortic stenosis. Pt initially r/i for NSTEMI at OSH after complaints of CP and SOB for several days. Patient follows with Dr. Dubois as an outpatient. Pt transferred to [facility]. On arrival, pt noted to be in AF with RVR (rate 140s) and tachypenic and was placed on BiPAP with lopressor, amio and lasix gtts. ECHO showing EF 40% and moderate MR, severe AS and lateral wall hypokinesis. Pt deemed not a good surgical candidate and determined to be a good TAVR candidate. underwent an uncomplicated TAVR (Edward 23 mm), EF 40% on 5/26/20 with Dr. Dubois. On POD#1 pt with mild ectopy and started on low-dose BB. Patient remained stable the rest of her post-op course and was originally planned for discharge yesterday but went into atrial fibrillation with RVR again so decision was made to stay overnight for observation. Pt started on Cardizem in addition to BB. POD#4 pt remains in AF but is rate controlled (low 100s). She states she generally doesn't feel well and moved her bowels in the afternoon. On POD#5, Cr trended upwards, lasix d/c'ed and gentle hydration being given. Repeat Cr in morning and is stable/downtrending. On POD#6, pt was discharged home. Pt then re-presented to ED on 6/2/20 complaining of dizziness/weakness. Pt readmitted for observation with no objective findings and remained stable. Pt is pending being discharged pending DIANA placement/acceptance      Plan:  Neurovascular:   -Pain well controlled with current regimen. PRN's: acetaminophen     Cardiovascular: readmitted on 6/2, POD#8 from TAVER (Adelaida valve)   -Tachycardia resolved most of the day, remains in 80-90s.   -Remains rate-controlled AF at this time (episode of AF with RVR x2 days ago)       -Remains on Cardizem ER 120mg       -c/w Eliquis 2.5mg BID        -c/w lopressor    -Monitor: BP, HR, tele  -Patient has MCOT in place, activated on previous admission, make sure devices are charged at all times.      Respiratory:   -Patient oxygenating well on RA   -Encourage continued use of IS 10x/hr and frequent ambulation  -CXR: no acute pathology, no PTX    GI:  -GI PPX: pantoprazole   -PO Diet: DASH/TLC   -Bowel Regimen: senna, miralax, gave magnesium citrate today and prune juice   -Constipation:  pt had BM in afternoon yesterday and again today. c/w senna and miralax     Renal / :  -Continue to monitor renal function: BUN/Cr  28/1.57  -- monitor kidney function while here. Ok with Dr. Dubois for patient to go home tomorrow with home lab draw early next week.   -Monitor I/O's daily     Endocrine:    -No hx of DM or thyroid dx  -A1c: 5.7   -TSH: 0.54     Hematologic: Stable  -CBC: H/H- 10.4/34    ID:  -Temperature: afebrile   -CBC: WBC- 9.4  -Continue to observe for SIRS/Sepsis Syndrome.    Prophylaxis:  -DVT prophylaxis with SCDs and eliquis   -Continue with SCD's b/l while patient is at rest     Disposition:  -Discharge DIANA once patient is medically ready- tomorrow This is a 73 y/o female with a PMHx of morbid obesity, HTN, HLD, GERD, TIA with carotid artery disease s/p left carotid endartectomy, CKD (baseline Cr 1.45) and chronic diastolic heart failure with severe aortic stenosis. Pt initially r/i for NSTEMI at OSH after complaints of CP and SOB for several days. Patient follows with Dr. Dubois as an outpatient. Pt transferred to [facility]. On arrival, pt noted to be in AF with RVR (rate 140s) and tachypenic and was placed on BiPAP with lopressor, amio and lasix gtts. ECHO showing EF 40% and moderate MR, severe AS and lateral wall hypokinesis. Pt deemed not a good surgical candidate and determined to be a good TAVR candidate. underwent an uncomplicated TAVR (Edward 23 mm), EF 40% on 5/26/20 with Dr. Dubois. On POD#1 pt with mild ectopy and started on low-dose BB. Patient remained stable the rest of her post-op course and was originally planned for discharge yesterday but went into atrial fibrillation with RVR again so decision was made to stay overnight for observation. Pt started on Cardizem in addition to BB. POD#4 pt remains in AF but is rate controlled (low 100s). She states she generally doesn't feel well and moved her bowels in the afternoon. On POD#5, Cr trended upwards, lasix d/c'ed and gentle hydration being given. Repeat Cr in morning and is stable/downtrending. On POD#6, pt was discharged home. Pt then re-presented to ED on 6/2/20 complaining of dizziness/weakness. Pt readmitted for observation with no objective findings and remained stable. Pt is pending being discharged pending DIANA placement/acceptance      Plan:  Neurovascular:   -Pain well controlled with current regimen. PRN's: acetaminophen     Cardiovascular: readmitted on 6/2, POD#8 from TAVER (Adelaida valve)   -Tachycardia resolved most of the day, remains in 80-90s.   -Remains rate-controlled AF at this time       -Remains on Cardizem ER 120mg       -c/w Eliquis 2.5mg BID        -c/w lopressor    -Monitor: BP, HR, tele  -Patient has MCOT in place, activated on previous admission, make sure devices are charged at all times.      Respiratory:   -Patient oxygenating well on RA   -Encourage continued use of IS 10x/hr and frequent ambulation  -CXR: no acute pathology, no PTX    GI:  -GI PPX: pantoprazole   -PO Diet: DASH/TLC   -Bowel Regimen: senna, miralax, gave magnesium citrate today and prune juice   -Constipation:  pt had BM in afternoon yesterday and again today. c/w senna and miralax     Renal / :  -Continue to monitor renal function: BUN/Cr  28/1.57  -- monitor kidney function while here. Ok with Dr. Dubois for patient to go home tomorrow with home lab draw early next week.   -Monitor I/O's daily     Endocrine:    -No hx of DM or thyroid dx  -A1c: 5.7   -TSH: 0.54     Hematologic: Stable  -CBC: H/H- 10.4/34    ID:  -Temperature: afebrile   -CBC: WBC- 9.4  -Continue to observe for SIRS/Sepsis Syndrome.    Prophylaxis:  -DVT prophylaxis with SCDs and eliquis   -Continue with SCD's b/l while patient is at rest     Disposition:  -Discharge DIANA once patient is medically ready- tomorrow This is a 73 y/o female with a PMHx of morbid obesity, HTN, HLD, GERD, TIA with carotid artery disease s/p left carotid endartectomy, CKD (baseline Cr 1.45) and chronic diastolic heart failure with severe aortic stenosis. Pt initially r/i for NSTEMI at OSH after complaints of CP and SOB for several days. Patient follows with Dr. Dubois as an outpatient. Pt transferred to [facility]. On arrival, pt noted to be in AF with RVR (rate 140s) and tachypenic and was placed on BiPAP with lopressor, amio and lasix gtts. ECHO showing EF 40% and moderate MR, severe AS and lateral wall hypokinesis. Pt deemed not a good surgical candidate and determined to be a good TAVR candidate. underwent an uncomplicated TAVR (Edward 23 mm), EF 40% on 5/26/20 with Dr. Dubois. On POD#1 pt with mild ectopy and started on low-dose BB. Patient remained stable the rest of her post-op course and was originally planned for discharge but went into atrial fibrillation with RVR so decision was made to stay overnight for observation. POD#4 pt remained in AF but is rate controlled (low 100s). She states she generally doesn't feel well and moved her bowels in the afternoon. On POD#5, Cr trended upwards, lasix d/c'ed and gentle hydration given with repeat Cr downtrended. On POD#6, pt was discharged home. Pt then re-presented to ED on 6/2/20 complaining of dizziness/weakness. Pt readmitted for observation with no objective findings and remained stable. Pt is pending being discharged pending DIANA placement/acceptance.    Plan:  Neurovascular:   -Pain well controlled with current regimen. PRN's: acetaminophen     Cardiovascular: readmitted on 6/2, POD#8 from TAVER (Adelaida valve)   -Tachycardia resolved most of the day, remains in 80-90s.   -Remains rate-controlled AF at this time       -Remains on Cardizem ER 120mg       -c/w Eliquis 2.5mg BID        -c/w lopressor    -Monitor: BP, HR, tele  -Patient has MCOT in place, activated on previous admission, make sure devices are charged at all times.      Respiratory:   -Patient oxygenating well on RA   -Encourage continued use of IS 10x/hr and frequent ambulation  -CXR: no acute pathology, no PTX    GI:  -GI PPX: pantoprazole   -PO Diet: DASH/TLC   -Bowel Regimen: senna, miralax, gave magnesium citrate today and prune juice   -Constipation:  pt had BM in afternoon yesterday and again today. c/w senna and miralax     Renal / :  -Continue to monitor renal function: BUN/Cr  28/1.57  -- monitor kidney function while here. Ok with Dr. Dubois for patient to go home tomorrow with home lab draw early next week.   -Monitor I/O's daily     Endocrine:    -No hx of DM or thyroid dx  -A1c: 5.7   -TSH: 0.54     Hematologic: Stable  -CBC: H/H- 10.4/34    ID:  -Temperature: afebrile   -CBC: WBC- 9.4  -Continue to observe for SIRS/Sepsis Syndrome.    Prophylaxis:  -DVT prophylaxis with SCDs and eliquis   -Continue with SCD's b/l while patient is at rest     Disposition:  -Discharge DIANA once patient is medically ready- tomorrow

## 2020-06-03 NOTE — DISCHARGE NOTE PROVIDER - HOSPITAL COURSE
This is a 73 y/o female with a PMHx of morbid obesity, HTN, HLD, GERD, TIA with carotid artery disease s/p left carotid endartectomy, CKD (baseline Cr 1.45) and chronic diastolic heart failure with severe aortic stenosis. Pt initially r/i for NSTEMI at OSH after complaints of CP and SOB for several days. Patient follows with Dr. Dubois as an outpatient. Pt transferred to [facility]. On arrival, pt noted to be in AF with RVR (rate 140s) and tachypenic and was placed on BiPAP with lopressor, amio and lasix gtts. ECHO showing EF 40% and moderate MR, severe AS and lateral wall hypokinesis. Pt deemed not a good surgical candidate and determined to be a good TAVR candidate. underwent an uncomplicated TAVR (Edward 23 mm), EF 40% on 5/26/20 with Dr. Dubois. On POD#1 pt with mild ectopy and started on low-dose BB. Patient remained stable the rest of her post-op course and was originally planned for discharge but went into atrial fibrillation with RVR so decision was made to stay overnight for observation. POD#4 pt remained in AF but is rate controlled (low 100s). She states she generally doesn't feel well and moved her bowels in the afternoon. On POD#5, Cr trended upwards, lasix d/c'ed and gentle hydration given with repeat Cr downtrended. On POD#6, pt was discharged home. Pt then re-presented to ED on 6/2/20 complaining of dizziness/weakness. Pt readmitted for observation with no objective findings and remained stable. Pt is pending being discharged pending [facility] placement/acceptance. On ______ pt         Over 35 minutes was spent with the patient reviewing the discharge material including medications, follow up appointments, recovery, concerning symptoms, and how to contact their health care providers if they have questions This is a 75 y/o female with a PMHx of morbid obesity, HTN, HLD, GERD, TIA with carotid artery disease s/p left carotid endartectomy, CKD (baseline Cr 1.45) and chronic diastolic heart failure with severe aortic stenosis. Pt initially r/i for NSTEMI at OSH after complaints of CP and SOB for several days. Patient follows with Dr. Dubois as an outpatient. Pt transferred to [facility]. On arrival, pt noted to be in AF with RVR (rate 140s) and tachypenic and was placed on BiPAP with lopressor, amio and lasix gtts. ECHO showing EF 40% and moderate MR, severe AS and lateral wall hypokinesis. Pt deemed not a good surgical candidate and determined to be a good TAVR candidate. underwent an uncomplicated TAVR (Edward 23 mm), EF 40% on 5/26/20 with Dr. Dubois. On POD#1 pt with mild ectopy and started on low-dose BB. Patient remained stable the rest of her post-op course and was originally planned for discharge but went into atrial fibrillation with RVR so decision was made to stay overnight for observation. POD#4 pt remained in AF but is rate controlled (low 100s). She states she generally doesn't feel well and moved her bowels in the afternoon. On POD#5, Cr trended upwards, lasix d/c'ed and gentle hydration given with repeat Cr downtrended. On POD#6, pt was discharged home. Pt then re-presented to ED on 6/2/20 complaining of dizziness/weakness. Pt readmitted for observation with no objective findings and remained stable while readmitted awaiting placement at [facility]. On 6/8 patient was stable and ready for discharge to [facility] per Dr. Dubois. At time of discharge patient was ambulating on room air with assistance, tolerating PO diet, having bowel movements, and voiding freely. She was discharged with an MCOT device in place and activated.        Over 35 minutes was spent with the patient reviewing the discharge material including medications, follow up appointments, recovery, concerning symptoms, and how to contact their health care providers if they have questions This is a 75 y/o female with a PMHx of morbid obesity, HTN, HLD, GERD, TIA with carotid artery disease s/p left carotid endartectomy, CKD (baseline Cr 1.45) and chronic diastolic heart failure with severe aortic stenosis. Pt initially r/i for NSTEMI at OSH after complaints of CP and SOB for several days. Patient follows with Dr. Dubois as an outpatient. Pt transferred to [facility]. On arrival, pt noted to be in AF with RVR (rate 140s) and tachypenic and was placed on BiPAP with lopressor, amio and lasix gtts. ECHO showing EF 40% and moderate MR, severe AS and lateral wall hypokinesis. Pt deemed not a good surgical candidate and determined to be a good TAVR candidate. underwent an uncomplicated TAVR (Edward 23 mm), EF 40% on 5/26/20 with Dr. Dubois. On POD#1 pt with mild ectopy and started on low-dose BB. Patient remained stable the rest of her post-op course and was originally planned for discharge but went into atrial fibrillation with RVR so decision was made to stay overnight for observation. POD#4 pt remained in AF but is rate controlled (low 100s). She states she generally doesn't feel well and moved her bowels in the afternoon. On POD#5, Cr trended upwards, lasix d/c'ed and gentle hydration given with repeat Cr downtrended. On POD#6, pt was discharged home. Pt then re-presented to ED on 6/2/20 complaining of dizziness/weakness. Pt readmitted for observation with no objective findings and remained stable while readmitted awaiting placement at [facility]. On _____ patient was stable and ready for discharge to [facility] per Dr. Dubois. At time of discharge patient was ambulating on room air with assistance, tolerating PO diet, having bowel movements, and voiding freely. She was discharged with an MCOT device in place and activated.        Over 35 minutes was spent with the patient reviewing the discharge material including medications, follow up appointments, recovery, concerning symptoms, and how to contact their health care providers if they have questions This is a 75 y/o female with a PMHx of morbid obesity, HTN, HLD, GERD, TIA with carotid artery disease s/p left carotid endartectomy, CKD (baseline Cr 1.45) and chronic diastolic heart failure with severe aortic stenosis. Pt initially r/i for NSTEMI at OSH after complaints of CP and SOB for several days. Patient follows with Dr. Dubois as an outpatient. Pt transferred to [facility]. On arrival, pt noted to be in AF with RVR (rate 140s) and tachypenic and was placed on BiPAP with lopressor, amio and lasix gtts. ECHO showing EF 40% and moderate MR, severe AS and lateral wall hypokinesis. Pt deemed not a good surgical candidate and determined to be a good TAVR candidate. underwent an uncomplicated TAVR (Edward 23 mm), EF 40% on 5/26/20 with Dr. Dubois. On POD#1 pt with mild ectopy and started on low-dose BB. Patient remained stable the rest of her post-op course and was originally planned for discharge but went into atrial fibrillation with RVR so decision was made to stay overnight for observation. POD#4 pt remained in AF but is rate controlled (low 100s). She states she generally doesn't feel well and moved her bowels in the afternoon. On POD#5, Cr trended upwards, lasix d/c'ed and gentle hydration given with repeat Cr downtrended back to baseline for pt (1.2). On POD#6, pt was discharged home. Pt then re-presented to ED on 6/2/20 complaining of dizziness/weakness. Pt readmitted for observation with no objective findings and remained stable while readmitted awaiting placement at [facility]. Patient was stable and ready for discharge to [facility] per Dr. Dubois and has been pending placement for the last few days. Today on 6/10, pt has been accepted to [facility] and per Dr. Dubois remains ready to be discharged home.         Over 35 minutes was spent with the patient reviewing the discharge material including medications, follow up appointments, recovery, concerning symptoms, and how to contact their health care providers if they have questions This is a 75 y/o female with a PMHx of morbid obesity, HTN, HLD, GERD, TIA with carotid artery disease s/p left carotid endartectomy, CKD (baseline Cr 1.45) and chronic diastolic heart failure with severe aortic stenosis. Pt initially r/i for NSTEMI at OSH after complaints of CP and SOB for several days. Patient follows with Dr. Dubois as an outpatient. Pt transferred to [facility]. On arrival, pt noted to be in AF with RVR (rate 140s) and tachypenic and was placed on BiPAP with lopressor, amio and lasix gtts. ECHO showing EF 40% and moderate MR, severe AS and lateral wall hypokinesis. Pt deemed not a good surgical candidate and determined to be a good TAVR candidate. underwent an uncomplicated TAVR (Edward 23 mm), EF 40% on 5/26/20 with Dr. Dubois. On POD#1 pt with mild ectopy and started on low-dose BB. Patient remained stable the rest of her post-op course and was originally planned for discharge but went into atrial fibrillation with RVR so decision was made to stay overnight for observation. POD#4 pt remained in AF but is rate controlled (low 100s). She states she generally doesn't feel well and moved her bowels in the afternoon. On POD#5, Cr trended upwards, lasix d/c'ed and gentle hydration given with repeat Cr downtrended back to baseline for pt (1.2). On POD#6, pt was discharged home. Pt then re-presented to ED on 6/2/20 complaining of dizziness/weakness. Pt readmitted for observation with no objective findings and remained stable while readmitted awaiting placement at [facility]. Patient was stable and ready for discharge to [facility] per Dr. Dubois and has been pending placement for the last few days. Today on 6/10, pt has been accepted to [facility] and per Dr. Dubois remains ready to be discharged home.         Over 35 minutes was spent with the patient reviewing the discharge material including medications, follow up appointments, recovery, concerning symptoms, and how to contact their health care providers if they have questions            Of note: I called the [facility] facility after discharge to educate them about the MCOT device and told them to follow up with Dr. Dubois regarding when she can stop using this device. Educated to keep it charge and change the skin dressing every few days or as needed. Gave them the office phone number to call if they have any questions. Pt was educated on this device and how to use it before leaving the hospital.

## 2020-06-03 NOTE — DISCHARGE NOTE PROVIDER - CARE PROVIDERS DIRECT ADDRESSES
,DirectAddress_Unknown,DirectAddress_Unknown,[email or web address]

## 2020-06-03 NOTE — DISCHARGE NOTE PROVIDER - NSDCCPCAREPLAN_GEN_ALL_CORE_FT
PRINCIPAL DISCHARGE DIAGNOSIS  Diagnosis: Dizziness  Assessment and Plan of Treatment: w/ associated fatigue

## 2020-06-04 NOTE — DIETITIAN INITIAL EVALUATION ADULT. - OTHER INFO
74F with a PMHx of morbid obesity, HTN, HLD, GERD, TIA with carotid artery disease s/p left carotid endartectomy, CKD (baseline Cr 1.45) and chronic diastolic heart failure with severe aortic stenosis. Pt initially r/i for NSTEMI at OSH after complaints of CP and SOB for several days. Pt transferred to [facility]. On arrival, pt noted to be in AF with RVR (rate 140s) and tachypenic and was placed on BiPAp. ECHO showing EF 40%. Pt deemed not a good surgical candidate and determined to be a good TAVR candidate. underwent an uncomplicated TAVR (Edward 23 mm), EF 40% on 5/26/20. On POD#1 pt with mild ectopy and started on low-dose BB. Patient remained stable the rest of her post-op course and was originally planned for discharge but went into atrial fibrillation with RVR so decision was made to stay overnight for observation. POD#4 pt remained in AF but is rate controlled (low 100s). On POD#6, pt was discharged home. Pt then re-presented to ED on 6/2/20 complaining of dizziness/weakness. Pt readmitted for observation with no objective findings and remained stable. Pt is pending being discharged pending [facility] placement/acceptance. Per report patient is feeling well this morning and has no complaints. Eating/drinking well. Denies n/v/d/c, chewing/ swallowing issues or pain impacting intake, skin is intact, jacoby score 18. Known to nutrition from prior admission, ed provided at that time. Will continue to follow per protocol, continues to pend DC.

## 2020-06-04 NOTE — PROGRESS NOTE ADULT - ASSESSMENT
D/C Instructions: This is a 75 y/o female with a PMHx of morbid obesity, HTN, HLD, GERD, TIA with carotid artery disease s/p left carotid endartectomy, CKD (baseline Cr 1.45) and chronic diastolic heart failure with severe aortic stenosis. Pt initially r/i for NSTEMI at OSH after complaints of CP and SOB for several days. Patient follows with Dr. Dubois as an outpatient. Pt transferred to [facility]. On arrival, pt noted to be in AF with RVR (rate 140s) and tachypenic and was placed on BiPAP with lopressor, amio and lasix gtts. ECHO showing EF 40% and moderate MR, severe AS and lateral wall hypokinesis. Pt deemed not a good surgical candidate and determined to be a good TAVR candidate. underwent an uncomplicated TAVR (Edward 23 mm), EF 40% on 5/26/20 with Dr. Dubois. On POD#1 pt with mild ectopy and started on low-dose BB. Patient remained stable the rest of her post-op course and was originally planned for discharge but went into atrial fibrillation with RVR so decision was made to stay overnight for observation. POD#4 pt remained in AF but is rate controlled (low 100s). She states she generally doesn't feel well and moved her bowels in the afternoon. On POD#5, Cr trended upwards, lasix d/c'ed and gentle hydration given with repeat Cr downtrended. On POD#6, pt was discharged home. Pt then re-presented to ED on 6/2/20 complaining of dizziness/weakness. Pt readmitted for observation with no objective findings and remained stable. Pt is pending being discharged pending [facility] placement/acceptance.    Plan:  Neurovascular:   -Pain well controlled with current regimen. PRN's: acetaminophen     Cardiovascular: readmitted on 6/2, POD#9 from TAVER (Adelaida valve)   -Tachycardia resolved, remains in 80-90s.   -Remains rate-controlled AF at this time       -Remains on Cardizem ER 120mg       -c/w Eliquis 2.5mg BID        -c/w lopressor    -Monitor: BP, HR, tele  -Patient has MCOT in place, activated on previous admission, make sure devices are charged at all times.      Respiratory:   -Patient oxygenating well on RA   -Encourage continued use of IS 10x/hr and frequent ambulation  -CXR: no acute pathology, no PTX    GI:  -GI PPX: pantoprazole   -PO Diet: DASH/TLC   -Bowel Regimen: senna, miralax, gave magnesium citrate today and prune juice   -Constipation:  pt had BM in afternoon yesterday and again today. c/w senna and miralax     Renal / :  -Continue to monitor renal function: BUN/Cr    -- monitor kidney function while here.  -Monitor I/O's daily     Endocrine:    -No hx of DM or thyroid dx  -A1c: 5.7   -TSH: 0.54     Hematologic: Stable  -CBC: H/H-     ID:  -Temperature: afebrile   -CBC: WBC-  -Continue to observe for SIRS/Sepsis Syndrome.    Prophylaxis:  -DVT prophylaxis with SCDs and eliquis   -Continue with SCD's b/l while patient is at rest     Disposition:  -Discharge [facility]- pending authorization

## 2020-06-04 NOTE — CHART NOTE - NSCHARTNOTEFT_GEN_A_CORE
Upon Nutritional Assessment by the Registered Dietitian your patient was determined to meet criteria / has evidence of the following diagnosis/diagnoses:          [ ]  Mild Protein Calorie Malnutrition        [ ]  Moderate Protein Calorie Malnutrition        [ ] Severe Protein Calorie Malnutrition        [ ] Unspecified Protein Calorie Malnutrition        [ ] Underweight / BMI <19        [ x] Morbid Obesity / BMI > 40      Findings as based on:  •  Comprehensive nutrition assessment and consultation      Treatment:    The following diet has been recommended:    Continue with DASH/TLC diet     PROVIDER Section:     By signing this assessment you are acknowledging and agree with the diagnosis/diagnoses assigned by the Registered Dietitian    Comments:

## 2020-06-04 NOTE — DIETITIAN INITIAL EVALUATION ADULT. - ENERGY NEEDS
Ideal body weight used for calculations as pt >120% of IBW.   .2kg, IBW 50kg, 212% IBW, ht 62", BMI 42.8   Nutrient needs based on [facility] standards of care for maintenance in adults, adjusted for CHF needs, fluid per team

## 2020-06-04 NOTE — PROGRESS NOTE ADULT - SUBJECTIVE AND OBJECTIVE BOX
Patient discussed on morning rounds with Dr. Dubois     Operation / Date: readmission on 6/2, s/p TAVR on 5/26/20    Surgeon: Dr. Dubois    SUBJECTIVE ASSESSMENT:  ID: [id number]  Patient is feeling well this morning and has no complaints. Eating/drinking well. Using IS regularly. Denies any CP, palpitations, SOB, wheezing, abd pain, n/v/d/c, fevers or chills.    HOSPITAL COURSE:  This is a 73 y/o female with a PMHx of morbid obesity, HTN, HLD, GERD, TIA with carotid artery disease s/p left carotid endartectomy, CKD (baseline Cr 1.45) and chronic diastolic heart failure with severe aortic stenosis. Pt initially r/i for NSTEMI at OSH after complaints of CP and SOB for several days. Patient follows with Dr. Dubois as an outpatient. Pt transferred to [facility]. On arrival, pt noted to be in AF with RVR (rate 140s) and tachypenic and was placed on BiPAP with lopressor, amio and lasix gtts. ECHO showing EF 40% and moderate MR, severe AS and lateral wall hypokinesis. Pt deemed not a good surgical candidate and determined to be a good TAVR candidate. underwent an uncomplicated TAVR (Edward 23 mm), EF 40% on 5/26/20 with Dr. Dubois. On POD#1 pt with mild ectopy and started on low-dose BB. Patient remained stable the rest of her post-op course and was originally planned for discharge but went into atrial fibrillation with RVR so decision was made to stay overnight for observation. POD#4 pt remained in AF but is rate controlled (low 100s). She states she generally doesn't feel well and moved her bowels in the afternoon. On POD#5, Cr trended upwards, lasix d/c'ed and gentle hydration given with repeat Cr downtrended. On POD#6, pt was discharged home. Pt then re-presented to ED on 6/2/20 complaining of dizziness/weakness. Pt readmitted for observation with no objective findings and remained stable. Pt is pending being discharged pending DIANA placement/acceptance. On 6/4 pt is _______________________     Over 35 minutes was spent with the patient reviewing the discharge material including medications, follow up appointments, recovery, concerning symptoms, and how to contact their health care providers if they have questions      Vital Signs Last 24 Hrs  T(C): 36 (04 Jun 2020 05:01), Max: 36.6 (03 Jun 2020 17:04)  T(F): 96.8 (04 Jun 2020 05:01), Max: 97.8 (03 Jun 2020 17:04)  HR: 81 (04 Jun 2020 06:27) (75 - 101)  BP: 131/67 (04 Jun 2020 06:27) (103/55 - 136/88)  BP(mean): 90 (04 Jun 2020 06:27) (73 - 108)  RR: 18 (04 Jun 2020 06:27) (16 - 18)  SpO2: 99% (04 Jun 2020 06:27) (95% - 100%)    EPICARDIAL WIRES REMOVED: n/a  TIE DOWNS REMOVED: n/a    PHYSICAL EXAM:  General: well appearing sitting up in bed in [facility]   Neurological: AOx3. Motor skills grossly intact  Cardiovascular: Normal S1/S2. Irregular rhythm, normal rate. No murmurs  Respiratory: Lungs CTA bilaterally. No wheezing or rales  Gastrointestinal: +BS in all 4 quadrants. Non-distended. Soft. Non-tender  Extremities: Strength 5/5 b/l upper/lower extremities. Sensation grossly intact upper/lower extremities. No edema. No calf tenderness.  Vascular: Radial 2+bilaterally, DP 2+ b/l  Incision Sites: groin incisions without erythema, purulence or ecchymosis.       LABS:                        10.4   9.47  )-----------( 247      ( 03 Jun 2020 09:18 )             34.0       COUMADIN:  No    PT/INR - ( 02 Jun 2020 11:28 )   PT: 14.4 sec;   INR: 1.25     PTT - ( 02 Jun 2020 11:28 )  PTT:24.5 sec    06-03    140  |  104  |  28<H>  ----------------------------<  185<H>  4.5   |  21<L>  |  1.57<H>    Ca    9.4      03 Jun 2020 09:18  Mg     2.0     06-03    TPro  6.1  /  Alb  3.6  /  TBili  0.8  /  DBili  x   /  AST  22  /  ALT  11  /  AlkPhos  45  06-02          Discharge CXR:  < from: Xray Chest 1 View- PORTABLE-Routine (06.03.20 @ 02:58) >  Impression: Linear atelectasis bilateral lung bases.  < end of copied text > Patient discussed on morning rounds with Dr. Dubois     Operation / Date: readmission on 6/2, s/p TAVR on 5/26/20    Surgeon: Dr. Dubois    SUBJECTIVE ASSESSMENT:  ID: [id number]  Patient is feeling well this morning and has no complaints. Eating/drinking well. Using IS regularly. Denies any CP, palpitations, SOB, wheezing, abd pain, n/v/d/c, fevers or chills.    HOSPITAL COURSE:  This is a 75 y/o female with a PMHx of morbid obesity, HTN, HLD, GERD, TIA with carotid artery disease s/p left carotid endartectomy, CKD (baseline Cr 1.45) and chronic diastolic heart failure with severe aortic stenosis. Pt initially r/i for NSTEMI at OSH after complaints of CP and SOB for several days. Patient follows with Dr. Dubois as an outpatient. Pt transferred to [facility]. On arrival, pt noted to be in AF with RVR (rate 140s) and tachypenic and was placed on BiPAP with lopressor, amio and lasix gtts. ECHO showing EF 40% and moderate MR, severe AS and lateral wall hypokinesis. Pt deemed not a good surgical candidate and determined to be a good TAVR candidate. underwent an uncomplicated TAVR (Edward 23 mm), EF 40% on 5/26/20 with Dr. Dubois. On POD#1 pt with mild ectopy and started on low-dose BB. Patient remained stable the rest of her post-op course and was originally planned for discharge but went into atrial fibrillation with RVR so decision was made to stay overnight for observation. POD#4 pt remained in AF but is rate controlled (low 100s). She states she generally doesn't feel well and moved her bowels in the afternoon. On POD#5, Cr trended upwards, lasix d/c'ed and gentle hydration given with repeat Cr downtrended. On POD#6, pt was discharged home. Pt then re-presented to ED on 6/2/20 complaining of dizziness/weakness. Pt readmitted for observation with no objective findings and remained stable. Pt is pending being discharged pending DIANA placement/acceptance.    Over 35 minutes was spent with the patient reviewing the discharge material including medications, follow up appointments, recovery, concerning symptoms, and how to contact their health care providers if they have questions      Vital Signs Last 24 Hrs  T(C): 36 (04 Jun 2020 05:01), Max: 36.6 (03 Jun 2020 17:04)  T(F): 96.8 (04 Jun 2020 05:01), Max: 97.8 (03 Jun 2020 17:04)  HR: 81 (04 Jun 2020 06:27) (75 - 101)  BP: 131/67 (04 Jun 2020 06:27) (103/55 - 136/88)  BP(mean): 90 (04 Jun 2020 06:27) (73 - 108)  RR: 18 (04 Jun 2020 06:27) (16 - 18)  SpO2: 99% (04 Jun 2020 06:27) (95% - 100%)    EPICARDIAL WIRES REMOVED: n/a  TIE DOWNS REMOVED: n/a    PHYSICAL EXAM:  General: well appearing sitting up in bed in NAD   Neurological: AOx3. Motor skills grossly intact  Cardiovascular: Normal S1/S2. Irregular rhythm, normal rate. No murmurs  Respiratory: Lungs CTA bilaterally. No wheezing or rales  Gastrointestinal: +BS in all 4 quadrants. Non-distended. Soft. Non-tender  Extremities: Strength 5/5 b/l upper/lower extremities. Sensation grossly intact upper/lower extremities. No edema. No calf tenderness.  Vascular: Radial 2+bilaterally, DP 2+ b/l  Incision Sites: groin incisions without erythema, purulence or ecchymosis.       LABS:                        10.4   9.47  )-----------( 247      ( 03 Jun 2020 09:18 )             34.0       COUMADIN:  No    PT/INR - ( 02 Jun 2020 11:28 )   PT: 14.4 sec;   INR: 1.25     PTT - ( 02 Jun 2020 11:28 )  PTT:24.5 sec    06-03    140  |  104  |  28<H>  ----------------------------<  185<H>  4.5   |  21<L>  |  1.57<H>    Ca    9.4      03 Jun 2020 09:18  Mg     2.0     06-03    TPro  6.1  /  Alb  3.6  /  TBili  0.8  /  DBili  x   /  AST  22  /  ALT  11  /  AlkPhos  45  06-02          Discharge CXR:  < from: Xray Chest 1 View- PORTABLE-Routine (06.03.20 @ 02:58) >  Impression: Linear atelectasis bilateral lung bases.  < end of copied text >

## 2020-06-05 NOTE — PROGRESS NOTE ADULT - SUBJECTIVE AND OBJECTIVE BOX
Patient discussed on morning rounds with Dr. Dubois    Operation / Date:  TAVR, readmitted  for dizziness/weakness     SUBJECTIVE ASSESSMENT:  74y Female assessed at bedside with Dominican  ID [id number]. The patient states she feels "not that good" today but on further questioning denies dizziness, nausea, vomiting, weakness, and states she feels better than she did yesterday. She has no acute complaints. She sates she walked 2X yesterday and agrees to ambulate today. Her last bowel movement was 2 days ago. She denies chest pain and SOB.     Of note, patient is refusing labs at bedside this morning.     Vital Signs Last 24 Hrs  T(C): 36.3 (2020 13:17), Max: 36.6 (2020 17:05)  T(F): 97.4 (2020 13:17), Max: 97.9 (2020 08:43)  HR: 108 (:17) (75 - 108)  BP: 146/89 (:17) (113/77 - 146/89)  BP(mean): 108 (:17) (72 - 108)  RR: 18 (:17) (16 - 20)  SpO2: 99% (2020 13:17) (98% - 100%)  I&O's Detail    2020 07:01  -  2020 07:00  --------------------------------------------------------  IN:    Oral Fluid: 180 mL  Total IN: 180 mL    OUT:    Incontinent per Collection Ba mL    Voided: 1000 mL  Total OUT: 1200 mL    Total NET: -1020 mL      2020 07:01  -  2020 13:37  --------------------------------------------------------  IN:    Oral Fluid: 480 mL  Total IN: 480 mL    OUT:    Voided: 120 mL  Total OUT: 120 mL    Total NET: 360 mL    CHEST TUBE:  No  JUAN DANIEL DRAIN:  No.  EPICARDIAL WIRES: No.  TIE DOWNS: No.  KOO: No.    PHYSICAL EXAM:  General: Well appearing, in NAD assessed sitting comfortably at bedside  Neurological: A&OX3, no focal deficits noted. Moving bilateral upper and lower extremities.   Cardiovascular: Irregular rhythm, no murmurs, rubs, gallops  Respiratory: Clear to auscultation bilateral posterior lung fields. No wheezes, rales, rhonchi.  Gastrointestinal: +BS, NT/ND  Extremities: No peripheral edema, no calf tenderness. Full strength and ROM bilateral upper and lower extremities    Vascular: Bilateral distal pulses 2+  Incision Sites: Bilateral groins without hematoma, tenderness     LABS:                        10.2   9.41  )-----------( 238      ( 2020 12:18 )             33.1       COUMADIN:  No        142  |  107  |  29<H>  ----------------------------<  98  4.5   |  24  |  1.57<H>    Ca    9.5      2020 12:17  Mg     2.1     06-04    MEDICATIONS  (STANDING):  apixaban 5 milliGRAM(s) Oral every 12 hours  aspirin enteric coated 81 milliGRAM(s) Oral daily  atorvastatin 40 milliGRAM(s) Oral at bedtime  diltiazem    milliGRAM(s) Oral daily  latanoprost 0.005% Ophthalmic Solution 1 Drop(s) Both EYES at bedtime  metoprolol tartrate 25 milliGRAM(s) Oral every 12 hours  pantoprazole    Tablet 40 milliGRAM(s) Oral before breakfast  senna 2 Tablet(s) Oral at bedtime  sodium chloride 0.9% lock flush 3 milliLiter(s) IV Push every 8 hours    MEDICATIONS  (PRN):  acetaminophen   Tablet .. 650 milliGRAM(s) Oral every 6 hours PRN Mild Pain (1 - 3)    RADIOLOGY & ADDITIONAL TESTS:  < from: Xray Chest 1 View- PORTABLE-Routine (20 @ 04:28) >  IMPRESSION:    Support Devices: None  Heart: Cardiomegaly, TAVR  Mediastinum:  Unremarkable  Lungs/Airways:  Unremarkable  Bones/Soft tissues: Unremarkable    A/P:    Ms. Martin is a 75 y/o female with a PMHx of morbid obesity, HTN, HLD, GERD, TIA with carotid artery disease s/p left carotid endartectomy, CKD (baseline Cr 1.45) and chronic diastolic heart failure with severe aortic stenosis. Pt was initially r/i for NSTEMI on 20 at OSH after complaints of CP and SOB for several days. Patient follows with Dr. Dubois as an outpatient and was transferred to [facility] under his care. On arrival, pt noted to be in AF with RVR (rate 140s) and tachypenic and was placed on BiPAP with lopressor, heparin, amio and lasix gtts. ECHO showed EF 40% and moderate MR, severe AS and lateral wall hypokinesis. A CT chest was negative for PE and a CT head was significant for 2.6 cm X 2.1 cm mass lesion with mild hydrocephalus (neurology consulted, will need outpatient follow up). Pt deemed not a good surgical candidate and determined to be a good TAVR candidate. She underwent an uncomplicated TAVR (Edward 23 mm), EF 40% on 20 with Dr. Dubois. On POD#1 pt with mild ectopy and started on low-dose BB. Patient remained stable the rest of her post-op course however discharge was delayed after she went into atrial fibrillation with RVR. On POD#5, Cr trended upwards, lasix d/c'ed and gentle hydration given with repeat Cr downtrended. On POD#6, pt was discharged home. Pt then re-presented to ED on 20 (same day of discharge) complaining of dizziness/weakness. Pt readmitted for observation with no objective findings and remained stable. Pt is pending being discharged pending DIANA placement/acceptance.    Neurovascular:   - No delirium. Pain well controlled with current regimen.  -Continue tylenol PRN    Cardiovascular:   - S/p TAVR, readmit with dizziness/weakness, has remained clinically stable   -Hemodynamically stable  - Atrial fibrillation, now rate controlled, continue cardizem 120 mg daily, eliquis 2.5 mg daily  - Hx of HTN, BP controlled 137//81, continue metoprolol tartrate 25 mg q12  - Hx of CAD, s/p STEMI, continue ASA 81 mg   - Patient has MCOT in place, activated before prior discharge    Respiratory:   - 02 Sat = 99% on RA.  -Encourage C+DB and Use of IS 10x / hr while awake.  -CXR : no effusions, consolidations, PTX    GI:   - Stable.  -Continue GI PPX with protonix  -PO Diet.  - Has had constipation, last BM 2 days ago, continue miralax and senna    Renal / :  - Hx of CKD with baseline Cr 1.45, 1.57 here  (pt refused labs today)  -Continue to monitor renal function.  -Monitor I/O's.  - Replete electrolytes PRN    Endocrine:    -A1c 5.7, no known hx of diabetes  -TSH 0.542, no known hx of thyroid disease     Hematologic:  -H/H stable at 10.2/33.1 yesterday with no obvious signs of bleeding, pt refused labs today   -Coagulation Panel.    ID:  -Pt remains afebrile with no signs of infection  -Continue to monitor CBC  -Observe for SIRS/Sepsis Syndrome.    Prophylaxis:  -DVT prophylaxis with eliquis   -SCD's    Disposition:  -DIANA, authorized, pending bed availability Patient discussed on morning rounds with Dr. Dubois    Operation / Date:  TAVR, readmitted  for dizziness/weakness     SUBJECTIVE ASSESSMENT:  74y Female assessed at bedside with Japanese  ID [id number]. The patient states she feels "not that good" today but on further questioning denies dizziness, nausea, vomiting, weakness, and states she feels better than she did yesterday. She has no acute complaints. She sates she walked 2X yesterday and agrees to ambulate today. Her last bowel movement was 2 days ago. She denies chest pain and SOB.     Of note, patient is refusing labs at bedside this morning.     Vital Signs Last 24 Hrs  T(C): 36.3 (2020 13:17), Max: 36.6 (2020 17:05)  T(F): 97.4 (2020 13:17), Max: 97.9 (2020 08:43)  HR: 108 (:17) (75 - 108)  BP: 146/89 (:17) (113/77 - 146/89)  BP(mean): 108 (:17) (72 - 108)  RR: 18 (:17) (16 - 20)  SpO2: 99% (2020 13:17) (98% - 100%)  I&O's Detail    2020 07:01  -  2020 07:00  --------------------------------------------------------  IN:    Oral Fluid: 180 mL  Total IN: 180 mL    OUT:    Incontinent per Collection Ba mL    Voided: 1000 mL  Total OUT: 1200 mL    Total NET: -1020 mL      2020 07:01  -  2020 13:37  --------------------------------------------------------  IN:    Oral Fluid: 480 mL  Total IN: 480 mL    OUT:    Voided: 120 mL  Total OUT: 120 mL    Total NET: 360 mL    CHEST TUBE:  No  JUAN DANIEL DRAIN:  No.  EPICARDIAL WIRES: No.  TIE DOWNS: No.  KOO: No.    PHYSICAL EXAM:  General: Well appearing, in NAD assessed sitting comfortably at bedside  Neurological: A&OX3, no focal deficits noted. Moving bilateral upper and lower extremities.   Cardiovascular: Irregular rhythm, no murmurs, rubs, gallops  Respiratory: Clear to auscultation bilateral posterior lung fields. No wheezes, rales, rhonchi.  Gastrointestinal: +BS, NT/ND  Extremities: No peripheral edema, no calf tenderness. Full strength and ROM bilateral upper and lower extremities    Vascular: Bilateral distal pulses 2+  Incision Sites: Bilateral groins without hematoma, tenderness     LABS:                        10.2   9.41  )-----------( 238      ( 2020 12:18 )             33.1       COUMADIN:  No        142  |  107  |  29<H>  ----------------------------<  98  4.5   |  24  |  1.57<H>    Ca    9.5      2020 12:17  Mg     2.1     06-04    MEDICATIONS  (STANDING):  apixaban 5 milliGRAM(s) Oral every 12 hours  aspirin enteric coated 81 milliGRAM(s) Oral daily  atorvastatin 40 milliGRAM(s) Oral at bedtime  diltiazem    milliGRAM(s) Oral daily  latanoprost 0.005% Ophthalmic Solution 1 Drop(s) Both EYES at bedtime  metoprolol tartrate 25 milliGRAM(s) Oral every 12 hours  pantoprazole    Tablet 40 milliGRAM(s) Oral before breakfast  senna 2 Tablet(s) Oral at bedtime  sodium chloride 0.9% lock flush 3 milliLiter(s) IV Push every 8 hours    MEDICATIONS  (PRN):  acetaminophen   Tablet .. 650 milliGRAM(s) Oral every 6 hours PRN Mild Pain (1 - 3)    RADIOLOGY & ADDITIONAL TESTS:  < from: Xray Chest 1 View- PORTABLE-Routine (20 @ 04:28) >  IMPRESSION:    Support Devices: None  Heart: Cardiomegaly, TAVR  Mediastinum:  Unremarkable  Lungs/Airways:  Unremarkable  Bones/Soft tissues: Unremarkable    A/P:    Ms. Martin is a 73 y/o female with a PMHx of morbid obesity, HTN, HLD, GERD, TIA with carotid artery disease s/p left carotid endartectomy, CKD (baseline Cr 1.45) and chronic diastolic heart failure with severe aortic stenosis. Pt was initially r/i for NSTEMI on 20 at OSH after complaints of CP and SOB for several days. Patient follows with Dr. Dubois as an outpatient and was transferred to [facility] under his care. On arrival, pt noted to be in AF with RVR (rate 140s) and tachypenic and was placed on BiPAP with lopressor, heparin, amio and lasix gtts. ECHO showed EF 40% and moderate MR, severe AS and lateral wall hypokinesis. A CT chest was negative for PE and a CT head was significant for 2.6 cm X 2.1 cm mass lesion with mild hydrocephalus (neurology consulted, will need outpatient follow up). Pt deemed not a good surgical candidate and determined to be a good TAVR candidate. She underwent an uncomplicated TAVR (Edward 23 mm), EF 40% on 20 with Dr. Dubois. On POD#1 pt with mild ectopy and started on low-dose BB. Patient remained stable the rest of her post-op course however discharge was delayed after she went into atrial fibrillation with RVR. On POD#5, Cr trended upwards, lasix d/c'ed and gentle hydration given with repeat Cr downtrended. On POD#6, pt was discharged home. Pt then re-presented to ED on 20 (same day of discharge) complaining of dizziness/weakness. Pt readmitted for observation with no objective findings and remained stable. Pt is pending being discharged pending DIANA placement/acceptance.    Neurovascular:   - No delirium. Pain well controlled with current regimen.  -Continue tylenol PRN    Cardiovascular:   - S/p TAVR, readmit with dizziness/weakness, has remained clinically stable   -Hemodynamically stable  - Atrial fibrillation, now rate controlled, continue cardizem 120 mg daily, apixaban 5 mg q12  - Hx of HTN, BP controlled 137//81, continue metoprolol tartrate 25 mg q12  - Hx of CAD, s/p STEMI, continue ASA 81 mg   - Patient has MCOT in place, activated before prior discharge    Respiratory:   - 02 Sat = 99% on RA.  -Encourage C+DB and Use of IS 10x / hr while awake.  -CXR : no effusions, consolidations, PTX    GI:   - Stable.  -Continue GI PPX with protonix  -PO Diet.  - Has had constipation, last BM 2 days ago, continue miralax and senna    Renal / :  - Hx of CKD with baseline Cr 1.45, 1.57 here  (pt refused labs today)  -Continue to monitor renal function.  -Monitor I/O's.  - Replete electrolytes PRN    Endocrine:    -A1c 5.7, no known hx of diabetes  -TSH 0.542, no known hx of thyroid disease     Hematologic:  -H/H stable at 10.2/33.1 yesterday with no obvious signs of bleeding, pt refused labs today   -Coagulation Panel.    ID:  -Pt remains afebrile with no signs of infection  -Continue to monitor CBC  -Observe for SIRS/Sepsis Syndrome.    Prophylaxis:  -DVT prophylaxis with apixaban  -SCD's    Disposition:  -DIANA, authorized, pending bed availability

## 2020-06-05 NOTE — DISCHARGE NOTE NURSING/CASE MANAGEMENT/SOCIAL WORK - NSDCPEFALRISK_GEN_ALL_CORE
call 911 or visit the emergency room if you are experiencing any of these symptoms:  Chest pain, Choking, Difficulty breathing, Severe burns, Severe abdominal pain, Altered mental status, Severe head injury and/or Worst headache of life, Loss of limb or Conciousness, Convulsions and seizures, Vaginal bleeding in pregnancy, High Blood Pressure, Complicated Fracture, Deep Open Wound/Cut, Eye Pain.  Medical compliance with plan discussed and risks of non-compliance reviewed.   Patient education completed on disease process, etiology & prognosis.   Proper usage and side effects of medications reviewed & discussed.   Follow up pcp as clinically indicated as discussed with patient who verbalized understanding of & agreement with the plan.       Patient Education     4 Steps for Eating Healthier  Changing the way you eat can improve your health. It can lower your cholesterol and blood pressure, and help you stay at a healthy weight. Your diet doesn’t have to be bland and boring to be healthy. Just watch your calories and follow these steps:    Step 1. Eat fewer unhealthy fats  · Choose more fish and lean meats instead of fatty cuts of meat.  · Skip butter and lard, and use less margarine.  · Pass on foods that have palm, coconut, or hydrogenated oils.  · Eat fewer high-fat dairy foods like cheese, ice cream, and whole milk.  · Get a heart-healthy cookbook and try some low-fat recipes.  Step 2. Go light on salt  · Keep the saltshaker off the table.  · Limit high-salt ingredients, such as soy sauce, bouillon, and garlic salt.  · Instead of adding salt when cooking, season your food with herbs and flavorings. Try lemon, garlic, and onion, or salt-free herb seasonings.  · Limit convenience foods, such as boxed or canned foods and restaurant food.  · Read food labels and choose lower-sodium options.  Step 3. Limit sugar  · Pause before you add sugars to pancakes, cereal, coffee, or tea. This includes white and brown table  [employer]. [address] [place] [address]. cholesterol is measured in milligrams per deciliter (mg/dL). This is what your number may mean:  · Less than 200 mg/dL is a good number. Your risk of heart disease may be low.  · 200 mg/dL to 239 mg/dL is borderline high. You may be at some risk for heart disease.  · 240 mg/dL or higher means your cholesterol is high. Your risk for heart disease may be higher than that of a person with normal cholesterol.  Keep in mind that your risk for heart disease based on your total cholesterol greatly depends on your HDL and LDL levels and other risk factors.  High cholesterol may be linked to these conditions:  · Inherited diseases that cause high cholesterol  · Gallbladder stones  · Kidney failure  · Cancer of the pancreas or prostate  · Low thyroid hormone  · Diabetes  · Obesity  Cholesterol levels below 140 mg/dL may happen with:  · Very healthy lifestyle and diet  · Severe liver disease  · High thyroid hormone  · Severe burns  · White blood cell cancers  · Poor nutrition  · Some types of anemia  · Short-term illness or infection  · Chronic lung disease  How is this test done?  The test is done with a blood sample. A needle is used to draw blood from a vein in your arm or hand.   Does this test pose any risks?  Having a blood test with a needle carries some risks. These include bleeding, infection, bruising, and feeling lightheaded. When the needle pricks your arm or hand, you may feel a slight sting or pain. Afterward, the site may be sore.   What might affect my test results?  Your diet, age, alcohol use, and other lifestyle choices may affect your results. Many medicines may also affect your results. Pregnancy may also affect your results. Having a heart attack in the last 3 months will also affect your results.  How do I get ready for this test?  You should keep to your regular diet and not drink alcohol for at least 2 days before this test. You will be asked to not eat or drink anything but water for a certain amount  Patient information on fall and injury prevention of time before the test. This test is usually done in the morning after you fast overnight. If you take medicines in the morning, ask your healthcare provider if you should take your pills.  In addition, be sure your healthcare provider knows about all medicines, herbs, vitamins, and supplements you are taking. This includes medicines that don't need a prescription and any illegal drugs you may use.  © 4933-1119 [employer]. [address] [place] [address], [place] [address]. All rights reserved. This information is not intended as a substitute for professional medical care. Always follow your healthcare professional's instructions.           Patient Education     Healthy Snacking  A common myth about snacking is that it’s not good for you. This might be true if you’re helping yourself to candy, chips, or other junk foods throughout the day. But healthy snacking is possible -- it’s what you eat and how much you eat that matters.    How to make snacks work for you  The key to healthy snacking is to make smart choices about what you’re eating. Snacks should come from one or more of the following food groups:  · Grains  · Fruits  · Vegetables  · Dairy  · Protein  They should also be high in nutrients, and low in fat, sugar, and salt. When snacking, it’s also important to watch how much you’re eating. A snack should be treated as a very small meal. The point is to eat just enough to take the edge off your hunger, not to eat until you’re full.  Pack snacks ahead of time  The body’s fuel runs out within several hours after eating a meal. If you don’t eat, you’ll feel your energy level drop. You may also notice that you’re less focused and alert. Try packing snacks to bring with you to work, school, or wherever your busy schedule takes you. Otherwise, you’ll end up relying on snacks from vending machines or convenience stores which are often high in fat and low in nutrients.  Pick your snacks wisely  Low-fat choices  to choose:  · Whole-wheat bagel with a smear of low-fat cream cheese  · Fat-free or low-fat muffin  · Pretzels  · Rice cakes  · Low-fat granola bars  · Whole-grain crackers and asaf crackers  · Low-fat and unsalted microwave popcorn  High-fat choices to avoid:  · Bulgarian and donuts  · Snack cakes and cupcakes  · Chips and crackers  · Chocolate bars  · Cream-filled cookies  · Ready-made popcorn  Snacks for anytime, anywhere  In addition to some of the snacks mentioned above, other good packable snacks include:  · Single-serving boxes of dry and unsweetened cereal  · Fresh fruit (oranges, pears, grapes, and apples all travel well)  · Dried fruit  · Vegetable sticks or baby carrots  · Mini-boxes of raisins  · Unsweetened juice boxes  · Unsalted nuts and seeds  Snack supplies for home and work  · Raw vegetables  · Fat-free or low-fat yogurt  · Unsweetened applesauce or canned fruit  · Low-fat cheese slices or string cheese  · Fat-free or low-fat cottage cheese  · Whole-wheat tortillas  · Hard-boiled eggs  · Peanut butter  · Slices of lean turkey or chicken  Date Last Reviewed: 6/1/2017  © 0197-9880 [employer]. [address] [place] [address]. All rights reserved. This information is not intended as a substitute for professional medical care. Always follow your healthcare professional's instructions.           Patient Education     Exercise: Making the Most of Your Time  Your exercise goal is 30 minutes on most days. Aim for a total of 150 or more minutes a week. Not sure you can fit one 30-minute block of exercise time into your day? Split it up into shorter 10- and 15-minute blocks. Do this 2 to 3 times a day. You'll still get all of the benefits of exercise.    Use your whole body  Aerobic exercise works your heart and lungs. Some examples are walking, running, and cycling. Try adding a few other activities, too. This can help you strengthen and stretch many different muscles in your  1492-8272 [employer]. [address] [place] [address], [place] [address]. All rights reserved. This information is not intended as a substitute for professional medical care. Always follow your healthcare professional's instructions.

## 2020-06-06 NOTE — PROGRESS NOTE ADULT - SUBJECTIVE AND OBJECTIVE BOX
Patient discussed on morning rounds with Dr. Dubois    Operation / Date: 5/26 TAVR, readmitted 6/2 for dizziness/weakness     SUBJECTIVE ASSESSMENT:  74y Female         Vital Signs Last 24 Hrs  T(C): 36 (06 Jun 2020 05:01), Max: 36.6 (05 Jun 2020 08:43)  T(F): 96.8 (06 Jun 2020 05:01), Max: 97.9 (05 Jun 2020 08:43)  HR: 98 (06 Jun 2020 05:47) (76 - 114)  BP: 151/75 (06 Jun 2020 05:47) (115/69 - 163/85)  BP(mean): 105 (06 Jun 2020 05:47) (96 - 113)  RR: 17 (06 Jun 2020 05:47) (17 - 20)  SpO2: 99% (06 Jun 2020 05:47) (97% - 99%)  I&O's Detail    05 Jun 2020 07:01  -  06 Jun 2020 07:00  --------------------------------------------------------  IN:    Oral Fluid: 720 mL  Total IN: 720 mL    OUT:    Voided: 280 mL  Total OUT: 280 mL    Total NET: 440 mL    CHEST TUBE:  No  JUAN DANIEL DRAIN: No.  EPICARDIAL WIRES: No.  TIE DOWNS: No.  KOO: No.    PHYSICAL EXAM:    General:     Neurological:    Cardiovascular:    Respiratory:    Gastrointestinal:    Extremities:    Vascular:    Incision Sites:    LABS:                        10.2   9.41  )-----------( 238      ( 04 Jun 2020 12:18 )             33.1       COUMADIN:  No    06-04    142  |  107  |  29<H>  ----------------------------<  98  4.5   |  24  |  1.57<H>    Ca    9.5      04 Jun 2020 12:17  Mg     2.1     06-04    MEDICATIONS  (STANDING):  apixaban 5 milliGRAM(s) Oral every 12 hours  aspirin enteric coated 81 milliGRAM(s) Oral daily  atorvastatin 40 milliGRAM(s) Oral at bedtime  diltiazem    milliGRAM(s) Oral daily  latanoprost 0.005% Ophthalmic Solution 1 Drop(s) Both EYES at bedtime  metoprolol tartrate 25 milliGRAM(s) Oral every 12 hours  pantoprazole    Tablet 40 milliGRAM(s) Oral before breakfast  senna 2 Tablet(s) Oral at bedtime  sodium chloride 0.9% lock flush 3 milliLiter(s) IV Push every 8 hours    MEDICATIONS  (PRN):  acetaminophen   Tablet .. 650 milliGRAM(s) Oral every 6 hours PRN Mild Pain (1 - 3)    RADIOLOGY & ADDITIONAL TESTS:  < from: Xray Chest 1 View- PORTABLE-Routine (06.04.20 @ 04:28) >  INTERPRETATION:  Chest, single portable view    HISTORY: Status post cardiac surgery, assess cardiopulmonary status and support devices.    Comparison: Status post cardiac surgery, assess cardiopulmonary status and support devices.    IMPRESSION:    Support Devices: None  Heart: Cardiomegaly, TAVR  Mediastinum:  Unremarkable  Lungs/Airways:  Unremarkable  Bones/Soft tissues: Unremarkable    A/P:    Ms. Martin is a 73 y/o female with a PMHx of morbid obesity, HTN, HLD, GERD, TIA with carotid artery disease s/p left carotid endartectomy, CKD (baseline Cr 1.45) and chronic diastolic heart failure with severe aortic stenosis. Pt was initially r/i for NSTEMI on 5/20/20 at OSH after complaints of CP and SOB for several days. Patient follows with Dr. Dubois as an outpatient and was transferred to [facility] under his care. On arrival, pt noted to be in AF with RVR (rate 140s) and tachypenic and was placed on BiPAP with lopressor, heparin, amio and lasix gtts. ECHO showed EF 40% and moderate MR, severe AS and lateral wall hypokinesis. A CT chest was negative for PE and a CT head was significant for 2.6 cm X 2.1 cm mass lesion with mild hydrocephalus (neurology consulted, will need outpatient follow up). Pt deemed not a good surgical candidate and determined to be a good TAVR candidate. She underwent an uncomplicated TAVR (Edward 23 mm), EF 40% on 5/26/20 with Dr. Dubois. On POD#1 pt with mild ectopy and started on low-dose BB. Patient remained stable the rest of her post-op course however discharge was delayed after she went into atrial fibrillation with RVR. On POD#5, Cr trended upwards, lasix d/c'ed and gentle hydration given with repeat Cr downtrended. On POD#6, pt was discharged home. Pt then re-presented to ED on 6/2/20 (same day of discharge) complaining of dizziness/weakness. Pt readmitted for observation with no objective findings and remained stable. Pt is pending being discharged pending DIANA placement/acceptance.    Neurovascular:   - No delirium. Pain well controlled with current regimen.  -Continue tylenol PRN    Cardiovascular:   - S/p TAVR, readmit with dizziness/weakness, has remained clinically stable   -Hemodynamically stable  - Atrial fibrillation, now rate controlled with HR 70s, continue cardizem 120 mg daily, apixaban 5 mg q12  - Hx of HTN, BP controlled 115//85, continue metoprolol tartrate 25 mg q12  - Hx of CAD, s/p STEMI, continue ASA 81 mg   - Patient has MCOT in place, activated before prior discharge    Respiratory:   - 02 Sat = 99% on RA.  -Encourage C+DB and Use of IS 10x / hr while awake.  -CXR 6/4: no effusions, consolidations, PTX    GI:   - Stable.  -Continue GI PPX with protonix  -PO Diet.  - Continue miralax and senna    Renal / :  - Hx of CKD with baseline Cr 1.45, 1.57 here 6/4 (pt refused labs today)  -Continue to monitor renal function.  -Monitor I/O's.  - Replete electrolytes PRN    Endocrine:    -A1c 5.7, no known hx of diabetes  -TSH 0.542, no known hx of thyroid disease     Hematologic:  -H/H stable at 10.2/33.1 6/4 with no obvious signs of bleeding, pt refused labs today   -Coagulation Panel.    ID:  -Pt remains afebrile with no signs of infection  -Continue to monitor CBC  -Observe for SIRS/Sepsis Syndrome.    Prophylaxis:  -DVT prophylaxis with apixaban   -SCD's    Disposition:  -DIANA, authorized, pending bed availability     **Note: patient has no peripheral access and is refusing new IV placement Patient discussed on morning rounds with Dr. Dubois    Operation / Date: 5/26 TAVR, readmitted 6/2 for dizziness/weakness     SUBJECTIVE ASSESSMENT:  74y Female assessed at bedside with [place] angelina  ID [id number]. The patient states she feels the same today as yesterday, "weak and tired." She felt okay with ambulation yesterday and agrees to continue to ambulate today. She denies fever, chills, nausea, vomiting. She has no chest pain or shortness of breath. She had a BM yesterday and has no abdominal pain. She does note right knee pain today.     Vital Signs Last 24 Hrs  T(C): 36 (06 Jun 2020 05:01), Max: 36.6 (05 Jun 2020 08:43)  T(F): 96.8 (06 Jun 2020 05:01), Max: 97.9 (05 Jun 2020 08:43)  HR: 98 (06 Jun 2020 05:47) (76 - 114)  BP: 151/75 (06 Jun 2020 05:47) (115/69 - 163/85)  BP(mean): 105 (06 Jun 2020 05:47) (96 - 113)  RR: 17 (06 Jun 2020 05:47) (17 - 20)  SpO2: 99% (06 Jun 2020 05:47) (97% - 99%)  I&O's Detail    05 Jun 2020 07:01  -  06 Jun 2020 07:00  --------------------------------------------------------  IN:    Oral Fluid: 720 mL  Total IN: 720 mL    OUT:    Voided: 280 mL  Total OUT: 280 mL    Total NET: 440 mL    CHEST TUBE:  No  JUAN DANIEL DRAIN: No.  EPICARDIAL WIRES: No.  TIE DOWNS: No.  KOO: No.    PHYSICAL EXAM:  General: Well appearing, in NAD assessed laying comfortably in bed   Neurological: A&OX3, no focal deficits noted. Moving bilateral upper and lower extremities.   Cardiovascular: Irregular rhythm, no murmurs, rubs, gallops  Respiratory: Clear to auscultation bilateral posterior lung fields. No wheezes, rales, rhonchi.  Gastrointestinal: +BS, NT/ND  Extremities: No peripheral edema, no calf tenderness. Full strength and ROM bilateral upper and lower extremities    Vascular: Bilateral distal pulses 2+  Incision Sites: Bilateral groins without hematoma, tenderness     LABS:                        10.2   9.41  )-----------( 238      ( 04 Jun 2020 12:18 )             33.1       COUMADIN:  No    06-04    142  |  107  |  29<H>  ----------------------------<  98  4.5   |  24  |  1.57<H>    Ca    9.5      04 Jun 2020 12:17  Mg     2.1     06-04    MEDICATIONS  (STANDING):  apixaban 5 milliGRAM(s) Oral every 12 hours  aspirin enteric coated 81 milliGRAM(s) Oral daily  atorvastatin 40 milliGRAM(s) Oral at bedtime  diltiazem    milliGRAM(s) Oral daily  latanoprost 0.005% Ophthalmic Solution 1 Drop(s) Both EYES at bedtime  metoprolol tartrate 25 milliGRAM(s) Oral every 12 hours  pantoprazole    Tablet 40 milliGRAM(s) Oral before breakfast  senna 2 Tablet(s) Oral at bedtime  sodium chloride 0.9% lock flush 3 milliLiter(s) IV Push every 8 hours    MEDICATIONS  (PRN):  acetaminophen   Tablet .. 650 milliGRAM(s) Oral every 6 hours PRN Mild Pain (1 - 3)    RADIOLOGY & ADDITIONAL TESTS:  < from: Xray Chest 1 View- PORTABLE-Routine (06.04.20 @ 04:28) >  INTERPRETATION:  Chest, single portable view    HISTORY: Status post cardiac surgery, assess cardiopulmonary status and support devices.    Comparison: Status post cardiac surgery, assess cardiopulmonary status and support devices.    IMPRESSION:    Support Devices: None  Heart: Cardiomegaly, TAVR  Mediastinum:  Unremarkable  Lungs/Airways:  Unremarkable  Bones/Soft tissues: Unremarkable    A/P:    Ms. Martin is a 73 y/o female with a PMHx of morbid obesity, HTN, HLD, GERD, TIA with carotid artery disease s/p left carotid endartectomy, CKD (baseline Cr 1.45) and chronic diastolic heart failure with severe aortic stenosis. Pt was initially r/i for NSTEMI on 5/20/20 at OSH after complaints of CP and SOB for several days. Patient follows with Dr. Dubois as an outpatient and was transferred to [facility] under his care. On arrival, pt noted to be in AF with RVR (rate 140s) and tachypenic and was placed on BiPAP with lopressor, heparin, amio and lasix gtts. ECHO showed EF 40% and moderate MR, severe AS and lateral wall hypokinesis. A CT chest was negative for PE and a CT head was significant for 2.6 cm X 2.1 cm mass lesion with mild hydrocephalus (neurology consulted, will need outpatient follow up). Pt deemed not a good surgical candidate and determined to be a good TAVR candidate. She underwent an uncomplicated TAVR (Edward 23 mm), EF 40% on 5/26/20 with Dr. Dubois. On POD#1 pt with mild ectopy and started on low-dose BB. Patient remained stable the rest of her post-op course however discharge was delayed after she went into atrial fibrillation with RVR. On POD#5, Cr trended upwards, lasix d/c'ed and gentle hydration given with repeat Cr downtrended. On POD#6, pt was discharged home. Pt then re-presented to ED on 6/2/20 (same day of discharge) complaining of dizziness/weakness. Pt readmitted for observation with no objective findings and remained stable. Pt is pending being discharged pending DIANA placement/acceptance.    Neurovascular:   - No delirium. Pain well controlled with current regimen.  -Continue tylenol PRN    Cardiovascular:   - S/p TAVR, readmit with dizziness/weakness, has remained clinically stable   -Hemodynamically stable  - Atrial fibrillation, now rate controlled with HR 70s, continue cardizem 120 mg daily, apixaban 2.5 mg q12 (decreased from 5 mg for Cr >1.5)  - Hx of HTN, BP controlled 115//85, continue metoprolol tartrate 25 mg q12  - Hx of CAD, s/p STEMI, continue ASA 81 mg   - Patient has MCOT in place, activated before prior discharge    Respiratory:   - 02 Sat = 99% on RA.  -Encourage C+DB and Use of IS 10x / hr while awake.  -CXR 6/4: no effusions, consolidations, PTX    GI:   - Stable.  -Continue GI PPX with protonix  -PO Diet.  - Continue miralax and senna    Renal / :  - Hx of CKD with baseline Cr 1.45, 1.57 here 6/4 (pt refused labs today)  -Continue to monitor renal function.  -Monitor I/O's.  - Replete electrolytes PRN    Endocrine:    -A1c 5.7, no known hx of diabetes  -TSH 0.542, no known hx of thyroid disease     Hematologic:  -H/H stable at 10.2/33.1 6/4 with no obvious signs of bleeding, pt refused labs today   -Coagulation Panel.    ID:  -Pt remains afebrile with no signs of infection  -Continue to monitor CBC  -Observe for SIRS/Sepsis Syndrome.    Prophylaxis:  -DVT prophylaxis with apixaban   -SCD's    Disposition:  -DIANA, authorized, pending bed availability     **Note: patient has no peripheral access and is refusing new IV placement today. Also refusing daily labs. Dr. Dubois aware.

## 2020-06-06 NOTE — PROGRESS NOTE ADULT - SUBJECTIVE AND OBJECTIVE BOX
CTICU  CRITICAL  CARE  attending     Hand off received 					   Pertinent clinical, laboratory, radiographic, hemodynamic, echocardiographic, respiratory data, microbiologic data and chart were reviewed and analyzed frequently throughout the course of the day and night  Patient seen and examined with CTS/ SH attending at bedside  Pt is a 74y , Female, HEALTH ISSUES - PROBLEM Dx:      , FAMILY HISTORY:  PAST MEDICAL & SURGICAL HISTORY:  H/O carotid stenosis  TIA (transient ischemic attack)  Chronic kidney disease (CKD)  Aortic stenosis  HLD (hyperlipidemia)  HTN (hypertension)  H/O carotid endarterectomy    Patient is a 74y old  Female who presents with a chief complaint of weakness (06 Jun 2020 08:16)      14 system review was unremarkable    Vital signs, hemodynamic and respiratory parameters were reviewed from the bedside nursing flowsheet.  ICU Vital Signs Last 24 Hrs  T(C): 36.2 (06 Jun 2020 13:18), Max: 36.3 (06 Jun 2020 09:30)  T(F): 97.2 (06 Jun 2020 13:18), Max: 97.4 (06 Jun 2020 09:30)  HR: 85 (06 Jun 2020 20:40) (80 - 98)  BP: 142/90 (06 Jun 2020 20:40) (114/91 - 151/75)  BP(mean): 107 (06 Jun 2020 20:40) (105 - 107)  ABP: --  ABP(mean): --  RR: 18 (06 Jun 2020 20:40) (17 - 18)  SpO2: 99% (06 Jun 2020 20:40) (98% - 100%)    Adult Advanced Hemodynamics Last 24 Hrs  CVP(mm Hg): --  CVP(cm H2O): --  CO: --  CI: --  PA: --  PA(mean): --  PCWP: --  SVR: --  SVRI: --  PVR: --  PVRI: --,     Intake and output was reviewed and the fluid balance was calculated  Daily     Daily   I&O's Summary    05 Jun 2020 07:01  -  06 Jun 2020 07:00  --------------------------------------------------------  IN: 720 mL / OUT: 280 mL / NET: 440 mL    06 Jun 2020 07:01  -  06 Jun 2020 21:54  --------------------------------------------------------  IN: 400 mL / OUT: 0 mL / NET: 400 mL        All lines and drain sites were assessed  Glycemic trend was reviewedCAPILLARY BLOOD GLUCOSE        No acute change in mental status  Auscultation of the chest reveals equal bs  Abdomen is soft  Extremities are warm and well perfused  Wounds appear clean and unremarkable  Antibiotics are periop    labs            The current medications were reviewed   MEDICATIONS  (STANDING):  apixaban 2.5 milliGRAM(s) Oral every 12 hours  aspirin enteric coated 81 milliGRAM(s) Oral daily  atorvastatin 40 milliGRAM(s) Oral at bedtime  diltiazem    milliGRAM(s) Oral daily  latanoprost 0.005% Ophthalmic Solution 1 Drop(s) Both EYES at bedtime  metoprolol tartrate 25 milliGRAM(s) Oral every 12 hours  pantoprazole    Tablet 40 milliGRAM(s) Oral before breakfast  senna 2 Tablet(s) Oral at bedtime  sodium chloride 0.9% lock flush 3 milliLiter(s) IV Push every 8 hours    MEDICATIONS  (PRN):  acetaminophen   Tablet .. 650 milliGRAM(s) Oral every 6 hours PRN Mild Pain (1 - 3)       PROBLEM LIST/ ASSESSMENT:  HEALTH ISSUES - PROBLEM Dx:      ,   Patient is a 74y old  Female who presents with a chief complaint of weakness (06 Jun 2020 08:16)     s/p tavr                My plan includes :  close hemodynamic, ventilatory and drain monitoring and management per post op routine    Monitor for arrhythmias and monitor parameters for organ perfusion  beta blockade not administered due to hemodynamic instability and bradycardia  monitor neurologic status  Head of the bed should remain elevated to 45 deg .   chest PT and IS will be encouraged  monitor adequacy of oxygenation and ventilation and attempt to wean oxygen  antibiotic regimen will be tailored to the clinical, laboratory and microbiologic data  Nutritional goals will be met using po eventually , ensure adequate caloric intake and montior the same  Stress ulcer and VTE prophylaxis will be achieved    Glycemic control is satisfactory  Electrolytes have been repleted as necessary and wound care has been carried out. Pain control has been achieved.   agressive physical therapy and early mobility and ambulation goals will be met   The family was updated about the course and plan  CRITICAL CARE TIME personally provided by me  in evaluation and management, reassessments, review and interpretation of labs and x-rays, ventilator and hemodynamic management, formulating a plan and coordinating care: ___90____ MIN.  Time does not include procedural time. Time spent was non routine post-operarive caRE and included multiple and repeated evaluations at the bedside  CTICU ATTENDING     					    Kris Grimm MD

## 2020-06-07 NOTE — PROGRESS NOTE ADULT - SUBJECTIVE AND OBJECTIVE BOX
Patient discussed on morning rounds with Dr. Fox    Operation / Date: 5/26 TAVR, readmitted 6/2 for dizziness/weakness     SUBJECTIVE ASSESSMENT:  74y Female assessed at bedside with [place] phone  ID [phone]. The patient states she feels the same today as yesterday which is "weak." She denies acute complaints of dizziness, chest pain, shortness of breath, nausea, vomiting, diarrhea. She states she had a bowel movement this morning.     She continues to refuse labs/ new IV placement. Dr. Dubois aware.     Vital Signs Last 24 Hrs  T(C): 36.2 (07 Jun 2020 13:32), Max: 36.7 (07 Jun 2020 09:28)  T(F): 97.2 (07 Jun 2020 13:32), Max: 98 (07 Jun 2020 09:28)  HR: 92 (07 Jun 2020 12:45) (80 - 93)  BP: 120/87 (07 Jun 2020 12:45) (106/70 - 142/90)  BP(mean): 106 (07 Jun 2020 05:00) (82 - 107)  RR: 18 (07 Jun 2020 12:45) (16 - 18)  SpO2: 96% (07 Jun 2020 12:45) (96% - 100%)  I&O's Detail    06 Jun 2020 07:01  -  07 Jun 2020 07:00  --------------------------------------------------------  IN:    Oral Fluid: 400 mL  Total IN: 400 mL    OUT:  Total OUT: 0 mL    Total NET: 400 mL      CHEST TUBE:  No  JUAN DANIEL DRAIN: No.  EPICARDIAL WIRES: No.  TIE DOWNS: No.  KOO: No.    PHYSICAL EXAM:  General: Well appearing, in NAD assessed laying comfortably in bed   Neurological: A&OX3, no focal deficits noted. Moving bilateral upper and lower extremities.   Cardiovascular: Irregular rhythm, no murmurs, rubs, gallops  Respiratory: Clear to auscultation bilateral posterior lung fields. No wheezes, rales, rhonchi.  Gastrointestinal: +BS, NT/ND  Extremities: No peripheral edema, no calf tenderness. Full strength and ROM bilateral upper and lower extremities    Vascular: Bilateral distal pulses 2+  Incision Sites: Bilateral groins without hematoma, tenderness     LABS:      COUMADIN:  No    MEDICATIONS  (STANDING):  apixaban 2.5 milliGRAM(s) Oral every 12 hours  aspirin enteric coated 81 milliGRAM(s) Oral daily  atorvastatin 40 milliGRAM(s) Oral at bedtime  diltiazem    milliGRAM(s) Oral daily  latanoprost 0.005% Ophthalmic Solution 1 Drop(s) Both EYES at bedtime  lidocaine   Patch 2 Patch Transdermal every 24 hours  metoprolol tartrate 25 milliGRAM(s) Oral every 12 hours  pantoprazole    Tablet 40 milliGRAM(s) Oral before breakfast  polyethylene glycol 3350 17 Gram(s) Oral daily  senna 2 Tablet(s) Oral at bedtime  sodium chloride 0.9% lock flush 3 milliLiter(s) IV Push every 8 hours    MEDICATIONS  (PRN):  acetaminophen   Tablet .. 650 milliGRAM(s) Oral every 6 hours PRN Mild Pain (1 - 3)      RADIOLOGY & ADDITIONAL TESTS:  < from: Xray Chest 1 View- PORTABLE-Routine (06.04.20 @ 04:28) >  IMPRESSION:    Support Devices: None  Heart: Cardiomegaly, TAVR  Mediastinum:  Unremarkable  Lungs/Airways:  Unremarkable  Bones/Soft tissues: Unremarkable    A/P:    Ms. Martin is a 73 y/o female with a PMHx of morbid obesity, HTN, HLD, GERD, TIA with carotid artery disease s/p left carotid endartectomy, CKD (baseline Cr 1.45) and chronic diastolic heart failure with severe aortic stenosis. Pt was initially r/i for NSTEMI on 5/20/20 at OSH after complaints of CP and SOB for several days. Patient follows with Dr. Dubois as an outpatient and was transferred to [facility] under his care. On arrival, pt noted to be in AF with RVR (rate 140s) and tachypenic and was placed on BiPAP with lopressor, heparin, amio and lasix gtts. ECHO showed EF 40% and moderate MR, severe AS and lateral wall hypokinesis. A CT chest was negative for PE and a CT head was significant for 2.6 cm X 2.1 cm mass lesion with mild hydrocephalus (neurology consulted, will need outpatient follow up). Pt deemed not a good surgical candidate and determined to be a good TAVR candidate. She underwent an uncomplicated TAVR (Edward 23 mm), EF 40% on 5/26/20 with Dr. Dubois. On POD#1 pt with mild ectopy and started on low-dose BB. Patient remained stable the rest of her post-op course however discharge was delayed after she went into atrial fibrillation with RVR. On POD#5, Cr trended upwards, lasix d/c'ed and gentle hydration given with repeat Cr downtrended. On POD#6, pt was discharged home. Pt then re-presented to ED on 6/2/20 (same day of discharge) complaining of dizziness/weakness. Pt readmitted for observation with no objective findings and remained stable. Pt is pending being discharged pending DIANA placement/acceptance.    Neurovascular:   - No delirium. Pain well controlled with current regimen.  -Continue tylenol PRN    Cardiovascular:   - S/p TAVR, readmit with dizziness/weakness, has remained clinically stable   -Hemodynamically stable  - Atrial fibrillation, now rate controlled with HR 80-93, continue cardizem 120 mg daily, apixaban 2.5 mg q12  - Hx of HTN, BP controlled 106//90, continue metoprolol tartrate 25 mg q12  - Hx of CAD, s/p STEMI, continue ASA 81 mg   - Patient has MCOT in place, activated before prior discharge    Respiratory:   - 02 Sat = 99% on RA.  -Encourage C+DB and Use of IS 10x / hr while awake.  -CXR 6/4: no effusions, consolidations, PTX    GI:   - Stable.  -Continue GI PPX with protonix  -PO Diet.  - Continue senna    Renal / :  - Hx of CKD with baseline Cr 1.45, 1.57 here 6/4 (pt refused labs since)  -Continue to monitor renal function.  -Monitor I/O's.  - Replete electrolytes PRN    Endocrine:    -A1c 5.7, no known hx of diabetes  -TSH 0.542, no known hx of thyroid disease     Hematologic:  -H/H stable at 10.2/33.1 6/4 with no obvious signs of bleeding, pt refused labs since  -Coagulation Panel.    ID:  -Pt remains afebrile with no signs of infection  -Continue to monitor CBC  -Observe for SIRS/Sepsis Syndrome.    Prophylaxis:  -DVT prophylaxis with apixaban   -SCD's    Disposition:  -DIANA, authorized, pending bed availability     **Note: patient has no peripheral access and is refusing new IV placement and daily labs. Dr. Silvino thakkar.

## 2020-06-08 NOTE — PROGRESS NOTE ADULT - SUBJECTIVE AND OBJECTIVE BOX
Patient discussed on morning rounds with Dr. Dubois    Operation / Date: 5/26 TAVR, readmitted 6/2 for dizziness/weakness     SUBJECTIVE ASSESSMENT:  74y Female assessed at bedside with Tongan phone  ID [phone]. The patient states she feels better today and less weak. She denies chest pain, SOB, fever, chills, nausea, vomiting. She has been having daily BMs and using IS (1000 cc).     Vital Signs Last 24 Hrs  T(C): 36.2 (08 Jun 2020 14:00), Max: 36.7 (08 Jun 2020 09:00)  T(F): 97.1 (08 Jun 2020 14:00), Max: 98.1 (08 Jun 2020 09:00)  HR: 82 (08 Jun 2020 13:25) (80 - 83)  BP: 105/61 (08 Jun 2020 13:25) (105/61 - 133/83)  BP(mean): 75 (08 Jun 2020 13:25) (75 - 99)  RR: 18 (08 Jun 2020 13:25) (16 - 18)  SpO2: 99% (08 Jun 2020 13:25) (98% - 99%)  I&O's Detail    07 Jun 2020 07:01  -  08 Jun 2020 07:00  --------------------------------------------------------  IN:    Oral Fluid: 300 mL  Total IN: 300 mL    OUT:    Voided: 800 mL  Total OUT: 800 mL    Total NET: -500 mL    CHEST TUBE:  No  JUAN DANIEL DRAIN: No.  EPICARDIAL WIRES: No.  TIE DOWNS: No.  KOO: No.    PHYSICAL EXAM:  General: Well appearing, in NAD assessed laying comfortably in bed   Neurological: A&OX3, no focal deficits noted. Moving bilateral upper and lower extremities.   Cardiovascular: Irregular rhythm, no murmurs, rubs, gallops  Respiratory: Clear to auscultation bilateral posterior lung fields. No wheezes, rales, rhonchi.  Gastrointestinal: +BS, NT/ND  Extremities: No peripheral edema, no calf tenderness. Full strength and ROM bilateral upper and lower extremities    Vascular: Bilateral distal pulses 2+  Incision Sites: Bilateral groins without hematoma, tenderness     LABS:                        10.6   7.85  )-----------( 238      ( 08 Jun 2020 07:34 )             34.9       COUMADIN:  No    06-08    146<H>  |  108  |  26<H>  ----------------------------<  89  4.5   |  24  |  1.44<H>    Ca    9.6      08 Jun 2020 07:34  Mg     2.0     06-08    MEDICATIONS  (STANDING):  apixaban 2.5 milliGRAM(s) Oral every 12 hours  aspirin enteric coated 81 milliGRAM(s) Oral daily  atorvastatin 40 milliGRAM(s) Oral at bedtime  diltiazem    milliGRAM(s) Oral daily  latanoprost 0.005% Ophthalmic Solution 1 Drop(s) Both EYES at bedtime  lidocaine   Patch 2 Patch Transdermal every 24 hours  metoprolol tartrate 25 milliGRAM(s) Oral every 12 hours  pantoprazole    Tablet 40 milliGRAM(s) Oral before breakfast  senna 2 Tablet(s) Oral at bedtime  sodium chloride 0.9% lock flush 3 milliLiter(s) IV Push every 8 hours    MEDICATIONS  (PRN):  acetaminophen   Tablet .. 650 milliGRAM(s) Oral every 6 hours PRN Mild Pain (1 - 3)      RADIOLOGY & ADDITIONAL TESTS:  < from: Xray Chest 1 View- PORTABLE-Routine (06.04.20 @ 04:28) >  IMPRESSION:    Support Devices: None  Heart: Cardiomegaly, TAVR  Mediastinum:  Unremarkable  Lungs/Airways:  Unremarkable  Bones/Soft tissues: Unremarkable    A/P:    Ms. Martin is a 73 y/o female with a PMHx of morbid obesity, HTN, HLD, GERD, TIA with carotid artery disease s/p left carotid endartectomy, CKD (baseline Cr 1.45) and chronic diastolic heart failure with severe aortic stenosis. Pt was initially r/i for NSTEMI on 5/20/20 at OSH after complaints of CP and SOB for several days. Patient follows with Dr. Dubois as an outpatient and was transferred to [facility] under his care. On arrival, pt noted to be in AF with RVR (rate 140s) and tachypenic and was placed on BiPAP with lopressor, heparin, amio and lasix gtts. ECHO showed EF 40% and moderate MR, severe AS and lateral wall hypokinesis. A CT chest was negative for PE and a CT head was significant for 2.6 cm X 2.1 cm mass lesion with mild hydrocephalus (neurology consulted, will need outpatient follow up). Pt deemed not a good surgical candidate and determined to be a good TAVR candidate. She underwent an uncomplicated TAVR (Edward 23 mm), EF 40% on 5/26/20 with Dr. Dubois. On POD#1 pt with mild ectopy and started on low-dose BB. Patient remained stable the rest of her post-op course however discharge was delayed after she went into atrial fibrillation with RVR. On POD#5, Cr trended upwards, lasix d/c'ed and gentle hydration given with repeat Cr downtrended. On POD#6, pt was discharged home. Pt then re-presented to ED on 6/2/20 (same day of discharge) complaining of dizziness/weakness. Pt readmitted for observation with no objective findings and remained stable. Pt is pending being discharged pending DIANA placement.    Neurovascular:   - No delirium. Pain well controlled with current regimen.  -Continue tylenol PRN    Cardiovascular:   - S/p TAVR, readmit with dizziness/weakness, has remained clinically stable   -Hemodynamically stable  - Atrial fibrillation, now rate controlled with HR 80-92, continue cardizem 120 mg daily, apixaban 2.5 mg q12  - Hx of HTN, BP controlled 126//83, continue metoprolol tartrate 25 mg q12  - Hx of CAD, s/p STEMI, continue ASA 81 mg   - Patient has MCOT in place, activated before prior discharge    Respiratory:   - 02 Sat = 99% on RA.  -Encourage C+DB and Use of IS 10x / hr while awake.  -CXR 6/4: no effusions, consolidations, PTX    GI:   - Stable.  -Continue GI PPX with protonix  -PO Diet.  - Continue senna    Renal / :  - Hx of CKD with baseline Cr 1.45, today BUN/Cr 26/1.44  -Continue to monitor renal function.  -Monitor I/O's.  - Replete electrolytes PRN    Endocrine:    -A1c 5.7, no known hx of diabetes  -TSH 0.542, no known hx of thyroid disease     Hematologic:  -H/H stable at 10.6/34.9 with no obvious signs of bleeding  -Coagulation Panel.    ID:  -Pt remains afebrile with no signs of infection  -Continue to monitor CBC  -Observe for SIRS/Sepsis Syndrome.    Prophylaxis:  -DVT prophylaxis with apixaban   -SCD's    Disposition:  -DIANA, authorized, pending bed availability     **Note: patient has no peripheral access and is refusing new IV placement, Dr. Dubois aware Patient discussed on morning rounds with Dr. Dubois    Operation / Date: 5/26 TAVR, readmitted 6/2 for dizziness/weakness     SUBJECTIVE ASSESSMENT:  74y Female assessed at bedside with Cymro phone  ID [phone]. The patient states she feels better today and less weak. She denies chest pain, SOB, fever, chills, nausea, vomiting. She has been having daily BMs and using IS (1000 cc).     Vital Signs Last 24 Hrs  T(C): 36.2 (08 Jun 2020 14:00), Max: 36.7 (08 Jun 2020 09:00)  T(F): 97.1 (08 Jun 2020 14:00), Max: 98.1 (08 Jun 2020 09:00)  HR: 82 (08 Jun 2020 13:25) (80 - 83)  BP: 105/61 (08 Jun 2020 13:25) (105/61 - 133/83)  BP(mean): 75 (08 Jun 2020 13:25) (75 - 99)  RR: 18 (08 Jun 2020 13:25) (16 - 18)  SpO2: 99% (08 Jun 2020 13:25) (98% - 99%)  I&O's Detail    07 Jun 2020 07:01  -  08 Jun 2020 07:00  --------------------------------------------------------  IN:    Oral Fluid: 300 mL  Total IN: 300 mL    OUT:    Voided: 800 mL  Total OUT: 800 mL    Total NET: -500 mL    CHEST TUBE:  No  JUAN DANIEL DRAIN: No.  EPICARDIAL WIRES: No.  TIE DOWNS: No.  KOO: No.    PHYSICAL EXAM:  General: Well appearing, in NAD assessed laying comfortably in bed   Neurological: A&OX3, no focal deficits noted. Moving bilateral upper and lower extremities.   Cardiovascular: Irregular rhythm, no murmurs, rubs, gallops  Respiratory: Clear to auscultation bilateral posterior lung fields. No wheezes, rales, rhonchi.  Gastrointestinal: +BS, NT/ND  Extremities: No peripheral edema, no calf tenderness. Full strength and ROM bilateral upper and lower extremities    Vascular: Bilateral distal pulses 2+  Incision Sites: Bilateral groins without hematoma, tenderness     LABS:                        10.6   7.85  )-----------( 238      ( 08 Jun 2020 07:34 )             34.9       COUMADIN:  No    06-08    146<H>  |  108  |  26<H>  ----------------------------<  89  4.5   |  24  |  1.44<H>    Ca    9.6      08 Jun 2020 07:34  Mg     2.0     06-08    MEDICATIONS  (STANDING):  apixaban 2.5 milliGRAM(s) Oral every 12 hours  aspirin enteric coated 81 milliGRAM(s) Oral daily  atorvastatin 40 milliGRAM(s) Oral at bedtime  diltiazem    milliGRAM(s) Oral daily  latanoprost 0.005% Ophthalmic Solution 1 Drop(s) Both EYES at bedtime  lidocaine   Patch 2 Patch Transdermal every 24 hours  metoprolol tartrate 25 milliGRAM(s) Oral every 12 hours  pantoprazole    Tablet 40 milliGRAM(s) Oral before breakfast  senna 2 Tablet(s) Oral at bedtime  sodium chloride 0.9% lock flush 3 milliLiter(s) IV Push every 8 hours    MEDICATIONS  (PRN):  acetaminophen   Tablet .. 650 milliGRAM(s) Oral every 6 hours PRN Mild Pain (1 - 3)      RADIOLOGY & ADDITIONAL TESTS:  < from: Xray Chest 1 View- PORTABLE-Routine (06.04.20 @ 04:28) >  IMPRESSION:    Support Devices: None  Heart: Cardiomegaly, TAVR  Mediastinum:  Unremarkable  Lungs/Airways:  Unremarkable  Bones/Soft tissues: Unremarkable    A/P:    Ms. Martin is a 73 y/o female with a PMHx of morbid obesity, HTN, HLD, GERD, TIA with carotid artery disease s/p left carotid endartectomy, CKD (baseline Cr 1.45) and chronic diastolic heart failure with severe aortic stenosis. Pt was initially r/i for NSTEMI on 5/20/20 at OSH after complaints of CP and SOB for several days. Patient follows with Dr. Dubois as an outpatient and was transferred to [facility] under his care. On arrival, pt noted to be in AF with RVR (rate 140s) and tachypenic and was placed on BiPAP with lopressor, heparin, amio and lasix gtts. ECHO showed EF 40% and moderate MR, severe AS and lateral wall hypokinesis. A CT chest was negative for PE and a CT head was significant for 2.6 cm X 2.1 cm mass lesion with mild hydrocephalus (neurology consulted, will need outpatient follow up). Pt deemed not a good surgical candidate and determined to be a good TAVR candidate. She underwent an uncomplicated TAVR (Edward 23 mm), EF 40% on 5/26/20 with Dr. Dubois. On POD#1 pt with mild ectopy and started on low-dose BB. Patient remained stable the rest of her post-op course however discharge was delayed after she went into atrial fibrillation with RVR. On POD#5, Cr trended upwards, lasix d/c'ed and gentle hydration given with repeat Cr downtrended. On POD#6, pt was discharged home. Pt then re-presented to ED on 6/2/20 (same day of discharge) complaining of dizziness/weakness. Pt readmitted for observation with no objective findings and remained stable. Pt is pending being discharged pending DIANA placement.    Neurovascular:   - No delirium. Pain well controlled with current regimen.  -Continue tylenol PRN    Cardiovascular:   - S/p TAVR, readmit with dizziness/weakness, has remained clinically stable   -Hemodynamically stable  - Atrial fibrillation, now rate controlled with HR 80-92, continue cardizem 120 mg daily, apixaban 5 mg q12 (increased per Pharm given Cr and pt weight)  - Hx of HTN, BP controlled 126//83, continue metoprolol tartrate 25 mg q12  - Hx of CAD, s/p STEMI, continue ASA 81 mg   - Patient has MCOT in place, activated before prior discharge    Respiratory:   - 02 Sat = 99% on RA.  -Encourage C+DB and Use of IS 10x / hr while awake.  -CXR 6/4: no effusions, consolidations, PTX    GI:   - Stable.  -Continue GI PPX with protonix  -PO Diet.  - Continue senna    Renal / :  - Hx of CKD with baseline Cr 1.45, today BUN/Cr 26/1.44  -Continue to monitor renal function.  -Monitor I/O's.  - Replete electrolytes PRN    Endocrine:    -A1c 5.7, no known hx of diabetes  -TSH 0.542, no known hx of thyroid disease     Hematologic:  -H/H stable at 10.6/34.9 with no obvious signs of bleeding  -Coagulation Panel.    ID:  -Pt remains afebrile with no signs of infection  -Continue to monitor CBC  -Observe for SIRS/Sepsis Syndrome.    Prophylaxis:  -DVT prophylaxis with apixaban   -SCD's    Disposition:  -DIANA, authorized, pending bed availability     **Note: patient has no peripheral access and is refusing new IV placement, Dr. Dubois aware

## 2020-06-09 NOTE — PROGRESS NOTE ADULT - ASSESSMENT
This is a 73 y/o female with a PMHx of morbid obesity, HTN, HLD, GERD, TIA with carotid artery disease s/p left carotid endartectomy, CKD (baseline Cr 1.45) and chronic diastolic heart failure with severe aortic stenosis. Pt was initially r/i for NSTEMI on 5/20/20 at OSH after complaints of CP and SOB for several days. Patient follows with Dr. Dubois as an outpatient and was transferred to [facility] under his care. On arrival, pt noted to be in AF with RVR (rate 140s) and tachypenic and was placed on BiPAP with lopressor, heparin, amio and lasix gtts. ECHO showed EF 40% and moderate MR, severe AS and lateral wall hypokinesis. A CT chest was negative for PE and a CT head was significant for 2.6 cm X 2.1 cm mass lesion with mild hydrocephalus (neurology consulted, will need outpatient follow up). Pt deemed not a good surgical candidate and determined to be a good TAVR candidate. She underwent an uncomplicated TAVR (Edward 23 mm), EF 40% on 5/26/20 with Dr. Dubois. On POD#1 pt with mild ectopy and started on low-dose BB. Patient remained stable the rest of her post-op course however discharge was delayed after she went into atrial fibrillation with RVR. On POD#5, Cr trended upwards, lasix d/c'ed and gentle hydration given with repeat Cr downtrended. On POD#6, pt was discharged home. Pt then re-presented to ED on 6/2/20 (same day of discharge) complaining of dizziness/weakness. Pt readmitted for observation with no objective findings and remained stable. Pt is pending being discharged pending DIANA placement/acceptance.    Plan:    Neurovascular:   -Pain well controlled with current regimen. PRN's: tylenol     Cardiovascular: s/p TAVR with Dr. Dubois, readmission due to dizziness/fatigue   -Hemodynamically stable.   -Monitor: BP, HR, tele  -AF: remains rate controlled, BB increased today to 37.5 BID, c/w Cardizem 120mg daily, asa, eliquis 2.5 BID   -HTN: BB increased today (SBP 150s), monitor resoponse   -CAD hx: c/w ASA   -C/w MCOT on d/c    Respiratory:   -Oxygenating well on room air  -Encourage continued use of IS 10x/hr and frequent ambulation  -CXR: no acute pathology, no PTX     GI:  -GI PPX: pantoprazole 40mg daily  -PO Diet DASH/TLC   -Bowel Regimen: senna    Renal / :  -Continue to monitor renal function: BUN/Cr no labs today, pt refused, Cr has been downtrending last few days, repeat labs in AM   -Monitor I/O's daily     Endocrine:    -No hx of DM or thyroid dx    Hematologic:  -CBC: H/H- no labs today, H/H remained stable this admission, f/u with AM labs   -Coagulation Panel.  *of note, pt IV was removed and pt refused getting another IV, ok per Dr. Dubois as pt is only awaiting DIANA placement*     ID:  -Temperature: afebrile   -CBC: WBC- pt refused this am, repeat labs in AM   -Continue to observe for SIRS/Sepsis Syndrome.    Prophylaxis:  -DVT prophylaxis with 5000 SubQ Heparin q8h.  -Continue with SCD's b/l while patient is at rest     Disposition:  -Pending DIANA placement

## 2020-06-09 NOTE — CHART NOTE - NSCHARTNOTEFT_GEN_A_CORE
Admitting Diagnosis:   Patient is a 74y old  Female who presents with a chief complaint of weakness (08 Jun 2020 14:25)      PAST MEDICAL & SURGICAL HISTORY:  H/O carotid stenosis  TIA (transient ischemic attack)  Chronic kidney disease (CKD)  Aortic stenosis  HLD (hyperlipidemia)  HTN (hypertension)  H/O carotid endarterectomy      Current Nutrition Order: DASH/TLC        PO Intake: Good (%) [   ]  Fair (50-75%) [ x  ] Poor (<25%) [   ]    GI Issues: no n/v/d/c    Pain: no pain noted     Skin Integrity: intact, jacoby score 21     Labs:   06-08    146<H>  |  108  |  26<H>  ----------------------------<  89  4.5   |  24  |  1.44<H>    Ca    9.6      08 Jun 2020 07:34  Mg     2.0     06-08      CAPILLARY BLOOD GLUCOSE          Medications:  MEDICATIONS  (STANDING):  apixaban 5 milliGRAM(s) Oral every 12 hours  aspirin enteric coated 81 milliGRAM(s) Oral daily  atorvastatin 40 milliGRAM(s) Oral at bedtime  diltiazem    milliGRAM(s) Oral daily  latanoprost 0.005% Ophthalmic Solution 1 Drop(s) Both EYES at bedtime  lidocaine   Patch 2 Patch Transdermal every 24 hours  metoprolol tartrate 12.5 milliGRAM(s) Oral once  metoprolol tartrate 37.5 milliGRAM(s) Oral every 12 hours  pantoprazole    Tablet 40 milliGRAM(s) Oral before breakfast  senna 2 Tablet(s) Oral at bedtime  sodium chloride 0.9% lock flush 3 milliLiter(s) IV Push every 8 hours    MEDICATIONS  (PRN):  acetaminophen   Tablet .. 650 milliGRAM(s) Oral every 6 hours PRN Mild Pain (1 - 3)      Weight: 106.2kg     Weight Change: no new wts     Nutrition Focused Physical Exam: Completed [   ]  Not Pertinent [ x  ]    Estimated energy needs:   Ideal body weight used for calculations as pt >120% of IBW.   .2kg, IBW 50kg, 212% IBW, ht 62", BMI 42.8   Nutrient needs based on [facility] standards of care for maintenance in adults, adjusted for CHF needs, fluid per team  1250-1500kcal (25-30kcal/kg)   60-70g pro (1.2-1.4g/kg pro)     Subjective:   74F with a PMHx of morbid obesity, HTN, HLD, GERD, TIA with carotid artery disease s/p left carotid endartectomy, CKD (baseline Cr 1.45) and chronic diastolic heart failure with severe aortic stenosis. Pt initially r/i for NSTEMI at OSH after complaints of CP and SOB for several days. Pt transferred to [facility]. On arrival, pt noted to be in AF with RVR (rate 140s) and tachypenic and was placed on BiPAp. ECHO showing EF 40%. Pt deemed not a good surgical candidate and determined to be a good TAVR candidate. underwent an uncomplicated TAVR (Edward 23 mm), EF 40% on 5/26/20. On POD#1 pt with mild ectopy and started on low-dose BB. Patient remained stable the rest of her post-op course and was originally planned for discharge but went into atrial fibrillation with RVR so decision was made to stay overnight for observation. POD#4 pt remained in AF but is rate controlled (low 100s). On POD#6, pt was discharged home. Pt then re-presented to ED on 6/2/20 complaining of dizziness/weakness. Pt readmitted for observation with no objective findings and remained stable. Pt is pending being discharged pending DIANA placement/acceptance. Continues eating/drinking well. Known to nutrition from prior admission, ed provided at that time and on initial. Will continue to follow per protocol, continues to pend DC/ bed availability at facility of choice. Will follow per protocol.     Previous Nutrition Diagnosis: Increased nutrient needs r/t CHF AEB hypermetabolic state     Active [ x  ]  Resolved [   ]    If resolved, new PES:     Goal: meet >75% EER     Recommendations:  1. Trend wts   2. Manage pain prn   3. Monitor and replete lytes   4. Reinforce ed    Education: n/a     Risk Level: High [   ] Moderate [ x  ] Low [   ]

## 2020-06-09 NOTE — PROGRESS NOTE ADULT - SUBJECTIVE AND OBJECTIVE BOX
Patient discussed on morning rounds with Dr. Dubois    Operation / Date: s/p TAVR placement  Readmission for dizziness and weakness     SUBJECTIVE ASSESSMENT: [place]  used for interview: ID # not recorded   Patient states she is feel improved this morning. She has no complaints at this time. She has been eating/drinking well. Using her IS fairly often. Denies any CP, palpitations, SOB, wheezing, n/v/d/c, fevers or chills.     Vital Signs Last 24 Hrs  T(C): 36.6 (09 Jun 2020 09:10), Max: 36.6 (09 Jun 2020 09:10)  T(F): 97.9 (09 Jun 2020 09:10), Max: 97.9 (09 Jun 2020 09:10)  HR: 114 (09 Jun 2020 09:10) (74 - 114)  BP: 152/78 (09 Jun 2020 09:10) (105/61 - 152/78)  BP(mean): 109 (09 Jun 2020 08:50) (74 - 109)  RR: 18 (09 Jun 2020 08:50) (16 - 18)  SpO2: 98% (09 Jun 2020 09:10) (94% - 99%)  I&O's Detail    08 Jun 2020 07:01  -  09 Jun 2020 07:00  --------------------------------------------------------  IN:    Oral Fluid: 75 mL  Total IN: 75 mL    OUT:    Voided: 500 mL  Total OUT: 500 mL    Total NET: -425 mL      09 Jun 2020 07:01  -  09 Jun 2020 12:19  --------------------------------------------------------  IN:    Oral Fluid: 120 mL  Total IN: 120 mL    OUT:    Voided: 300 mL  Total OUT: 300 mL    Total NET: -180 mL    CHEST TUBE:  no  JUAN DANIEL DRAIN:  no  EPICARDIAL WIRES: no  TIE DOWNS: no  KOO: no    PHYSICAL EXAM:  General: well appearing sitting up in chair in NAD   Neurological: AOx3. Motor skills grossly intact  Cardiovascular: Normal S1/S2. Regular rate/rhythm. No murmurs  Respiratory: Lungs CTA bilaterally. No wheezing or rales  Gastrointestinal: +BS in all 4 quadrants. Non-distended. Soft. Non-tender  Extremities: Strength 5/5 b/l upper/lower extremities. Sensation grossly intact upper/lower extremities. +trace LE edema. No calf tenderness.  Vascular: Radial 2+bilaterally, DP 2+ b/l  Incision Sites: previous groin sites, well healed, no erythema, purulence or ecchymosis.     LABS:                        10.6   7.85  )-----------( 238      ( 08 Jun 2020 07:34 )             34.9     COUMADIN:  no      06-08    146<H>  |  108  |  26<H>  ----------------------------<  89  4.5   |  24  |  1.44<H>    Ca    9.6      08 Jun 2020 07:34  Mg     2.0     06-08            MEDICATIONS  (STANDING):  apixaban 5 milliGRAM(s) Oral every 12 hours  aspirin enteric coated 81 milliGRAM(s) Oral daily  atorvastatin 40 milliGRAM(s) Oral at bedtime  diltiazem    milliGRAM(s) Oral daily  latanoprost 0.005% Ophthalmic Solution 1 Drop(s) Both EYES at bedtime  lidocaine   Patch 2 Patch Transdermal every 24 hours  metoprolol tartrate 25 milliGRAM(s) Oral every 12 hours  pantoprazole    Tablet 40 milliGRAM(s) Oral before breakfast  senna 2 Tablet(s) Oral at bedtime  sodium chloride 0.9% lock flush 3 milliLiter(s) IV Push every 8 hours    MEDICATIONS  (PRN):  acetaminophen   Tablet .. 650 milliGRAM(s) Oral every 6 hours PRN Mild Pain (1 - 3)        RADIOLOGY & ADDITIONAL TESTS:  < from: Xray Chest 1 View- PORTABLE-Routine (06.04.20 @ 04:28) >  IMPRESSION:  Support Devices: None  Heart: Cardiomegaly, TAVR  Mediastinum:  Unremarkable  Lungs/Airways:  Unremarkable  Bones/Soft tissues: Unremarkable  < end of copied text >

## 2020-06-10 NOTE — PROGRESS NOTE ADULT - SUBJECTIVE AND OBJECTIVE BOX
Patient discussed on morning rounds with Dr. Dubois    Operation / Date: s/p TAVR, readmitted for dizziness     Surgeon: Dr. Dubois     Referring Physician: Dr. Veliz     SUBJECTIVE ASSESSMENT  ID: [id number]	  Patient is feeling well this morning and has no complaints. She is glad that she is finally leaving the hospital and going to [facility]. Moving her bowels regularly. Admits she feels much better than when she first came in. Eating/drinking well. Using his IS. Denies any CP, palpitations, SOB, wheezing, abd pain, n/v/d/c, fevers or chills.    HOSPITAL COURSE:  This is a 73 y/o female with a PMHx of morbid obesity, HTN, HLD, GERD, TIA with carotid artery disease s/p left carotid endartectomy, CKD (baseline Cr 1.45) and chronic diastolic heart failure with severe aortic stenosis. Pt initially r/i for NSTEMI at OSH after complaints of CP and SOB for several days. Patient follows with Dr. Dubois as an outpatient. Pt transferred to [facility]. On arrival, pt noted to be in AF with RVR (rate 140s) and tachypenic and was placed on BiPAP with lopressor, amio and lasix gtts. ECHO showing EF 40% and moderate MR, severe AS and lateral wall hypokinesis. Pt deemed not a good surgical candidate and determined to be a good TAVR candidate. underwent an uncomplicated TAVR (Edward 23 mm), EF 40% on 5/26/20 with Dr. Dubois. On POD#1 pt with mild ectopy and started on low-dose BB. Patient remained stable the rest of her post-op course and was originally planned for discharge but went into atrial fibrillation with RVR so decision was made to stay overnight for observation. POD#4 pt remained in AF but is rate controlled (low 100s). She states she generally doesn't feel well and moved her bowels in the afternoon. On POD#5, Cr trended upwards, lasix d/c'ed and gentle hydration given with repeat Cr downtrended back to baseline for pt (1.2). On POD#6, pt was discharged home. Pt then re-presented to ED on 6/2/20 complaining of dizziness/weakness. Pt readmitted for observation with no objective findings and remained stable while readmitted awaiting placement at [facility]. Patient was stable and ready for discharge to [facility] per Dr. Dubois and has been pending placement for the last few days. Today on 6/10, pt has been accepted to [facility] and per Dr. Dubois remains ready to be discharged home.     Over 35 minutes was spent with the patient reviewing the discharge material including medications, follow up appointments, recovery, concerning symptoms, and how to contact their health care providers if they have questions    Vital Signs Last 24 Hrs  T(C): 36.2 (10 Tanvir 2020 09:00), Max: 36.7 (09 Jun 2020 18:23)  T(F): 97.2 (10 Tanvir 2020 09:00), Max: 98 (09 Jun 2020 18:23)  HR: 85 (10 Tanvir 2020 04:10) (84 - 87)  BP: 137/88 (10 Tanvir 2020 04:10) (135/84 - 159/84)  BP(mean): 107 (10 Tanvir 2020 04:10) (102 - 116)  RR: 17 (10 Tanvir 2020 04:10) (15 - 18)  SpO2: 95% (10 Tanvir 2020 04:10) (94% - 98%)    EPICARDIAL WIRES REMOVED: n/a  TIE DOWNS REMOVED: n/a    PHYSICAL EXAM:  General: well appearing sitting up in chair in NAD   Neurological: AOx3. Motor skills grossly intact  Cardiovascular: Normal S1/S2. Irregular rhytm, normal rate.  No murmurs  Respiratory: Lungs CTA bilaterally. No wheezing or rales  Gastrointestinal: +BS in all 4 quadrants. Non-distended. Soft. Non-tender  Extremities: Strength 5/5 b/l upper/lower extremities. Sensation grossly intact upper/lower extremities. +trace LE edema. No calf tenderness.  Vascular: Radial 2+bilaterally, DP 2+ b/l  Incision Sites: previous groin sites, well healed, no erythema, purulence or ecchymosis.     LABS:                        10.1   7.43  )-----------( 186      ( 10 Tanvir 2020 06:14 )             33.5       COUMADIN:  no, on eliquis         06-10    143  |  108  |  22  ----------------------------<  88  4.5   |  24  |  1.13    Ca    9.1      10 Tanvir 2020 06:14  Mg     1.9     06-10            Discharge CXR:  < from: Xray Chest 1 View- PORTABLE-Routine (06.04.20 @ 04:28) >  IMPRESSION:  Support Devices: None  Heart: Cardiomegaly, TAVR  Mediastinum:  Unremarkable  Lungs/Airways:  Unremarkable  Bones/Soft tissues: Unremarkable  < end of copied text >    Discharge ECHO:  < from: TTE Echo Complete w/o contrast w/ Doppler (05.27.20 @ 09:56) >  CONCLUSIONS:   1. Normal left and right ventricular size and systolic function.   2. Mild symmetric left ventricular hypertrophy.   3. Mildly dilated left atrium.   4. Moderate-to-severe mitral regurgitation.   5. Pulmonary hypertension present, pulmonary artery systolic pressure is 64 mmHg.   6. 23 mm Adelaida 3 valve is noted in the aortic position without any aortic regurgitation. The peak transvalvular velocity is 2.51 m/s, the mean transvalvular gradient is 15.80 mmHg, and the LVOT/AV velocity ratio is 0.38. The peak transaortic gradient is 25.20 mmHg.   7. Moderately dilated aortic root.   8. No pericardial effusion.  < end of copied text > Patient discussed on morning rounds with Dr. Dubois    Operation / Date: s/p TAVR, readmitted for dizziness     Surgeon: Dr. Dubois     Referring Physician: Dr. Veliz     SUBJECTIVE ASSESSMENT  ID: [id number]	  Patient is feeling well this morning and has no complaints. She is glad that she is finally leaving the hospital and going to [facility]. Moving her bowels regularly. Admits she feels much better than when she first came in. Eating/drinking well. Using his IS. Denies any CP, palpitations, SOB, wheezing, abd pain, n/v/d/c, fevers or chills.    HOSPITAL COURSE:  This is a 73 y/o female with a PMHx of morbid obesity, HTN, HLD, GERD, TIA with carotid artery disease s/p left carotid endartectomy, CKD (baseline Cr 1.45) and chronic diastolic heart failure with severe aortic stenosis. Pt initially r/i for NSTEMI at OSH after complaints of CP and SOB for several days. Patient follows with Dr. Dubois as an outpatient. Pt transferred to [facility]. On arrival, pt noted to be in AF with RVR (rate 140s) and tachypenic and was placed on BiPAP with lopressor, amio and lasix gtts. ECHO showing EF 40% and moderate MR, severe AS and lateral wall hypokinesis. Pt deemed not a good surgical candidate and determined to be a good TAVR candidate. underwent an uncomplicated TAVR (Edward 23 mm), EF 40% on 5/26/20 with Dr. Dubois. On POD#1 pt with mild ectopy and started on low-dose BB. Patient remained stable the rest of her post-op course and was originally planned for discharge but went into atrial fibrillation with RVR so decision was made to stay overnight for observation. POD#4 pt remained in AF but is rate controlled (low 100s). She states she generally doesn't feel well and moved her bowels in the afternoon. On POD#5, Cr trended upwards, lasix d/c'ed and gentle hydration given with repeat Cr downtrended back to baseline for pt (1.2). On POD#6, pt was discharged home. Pt then re-presented to ED on 6/2/20 complaining of dizziness/weakness. Pt readmitted for observation with no objective findings and remained stable while readmitted awaiting placement at [facility]. Patient was stable and ready for discharge to [facility] per Dr. Dubois and has been pending placement for the last few days. Today on 6/10, pt has been accepted to [facility] and per Dr. Dubois remains ready to be discharged home.     Over 35 minutes was spent with the patient reviewing the discharge material including medications, follow up appointments, recovery, concerning symptoms, and how to contact their health care providers if they have questions    Of note: I called the [facility] facility to educate them about the MCOT device and told them to follow up with Dr. Dubois     Vital Signs Last 24 Hrs  T(C): 36.2 (10 Tanvir 2020 09:00), Max: 36.7 (09 Jun 2020 18:23)  T(F): 97.2 (10 Tanvir 2020 09:00), Max: 98 (09 Jun 2020 18:23)  HR: 85 (10 Tanvir 2020 04:10) (84 - 87)  BP: 137/88 (10 Tavnir 2020 04:10) (135/84 - 159/84)  BP(mean): 107 (10 Tanvir 2020 04:10) (102 - 116)  RR: 17 (10 Tanvir 2020 04:10) (15 - 18)  SpO2: 95% (10 Tanvir 2020 04:10) (94% - 98%)    EPICARDIAL WIRES REMOVED: n/a  TIE DOWNS REMOVED: n/a    PHYSICAL EXAM:  General: well appearing sitting up in chair in NAD   Neurological: AOx3. Motor skills grossly intact  Cardiovascular: Normal S1/S2. Irregular rhytm, normal rate.  No murmurs  Respiratory: Lungs CTA bilaterally. No wheezing or rales  Gastrointestinal: +BS in all 4 quadrants. Non-distended. Soft. Non-tender  Extremities: Strength 5/5 b/l upper/lower extremities. Sensation grossly intact upper/lower extremities. +trace LE edema. No calf tenderness.  Vascular: Radial 2+bilaterally, DP 2+ b/l  Incision Sites: previous groin sites, well healed, no erythema, purulence or ecchymosis.     LABS:                        10.1   7.43  )-----------( 186      ( 10 Tanvir 2020 06:14 )             33.5       COUMADIN:  no, on eliquis         06-10    143  |  108  |  22  ----------------------------<  88  4.5   |  24  |  1.13    Ca    9.1      10 Tanvir 2020 06:14  Mg     1.9     06-10            Discharge CXR:  < from: Xray Chest 1 View- PORTABLE-Routine (06.04.20 @ 04:28) >  IMPRESSION:  Support Devices: None  Heart: Cardiomegaly, TAVR  Mediastinum:  Unremarkable  Lungs/Airways:  Unremarkable  Bones/Soft tissues: Unremarkable  < end of copied text >    Discharge ECHO:  < from: TTE Echo Complete w/o contrast w/ Doppler (05.27.20 @ 09:56) >  CONCLUSIONS:   1. Normal left and right ventricular size and systolic function.   2. Mild symmetric left ventricular hypertrophy.   3. Mildly dilated left atrium.   4. Moderate-to-severe mitral regurgitation.   5. Pulmonary hypertension present, pulmonary artery systolic pressure is 64 mmHg.   6. 23 mm Adelaida 3 valve is noted in the aortic position without any aortic regurgitation. The peak transvalvular velocity is 2.51 m/s, the mean transvalvular gradient is 15.80 mmHg, and the LVOT/AV velocity ratio is 0.38. The peak transaortic gradient is 25.20 mmHg.   7. Moderately dilated aortic root.   8. No pericardial effusion.  < end of copied text > Patient discussed on morning rounds with Dr. Dubois    Operation / Date: s/p TAVR, readmitted for dizziness     Surgeon: Dr. Dubois     Referring Physician: Dr. Veliz     SUBJECTIVE ASSESSMENT  ID: [id number]	  Patient is feeling well this morning and has no complaints. She is glad that she is finally leaving the hospital and going to [facility]. Moving her bowels regularly. Admits she feels much better than when she first came in. Eating/drinking well. Using his IS. Denies any CP, palpitations, SOB, wheezing, abd pain, n/v/d/c, fevers or chills.    HOSPITAL COURSE:  This is a 73 y/o female with a PMHx of morbid obesity, HTN, HLD, GERD, TIA with carotid artery disease s/p left carotid endartectomy, CKD (baseline Cr 1.45) and chronic diastolic heart failure with severe aortic stenosis. Pt initially r/i for NSTEMI at OSH after complaints of CP and SOB for several days. Patient follows with Dr. Dubois as an outpatient. Pt transferred to [facility]. On arrival, pt noted to be in AF with RVR (rate 140s) and tachypenic and was placed on BiPAP with lopressor, amio and lasix gtts. ECHO showing EF 40% and moderate MR, severe AS and lateral wall hypokinesis. Pt deemed not a good surgical candidate and determined to be a good TAVR candidate. underwent an uncomplicated TAVR (Edward 23 mm), EF 40% on 5/26/20 with Dr. Dubois. On POD#1 pt with mild ectopy and started on low-dose BB. Patient remained stable the rest of her post-op course and was originally planned for discharge but went into atrial fibrillation with RVR so decision was made to stay overnight for observation. POD#4 pt remained in AF but is rate controlled (low 100s). She states she generally doesn't feel well and moved her bowels in the afternoon. On POD#5, Cr trended upwards, lasix d/c'ed and gentle hydration given with repeat Cr downtrended back to baseline for pt (1.2). On POD#6, pt was discharged home. Pt then re-presented to ED on 6/2/20 complaining of dizziness/weakness. Pt readmitted for observation with no objective findings and remained stable while readmitted awaiting placement at [facility]. Patient was stable and ready for discharge to [facility] per Dr. Dubois and has been pending placement for the last few days. Today on 6/10, pt has been accepted to [facility] and per Dr. Dubois remains ready to be discharged home.     Over 35 minutes was spent with the patient reviewing the discharge material including medications, follow up appointments, recovery, concerning symptoms, and how to contact their health care providers if they have questions    Of note: I called the [facility] facility after discharge to educate them about the MCOT device and told them to follow up with Dr. Dubois regarding when she can stop using this device. Educated to keep it charge and change the skin dressing every few days or as needed. Gave them the office phone number to call if they have any questions. Pt was educated on this device and how to use it before leaving the hospital.     Vital Signs Last 24 Hrs  T(C): 36.2 (10 Tanvir 2020 09:00), Max: 36.7 (09 Jun 2020 18:23)  T(F): 97.2 (10 Tanvir 2020 09:00), Max: 98 (09 Jun 2020 18:23)  HR: 85 (10 Tanvir 2020 04:10) (84 - 87)  BP: 137/88 (10 Tanvir 2020 04:10) (135/84 - 159/84)  BP(mean): 107 (10 Tanvir 2020 04:10) (102 - 116)  RR: 17 (10 Tanvir 2020 04:10) (15 - 18)  SpO2: 95% (10 Tanvir 2020 04:10) (94% - 98%)    EPICARDIAL WIRES REMOVED: n/a  TIE DOWNS REMOVED: n/a    PHYSICAL EXAM:  General: well appearing sitting up in chair in [facility]   Neurological: AOx3. Motor skills grossly intact  Cardiovascular: Normal S1/S2. Irregular rhytm, normal rate.  No murmurs  Respiratory: Lungs CTA bilaterally. No wheezing or rales  Gastrointestinal: +BS in all 4 quadrants. Non-distended. Soft. Non-tender  Extremities: Strength 5/5 b/l upper/lower extremities. Sensation grossly intact upper/lower extremities. +trace LE edema. No calf tenderness.  Vascular: Radial 2+bilaterally, DP 2+ b/l  Incision Sites: previous groin sites, well healed, no erythema, purulence or ecchymosis.     LABS:                        10.1   7.43  )-----------( 186      ( 10 Tanvir 2020 06:14 )             33.5       COUMADIN:  no, on eliquis         06-10    143  |  108  |  22  ----------------------------<  88  4.5   |  24  |  1.13    Ca    9.1      10 Tanvir 2020 06:14  Mg     1.9     06-10            Discharge CXR:  < from: Xray Chest 1 View- PORTABLE-Routine (06.04.20 @ 04:28) >  IMPRESSION:  Support Devices: None  Heart: Cardiomegaly, TAVR  Mediastinum:  Unremarkable  Lungs/Airways:  Unremarkable  Bones/Soft tissues: Unremarkable  < end of copied text >    Discharge ECHO:  < from: TTE Echo Complete w/o contrast w/ Doppler (05.27.20 @ 09:56) >  CONCLUSIONS:   1. Normal left and right ventricular size and systolic function.   2. Mild symmetric left ventricular hypertrophy.   3. Mildly dilated left atrium.   4. Moderate-to-severe mitral regurgitation.   5. Pulmonary hypertension present, pulmonary artery systolic pressure is 64 mmHg.   6. 23 mm Adelaida 3 valve is noted in the aortic position without any aortic regurgitation. The peak transvalvular velocity is 2.51 m/s, the mean transvalvular gradient is 15.80 mmHg, and the LVOT/AV velocity ratio is 0.38. The peak transaortic gradient is 25.20 mmHg.   7. Moderately dilated aortic root.   8. No pericardial effusion.  < end of copied text >

## 2020-06-10 NOTE — PROGRESS NOTE ADULT - ASSESSMENT
D/C Instructions:   [facility] C[facility]IOV[facility]ULAR SURG[facility] [address] [place] [address] Phone: [phone] Fax: [phone] Scheduled Appointment: 06/22/2020 10:00 AM (televisit)  [facility] [address] [place] [address] Phone: [phone] Fax: [phone] Scheduled Appointment: 06/22/2020 12:20 PM  [facility] [address] [address] Phone: [phone] Fax: [phone] Scheduled Appointment: 06/26/2020 03:00 PM (in person)	  [facility] ; 06/22/2020 ; NPP CT Surg [address] [facility] ; 06/22/2020 ; NPP HeartVasc [address] [facility] ; 06/26/2020 ; NPP Neuro [address]	  	  -Please attend your discharge appointments	  	  - Please continue with a "DASH" diet low in cholesterol, saturated fats, and sodium.	  No heavy lifting/straining, Showering allowed, Stairs allowed, Walking - Indoors allowed, Walking - Outdoors allowed	  -Walk daily as tolerated and use your incentive spirometer every hour.   -No driving or strenuous activity/exercise until cleared by your surgeon.  -Gently clean your incisions with unscented/antibacterial soap and water, pat dry.  You may leave them open to air.  -Call your doctor if you have shortness of breath, chest pain not relieved by pain medication, dizziness, fever >101.5, or increased redness or drainage from incisions.

## 2020-06-10 NOTE — DISCHARGE NOTE NURSING/CASE MANAGEMENT/SOCIAL WORK - NSDCFUADDAPPT_GEN_ALL_CORE_FT
-Please attend your discharge appointments  -Please ignore appointment TIMES listed above. The following at the correct appointment times:  -Dr. Dubois: Telehealth follow up visit - 6/22/20 at 10am  -Dr. Pisano (neurologist): Telehealth follow up visit -6/26/20 at 3:00pm  -Dr. Veliz (referring): 6/22/20 at 12:20pm

## 2020-06-10 NOTE — DISCHARGE NOTE NURSING/CASE MANAGEMENT/SOCIAL WORK - PATIENT PORTAL LINK FT
You can access the FollowMyHealth Patient Portal offered by [facility] by registering at the following website: [email or web address]. By joining [facility]’s FollowMyHealth portal, you will also be able to view your health information using other applications (apps) compatible with our system.

## 2020-07-28 NOTE — ED PROVIDER NOTE - ADMIT DISPOSITION PRESENT ON ADMISSION SEPSIS
Clinic Care Coordination Contact  [facility]/ No Voicemail    Referral Source: IP/TCU Report  Clinical Data: Care Coordinator Outreach  Outreach attempted x 3. Unable to leave message on voicemail( one number rings continually with no answer or VM, the mailbox is full for the other number) .  Plan: Care Coordinator mailed out care coordination introduction letter on 2/7/17. Care Coordinator will do no further outreaches at this time.  PCP is aware that RN CC and Home Care have been unable to reach patient. She has tried without success as well. Letter has been sent and was able to leave one message on VM initially. Can open to CC If response to either of these and needs are found.  Lenin ROGERSN,RN- BC  Clinic Care Coordinator  [facility]  Phone: [phone]                 No

## 2020-07-28 NOTE — H&P ADULT - PROBLEM SELECTOR PLAN 9
Hx of aortic stenosis s/p TAVR in May 2020. Lung exam CTAB.    #HFpEF   Echo 5/2020: EF 64%, +pHTN (PASP 64), mod-severe mtiral regurg, LVH, mildly dilated LA. On home lopressor 37.5mg BID. Lungs CTAB on exam. CXR with cardiomegaly consistent with prior CXR in June, not fluid overloaded at this moment. Satting well on RA.  - c/w lopressor

## 2020-07-28 NOTE — H&P ADULT - PROBLEM SELECTOR PLAN 2
Pt presented with 2 weeks of worsening AMS. CTH noncontrast in ED showing increased diffuse ventriculomegaly compared to May 2020.  - AM general neurology consult

## 2020-07-28 NOTE — H&P ADULT - PROBLEM SELECTOR PLAN 3
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 Did not meet SIRS criteria on admission. Pt was started on ertapenem 1g qd on 7/22 (planned for course to be completed on 7/29)  - s/p dose already on day of admission 7/28 at rehab  - c/w additional ertapenem dose on 7/29  - f/u UCx sent from ED

## 2020-07-28 NOTE — H&P ADULT - HISTORY OF PRESENT ILLNESS
76 y/o woman with a PMHx of Afib, HTN, CAD s/p STEMI (on ASA), CKD, TIA s/p L CEA, w/ AMS, presented for 2 weeks of AMS.    ED Vitals: T 98.2F, /90, , RR 20, SpO2 94% RA  ED Labs: WBC 8.82, Hb 12.4, INR 1.55, Cr 1.28 (baseline 1.13 from June 2020)  ED Imaging: CTH noncontrast: No acute hemorrhage or infarct, +Diffuse ventriculomegaly increased from May 2020.  ED Management: IV CTX 1g x1 76 y/o woman with a PMHx of Afib (on Eliquis), HFpEF w/ severe aortic stenosis s/p TAVR (May 2020), HTN, CAD s/p STEMI (on ASA), CKD, TIA s/p L CEA, presented from [facility] rehab for 2 weeks of worsening AMS. Per Dr. Zhong from [facility], pt arrived at [facility] rehab about 3 weeks ago after recent admission for dizziness and weakness last month (6/2020). At [facility] rehab, patient was AAOx1, but was ambulatory thoughout her 3 weeks there. She was noted to be not eating as much recently and had multiple negative sepsis workup. Her latest UCx however did grow ESBL sensitive to ertapenem (started IV 1g qd on 7/22, to end on 7/29). She was reported to be AAOx3, conversant and ambulatory. On arrival at [facility] ED, stroke code was called given patient was nonverbal. No acute stroke or bleeds on CTH, only notable for increasing hydrocephalus from May. At bedside, patient was AAOx2 (person and place), but not following much commands or conversant when using a [place] phone . Patient denied having any pain, but was unable to further answer other ROS questions at bedside.    ED Vitals: T 98.2F, /90, , RR 20, SpO2 94% RA  ED Labs: WBC 8.82, Hb 12.4, INR 1.55, Cr 1.28 (baseline 1.13 from June 2020)  ED Imaging: CTH noncontrast: No acute hemorrhage or infarct, +Diffuse ventriculomegaly increased from May 2020.  ED Management: IV CTX 1g x1 74 y/o woman with a PMHx of Afib (on Eliquis), HFpEF w/ severe aortic stenosis s/p TAVR (May 2020), HTN, CAD s/p STEMI (on ASA), CKD, TIA s/p L CEA, presented from [facility] rehab for 2 weeks of worsening AMS. Per Dr. Zhong from [facility], pt arrived at [facility] rehab about 3 weeks ago after recent admission for dizziness and weakness last month (6/2020). At [facility] rehab, patient was AAOx1, but was ambulatory thoughout her 3 weeks there. She was noted to be not eating as much recently and had multiple negative sepsis workup. Her latest UCx however did grow ESBL sensitive to ertapenem (started IV 1g qd on 7/22, to end on 7/29). She was reported to be AAOx3, conversant and ambulatory. On arrival at [facility] ED, stroke code was called given patient was nonverbal. No acute stroke or bleeds on CTH, only notable for increasing hydrocephalus from May. At bedside, patient was AAOx2 (person and place), but not following much commands or conversant when using a Citizen of [place] phone . Patient denied having any pain, but was unable to further answer other ROS questions at bedside.    ED Vitals: T 98.2F, /90, , RR 20, SpO2 94% RA  ED Labs: WBC 8.82, Hb 12.4, INR 1.55, Cr 1.28 (baseline 1.13 from June 2020)  ED Imaging: CTH noncontrast: No acute hemorrhage or infarct, +Diffuse ventriculomegaly increased from May 2020. CTA head/neck without large vessel occlusion.  ED Management: IV CTX 1g x1 74 y/o woman with a PMHx of Afib (on Eliquis), HFpEF w/ severe aortic stenosis s/p TAVR (May 2020), HTN, CAD s/p STEMI (on ASA), CKD, TIA s/p L CEA, presented from [facility] rehab for 2 weeks of worsening AMS. Per Dr. Zhong from [facility], pt arrived at [facility] rehab about 3 weeks ago after recent admission for dizziness and weakness last month (6/2020). At [facility] rehab, patient was AAOx1, but was ambulatory thoughout her 3 weeks there. She was noted to be not eating as much recently and had multiple negative sepsis workup. Her latest UCx however did grow ESBL sensitive to ertapenem (started IV 1g qd on 7/22, to end on 7/29). She was reported to be AAOx3, conversant and ambulatory. On arrival at [facility] ED, stroke code was called given patient was nonverbal. No acute stroke or bleeds on CTH, only notable for increasing hydrocephalus from May. At bedside, patient was AAOx2 (person and place), but not following much commands or conversant when using a [place] phone . Patient denied having any pain, but was unable to further answer other ROS questions at bedside.    ED Vitals: T 98.2F, /90, , RR 20, SpO2 94% RA  ED Labs: WBC 8.82, Hb 12.4, INR 1.55, Cr 1.28 (baseline 1.13 from June 2020). Trop neg x1.  ED Imaging: CTH noncontrast: No acute hemorrhage or infarct, +Diffuse ventriculomegaly increased from May 2020. CTA head/neck without large vessel occlusion. CXR with unchanged cardiomegaly, no acute infiltrates.  ED Management: IV CTX 1g x1

## 2020-07-28 NOTE — ED PROVIDER NOTE - CLINICAL SUMMARY MEDICAL DECISION MAKING FREE TEXT BOX
Stroke code called at triage for AMS-  px nonverbal- unsure baseline- stat head CT Stroke code called at triage for AMS-  px nonverbal- unsure baseline- stat head CT  seen by Stroke team- not a stroke- they will follow- ms decline has been 2 weeks- gentle IVF, IV abx for UA- follow ms

## 2020-07-28 NOTE — H&P ADULT - ATTENDING COMMENTS
75F PMHx of Afib (on Eliquis), HFpEF w/ severe aortic stenosis s/p TAVR (May 2020), HTN, CAD s/p STEMI (on ASA), CKD, TIA s/p L CEA, recent ESBL UTI, presented from [facility] rehab for 2 weeks of worsening AMS, found to have increasing ventriculomegaly.     Based on history - there seems to be a progressive, functional decline since May post TAVR. Patient in rehab was noted to be A&O x 0-1. Per family wishes, patient recurrently tested for infectious workup despite no systemic signs or symptoms. Ultimately, UA pos and Urine Cx with ESBL organism - tomorrow is last day of a 7 day Ertapenem course. It is unlikely given treatment, that her current state is due to UTI.     Alternatively, on imaging, there is read of a mild increase in ventriculomegaly. Patient without urinary incontinence. Gait not tested at present, but was ambulating normally in rehab. Unclear if this may be contributing to mental status. Consult Neuro vs Neurosurgery.     F/u TSH, B12, Folate, RPR.

## 2020-07-28 NOTE — H&P ADULT - PROBLEM SELECTOR PLAN 10
Plan:   F: NS @ 80cc/hr  E: replete K<4, Mg<2  N: NPO (pending S/S eval)  VTE Prophylaxis: Eliquis, SCDs  C: Full Code  D: [id number]

## 2020-07-28 NOTE — H&P ADULT - PROBLEM SELECTOR PLAN 7
Hx of TIA s/p L CEA. CTH noncontrast here without acute bleeds or strokes. CTA head/neck without large vessel occlusion  - c/w ASA per stroke neurology

## 2020-07-28 NOTE — ED PROVIDER NOTE - PROGRESS NOTE DETAILS
d/w nurse at [facility] has been declining for about 2 weeks- prog worsening as per Dr Weaver- px has been delerious for 2 weeks- noncommunicative not following instructions for 2 days- had one esbl + Urine- Invanz- already completed Invanz- afib on eliquis

## 2020-07-28 NOTE — H&P ADULT - NSHPSOCIALHISTORY_GEN_ALL_CORE
Patient came from [facility] rehab. Social history unable to be taken from patient given nonconversant state at bedside.

## 2020-07-28 NOTE — ED ADULT TRIAGE NOTE - OTHER COMPLAINTS
BIBA from [facility] for AMS, pt was found at 3pm by physician not found at baseline, on arrival pt is confused, unable to follow commands.  last known normal this morning.

## 2020-07-28 NOTE — H&P ADULT - NSHPLABSRESULTS_GEN_ALL_CORE
12.4   8.82  )-----------( 172      ( 2020 18:19 )             40.2     07-    139  |  101  |  25<H>  ----------------------------<  103<H>  4.7   |  27  |  1.28    Ca    9.4      2020 18:19    TPro  6.1  /  Alb  3.6  /  TBili  0.4  /  DBili  x   /  AST  22  /  ALT  21  /  AlkPhos  54      LIVER FUNCTIONS - ( 2020 18:19 )  Alb: 3.6 g/dL / Pro: 6.1 g/dL / ALK PHOS: 54 U/L / ALT: 21 U/L / AST: 22 U/L / GGT: x           PT/INR - ( 2020 18:19 )   PT: 18.2 sec;   INR: 1.55          PTT - ( 2020 18:19 )  PTT:30.1 sec  Urinalysis Basic - ( 2020 18:46 )    Color: Yellow / Appearance: SL Cloudy / S.015 / pH: x  Gluc: x / Ketone: NEGATIVE  / Bili: Negative / Urobili: 0.2 E.U./dL   Blood: x / Protein: NEGATIVE mg/dL / Nitrite: NEGATIVE   Leuk Esterase: NEGATIVE / RBC: < 5 /HPF / WBC > 10 /HPF   Sq Epi: x / Non Sq Epi: 0-5 /HPF / Bacteria: Present /HPF      CARDIAC MARKERS ( 2020 18:19 )  x     / <0.01 ng/mL / x     / x     / x            Radiology and other tests: Reviewed.

## 2020-07-28 NOTE — ED ADULT NURSE NOTE - PMH
Aortic stenosis    Chronic kidney disease (CKD)    H/O carotid stenosis    HLD (hyperlipidemia)    HTN (hypertension)    TIA (transient ischemic attack)

## 2020-07-28 NOTE — H&P ADULT - ASSESSMENT
76 y/o woman with a PMHx of Afib (on Eliquis), HFpEF w/ severe aortic stenosis s/p TAVR (May 2020), HTN, CAD s/p STEMI (on ASA), CKD, TIA s/p L CEA, recent ESBL UTI, presented from [facility] rehab for 2 weeks of worsening AMS, found to have increasing hydrocephalus.

## 2020-07-28 NOTE — H&P ADULT - PROBLEM SELECTOR PLAN 8
Hx of CKD (baseline 1.13 from June 2020). Cr 1.28 on admission. Likely elevated given recent poor PO intake at rehab  - continue to monitor with BMP

## 2020-07-28 NOTE — H&P ADULT - PROBLEM SELECTOR PLAN 4
Hx of afib on home Eliquis 5mg BID, diltiazem 120 qd, lopressor 37.5mg BID  - per stroke neurology, OK to restart Eliquis  - c/w Eliquis, diltiazem, and lopressor here

## 2020-07-28 NOTE — H&P ADULT - PROBLEM SELECTOR PLAN 1
Pt reported to be AAOx3, conversant and ambulatory prior to TAVR in May 2020. Recently sent to [facility] rehab after June admission for dizziness/weakness. Pt reported to be AAOx0 at [facility]. Multiple negative septic workup, until recent UCx growing ESBL bacteria sensitive to ertapenem (started on IV ertapenem 1g qd from 7/22-7/29). Stroke code called in ED for nonverbal state, CTH negative for acute pathologies, however showing increasing hydrocephalus. CTA head/neck without large vessel occlusion. DDx include 2/2 ESBL UTI vs. worsening hydrocephalus.  - c/w IV ertapenem 1g qd (received dose at [facility] rehab today), last dose 7/29  - per stroke team, OK to restart ASA and Eliquis  - f/u further stroke team recs  - f/u speech/swallow recs  - f/u PT recs

## 2020-07-28 NOTE — CONSULT NOTE ADULT - SUBJECTIVE AND OBJECTIVE BOX
**STROKE CODE CONSULT NOTE**    Last known well time/Time of onset of symptoms:    HPI:    PAST MEDICAL & SURGICAL HISTORY:  H/O carotid stenosis  TIA (transient ischemic attack)  Chronic kidney disease (CKD)  Aortic stenosis  HLD (hyperlipidemia)  HTN (hypertension)  H/O carotid endarterectomy      FAMILY HISTORY:      SOCIAL HISTORY:  Denies smoking, drinking, or drug use    ROS:  Constitutional: No fever, weight loss or fatigue  Eyes: No eye pain, visual disturbances, or discharge  ENMT:  No difficulty hearing, tinnitus, vertigo; No sinus or throat pain  Neck: No pain or stiffness  Respiratory: No cough, wheezing, chills or hemoptysis  Cardiovascular: No chest pain, palpitations, shortness of breath, or leg swelling  Gastrointestinal: No abdominal pain. No nausea, vomiting or hematemesis; No diarrhea or constipation. Nohematochezia.  Genitourinary: No dysuria, frequency, hematuria or incontinence  Neurological: As per HPI      MEDICATIONS  (STANDING):    MEDICATIONS  (PRN):      Allergies    No Known Allergies    Intolerances        Vital Signs Last 24 Hrs  T(C): 36.8 (28 Jul 2020 17:44), Max: 36.8 (28 Jul 2020 17:44)  T(F): 98.2 (28 Jul 2020 17:44), Max: 98.2 (28 Jul 2020 17:44)  HR: 112 (28 Jul 2020 17:44) (112 - 112)  BP: 130/90 (28 Jul 2020 17:44) (130/90 - 130/90)  BP(mean): --  RR: 20 (28 Jul 2020 17:44) (20 - 20)  SpO2: 94% (28 Jul 2020 17:44) (94% - 94%)    PHYSICAL EXAM:  Constitutional: WDWN; NAD    Neurologic:  -Mental status: Awake, alert and oriented to person, age, and month. Speech is fluent with intact naming, able to describe picture in full.  Recent and remote memory intact.  Attention/concentration intact.  No dysarthria, no aphasia.  Fund of knowledge appropriate.    -Cranial nerves:  II: Visual fields full to confrontation. Pupils PERRL  III, IV, VI: extraocular movements are intact without nystagmus  V: Facial sensation intact V1 through V3 intact bilaterally  VII: No facial droop   -Motor:  Normal bulk and tone, able to hold both arms equally but did not follow command to keep up so both drifted down. Able to bend both legs but would not follow command to lift in the air.    -Sensation: Intact to light touch.  No neglect.   -Coordination: did not cooperate  -Reflexes:downgoing toes bilaterally    NIHSS: 12    Fingerstick Blood Glucose: CAPILLARY BLOOD GLUCOSE  102 (28 Jul 2020 17:57)      POCT Blood Glucose.: 102 mg/dL (28 Jul 2020 17:41)       LABS:                    RADIOLOGY & ADDITIONAL STUDIES:    IV-tPA:        No  Reason IV-tPA not given: outside window    ASSESSMENT/PLAN:    75y Female w/ PMHafib on Eliquis, coming in with  CT head with no acute events, large ventricles. CTA/CTP with no acute findings.  Initial NIHSS 12  ICH score: N/A **STROKE CODE CONSULT NOTE**    Last known well time/Time of onset of symptoms: unknown    HPI:  75y Female w/ PMHafib on Eliquis, HTN, HLD, coming in with progressive altered mental status x 2 weeks. Patient was sent in from nursing home, reported that a few weeks ago she was conversant. The nurse at [facility] states that over the past 2 weeks pt has been declining, and saying less and not getting out of bed.   Pt herself awake and alert but not speaking except to say her name when asked in Surinamese. Did not follow commands in Surinamese.   Pt normotensive in the ER    PAST MEDICAL & SURGICAL HISTORY:  H/O carotid stenosis  TIA (transient ischemic attack)  Chronic kidney disease (CKD)  Aortic stenosis  HLD (hyperlipidemia)  HTN (hypertension)  H/O carotid endarterectomy      FAMILY HISTORY:      SOCIAL HISTORY:      ROS:  did not answer ROS questions  Neurological: As per HPI      MEDICATIONS  (STANDING):    MEDICATIONS  (PRN):      Allergies    No Known Allergies    Intolerances        Vital Signs Last 24 Hrs  T(C): 36.8 (28 Jul 2020 17:44), Max: 36.8 (28 Jul 2020 17:44)  T(F): 98.2 (28 Jul 2020 17:44), Max: 98.2 (28 Jul 2020 17:44)  HR: 112 (28 Jul 2020 17:44) (112 - 112)  BP: 130/90 (28 Jul 2020 17:44) (130/90 - 130/90)  BP(mean): --  RR: 20 (28 Jul 2020 17:44) (20 - 20)  SpO2: 94% (28 Jul 2020 17:44) (94% - 94%)    PHYSICAL EXAM:  Constitutional: WDWN; NAD    Neurologic:  -Mental status: Awake, alert and oriented to person. Did not answer other questions. Did not follow commands.    -Cranial nerves:  II: Visual fields full to confrontation. Pupils PERRL  III, IV, VI: extraocular movements are intact without nystagmus  VII: No facial droop   -Motor:  Normal bulk and tone, able to hold both arms equally but did not follow command to keep up so both drifted down. Able to bend both legs but would not follow command to lift in the air.    -Sensation: Intact to light touch.  No neglect.   -Coordination: did not cooperate  -Reflexes:downgoing toes bilaterally    NIHSS: 12    Fingerstick Blood Glucose: CAPILLARY BLOOD GLUCOSE  102 (28 Jul 2020 17:57)      POCT Blood Glucose.: 102 mg/dL (28 Jul 2020 17:41)       LABS:                    RADIOLOGY & ADDITIONAL STUDIES:  < from: CT Brain Stroke Protocol (07.28.20 @ 18:11) >  There is increased hydrocephalus, quite significantly in only 2 months, with development of periventricular lucency compatible with acute/active hydrocephalus. The fourth ventricle also appears larger, suggesting a communicating hydrocephalus. For reference, maximum diameter of the thirdventricle is 1.9 cm, previously 1.5 cm. Maximum transfrontal diameter measures 5.2 cm, previously 4.6 cm.    Agree, no acute intracranial hemorrhage or demarcated infarction site.    There is a partially calcified mass at the planum sphenoidale, sella and suprasellar cistern consistent with meningioma. Comparison of this is suboptimal due to prior oblique plane of scanning, though no definite appreciated lobe process is identified as to explain hydrocephalus.    < end of copied text >        IV-tPA:        No  Reason IV-tPA not given: outside window    ASSESSMENT/PLAN:    75y Female w/ PMHafib on Eliquis, HTN, HLD, coming in with progressive altered mental status x 2 weeks.   CT head with no acute events, increased hydrocephalus. CTA with no acute findings.  Initial NIHSS 12  ICH score: N/A    -Patient should continue her eliquis and aspirin  -Altered mental status workup per ED **STROKE CODE CONSULT NOTE**    Last known well time/Time of onset of symptoms: unknown    HPI:  75y Female w/ PMHafib on Eliquis, HTN, HLD, TIA s/p left CEA, CAD on aspirin, recent admission 5/2020 for TAVR, coming in with progressive altered mental status x 2 weeks. Patient was sent in from nursing home, reported that a few weeks ago she was conversant. The nurse at [facility] states that over the past 2 weeks pt has been declining, and saying less and not getting out of bed.   Pt herself awake and alert but not speaking except to say her name when asked in Chilean. Did not follow commands in Chilean.   Pt normotensive in the ER    PAST MEDICAL & SURGICAL HISTORY:  H/O carotid stenosis  TIA (transient ischemic attack)  Chronic kidney disease (CKD)  Aortic stenosis  HLD (hyperlipidemia)  HTN (hypertension)  H/O carotid endarterectomy      FAMILY HISTORY:      SOCIAL HISTORY:      ROS:  did not answer ROS questions  Neurological: As per HPI      MEDICATIONS  (STANDING):    MEDICATIONS  (PRN):      Allergies    No Known Allergies    Intolerances        Vital Signs Last 24 Hrs  T(C): 36.8 (28 Jul 2020 17:44), Max: 36.8 (28 Jul 2020 17:44)  T(F): 98.2 (28 Jul 2020 17:44), Max: 98.2 (28 Jul 2020 17:44)  HR: 112 (28 Jul 2020 17:44) (112 - 112)  BP: 130/90 (28 Jul 2020 17:44) (130/90 - 130/90)  BP(mean): --  RR: 20 (28 Jul 2020 17:44) (20 - 20)  SpO2: 94% (28 Jul 2020 17:44) (94% - 94%)    PHYSICAL EXAM:  Constitutional: WDWN; NAD    Neurologic:  -Mental status: Awake, alert and oriented to person. Did not answer other questions. Did not follow commands.    -Cranial nerves:  II: Visual fields full to confrontation. Pupils PERRL  III, IV, VI: extraocular movements are intact without nystagmus  VII: No facial droop   -Motor:  Normal bulk and tone, able to hold both arms equally but did not follow command to keep up so both drifted down. Able to bend both legs but would not follow command to lift in the air.    -Sensation: Intact to light touch.  No neglect.   -Coordination: did not cooperate  -Reflexes:downgoing toes bilaterally    NIHSS: 12    Fingerstick Blood Glucose: CAPILLARY BLOOD GLUCOSE  102 (28 Jul 2020 17:57)      POCT Blood Glucose.: 102 mg/dL (28 Jul 2020 17:41)       LABS:                RADIOLOGY & ADDITIONAL STUDIES:  < from: CT Brain Stroke Protocol (07.28.20 @ 18:11) >  There is increased hydrocephalus, quite significantly in only 2 months, with development of periventricular lucency compatible with acute/active hydrocephalus. The fourth ventricle also appears larger, suggesting a communicating hydrocephalus. For reference, maximum diameter of the thirdventricle is 1.9 cm, previously 1.5 cm. Maximum transfrontal diameter measures 5.2 cm, previously 4.6 cm.    Agree, no acute intracranial hemorrhage or demarcated infarction site.    There is a partially calcified mass at the planum sphenoidale, sella and suprasellar cistern consistent with meningioma. Comparison of this is suboptimal due to prior oblique plane of scanning, though no definite appreciated lobe process is identified as to explain hydrocephalus.    < end of copied text >        IV-tPA:        No  Reason IV-tPA not given: outside window    ASSESSMENT/PLAN:    75y Female w/ PMHafib on Eliquis, HTN, HLD, TIA s/p left CEA, CAD on aspirin, recent admission 5/2020 for TAVR, coming in with progressive altered mental status x 2 weeks.   CT head with increased hydrocephalus. CTA with no acute findings.  Initial NIHSS 12  ICH score: N/A    -Patient should continue her eliquis and aspirin  -Altered mental status workup per ED  -consider Gen neuro consult for worsening hydrocephalus if all other results neg **STROKE CODE CONSULT NOTE**    Last known well time/Time of onset of symptoms: unknown    HPI:  75y Female w/ PMHafib on Eliquis, HTN, HLD, TIA s/p left CEA, CAD on aspirin, recent admission 5/2020 for TAVR, coming in with progressive altered mental status x 2 weeks. Patient was sent in from nursing home, reported that a few weeks ago she was conversant. The nurse at [facility] states that over the past 2 weeks pt has been declining, and saying less and not getting out of bed.   Pt herself awake and alert but not speaking except to say her name when asked in Icelandic. Did not follow commands in Icelandic.   Pt normotensive in the ER    PAST MEDICAL & SURGICAL HISTORY:  H/O carotid stenosis  TIA (transient ischemic attack)  Chronic kidney disease (CKD)  Aortic stenosis  HLD (hyperlipidemia)  HTN (hypertension)  H/O carotid endarterectomy      FAMILY HISTORY:      SOCIAL HISTORY:      ROS:  did not answer ROS questions  Neurological: As per HPI      MEDICATIONS  (STANDING):    MEDICATIONS  (PRN):      Allergies    No Known Allergies    Intolerances        Vital Signs Last 24 Hrs  T(C): 36.8 (28 Jul 2020 17:44), Max: 36.8 (28 Jul 2020 17:44)  T(F): 98.2 (28 Jul 2020 17:44), Max: 98.2 (28 Jul 2020 17:44)  HR: 112 (28 Jul 2020 17:44) (112 - 112)  BP: 130/90 (28 Jul 2020 17:44) (130/90 - 130/90)  BP(mean): --  RR: 20 (28 Jul 2020 17:44) (20 - 20)  SpO2: 94% (28 Jul 2020 17:44) (94% - 94%)    PHYSICAL EXAM:  Constitutional: WDWN; NAD    Neurologic:  -Mental status: Awake, alert and oriented to person. Did not answer other questions. Did not follow commands.    -Cranial nerves:  II: Visual fields full to confrontation. Pupils PERRL  III, IV, VI: extraocular movements are intact without nystagmus  VII: No facial droop   -Motor:  Normal bulk and tone, able to hold both arms equally but did not follow command to keep up so both drifted down. Able to bend both legs but would not follow command to lift in the air.    -Sensation: Intact to light touch.  No neglect.   -Coordination: did not cooperate  -Reflexes:downgoing toes bilaterally    NIHSS: 12    Fingerstick Blood Glucose: CAPILLARY BLOOD GLUCOSE  102 (28 Jul 2020 17:57)      POCT Blood Glucose.: 102 mg/dL (28 Jul 2020 17:41)       LABS:                RADIOLOGY & ADDITIONAL STUDIES:  < from: CT Brain Stroke Protocol (07.28.20 @ 18:11) >  There is increased hydrocephalus, quite significantly in only 2 months, with development of periventricular lucency compatible with acute/active hydrocephalus. The fourth ventricle also appears larger, suggesting a communicating hydrocephalus. For reference, maximum diameter of the thirdventricle is 1.9 cm, previously 1.5 cm. Maximum transfrontal diameter measures 5.2 cm, previously 4.6 cm.    Agree, no acute intracranial hemorrhage or demarcated infarction site.    There is a partially calcified mass at the planum sphenoidale, sella and suprasellar cistern consistent with meningioma. Comparison of this is suboptimal due to prior oblique plane of scanning, though no definite appreciated lobe process is identified as to explain hydrocephalus.    < end of copied text >        IV-tPA:        No  Reason IV-tPA not given: outside window    ASSESSMENT/PLAN:    75y Female w/ PMHafib on Eliquis, HTN, HLD, TIA s/p left CEA, CAD on aspirin, recent admission 5/2020 for TAVR, coming in with progressive altered mental status x 2 weeks.   CT head with increased hydrocephalus. CTA with no acute findings.  Doubt TIA/CVA.  Initial NIHSS 12  ICH score: N/A    -Patient should continue her eliquis and aspirin  -Altered mental status workup per ED  -consider Gen neuro consult for worsening hydrocephalus if all other results neg

## 2020-07-28 NOTE — H&P ADULT - PROBLEM SELECTOR PLAN 5
Hx of CAD s/p STEMI. On home ASA 81mg qd  - per stroke neurology, OK to restart ASA  - c/w ASA  - f/u AM EKG

## 2020-07-28 NOTE — ED ADULT NURSE NOTE - OBJECTIVE STATEMENT
Patient alert and wake , was sent from NH for altered mental status , patient is Moldovan speaking , able to understand some of commands but un able to talk at this time . STroke code was called , protocol followed . Seen and examined by Dr. Villegas and neuro team . All labs sent . Not in distress . safety maintained , will continue to monitor.

## 2020-07-28 NOTE — ED PROVIDER NOTE - OBJECTIVE STATEMENT
75 F hx of HTN, afib, ckd 75 F hx of HTN, afib, ckd, TIA w AMS- onset unknown- was altered at 3 pm- baseline is unknown  no exac/allev factors  moderate-severe

## 2020-07-28 NOTE — H&P ADULT - NSHPPHYSICALEXAM_GEN_ALL_CORE
GENERAL: In NAD. Lying in bed comfortably. AAOx 2 (person, place), overall nonconversant.  HEENT: NC/AT. PERRL. EOMI. Neck supple. No JVD. No lymphadenopathy. No thyromegaly.  CV: RRR. +S1/S2. No murmurs, rubs or gallops  LUNGS: Clear to auscultation b/l. No wheezing, rales or rhonchi.  ABDOMEN: Soft, nontender, nondistended. +BSx4. No guarding or rebound tenderness.  EXT: WWP. No edema in b/l extremities  VASCULAR: 2+ radial, DP/PT bilaterally GENERAL: In NAD. Lying in bed comfortably. AAOx 2 (person, place), overall nonconversant.  HEENT: NC/AT. PERRL. EOMI. Neck supple. No JVD. No lymphadenopathy. No thyromegaly.  CV: Irregularly irregular rate and rhythm. +S1/S2. +RUBY at R 2nd intercostal space  LUNGS: Clear to auscultation b/l. No wheezing, rales or rhonchi.   ABDOMEN: Obese abdomen. Soft, nontender, nondistended. +BS. No guarding or rebound tenderness.  EXT: WWP. No edema in b/l extremities  VASCULAR: 2+ radial, DP bilaterally  NEURO: AAOx2 (person, place). Moves all 4 extremities spontaneously. Patient not able to follow commands despite using [place]uneian  to assess full neuro exam. GENERAL: In NAD. Lying in bed comfortably. AAOx 2 (person, place), overall nonconversant.  HEENT: NC/AT. PERRL. EOMI. Neck supple. No JVD. No lymphadenopathy. No thyromegaly.  CV: Irregularly irregular rate and rhythm. +S1/S2. +RUBY at R 2nd intercostal space  LUNGS: Clear to auscultation b/l. No wheezing, rales or rhonchi.   ABDOMEN: Obese abdomen. Soft, nontender, nondistended. +BS. No guarding or rebound tenderness.  EXT: WWP. No edema in b/l extremities  VASCULAR: 2+ radial, DP bilaterally  NEURO: AAOx2 (person, place). PERRL. EOMI. No facial droop. Moves all 4 extremities spontaneously. Patient not able to follow commands despite using [place]  to assess full neuro exam.

## 2020-07-28 NOTE — ED ADULT NURSE NOTE - NSIMPLEMENTINTERV_GEN_ALL_ED
Implemented All Fall with Harm Risk Interventions:  [place] to call system. Call bell, personal items and telephone within reach. Instruct patient to call for assistance. Room bathroom lighting operational. Non-slip footwear when patient is off stretcher. Physically safe environment: no spills, clutter or unnecessary equipment. Stretcher in lowest position, wheels locked, appropriate side rails in place. Provide visual cue, wrist band, yellow gown, etc. Monitor gait and stability. Monitor for mental status changes and reorient to person, place, and time. Review medications for side effects contributing to fall risk. Reinforce activity limits and safety measures with patient and family. Provide visual clues: red socks.

## 2020-07-28 NOTE — ED ADULT NURSE NOTE - OTHER COMPLAINTS
BIBA from [facility] for AMS, pt was found at 3pm by physician not found at baseline, on arrival pt is confused, unable to follow commands.  last known normal this morning.

## 2020-07-29 NOTE — PROGRESS NOTE ADULT - PROBLEM SELECTOR PLAN 3
BLEPHARITIS, OU: PRESCRIBE WARM COMPRESSES AND EYELID SCRUBS QD-BID, ARTIFICIAL TEARS BID-QID, THE DAILY INTAKE OF OMEGA-3 FATTY ACIDS AND LID SCRUBS_. WILL CONSIDER LIPIFLOW TREATMENT NEXT VISIT IF NOT RESPONSIVE TO TREATMENT OR IF SYMPTOMS PERSIST. RETURN FOR FOLLOW-UP AS SCHEDULED. Pt presented with 2 weeks of worsening AMS with recent UCx performed at [facility] rehab showing ESBL (bacteria unclear) sensitive to ertapenem. Did not meet SIRS criteria on admission. Pt was started on ertapenem 1g qd on 7/22 (planned for course to be completed on 7/29)  - s/p dose already on day of admission 7/28 at rehab  - c/w additional ertapenem dose on 7/29  - f/u UCx sent from ED  [ ] Pt presented with 2 weeks of worsening AMS with recent UCx performed at [facility] rehab showing ESBL (bacteria unclear) sensitive to ertapenem. Did not meet SIRS criteria on admission. Pt was started on ertapenem 1g qd on 7/22 (planned for course to be completed on 7/29)  - s/p dose already on day of admission 7/28 at rehab  - c/w additional ertapenem dose on 7/29  - f/u UCx sent from ED

## 2020-07-29 NOTE — PROGRESS NOTE ADULT - PROBLEM SELECTOR PLAN 7
Hx of TIA s/p L CEA. CTH noncontrast here without acute bleeds or strokes. CTA head/neck without large vessel occlusion  - ASA per stroke neurology but will hold until since possible LP; will decide pending MRI results.

## 2020-07-29 NOTE — PROGRESS NOTE ADULT - PROBLEM SELECTOR PLAN 5
Hx of CAD s/p STEMI. On home ASA 81mg qd  - per stroke neurology, OK to restart ASA  - c/w ASA  - f/u AM EKG Hx of CAD s/p STEMI. On home ASA 81mg qd  - per stroke neurology, OK to restart ASA. Will NOT start ASA since possible LP; will decide pending MRI results  - F/U on repeat ECG. (AM)

## 2020-07-29 NOTE — DIETITIAN INITIAL EVALUATION ADULT. - ENERGY NEEDS
Height: 66" Weight: 233lbs per last admission,  lbs+/-10%, %%, BMI 37.6,  IBW used for calculations as pt >120% of IBW, adjusted for age

## 2020-07-29 NOTE — PROGRESS NOTE ADULT - PROBLEM SELECTOR PLAN 1
Pt reported to be AAOx3, conversant and ambulatory prior to TAVR in May 2020. Recently sent to [facility] rehab after June admission for dizziness/weakness. Pt reported to be AAOx0 at [facility]. Multiple negative septic workup, until recent UCx growing ESBL bacteria sensitive to ertapenem (started on IV ertapenem 1g qd from 7/22-7/29). Stroke code called in ED for nonverbal state, CTH negative for acute pathologies, however showing increasing hydrocephalus. CTA head/neck without large vessel occlusion. DDx include 2/2 ESBL UTI vs. worsening hydrocephalus.  - c/w IV ertapenem 1g qd (received dose at [facility] rehab today), last dose will be today 7/29  - per stroke team, OK to restart ASA and Eliquis  - f/u speech/swallow recs   - f/u PT recs

## 2020-07-29 NOTE — PATIENT PROFILE ADULT - HAVE YOU EXPERIENCED VIOLENCE OR A TRAUMATIC EVENT?
Remove under wire    Lump or mass in breast  (primary encounter diagnosis)  Comment: reviewed mammogram 6/2019  Plan: MA Diagnostic Digital Left        Schedule a diagnostic mammogram for this area       no

## 2020-07-29 NOTE — CONSULT NOTE ADULT - ASSESSMENT
per Internal Medicine    76 y/o woman with a PMHx of Afib (on Eliquis), HFpEF w/ severe aortic stenosis s/p TAVR (May 2020), HTN, CAD s/p STEMI (on ASA), CKD, TIA s/p L CEA, recent ESBL UTI, presented from [facility] rehab due to 2 weeks of worsening mental status. She was found to have increasing hydrocephalus upon presentation in the ED. Patient is currently non verbal and not responsible to verbal stimuli and neurology is following.     Problem/Plan - 1:  ·  Problem: Altered mental status, unspecified altered mental status type.  Plan: Pt reported to be AAOx3, conversant and ambulatory prior to TAVR in May 2020. Recently sent to [facility] rehab after June admission for dizziness/weakness. Pt reported to be AAOx0 at [facility]. Multiple negative septic workup, until recent UCx growing ESBL bacteria sensitive to ertapenem (started on IV ertapenem 1g qd from 7/22-7/29). Stroke code called in ED for nonverbal state, CTH negative for acute pathologies, however showing increasing hydrocephalus. CTA head/neck without large vessel occlusion. DDx include 2/2 ESBL UTI vs. worsening hydrocephalus.  - c/w IV ertapenem 1g qd (received dose at [facility] rehab today), last dose will be today 7/29  - per stroke team, OK to restart ASA and Eliquis  - f/u speech/swallow recs   - f/u PT recs.    Problem/Plan - 2:  ·  Problem: Hydrocephalus.  Plan: Pt presented with 2 weeks of worsening AMS. CTH noncontrast in ED showing increased diffuse ventriculomegaly compared to May 2020.  - F/U neurology recs; spoke to team: MRI with contrast first then LP if appropriate.    Problem/Plan - 3:  ·  Problem: UTI (urinary tract infection).  Plan: Pt presented with 2 weeks of worsening AMS with recent UCx [facility] at [facility] showing ESBL (bacteria unclear) sensitive to ertapenem. Did not meet SIRS criteria on admission. Pt was started on ertapenem 1g qd on 7/22 (planned for course to be completed on 7/29)  - s/p dose already on day of admission 7/28 at rehab  - c/w additional ertapenem dose on 7/29  - f/u UCx sent from ED.    Problem/Plan - 4:  ·  Problem: Afib.  Plan: Hx of afib on home Eliquis 5mg BID, diltiazem 120 qd, lopressor 37.5mg BID  - per stroke neurology, OK to restart Eliquis  - c/w Eliquis, diltiazem, and lopressor here.     Problem/Plan - 5:  ·  Problem: CAD (coronary artery disease).  Plan: Hx of CAD s/p STEMI. On home ASA 81mg qd  - per stroke neurology, OK to restart ASA. Will NOT start ASA since possible LP; will decide pending MRI results  - F/U on repeat ECG. (AM).    Problem/Plan - 6:  Problem: HTN (hypertension). Plan: Hx of HTN on lopressor 37.5mg BID  - sBP 130s here HR tachycardic 90s-110s  - c/w lopressor here.    Problem/Plan - 7:  ·  Problem: TIA (transient ischemic attack).  Plan: Hx of TIA s/p L CEA. CTH noncontrast here without acute bleeds or strokes. CTA head/neck without large vessel occlusion  - ASA per stroke neurology but will hold until since possible LP; will decide pending MRI results.    Problem/Plan - 8:  ·  Problem: Chronic kidney disease (CKD).  Plan: Hx of CKD (baseline 1.13 from June 2020). Cr 1.28 on admission this time. Likely elevated given recent poor PO Fluid intake at rehab  - continue to monitor with BMP.    Problem/Plan - 9:  ·  Problem: Aortic stenosis.  Plan: Hx of aortic stenosis s/p TAVR in May 2020. Lung exam CTAB.    #HFpEF   Echo 5/2020: EF 64%, +pHTN (PASP 64), mod-severe mtiral regurg, LVH, mildly dilated LA. On home lopressor 37.5mg BID. Lungs CTAB on exam. CXR with cardiomegaly consistent with prior CXR in June, not fluid overloaded at this moment. Satting well on RA.  - c/w lopressor.     Problem/Plan - 10:  Problem: Nutrition, metabolism, and development symptoms. Plan; Plan:   F: NS @ 150 cc/hr  E: replete K<4, Mg<2  N: NPO (pending S/S eval)  VTE Prophylaxis: Eliquis, SCDs  C: Full Code  D: RMF.

## 2020-07-29 NOTE — PROGRESS NOTE ADULT - PROBLEM SELECTOR PLAN 10
Plan:   F: NS @ 150 cc/hr  E: replete K<4, Mg<2  N: NPO (pending S/S eval)  VTE Prophylaxis: Eliquis, SCDs  C: Full Code  D: FARZANEH

## 2020-07-29 NOTE — PROGRESS NOTE ADULT - PROBLEM SELECTOR PLAN 3
Pt presented with 2 weeks of worsening AMS with recent UCx performed at [facility] rehab showing ESBL (bacteria unclear) sensitive to ertapenem. Did not meet SIRS criteria on admission. Pt was started on ertapenem 1g qd on 7/22 (planned for course to be completed on 7/29)  - s/p dose already on day of admission 7/28 at rehab  - c/w additional ertapenem dose on 7/29  - f/u UCx sent from ED

## 2020-07-29 NOTE — PROGRESS NOTE ADULT - SUBJECTIVE AND OBJECTIVE BOX
76 y/o [place] woman with a PMHx of Afib (on Eliquis), HFpEF w/ severe aortic stenosis s/p TAVR (May 2020), HTN, CAD s/p STEMI (on ASA), CKD, TIA s/p L CEA, presented from [facility] rehab for 2 weeks of worsening mental status. Per Dr. Zhong from [facility], pt arrived at [facility] rehab about 3 weeks ago after recent admission for dizziness and weakness last month (2020). Based on the H&P patient was AAOx1 during her time at the [facility] rehab facility but was ambulatory throughout her 3 weeks there. She was noted to have decreased appetite and had multiple negative sepsis workups that were negative. Her latest UCx grew ESBL sensitive to ertapenem (started IV 1g qd on , to end on ).     On arrival at [facility] ED, patient was non-verbal and stroke called was called. No acute stroke or bleeds on CTH, only notable for increasing hydrocephalus from May. Per H&P, at bedside, patient was AAOx2 (person and place), but not following commands or conversant even when using a [place] phone . Patient denied having any pain, but was unable to further answer other ROS questions at bedside.    ED Vitals: T 98.2F, /90, , RR 20, SpO2 94% RA  ED Labs: WBC 8.82, Hb 12.4, INR 1.55, Cr 1.28 (baseline 1.13 from 2020). Trop neg x1.  ED Imaging: CTH noncontrast: No acute hemorrhage or infarct, +Diffuse ventriculomegaly increased from May 2020. CTA head/neck without large vessel occlusion. CXR with unchanged cardiomegaly, no acute infiltrates.  ED Management: IV CTX 1g x1    PAST MEDICAL & SURGICAL HISTORY:  H/O carotid stenosis  TIA (transient ischemic attack)  Chronic kidney disease (CKD)  Aortic stenosis  HLD (hyperlipidemia)  HTN (hypertension)  H/O carotid endarterectomy    Allergies: No Known Allergies    Home Medications:  acetaminophen 325 mg oral tablet: 2 tab(s) orally every 6 hours, As needed, Mild Pain (1 - 3) (10 Tanvir 2020 09:19)  apixaban 5 mg oral tablet: 1 tab(s) orally every 12 hours (10 Tanvir 2020 09:19)  aspirin 81 mg oral delayed release tablet: 1 tab(s) orally once a day (10 Tanvir 2020 09:19)  atorvastatin 40 mg oral tablet: 1 tab(s) orally once a day (at bedtime) (10 Tanvir 2020 09:19)  dilTIAZem 120 mg/24 hours oral capsule, extended release: 1 cap(s) orally once a day (10 Tanvir 2020 09:19)  metoprolol tartrate 37.5 mg oral tablet: 1 tab(s) orally every 12 hours (10 Tanvir 2020 09:)  pantoprazole 40 mg oral delayed release tablet: 1 tab(s) orally once a day (before a meal) (10 Tanvir 2020 09:)  senna oral tablet: 2 tab(s) orally once a day (at bedtime) (10 Tanvir 2020 09:)  Travatan Z 0.004% ophthalmic solution: 1 drop(s) to each affected eye once a day (in the evening) (2020 12:31)    MEDICATIONS  (STANDING):  apixaban 5 milliGRAM(s) Oral every 12 hours  aspirin enteric coated 81 milliGRAM(s) Oral daily  atorvastatin 40 milliGRAM(s) Oral at bedtime  diltiazem    milliGRAM(s) Oral daily  ertapenem  IVPB 1000 milliGRAM(s) IV Intermittent once  latanoprost 0.005% Ophthalmic Solution 1 Drop(s) Both EYES at bedtime  metoprolol tartrate 37.5 milliGRAM(s) Oral two times a day  pantoprazole    Tablet 40 milliGRAM(s) Oral before breakfast  senna 2 Tablet(s) Oral at bedtime                          13.3   7.55  )-----------( 167      ( 2020 07:25 )             42.6     07-  141  |  105  |  22  ----------------------------<  90  4.0   |  26  |  1.05    Ca    9.7      2020 07:25  TPro  6.1  /  Alb  3.6  /  TBili  0.4  /  DBili  x   /  AST  22  /  ALT  21  /  AlkPhos  54  07-28  PT/INR - ( 2020 18:19 )   PT: 18.2 sec;   INR: 1.55      PTT - ( 2020 18:19 )  PTT:30.1 sec  CARDIAC MARKERS ( 2020 18:19 )  x     / <0.01 ng/mL / x     / x     / x        Urinalysis Basic - ( 2020 18:46 )    Color: Yellow / Appearance: SL Cloudy / S.015 / pH: x  Gluc: x / Ketone: NEGATIVE  / Bili: Negative / Urobili: 0.2 E.U./dL   Blood: x / Protein: NEGATIVE mg/dL / Nitrite: NEGATIVE   Leuk Esterase: NEGATIVE / RBC: < 5 /HPF / WBC > 10 /HPF   Sq Epi: x / Non Sq Epi: 0-5 /HPF / Bacteria: Present /HPF      EXAM: XR CHEST PORTABLE URGENT 1V     PROCEDURE DATE: 2020   INTERPRETATION: Portable chest   HISTORY: Cough and shortness of breath   IMPRESSION:   No infiltrate or pleural effusion. Cardiomegaly and tortuosity thoracic aorta. No pneumothorax. No acute bone abnormality. Mild hypoinflation.   Thank you for the opportunity to participate in the care of this patient.   JOHN DOE M.D., ATTENDING RADIOLOGIST   This document has been electronically signed. 2020 7:54AM     EXAM: CT ANGIO NECK (W)AW IC   EXAM: CT ANGIO BRAIN (W)AW IC   PROCEDURE DATE: 2020     COMPARISON: CT head 2020.   FINDINGS: The CTA examination of the [place] demonstrates the internal carotid arteries to be normal in caliber. There is a normal bifurcation into the A1 and M1 segments. The visualized vertebral arteries are normal in caliber. The basilar artery is normal in caliber. There is a normal bifurcation into the posterior cerebral arteries. There are no areas of stenosis, dilatation or aneurysm.   IMPRESSION: No large vessel occlusion.     PROCEDURE: CTA neck with and without intravenous contrast.   INDICATION: Stroke code. Altered mental status.   TECHNIQUE: Multiple axial thin section were obtained through the neck following the intravenous bolus injection of contrast as above. MIP series are provided.   COMPARISON: None.   FINDINGS: The CTA examination demonstrates the aortic arch appears intact without narrowing of the origin of the great vessels.   The right common carotid artery to be normal in caliber. There is a normal bifurcation into the right internal and external carotid arteries. There is moderate calcified plaque at the carotid bifurcation with approximately 50% stenosis.   The left common carotid artery is normal in caliber. There is a normal bifurcation into the left internal and external carotid arteries. There is mild calcified plaque at the left carotid bifurcation without hemodynamically significant stenosis.   The right vertebral artery is dominant.   IMPRESSION: No large vessel occlusion.     MILTON PAREDES M.D., RADIOLOGY RESIDENT   This document has been electronically signed.   FACUNDO BOOTHE M.D. ATTENDING RADIOLOGIST   This document has been electronically signed. 2020 6:50PM     EXAM: CT ANGIO NECK (W)AW IC   EXAM: CT ANGIO BRAIN (W)AW IC   PROCEDURE DATE: 2020   INTERPRETATION: I, Facundo Boothe MD, have reviewed the images and the report and agree with the findings, with the following modifications:     CTA head: The internal carotid arteries at the skull base are patent. 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 The anterior cerebral artery A1 segments appear encased, at least partially, as well but without occlusion or significant stenosis. The left A1 is somewhat hypoplastic. Both middle cerebral arteries are widely patent and symmetric caliber at their first and second order branches. The posterior circulation appears within normal limits. The right vertebral artery is dominant. The basilar artery is widely patent, as are both posterior cerebral arteries.     CTA neck: Agree, there is no arterial occlusive disease. There is mixed calcified and noncalcified plaque at the origin of the left internal carotid artery, measuring 2.2 mm in minimal luminal diameter and distally measuring 3.9 mm. This is LESS THAN 50% stenosis per NASCET criteria. There is no visible plaque or stenosis of the left carotid. Both vertebral arteries appear patent at their origins and throughout the neck; the right side is dominant.     There is aneurysmal dilatation of the ascending thoracic aorta, 4.7 cm in diameter. The partially seen heart appears enlarged, particularly the left atrium. There is atelectasis and bronchial wall thickening of left basilar segments in the lower lobe of the left lung. There is mosaic attenuation in the upper lobes that likely indicates small airway disease. 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       PRELIMINARY REPORT:   PROCEDURE: CTA brain with intravenous contrast.   INDICATION: Stroke code. Altered mental status.   TECHNIQUE: Multiple axial thin section were obtained through the [place] following the intravenous bolus injection of 85 mL of Isovue-[id number]. MIP series are provided   COMPARISON: CT head 2020.   FINDINGS: The CTA examination of the [place] demonstrates the internal carotid arteries to be normal in caliber. There is a normal bifurcation into the A1 and M1 segments. The visualized vertebral arteries are normal in caliber. The basilar artery is normal in caliber. There is a normal bifurcation into the posterior cerebral arteries. There are no areas of stenosis, dilatation or aneurysm.   IMPRESSION: No large vessel occlusion.       PROCEDURE: CTA neck with and without intravenous contrast.   INDICATION: Stroke code. Altered mental status.   TECHNIQUE: Multiple axial thin section were obtained through the neck following the intravenous bolus injection of contrast as above. MIP series are provided.   COMPARISON: None.   FINDINGS: The CTA examination demonstrates the aortic arch appears intact without narrowing of the origin of the great vessels.   The right common carotid artery to be normal in caliber. There is a normal bifurcation into the right internal and external carotid arteries. There is moderate calcified plaque at the carotid bifurcation with approximately 50% stenosis.     The left common carotid artery is normal in caliber. There is a normal bifurcation into the left internal and external carotid arteries. There is mild calcified plaque at the left carotid bifurcation without hemodynamically significant stenosis.     The right vertebral artery is dominant.     IMPRESSION: No large vessel occlusion.

## 2020-07-29 NOTE — DIETITIAN INITIAL EVALUATION ADULT. - OTHER INFO
74 y/o woman with a PMHx of Afib (on Eliquis), HFpEF w/ severe aortic stenosis s/p TAVR (May 2020), HTN, CAD s/p STEMI (on ASA), CKD, TIA s/p L CEA, recent ESBL UTI, presented from [facility] rehab for 2 weeks of worsening AMS, found to have increasing hydrocephalus. Pt seen in room, unable to obtain hx 2/2 pt status. Pt currently NPO since admission s/p swallow eval with recs for puree/thin liquids. No wt in chart, per last admission 6/4 pt 106.2kg, please obtain wt. Skin intact. No pain noted at this time. GI wdl per flowsheet. NFPE unremarkable. Please see recommendations below. Will continue to follow per RD protocol.

## 2020-07-29 NOTE — CONSULT NOTE ADULT - SUBJECTIVE AND OBJECTIVE BOX
Patient is a 75y old  Female who presents with a chief complaint of AMS, UTI (2020 11:58)       HPI:  76 y/o woman with a PMHx of Afib (on Eliquis), HFpEF w/ severe aortic stenosis s/p TAVR (May 2020), HTN, CAD s/p STEMI (on ASA), CKD, TIA s/p L CEA, presented from [facility] rehab for 2 weeks of worsening AMS. Per Dr. Zhong from [facility], pt arrived at [facility] rehab about 3 weeks ago after recent admission for dizziness and weakness last month (2020). At [facility] rehab, patient was AAOx1, but was ambulatory thoughout her 3 weeks there. She was noted to be not eating as much recently and had multiple negative sepsis workup. Her latest UCx however did grow ESBL sensitive to ertapenem (started IV 1g qd on , to end on ). She was reported to be AAOx3, conversant and ambulatory. On arrival at [facility] ED, stroke code was called given patient was nonverbal. No acute stroke or bleeds on CTH, only notable for increasing hydrocephalus from May. At bedside, patient was AAOx2 (person and place), but not following much commands or conversant when using a [place] phone . Patient denied having any pain, but was unable to further answer other ROS questions at bedside.    ED Vitals: T 98.2F, /90, , RR 20, SpO2 94% RA  ED Labs: WBC 8.82, Hb 12.4, INR 1.55, Cr 1.28 (baseline 1.13 from 2020). Trop neg x1.  ED Imaging: CTH noncontrast: No acute hemorrhage or infarct, +Diffuse ventriculomegaly increased from May 2020. CTA head/neck without large vessel occlusion. CXR with unchanged cardiomegaly, no acute infiltrates.  ED Management: IV CTX 1g x1 (2020 21:40)      PAST MEDICAL & SURGICAL HISTORY:  H/O carotid stenosis  TIA (transient ischemic attack)  Chronic kidney disease (CKD)  Aortic stenosis  HLD (hyperlipidemia)  HTN (hypertension)  H/O carotid endarterectomy      MEDICATIONS  (STANDING):  apixaban 5 milliGRAM(s) Oral every 12 hours  atorvastatin 40 milliGRAM(s) Oral at bedtime  diltiazem    milliGRAM(s) Oral daily  latanoprost 0.005% Ophthalmic Solution 1 Drop(s) Both EYES at bedtime  metoprolol tartrate 37.5 milliGRAM(s) Oral two times a day  pantoprazole    Tablet 40 milliGRAM(s) Oral before breakfast  senna 2 Tablet(s) Oral at bedtime    MEDICATIONS  (PRN):      [facility]           -  Home Living Status [facility]:: lives with her daughter in an elevator accessible apartment building           -  Prior Home Care Services:  6 hrs x 7 days    Baseline Functional Level Prior to Admission:           - ADL's:    partial assistance with bathing/dressing/toileting           - ambulatory status PTA:  walked with a cane/walker    FAMILY HISTORY:      CBC Full  -  ( 2020 07:25 )  WBC Count : 7.55 K/uL  RBC Count : 4.43 M/uL  Hemoglobin : 13.3 g/dL  Hematocrit : 42.6 %  Platelet Count - Automated : 167 K/uL  Mean Cell Volume : 96.2 fl  Mean Cell Hemoglobin : 30.0 pg  Mean Cell Hemoglobin Concentration : 31.2 gm/dL  Auto Neutrophil # : 4.77 K/uL  Auto Lymphocyte # : 1.67 K/uL  Auto Monocyte # : 0.71 K/uL  Auto Eosinophil # : 0.27 K/uL  Auto Basophil # : 0.09 K/uL  Auto Neutrophil % : 63.2 %  Auto Lymphocyte % : 22.1 %  Auto Monocyte % : 9.4 %  Auto Eosinophil % : 3.6 %  Auto Basophil % : 1.2 %          141  |  105  |  22  ----------------------------<  90  4.0   |  26  |  1.05    Ca    9.7      2020 07:25    TPro  6.1  /  Alb  3.6  /  TBili  0.4  /  DBili  x   /  AST  22  /  ALT  21  /  AlkPhos  54  07-28      Urinalysis Basic - ( 2020 18:46 )    Color: Yellow / Appearance: SL Cloudy / S.015 / pH: x  Gluc: x / Ketone: NEGATIVE  / Bili: Negative / Urobili: 0.2 E.U./dL   Blood: x / Protein: NEGATIVE mg/dL / Nitrite: NEGATIVE   Leuk Esterase: NEGATIVE / RBC: < 5 /HPF / WBC > 10 /HPF   Sq Epi: x / Non Sq Epi: 0-5 /HPF / Bacteria: Present /HPF          Radiology:    < from: Xray Chest 1 View- PORTABLE-Urgent (20 @ 22:19) >  EXAM:  XR CHEST PORTABLE URGENT 1V                          PROCEDURE DATE:  2020          INTERPRETATION:  Portable chest    HISTORY: Cough and shortness of breath    IMPRESSION:    No infiltrate or pleural effusion. Cardiomegaly and tortuosity thoracic aorta. No pneumothorax. No acute bone abnormality. Mild hypoinflation.          < from: CT Angio Head w/ IV Cont (20 @ 18:15) >  EXAM:  CT ANGIO NECK (W)AW IC                          EXAM:  CT ANGIO BRAIN (W)AW IC                          PROCEDURE DATE:  2020          INTERPRETATION:  Earle SEGOVIA MD, have reviewed the images and the report and agree with the findings, with the following modifications:    CTA head: The internal carotid arteries at the skull base are patent. 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 The anterior cerebral artery A1 segments appear encased, at least partially, as well but without occlusion or significant stenosis. The left A1 is somewhat hypoplastic. Both middle cerebral arteries are widely patent and symmetric caliber at their first and second order branches. The posterior circulation appears within normal limits. The right vertebral artery is dominant. The basilar artery is widely patent, as are both posterior cerebral arteries.    CTA neck: Agree, there is no arterial occlusive disease. There is mixed calcified and noncalcified plaque at the origin of the left internal carotid artery, measuring 2.2 mm in minimal luminal diameter and distally measuring 3.9 mm. This is LESS THAN 50% stenosis per NASCET criteria. There is no visible plaque or stenosis of the left carotid. Both vertebral arteries appear patent at their origins and throughout the neck; the right side is dominant.    There is aneurysmal dilatation of the ascending thoracic aorta, 4.7 cm in diameter. The partially seen heart appears enlarged, particularly the left atrium. There is atelectasis and bronchial wall thickening of left basilar segments in the lower lobe of the left lung. There is mosaic attenuation in the upper lobes that likely indicates small airway disease. There is anincidental left pleural lipoma at the second intercostal space.      PRELIMINARY REPORT:    PROCEDURE: CTA brain with intravenous contrast.    INDICATION: Stroke code. Altered mental status.    TECHNIQUE: Multiple axial thin section were obtained through the [place] [facility] [place] following the intravenous bolus injection of 85 mL of Isovue-370. MIP series are provided    COMPARISON: CT head 2020.    FINDINGS: The CTA examination of the [place] [facility] [place] demonstrates the internal carotid arteries to be normal in caliber. There is a normal bifurcation into the A1 and M1 segments. The visualized vertebral arteries are normal in caliber. The basilar artery is normal in caliber. There is a normal bifurcation into the posterior cerebral arteries. There are no areas of stenosis, dilatation or aneurysm.    IMPRESSION: No large vessel occlusion.      PROCEDURE: CTA neck with and without intravenous contrast.    INDICATION: Stroke code. Altered mental status.    TECHNIQUE: Multiple axial thin section were obtained through the neck following the intravenous bolus injection of contrast as above. MIP series are provided.    COMPARISON: None.    FINDINGS: The CTA examination demonstrates the aortic arch appears intact without narrowing of the origin of the great vessels.    The right common carotid artery to be normal in caliber. There is a normal bifurcation into the right internal and external carotid arteries. There is moderate calcified plaque at the carotid bifurcation with approximately 50% stenosis.    The left common carotid artery is normal in caliber. There is a normal bifurcation into the left internal and external carotid arteries. There is mild calcified plaque at the left carotid bifurcation without hemodynamically significant stenosis.    The right vertebral artery is dominant.    IMPRESSION: No large vessel occlusion.      < from: CT Brain Stroke Protocol (20 @ 18:11) >    EXAM:  CT BRAIN STROKE PROTOCOL                          PROCEDURE DATE:  2020          INTERPRETATION:  IEarle MD, have reviewed the images and the report and agree with the findings.    There is increased hydrocephalus, quite significantly in only 2 months, with development of periventricular lucency compatible with acute/active hydrocephalus. The fourth ventricle also appears larger, suggesting a communicating hydrocephalus. For reference, maximum diameter of the thirdventricle is 1.9 cm, previously 1.5 cm. Maximum transfrontal diameter measures 5.2 cm, previously 4.6 cm.    Agree, no acute intracranial hemorrhage or demarcated infarction site.    There is a partially calcified mass at the planum sphenoidale, sella and suprasellar cistern consistent with meningioma. Comparison of this is suboptimal due to prior oblique plane of scanning, though no definite appreciated lobe process is identified as to explain hydrocephalus.      PRELIMINARY REPORT:      PROCEDURE: CT head without intravenous contrast    INDICATIONS: Stroke code. Altered mental status, aphasia.    TECHNIQUE:  Serial axial images were obtained from the skull base to the vertex without the use of intravenous contrast.    COMPARISON EXAMINATION: CT head 2020.    FINDINGS:  VENTRICLES AND SULCI: There is diffuse enlargement of ventricles, which is mildly increased when compared to 2020.  INTRA-AXIAL: No acute intracranial hemorrhage or midline shift is present. The gray-white matter differentiation is preserved. There is confluent hypodensity of the periventricular white matter, most prominent adjacent to the bilateral frontal horns, which is significantly increased when compared to 2020, and could represent transependymaledema. Calcifications of the bilateral globus pallidus.  EXTRA-AXIAL: No extra-axial fluid collection is present.  VISUALIZED SINUSES: The visualized paranasal sinuses are clear.  VISUALIZED MASTOIDS:  Clear.  CALVARIUM:  Normal.  MISCELLANEOUS:  Thenative right ocular lens is absent.      IMPRESSION:  No acute intracranial hemorrhage or transcortical infarct.  Diffuse ventriculomegaly, increased since 2020.            Vital Signs Last 24 Hrs  T(C): 36.7 (2020 05:45), Max: 36.8 (2020 17:44)  T(F): 98 (2020 05:45), Max: 98.2 (2020 17:44)  HR: 98 (2020 05:45) (87 - 112)  BP: 146/96 (2020 05:45) (130/90 - 148/92)  BP(mean): --  RR: 16 (2020 05:45) (16 - 20)  SpO2: 94% (2020 05:45) (94% - 97%)    REVIEW OF SYSTEMS: per HPI          Physical Exam:  76 yo [place] speaking woman lying in bed, NAD    Head: normocephalic, atraumatic    Eyes: PERRLA, EOMI, no nystagmus, sclera anicteric    ENT: nasal discharge, uvula midline, no oropharyngeal erythema/exudate    Neck: supple, negative JVD, negative carotid bruits, no thyromegaly    Chest: CTA bilaterally, neg wheeze/ rhonchi/ rales/ crackles/ egophany    Cardiovascular: regular rate and rhythm, neg murmurs/rubs/gallops    Abdomen: soft, non distended, non tender to palpation in all 4 quadrants, negative rebound/guarding, normal bowel sounds    Extremities: WWP, neg cyanosis/clubbing/edema, negative calf tenderness to palpation, negative Widler's sign    Musculoskeletal:      :     Neurologic Exam:    Alert and oriented x 1 to person,  speech fluent w/o dysarthria    Cranial Nerves:     II:                       pupils equal, round and reactive to light, visual fields intact   III/ IV/VI:            extraocular movements intact, neg nystagmus, neg ptosis  V:                       facial sensation intact, V1-3 normal  VII:                     face symmetric, no droop, normal eye closure and smile  VIII:                    hearing intact to finger rub bilaterally  IX/ X:                 soft palate rise symmetrical  XI:                      head turning, shoulder shrug normal  XII:                     tongue midline    Motor Exam:    Upper Extremities:     Right:    poor effort > 3+/5    Left:    poor effort > 3+/5      Lower Extremities:    Right:   poor effort > 3+/5    Left :   poor effort > 3+/5                 Sensory:    intact to LT/PP in all UE/LE dermatomes                     DTR:            = biceps/     triceps/     brachioradialis                      = patella/   medial hamstring/ankle                      neg clonus                      neg Babinski                          Gait:  not tested        PM&R Impression:    1) deconditioned  2) no focal weakness  3) ? NPH    Plan:    1) Physical therapy focusing on therapeutic exercises, bed mobility/transfer out of bed evaluation, progressive ambulation with assistive devices prn.    2) Anticipated Disposition Plan/Recs:    subacute rehab placement, daughter is requesting [facility] in [place]

## 2020-07-29 NOTE — DIETITIAN INITIAL EVALUATION ADULT. - PROBLEM SELECTOR PLAN 1
Pt reported to be AAOx3, conversant and ambulatory prior to TAVR in May 2020. Recently sent to [facility] rehab after June admission for dizziness/weakness. Pt reported to be AAOx0 at [facility]. Multiple negative septic workup, until recent UCx growing ESBL bacteria sensitive to ertapenem (started on IV ertapenem 1g qd from 7/22-7/29). Stroke code called in ED for nonverbal state, CTH negative for acute pathologies, however showing increasing hydrocephalus. CTA head/neck without large vessel occlusion. DDx include 2/2 ESBL UTI vs. worsening hydrocephalus.  - c/w IV ertapenem 1g qd (received dose at [facility] rehab today), last dose 7/29  - per stroke team, OK to restart ASA and Eliquis  - f/u further stroke team recs  - f/u speech/swallow recs  - f/u PT recs

## 2020-07-29 NOTE — PROGRESS NOTE ADULT - ATTENDING COMMENTS
Patient was seen and examined with the resident team today.  I agree with the above assessment and plan with the following exceptions/additions:     Briefly, this is a 76yo, [place]-speaking, woman with a PMH of Afib (on Eliquis), chronic HFpEF w/severe AS s/p TAVR (May 2020), HTN, CAD s/p STEMI (on ASA), CKD stage 3 and TIA s/p L CEA who presented from [facility] rehab for 2 weeks of worsening mental status, now non-verbal and bedbound.  Work-up thus far negative for a CVA.      -- complete outpatient Ertapenem course  -- Neuro consult   -- f/u TSH, folate, b12 and RPR  -- holding ASA pending Neuro recs, in case patient needs an LP  -- DVT PPx-  Eliquis  -- Dispo - [facility] but family would like to NOT return to [facility]    Fatumaclaudia Siegel  [phone] Patient was seen and examined with the resident team today.  I agree with the above assessment and plan with the following exceptions/additions:     Briefly, this is a 76yo, Indian-speaking, woman with a PMH of Afib (on Eliquis), chronic HFpEF w/severe AS s/p TAVR (May 2020), HTN, CAD s/p STEMI (on ASA), CKD stage 3 and TIA s/p L CEA who presented from [facility] rehab for 2 weeks of worsening metabolic encephalopathy, now non-verbal and bedbound.  Work-up thus far negative for a CVA but noted to have a calcified meningioma and hydrocephalous.    -- complete outpatient Ertapenem course  -- Neuro consult   -- f/u TSH, folate, b12 and RPR  -- holding ASA pending Neuro recs, in case patient needs an LP  -- DVT PPx-  Eliquis  -- Dispo - [facility] but family would like to NOT return to [facility]    Fatuma Siegel  [phone]

## 2020-07-29 NOTE — PROGRESS NOTE ADULT - SUBJECTIVE AND OBJECTIVE BOX
76 y/o woman with a PMHx of Afib (on Eliquis), HFpEF w/ severe aortic stenosis s/p TAVR (May 2020), HTN, CAD s/p STEMI (on ASA), CKD, TIA s/p L CEA, presented from [facility] rehab for 2 weeks of worsening AMS. Per Dr. Zhong from [facility], pt arrived at [facility] rehab about 3 weeks ago after recent admission for dizziness and weakness last month (6/2020). At [facility] rehab, patient was AAOx1, but was ambulatory thoughout her 3 weeks there. She was noted to be not eating as much recently and had multiple negative sepsis workup. Her latest UCx however did grow ESBL sensitive to ertapenem (started IV 1g qd on 7/22, to end on 7/29). She was reported to be AAOx3, conversant and ambulatory. On arrival at [facility] ED, stroke code was called given patient was nonverbal. No acute stroke or bleeds on CTH, only notable for increasing hydrocephalus from May. At bedside, patient was AAOx2 (person and place), but not following much commands or conversant when using a Italian phone . Patient denied having any pain, but was unable to further answer other ROS questions at bedside.    ED Vitals: T 98.2F, /90, , RR 20, SpO2 94% RA  ED Labs: WBC 8.82, Hb 12.4, INR 1.55, Cr 1.28 (baseline 1.13 from June 2020). Trop neg x1.  ED Imaging: CTH noncontrast: No acute hemorrhage or infarct, +Diffuse ventriculomegaly increased from May 2020. CTA head/neck without large vessel occlusion. CXR with unchanged cardiomegaly, no acute infiltrates.  ED Management: IV CTX 1g x1    PAST MEDICAL & SURGICAL HISTORY:  H/O carotid stenosis  TIA (transient ischemic attack)  Chronic kidney disease (CKD)  Aortic stenosis  HLD (hyperlipidemia)  HTN (hypertension)  H/O carotid endarterectomy    Allergies: No Known Allergies    Home Medications:  acetaminophen 325 mg oral tablet: 2 tab(s) orally every 6 hours, As needed, Mild Pain (1 - 3) (10 Tanvir 2020 09:19)  apixaban 5 mg oral tablet: 1 tab(s) orally every 12 hours (10 Tanvir 2020 09:19)  aspirin 81 mg oral delayed release tablet: 1 tab(s) orally once a day (10 Tanvir 2020 09:19)  atorvastatin 40 mg oral tablet: 1 tab(s) orally once a day (at bedtime) (10 Tanvir 2020 09:19)  dilTIAZem 120 mg/24 hours oral capsule, extended release: 1 cap(s) orally once a day (10 Tanvir 2020 09:19)  metoprolol tartrate 37.5 mg oral tablet: 1 tab(s) orally every 12 hours (10 Tanvir 2020 09:19)  pantoprazole 40 mg oral delayed release tablet: 1 tab(s) orally once a day (before a meal) (10 Tanvir 2020 09:19)  senna oral tablet: 2 tab(s) orally once a day (at bedtime) (10 Tanvir 2020 09:19)  Travatan Z 0.004% ophthalmic solution: 1 drop(s) to each affected eye once a day (in the evening) (02 Jun 2020 12:31)    MEDICATIONS  (STANDING):  apixaban 5 milliGRAM(s) Oral every 12 hours  aspirin enteric coated 81 milliGRAM(s) Oral daily  atorvastatin 40 milliGRAM(s) Oral at bedtime  diltiazem    milliGRAM(s) Oral daily  ertapenem  IVPB 1000 milliGRAM(s) IV Intermittent once  latanoprost 0.005% Ophthalmic Solution 1 Drop(s) Both EYES at bedtime  metoprolol tartrate 37.5 milliGRAM(s) Oral two times a day  pantoprazole    Tablet 40 milliGRAM(s) Oral before breakfast  senna 2 Tablet(s) Oral at bedtime    MEDICATIONS  (PRN): 74 y/o [place] woman with a PMHx of Afib (on Eliquis), HFpEF w/ severe aortic stenosis s/p TAVR (May 2020), HTN, CAD s/p STEMI (on ASA), CKD, TIA s/p L CEA, presented from [facility] rehab for 2 weeks of worsening mental status. Per Dr. Zhong from [facility], pt arrived at [facility] rehab about 3 weeks ago after recent admission for dizziness and weakness last month (2020). Based on the H&P patient was AAOx1 during her time at the [facility] rehab facility but was ambulatory throughout her 3 weeks there. She was noted to have decreased appetite and had multiple negative sepsis workups that were negative. Her latest UCx grew ESBL sensitive to ertapenem (started IV 1g qd on , to end on ).     On arrival at [facility] ED, patient was non-verbal and stroke called was called. No acute stroke or bleeds on CTH, only notable for increasing hydrocephalus from May. Per H&P, at bedside, patient was AAOx2 (person and place), but not following commands or conversant even when using a Tristanian phone . Patient denied having any pain, but was unable to further answer other ROS questions at bedside.    ED Vitals: T 98.2F, /90, , RR 20, SpO2 94% RA  ED Labs: WBC 8.82, Hb 12.4, INR 1.55, Cr 1.28 (baseline 1.13 from 2020). Trop neg x1.  ED Imaging: CTH noncontrast: No acute hemorrhage or infarct, +Diffuse ventriculomegaly increased from May 2020. CTA head/neck without large vessel occlusion. CXR with unchanged cardiomegaly, no acute infiltrates.  ED Management: IV CTX 1g x1    PAST MEDICAL & SURGICAL HISTORY:  H/O carotid stenosis  TIA (transient ischemic attack)  Chronic kidney disease (CKD)  Aortic stenosis  HLD (hyperlipidemia)  HTN (hypertension)  H/O carotid endarterectomy    Allergies: No Known Allergies    Home Medications:  acetaminophen 325 mg oral tablet: 2 tab(s) orally every 6 hours, As needed, Mild Pain (1 - 3) (10 Tanvir 2020 09:19)  apixaban 5 mg oral tablet: 1 tab(s) orally every 12 hours (10 Tanvir 2020 09:19)  aspirin 81 mg oral delayed release tablet: 1 tab(s) orally once a day (10 Tanvir 2020 09:19)  atorvastatin 40 mg oral tablet: 1 tab(s) orally once a day (at bedtime) (10 Tanvir 2020 09:19)  dilTIAZem 120 mg/24 hours oral capsule, extended release: 1 cap(s) orally once a day (10 Tanvir 2020 09:19)  metoprolol tartrate 37.5 mg oral tablet: 1 tab(s) orally every 12 hours (10 Tanvir 2020 09:)  pantoprazole 40 mg oral delayed release tablet: 1 tab(s) orally once a day (before a meal) (10 Tanvir 2020 09:)  senna oral tablet: 2 tab(s) orally once a day (at bedtime) (10 Tanvir 2020 09:)  Travatan Z 0.004% ophthalmic solution: 1 drop(s) to each affected eye once a day (in the evening) (2020 12:31)    MEDICATIONS  (STANDING):  apixaban 5 milliGRAM(s) Oral every 12 hours  aspirin enteric coated 81 milliGRAM(s) Oral daily  atorvastatin 40 milliGRAM(s) Oral at bedtime  diltiazem    milliGRAM(s) Oral daily  ertapenem  IVPB 1000 milliGRAM(s) IV Intermittent once  latanoprost 0.005% Ophthalmic Solution 1 Drop(s) Both EYES at bedtime  metoprolol tartrate 37.5 milliGRAM(s) Oral two times a day  pantoprazole    Tablet 40 milliGRAM(s) Oral before breakfast  senna 2 Tablet(s) Oral at bedtime                          13.3   7.55  )-----------( 167      ( 2020 07:25 )             42.6     07-  141  |  105  |  22  ----------------------------<  90  4.0   |  26  |  1.05    Ca    9.7      2020 07:25  TPro  6.1  /  Alb  3.6  /  TBili  0.4  /  DBili  x   /  AST  22  /  ALT  21  /  AlkPhos  54  07-28  PT/INR - ( 2020 18:19 )   PT: 18.2 sec;   INR: 1.55      PTT - ( 2020 18:19 )  PTT:30.1 sec  CARDIAC MARKERS ( 2020 18:19 )  x     / <0.01 ng/mL / x     / x     / x        Urinalysis Basic - ( 2020 18:46 )    Color: Yellow / Appearance: SL Cloudy / S.015 / pH: x  Gluc: x / Ketone: NEGATIVE  / Bili: Negative / Urobili: 0.2 E.U./dL   Blood: x / Protein: NEGATIVE mg/dL / Nitrite: NEGATIVE   Leuk Esterase: NEGATIVE / RBC: < 5 /HPF / WBC > 10 /HPF   Sq Epi: x / Non Sq Epi: 0-5 /HPF / Bacteria: Present /HPF      EXAM: XR CHEST PORTABLE URGENT 1V     PROCEDURE DATE: 2020   INTERPRETATION: Portable chest   HISTORY: Cough and shortness of breath   IMPRESSION:   No infiltrate or pleural effusion. Cardiomegaly and tortuosity thoracic aorta. No pneumothorax. No acute bone abnormality. Mild hypoinflation.   Thank you for the opportunity to participate in the care of this patient.   JOHN ODE M.D., ATTENDING RADIOLOGIST   This document has been electronically signed. 2020 7:54AM     EXAM: CT ANGIO NECK (W)AW IC   EXAM: CT ANGIO BRAIN (W)AW IC   PROCEDURE DATE: 2020     COMPARISON: CT head 2020.   FINDINGS: The CTA examination of the [place] demonstrates the internal carotid arteries to be normal in caliber. There is a normal bifurcation into the A1 and M1 segments. The visualized vertebral arteries are normal in caliber. The basilar artery is normal in caliber. There is a normal bifurcation into the posterior cerebral arteries. There are no areas of stenosis, dilatation or aneurysm.   IMPRESSION: No large vessel occlusion.     PROCEDURE: CTA neck with and without intravenous contrast.   INDICATION: Stroke code. Altered mental status.   TECHNIQUE: Multiple axial thin section were obtained through the neck following the intravenous bolus injection of contrast as above. MIP series are provided.   COMPARISON: None.   FINDINGS: The CTA examination demonstrates the aortic arch appears intact without narrowing of the origin of the great vessels.   The right common carotid artery to be normal in caliber. There is a normal bifurcation into the right internal and external carotid arteries. There is moderate calcified plaque at the carotid bifurcation with approximately 50% stenosis.   The left common carotid artery is normal in caliber. There is a normal bifurcation into the left internal and external carotid arteries. There is mild calcified plaque at the left carotid bifurcation without hemodynamically significant stenosis.   The right vertebral artery is dominant.   IMPRESSION: No large vessel occlusion.     MILTON PAREDES M.D., RADIOLOGY RESIDENT   This document has been electronically signed.   FACUNDO BOOTHE M.D. ATTENDING RADIOLOGIST   This document has been electronically signed. 2020 6:50PM     EXAM: CT ANGIO NECK (W)AW IC   EXAM: CT ANGIO BRAIN (W)AW IC   PROCEDURE DATE: 2020   INTERPRETATION: I, Facundo Boothe MD, have reviewed the images and the report and agree with the findings, with the following modifications:     CTA head: The internal carotid arteries at the skull base are patent. 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 The anterior cerebral artery A1 segments appear encased, at least partially, as well but without occlusion or significant stenosis. The left A1 is somewhat hypoplastic. Both middle cerebral arteries are widely patent and symmetric caliber at their first and second order branches. The posterior circulation appears within normal limits. The right vertebral artery is dominant. The basilar artery is widely patent, as are both posterior cerebral arteries.     CTA neck: Agree, there is no arterial occlusive disease. There is mixed calcified and noncalcified plaque at the origin of the left internal carotid artery, measuring 2.2 mm in minimal luminal diameter and distally measuring 3.9 mm. This is LESS THAN 50% stenosis per NASCET criteria. There is no visible plaque or stenosis of the left carotid. Both vertebral arteries appear patent at their origins and throughout the neck; the right side is dominant.     There is aneurysmal dilatation of the ascending thoracic aorta, 4.7 cm in diameter. The partially seen heart appears enlarged, particularly the left atrium. There is atelectasis and bronchial wall thickening of left basilar segments in the lower lobe of the left lung. There is mosaic attenuation in the upper lobes that likely indicates small airway disease. 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       PRELIMINARY REPORT:   PROCEDURE: CTA brain with intravenous contrast.   INDICATION: Stroke code. Altered mental status.   TECHNIQUE: Multiple axial thin section were obtained through the [place] following the intravenous bolus injection of 85 mL of Isovue-370. MIP series are provided   COMPARISON: CT head 2020.   FINDINGS: The CTA examination of the [place] demonstrates the internal carotid arteries to be normal in caliber. There is a normal bifurcation into the A1 and M1 segments. The visualized vertebral arteries are normal in caliber. The basilar artery is normal in caliber. There is a normal bifurcation into the posterior cerebral arteries. There are no areas of stenosis, dilatation or aneurysm.   IMPRESSION: No large vessel occlusion.       PROCEDURE: CTA neck with and without intravenous contrast.   INDICATION: Stroke code. Altered mental status.   TECHNIQUE: Multiple axial thin section were obtained through the neck following the intravenous bolus injection of contrast as above. MIP series are provided.   COMPARISON: None.   FINDINGS: The CTA examination demonstrates the aortic arch appears intact without narrowing of the origin of the great vessels.   The right common carotid artery to be normal in caliber. There is a normal bifurcation into the right internal and external carotid arteries. There is moderate calcified plaque at the carotid bifurcation with approximately 50% stenosis.     The left common carotid artery is normal in caliber. There is a normal bifurcation into the left internal and external carotid arteries. There is mild calcified plaque at the left carotid bifurcation without hemodynamically significant stenosis.     The right vertebral artery is dominant.     IMPRESSION: No large vessel occlusion.

## 2020-07-29 NOTE — PROGRESS NOTE ADULT - PROBLEM SELECTOR PLAN 10
Plan:   F: NS @ 80cc/hr  E: replete K<4, Mg<2  N: NPO (pending S/S eval)  VTE Prophylaxis: Eliquis, SCDs  C: Full Code  D: FARZANEH Plan:   F: NS @ 150 cc/hr  E: replete K<4, Mg<2  N: NPO (pending S/S eval)  VTE Prophylaxis: Eliquis, SCDs  C: Full Code  D: FARZANEH

## 2020-07-29 NOTE — SWALLOW BEDSIDE ASSESSMENT ADULT - NS SPL SWALLOW CLINIC TRIAL FT
Bolus phase Oral phase was characterized by minimal oral movements during bolus manipulation with delayed A-P bolus transport 2/2 brief periods of bolus holding. Swallow trigger consistently palpated. No clinical overt s/s of aspiration appreciated. *Additional solids deferred d/t oral phase deficits and AMS.*

## 2020-07-29 NOTE — SWALLOW BEDSIDE ASSESSMENT ADULT - SLP PERTINENT HISTORY OF CURRENT PROBLEM
PMHx of Afib (on Eliquis), HFpEF w/ severe aortic stenosis s/p TAVR (May 2020), HTN, CAD s/p STEMI (on ASA), CKD, TIA s/p L CEA, recent ESBL UTI, presented from [facility] rehab due to 2 weeks of worsening mental status. She was found to have increasing hydrocephalus upon presentation in the ED. Patient is currently non verbal and not responsible to verbal stimuli and neurology is following.

## 2020-07-29 NOTE — PROGRESS NOTE ADULT - PROBLEM SELECTOR PLAN 7
Hx of TIA s/p L CEA. CTH noncontrast here without acute bleeds or strokes. CTA head/neck without large vessel occlusion  - c/w ASA per stroke neurology Hx of TIA s/p L CEA. CTH noncontrast here without acute bleeds or strokes. CTA head/neck without large vessel occlusion  - ASA per stroke neurology but will hold until since possible LP; will decide pending MRI results.

## 2020-07-29 NOTE — PROGRESS NOTE ADULT - ATTENDING COMMENTS
UES --patient will follow  Chart reviewed.  CV stable  IVF  SEPSIS--f/u culture  IV ab  h/o dementia  supp rx

## 2020-07-29 NOTE — PROGRESS NOTE ADULT - PROBLEM SELECTOR PLAN 8
Hx of CKD (baseline 1.13 from June 2020). Cr 1.28 on admission this time. Likely elevated given recent poor PO Fluid intake at rehab  - continue to monitor with BMP  [  ] Hx of CKD (baseline 1.13 from June 2020). Cr 1.28 on admission this time. Likely elevated given recent poor PO Fluid intake at rehab  - continue to monitor with BMP

## 2020-07-29 NOTE — PROGRESS NOTE ADULT - PROBLEM SELECTOR PLAN 2
Pt presented with 2 weeks of worsening AMS. CTH noncontrast in ED showing increased diffuse ventriculomegaly compared to May 2020.  - AM general neurology consult [ ] Pt presented with 2 weeks of worsening AMS. CTH noncontrast in ED showing increased diffuse ventriculomegaly compared to May 2020.  - F/U neurology recs; spoke to team: MRI with contrast first then LP if appropriate.

## 2020-07-29 NOTE — PROGRESS NOTE ADULT - ASSESSMENT
76 y/o woman with a PMHx of Afib (on Eliquis), HFpEF w/ severe aortic stenosis s/p TAVR (May 2020), HTN, CAD s/p STEMI (on ASA), CKD, TIA s/p L CEA, recent ESBL UTI, presented from [facility] rehab due to 2 weeks of worsening mental status. She was found to have increasing hydrocephalus upon presentation in the ED. Patient is currently non verbal and not responsible to verbal stimuli and neurology is following.

## 2020-07-29 NOTE — CONSULT NOTE ADULT - SUBJECTIVE AND OBJECTIVE BOX
Neurology consult    GRACE KEY 75y Female    HPI:  74 y/o woman with a PMHx of Afib (on Eliquis), HFpEF w/ severe aortic stenosis s/p TAVR (May 2020), HTN, CAD s/p STEMI (on ASA), CKD, TIA s/p L CEA, presented from [facility] rehab for 2 weeks of worsening AMS. Per Dr. Zhong from [facility], pt arrived at [facility] rehab about 3 weeks ago after recent admission for dizziness and weakness last month (2020). At [facility] rehab, patient was AAOx1, but was ambulatory thoughout her 3 weeks there. She was noted to be not eating as much recently and had multiple negative sepsis workup. Her latest UCx however did grow ESBL sensitive to ertapenem (started IV 1g qd on , to end on ). She was reported to be AAOx3, conversant and ambulatory upon discharge from previous [facility] hospitalization. On arrival at [facility] ED, stroke code was called given patient was nonverbal. No acute stroke or bleeds on CTH, only notable for increasing hydrocephalus from May. At bedside, patient was AAOx2 (person and place), but not following much commands or conversant when using a [place] phone . Patient denied having any pain, but was unable to further answer other ROS questions at bedside.    ED Vitals: T 98.2F, /90, , RR 20, SpO2 94% RA  ED Labs: WBC 8.82, Hb 12.4, INR 1.55, Cr 1.28 (baseline 1.13 from 2020). Trop neg x1.  ED Imaging: CTH noncontrast: No acute hemorrhage or infarct, +Diffuse ventriculomegaly increased from May 2020. CTA head/neck without large vessel occlusion. CXR with unchanged cardiomegaly, no acute infiltrates.  ED Management: IV CTX 1g x1 (2020 21:40)      MEDICATIONS    apixaban 5 milliGRAM(s) Oral every 12 hours  atorvastatin 40 milliGRAM(s) Oral at bedtime  diltiazem    milliGRAM(s) Oral daily  latanoprost 0.005% Ophthalmic Solution 1 Drop(s) Both EYES at bedtime  metoprolol tartrate 37.5 milliGRAM(s) Oral two times a day  pantoprazole    Tablet 40 milliGRAM(s) Oral before breakfast  senna 2 Tablet(s) Oral at bedtime       Family history: No history of dementia, strokes, or seizures   FAMILY HISTORY:    SOCIAL HISTORY -- No history of tobacco or alcohol use     Allergies    No Known Allergies    Intolerances        Height (cm): 167.64 (- @ 17:44)    Vital Signs Last 24 Hrs  T(C): 36.7 (2020 05:45), Max: 36.8 (2020 17:44)  T(F): 98 (2020 05:45), Max: 98.2 (2020 17:44)  HR: 98 (2020 05:45) (87 - 112)  BP: 146/96 (2020 05:45) (130/90 - 148/92)  BP(mean): --  RR: 16 (2020 05:45) (16 - 20)  SpO2: 94% (2020 05:45) (94% - 97%)    REVIEW OF SYSTEMS: unable to assess as patient does not follow commands at this time    On Neurological Examination:  Neurology consult    GRACE KEY 75y Female    HPI:  74 y/o woman with a PMHx of Afib (on Eliquis), HFpEF w/ severe aortic stenosis s/p TAVR (May 2020), HTN, CAD s/p STEMI (on ASA), CKD, TIA s/p L CEA, presented from [facility] rehab for 2 weeks of worsening AMS. Per Dr. Zhong from [facility], pt arrived at [facility] rehab about 3 weeks ago after recent admission for dizziness and weakness last month (2020). At [facility] rehab, patient was AAOx1, but was ambulatory thoughout her 3 weeks there. She was noted to be not eating as much recently and had multiple negative sepsis workup. Her latest UCx however did grow ESBL sensitive to ertapenem (started IV 1g qd on , to end on ). She was reported to be AAOx3, conversant and ambulatory. On arrival at [facility] ED, stroke code was called given patient was nonverbal. No acute stroke or bleeds on CTH, only notable for increasing hydrocephalus from May. At bedside, patient was AAOx2 (person and place), but not following much commands or conversant when using a [place] phone . Patient denied having any pain, but was unable to further answer other ROS questions at bedside.    ED Vitals: T 98.2F, /90, , RR 20, SpO2 94% RA  ED Labs: WBC 8.82, Hb 12.4, INR 1.55, Cr 1.28 (baseline 1.13 from 2020). Trop neg x1.  ED Imaging: CTH noncontrast: No acute hemorrhage or infarct, +Diffuse ventriculomegaly increased from May 2020. CTA head/neck without large vessel occlusion. CXR with unchanged cardiomegaly, no acute infiltrates.  ED Management: IV CTX 1g x1 (2020 21:40)      MEDICATIONS    apixaban 5 milliGRAM(s) Oral every 12 hours  atorvastatin 40 milliGRAM(s) Oral at bedtime  diltiazem    milliGRAM(s) Oral daily  latanoprost 0.005% Ophthalmic Solution 1 Drop(s) Both EYES at bedtime  metoprolol tartrate 37.5 milliGRAM(s) Oral two times a day  pantoprazole    Tablet 40 milliGRAM(s) Oral before breakfast  senna 2 Tablet(s) Oral at bedtime       Family history: No history of dementia, strokes, or seizures   FAMILY HISTORY:    SOCIAL HISTORY -- No history of tobacco or alcohol use     Allergies    No Known Allergies    Intolerances        Height (cm): 167.64 (-28 @ 17:44)    Vital Signs Last 24 Hrs  T(C): 36.7 (2020 05:45), Max: 36.8 (2020 17:44)  T(F): 98 (2020 05:45), Max: 98.2 (2020 17:44)  HR: 98 (2020 05:45) (87 - 112)  BP: 146/96 (2020 05:45) (130/90 - 148/92)  BP(mean): --  RR: 16 (2020 05:45) (16 - 20)  SpO2: 94% (2020 05:45) (94% - 97%)    REVIEW OF SYSTEMS:    Constitutional: No fever, chills, fatigue, weakness  Eyes: no eye pain, visual disturbances, or discharge  ENT:  No difficulty hearing, tinnitus, vertigo; No sinus or throat pain  Neck: No pain or stiffness  Respiratory: No cough, dyspnea, wheezing   Cardiovascular: No chest pain, palpitations,   Gastrointestinal: No abdominal or epigastric pain. No nausea, vomiting  No diarrhea or constipation.   Genitourinary: No dysuria, frequency, hematuria or incontinence  Neurological: No headaches, lightheadedness, vertigo, numbness or tremors  Psychiatric: No depression, anxiety, mood swings or difficulty sleeping  Musculoskeletal: No joint pain or swelling; No muscle, back or extremity pain  Skin: No itching, burning, rashes or lesions   Lymph Nodes: No enlarged glands  Endocrine: No heat or cold intolerance; No hair loss, No h/o diabetes or thyroid dysfunction  Allergy and Immunologic: No hives or eczema    On Neurological Examination:      LABS:  CBC Full  -  ( 2020 07:25 )  WBC Count : 7.55 K/uL  RBC Count : 4.43 M/uL  Hemoglobin : 13.3 g/dL  Hematocrit : 42.6 %  Platelet Count - Automated : 167 K/uL  Mean Cell Volume : 96.2 fl  Mean Cell Hemoglobin : 30.0 pg  Mean Cell Hemoglobin Concentration : 31.2 gm/dL  Auto Neutrophil # : 4.77 K/uL  Auto Lymphocyte # : 1.67 K/uL  Auto Monocyte # : 0.71 K/uL  Auto Eosinophil # : 0.27 K/uL  Auto Basophil # : 0.09 K/uL  Auto Neutrophil % : 63.2 %  Auto Lymphocyte % : 22.1 %  Auto Monocyte % : 9.4 %  Auto Eosinophil % : 3.6 %  Auto Basophil % : 1.2 %    Urinalysis Basic - ( 2020 18:46 )    Color: Yellow / Appearance: SL Cloudy / S.015 / pH: x  Gluc: x / Ketone: NEGATIVE  / Bili: Negative / Urobili: 0.2 E.U./dL   Blood: x / Protein: NEGATIVE mg/dL / Nitrite: NEGATIVE   Leuk Esterase: NEGATIVE / RBC: < 5 /HPF / WBC > 10 /HPF   Sq Epi: x / Non Sq Epi: 0-5 /HPF / Bacteria: Present /HPF      -    141  |  105  |  22  ----------------------------<  90  4.0   |  26  |  1.05    Ca    9.7      2020 07:25    TPro  6.1  /  Alb  3.6  /  TBili  0.4  /  DBili  x   /  AST  22  /  ALT  21  /  AlkPhos  54  -28    Hemoglobin A1C:     LIVER FUNCTIONS - ( 2020 18:19 )  Alb: 3.6 g/dL / Pro: 6.1 g/dL / ALK PHOS: 54 U/L / ALT: 21 U/L / AST: 22 U/L / GGT: x           Vitamin B12   PT/INR - ( 2020 18:19 )   PT: 18.2 sec;   INR: 1.55          PTT - ( 2020 18:19 )  PTT:30.1 sec      RADIOLOGY  < from: CT Brain Stroke Protocol (20 @ 18:11) >  No acute intracranial hemorrhage or transcortical infarct.  Diffuse ventriculomegaly, increased since 2020.    < end of copied text >            LABS:  CBC Full  -  ( 2020 07:25 )  WBC Count : 7.55 K/uL  RBC Count : 4.43 M/uL  Hemoglobin : 13.3 g/dL  Hematocrit : 42.6 %  Platelet Count - Automated : 167 K/uL  Mean Cell Volume : 96.2 fl  Mean Cell Hemoglobin : 30.0 pg  Mean Cell Hemoglobin Concentration : 31.2 gm/dL  Auto Neutrophil # : 4.77 K/uL  Auto Lymphocyte # : 1.67 K/uL  Auto Monocyte # : 0.71 K/uL  Auto Eosinophil # : 0.27 K/uL  Auto Basophil # : 0.09 K/uL  Auto Neutrophil % : 63.2 %  Auto Lymphocyte % : 22.1 %  Auto Monocyte % : 9.4 %  Auto Eosinophil % : 3.6 %  Auto Basophil % : 1.2 %    Urinalysis Basic - ( 2020 18:46 )    Color: Yellow / Appearance: SL Cloudy / S.015 / pH: x  Gluc: x / Ketone: NEGATIVE  / Bili: Negative / Urobili: 0.2 E.U./dL   Blood: x / Protein: NEGATIVE mg/dL / Nitrite: NEGATIVE   Leuk Esterase: NEGATIVE / RBC: < 5 /HPF / WBC > 10 /HPF   Sq Epi: x / Non Sq Epi: 0-5 /HPF / Bacteria: Present /HPF          141  |  105  |  22  ----------------------------<  90  4.0   |  26  |  1.05    Ca    9.7      2020 07:25    TPro  6.1  /  Alb  3.6  /  TBili  0.4  /  DBili  x   /  AST  22  /  ALT  21  /  AlkPhos  54  07-28    Hemoglobin A1C:     LIVER FUNCTIONS - ( 2020 18:19 )  Alb: 3.6 g/dL / Pro: 6.1 g/dL / ALK PHOS: 54 U/L / ALT: 21 U/L / AST: 22 U/L / GGT: x           Vitamin B12   PT/INR - ( 2020 18:19 )   PT: 18.2 sec;   INR: 1.55          PTT - ( 2020 18:19 )  PTT:30.1 sec      RADIOLOGY    EKG Neurology consulted for AMS    HPI:  74 y/o woman with a PMHx of Afib (on Eliquis), HFpEF w/ severe aortic stenosis s/p TAVR (May 2020), HTN, CAD s/p STEMI (on ASA), CKD, TIA s/p L CEA, presented from [facility] rehab for 2 weeks of worsening AMS. Per Dr. Zhong from [facility], pt arrived at [facility] rehab about 3 weeks ago after recent admission for dizziness and weakness last month (2020). At [facility] rehab, patient was AAOx1, but was ambulatory throughout her 3 weeks there. She was noted to not be eating as much recently and had multiple negative sepsis workup. Her latest UCx however did grow ESBL sensitive to ertapenem (started IV 1g qd on , to end on ). On arrival at [facility] ED, stroke code was called given patient was nonverbal. No acute stroke or bleeds on CTH, only notable for increasing hydrocephalus from May. At bedside, patient was AAOx2 (person and place), but not following much commands or conversant when using a [place] phone . Patient denied having any pain, but was unable to further answer other ROS questions at bedside. Of note, she was reported to be AAOx3, conversant and ambulatory after the TAVR on her previous admission.     ED Vitals: T 98.2F, /90, , RR 20, SpO2 94% RA  ED Labs: WBC 8.82, Hb 12.4, INR 1.55, Cr 1.28 (baseline 1.13 from 2020). Trop neg x1.  ED Imaging: CTH noncontrast: No acute hemorrhage or infarct, +Diffuse ventriculomegaly increased from May 2020. CTA head/neck without large vessel occlusion. CXR with unchanged cardiomegaly, no acute infiltrates.  ED Management: IV CTX 1g x1 (2020 21:40)    Interval History: Used [place]  this morning; however, patient did not answer questions or follow commands. ROS unable to be assessed at this time.       MEDICATIONS    apixaban 5 milliGRAM(s) Oral every 12 hours  atorvastatin 40 milliGRAM(s) Oral at bedtime  diltiazem    milliGRAM(s) Oral daily  latanoprost 0.005% Ophthalmic Solution 1 Drop(s) Both EYES at bedtime  metoprolol tartrate 37.5 milliGRAM(s) Oral two times a day  pantoprazole    Tablet 40 milliGRAM(s) Oral before breakfast  senna 2 Tablet(s) Oral at bedtime       Family history: No history of dementia, strokes, or seizures   FAMILY HISTORY:    SOCIAL HISTORY -- No history of tobacco or alcohol use     Allergies    No Known Allergies    Intolerances        Height (cm): 167.64 (- @ 17:44)    Vital Signs Last 24 Hrs  T(C): 36.7 (2020 05:45), Max: 36.8 (2020 17:44)  T(F): 98 (2020 05:45), Max: 98.2 (2020 17:44)  HR: 98 (2020 05:45) (87 - 112)  BP: 146/96 (2020 05:45) (130/90 - 148/92)  BP(mean): --  RR: 16 (2020 05:45) (16 - 20)  SpO2: 94% (2020 05:45) (94% - 97%)    REVIEW OF SYSTEMS: Unable to assess as patient is not answering questions at the [facility]    Neurological exam:  HF: A x O x 0. Patient not answering questions despite [place]  being used. Did says she was "fine" once.   CN: CUCA, extraocular movements appear intact; however, patient not following commands,   Motor: Patient not following commands, but muscle tone appears intact, no tremor or rigidity appreciated  Sens: unable to assess at this time  Reflexes: hyporeflexive in lower extremities  Coord:  unable to assess  Gait/Balance: not assessed at this time    LABS:  CBC Full  -  ( 2020 07:25 )  WBC Count : 7.55 K/uL  RBC Count : 4.43 M/uL  Hemoglobin : 13.3 g/dL  Hematocrit : 42.6 %  Platelet Count - Automated : 167 K/uL  Mean Cell Volume : 96.2 fl  Mean Cell Hemoglobin : 30.0 pg  Mean Cell Hemoglobin Concentration : 31.2 gm/dL  Auto Neutrophil # : 4.77 K/uL  Auto Lymphocyte # : 1.67 K/uL  Auto Monocyte # : 0.71 K/uL  Auto Eosinophil # : 0.27 K/uL  Auto Basophil # : 0.09 K/uL  Auto Neutrophil % : 63.2 %  Auto Lymphocyte % : 22.1 %  Auto Monocyte % : 9.4 %  Auto Eosinophil % : 3.6 %  Auto Basophil % : 1.2 %    Urinalysis Basic - ( 2020 18:46 )    Color: Yellow / Appearance: SL Cloudy / S.015 / pH: x  Gluc: x / Ketone: NEGATIVE  / Bili: Negative / Urobili: 0.2 E.U./dL   Blood: x / Protein: NEGATIVE mg/dL / Nitrite: NEGATIVE   Leuk Esterase: NEGATIVE / RBC: < 5 /HPF / WBC > 10 /HPF   Sq Epi: x / Non Sq Epi: 0-5 /HPF / Bacteria: Present /HPF      -    141  |  105  |  22  ----------------------------<  90  4.0   |  26  |  1.05    Ca    9.7      2020 07:25    TPro  6.1  /  Alb  3.6  /  TBili  0.4  /  DBili  x   /  AST  22  /  ALT  21  /  AlkPhos  54  -    Hemoglobin A1C:     LIVER FUNCTIONS - ( 2020 18:19 )  Alb: 3.6 g/dL / Pro: 6.1 g/dL / ALK PHOS: 54 U/L / ALT: 21 U/L / AST: 22 U/L / GGT: x           Vitamin B12   PT/INR - ( 2020 18:19 )   PT: 18.2 sec;   INR: 1.55          PTT - ( 2020 18:19 )  PTT:30.1 sec      RADIOLOGY  CT Brain Stroke Protocol: No acute intracranial hemorrhage or transcortical infarct. Diffuse ventriculomegaly, increased since 2020.

## 2020-07-29 NOTE — PROGRESS NOTE ADULT - PROBLEM SELECTOR PLAN 1
Pt reported to be AAOx3, conversant and ambulatory prior to TAVR in May 2020. Recently sent to [facility] rehab after June admission for dizziness/weakness. Pt reported to be AAOx0 at [facility]. Multiple negative septic workup, until recent UCx growing ESBL bacteria sensitive to ertapenem (started on IV ertapenem 1g qd from 7/22-7/29). Stroke code called in ED for nonverbal state, CTH negative for acute pathologies, however showing increasing hydrocephalus. CTA head/neck without large vessel occlusion. DDx include 2/2 ESBL UTI vs. worsening hydrocephalus.  - c/w IV ertapenem 1g qd (received dose at [facility] rehab today), last dose 7/29  - per stroke team, OK to restart ASA and Eliquis  - f/u further stroke team recs [ ]  - f/u speech/swallow recs [ ]  - f/u PT recs [ ] Pt reported to be AAOx3, conversant and ambulatory prior to TAVR in May 2020. Recently sent to [facility] rehab after June admission for dizziness/weakness. Pt reported to be AAOx0 at [facility]. Multiple negative septic workup, until recent UCx growing ESBL bacteria sensitive to ertapenem (started on IV ertapenem 1g qd from 7/22-7/29). Stroke code called in ED for nonverbal state, CTH negative for acute pathologies, however showing increasing hydrocephalus. CTA head/neck without large vessel occlusion. DDx include 2/2 ESBL UTI vs. worsening hydrocephalus.  - c/w IV ertapenem 1g qd (received dose at [facility] rehab today), last dose will be today 7/29  - per stroke team, OK to restart ASA and Eliquis  - f/u speech/swallow recs   - f/u PT recs

## 2020-07-29 NOTE — PROGRESS NOTE ADULT - PROBLEM SELECTOR PLAN 2
Pt presented with 2 weeks of worsening AMS. CTH noncontrast in ED showing increased diffuse ventriculomegaly compared to May 2020.  - F/U neurology recs; spoke to team: MRI with contrast first then LP if appropriate.

## 2020-07-29 NOTE — PROGRESS NOTE ADULT - PROBLEM SELECTOR PLAN 5
Hx of CAD s/p STEMI. On home ASA 81mg qd  - per stroke neurology, OK to restart ASA. Will NOT start ASA since possible LP; will decide pending MRI results  - F/U on repeat ECG. (AM)

## 2020-07-29 NOTE — DIETITIAN INITIAL EVALUATION ADULT. - PROBLEM SELECTOR PLAN 3
Pt presented with 2 weeks of worsening AMS with recent UCx performed at [facility] rehab showing ESBL (bacteria unclear) sensitive to ertapenem. Did not meet SIRS criteria on admission. Pt was started on ertapenem 1g qd on 7/22 (planned for course to be completed on 7/29)  - s/p dose already on day of admission 7/28 at rehab  - c/w additional ertapenem dose on 7/29  - f/u UCx sent from ED

## 2020-07-29 NOTE — PROGRESS NOTE ADULT - PROBLEM SELECTOR PLAN 8
Hx of CKD (baseline 1.13 from June 2020). Cr 1.28 on admission this time. Likely elevated given recent poor PO Fluid intake at rehab  - continue to monitor with BMP

## 2020-07-29 NOTE — DIETITIAN INITIAL EVALUATION ADULT. - ADD RECOMMEND
1. Advance diet as tolerated to DASH, puree/thin liquids as per slp. Monitor %PO intake and tolerance. 2. Monitor lytes and replete prn. 3. Obtain current wt and trend wts weekly.

## 2020-07-29 NOTE — PROGRESS NOTE ADULT - ASSESSMENT
76 y/o woman with a PMHx of Afib (on Eliquis), HFpEF w/ severe aortic stenosis s/p TAVR (May 2020), HTN, CAD s/p STEMI (on ASA), CKD, TIA s/p L CEA, recent ESBL UTI, presented from [facility] rehab for 2 weeks of worsening AMS, found to have increasing hydrocephalus. 76 y/o woman with a PMHx of Afib (on Eliquis), HFpEF w/ severe aortic stenosis s/p TAVR (May 2020), HTN, CAD s/p STEMI (on ASA), CKD, TIA s/p L CEA, recent ESBL UTI, presented from [facility] rehab due to 2 weeks of worsening mental status. She was found to have increasing hydrocephalus upon presentation in the ED. Patient is currently non verbal and not responsible to verbal stimuli and neurology is following.

## 2020-07-29 NOTE — CONSULT NOTE ADULT - ASSESSMENT
74 y/o woman with a PMHx of Afib (on Eliquis), HFpEF w/ severe aortic stenosis s/p TAVR (May 2020), HTN, CAD s/p STEMI (on ASA), CKD, TIA s/p L CEA, recent ESBL UTI, presented from [facility] rehab due to 2 weeks of worsening mental status. Neurology consulted for AMS and increasing ventriculomegaly.     #Encephalopathy  Pt presenting to [facility] for progressively worsening AMS over the last 2 weeks. Recently discharged from [facility] 3 weeks ago s/p TAVR c/b dizziness 2/2 afib w/ RVR. Upon discharge, patient was reportedly AAOx3, verbal and ambulating. At rehab, pt primarily AAOx1. Urine cx grew esbl E. coli during that time, currently treated with ertapenem. Previous imaging shows meningioma. Upon admission, stroke code called, CTH negative for hemorrhage but showed increasing ventriculomegaly from previous CT in May. Patient currently averbal, not following commands. DDX for ventriculomegaly includes obstruction, paraneoplastic, fungal/TB/Crypto meningitis, sarcoid  - Such a drastic increase in ventriculomegaly would not be expected in [facility], and patient not presenting with other clinical symptoms (incontinence or gait disturbance  - Location of brain lesion seen on imaging unlikely to cause ventriculomegaly  - Recommend MRI with contrast to r/o obstructive cause for ventriculomegaly  - Will consider LP once MRI rules out obstructive process  - UTI treatment per primary team    Case discussed with Consult Neurology Attending, Dr. Roach 76 y/o woman with a PMHx of Afib (on Eliquis), HFpEF w/ severe aortic stenosis s/p TAVR (May 2020), HTN, CAD s/p STEMI (on ASA), CKD, TIA s/p L CEA, recent ESBL UTI, presented from [facility] rehab due to 2 weeks of worsening mental status. Neurology consulted for AMS and increasing ventriculomegaly.     #Encephalopathy  Pt presenting to [facility] for progressively worsening AMS over the last 2 weeks. Recently discharged from [facility] 3 weeks ago s/p TAVR c/b dizziness 2/2 afib w/ RVR. Upon discharge, patient was reportedly AAOx3, verbal and ambulating. At rehab, pt primarily AAOx1. Urine cx grew esbl E. coli during that time, currently treated with ertapenem. Previous imaging shows meningioma. Upon admission, stroke code called, CTH negative for hemorrhage but showed increasing ventriculomegaly from previous CT in May. Patient currently averbal, not following commands. DDX for ventriculomegaly includes obstruction, fungal/TB/Crypto meningitis, sarcoid; less likely [facility] as such a rapid increase in ventriculomegaly would not be expected in [facility], and patient not presenting with other clinical symptoms (incontinence or gait disturbance)  - Location of brain lesion seen on imaging unlikely to cause ventriculomegaly  - Recommend MRI with contrast to r/o obstructive cause for ventriculomegaly  - Will consider LP once MRI rules out obstructive process  - UTI treatment per primary team    Case discussed with Consult Neurology Attending, Dr. Roach

## 2020-07-29 NOTE — SWALLOW BEDSIDE ASSESSMENT ADULT - SLP GENERAL OBSERVATIONS
Extremely limited verbal output. Not answering simple questions but occasionally shaking head yes and following some basic commands.

## 2020-07-29 NOTE — SWALLOW BEDSIDE ASSESSMENT ADULT - SWALLOW EVAL: DIAGNOSIS
Pt presents with mild oral phase deficits in setting of AMS. Deficits characterized by reduced and prolonged bolus manipulation with delayed A-P bolus transport. Recommend a pureed diet with thin liquids. Strict aspiration precautions in place d/t dependency on others for feeding. Please ensure pt is sitting upright while eating/drinking.

## 2020-07-30 NOTE — PROGRESS NOTE ADULT - PROBLEM SELECTOR PLAN 5
Hx of CAD s/p STEMI. On home ASA 81mg qd  - per stroke neurology, OK to restart ASA.  - Hold ASA for LP on Monday.  - F/U on repeat ECG. (AM)

## 2020-07-30 NOTE — PHYSICAL THERAPY INITIAL EVALUATION ADULT - CRITERIA FOR SKILLED THERAPEUTIC INTERVENTIONS
anticipated discharge recommendation/risk reduction/prevention/functional limitations in following categories/anticipated equipment needs at discharge/predicted duration of therapy intervention/impairments found/therapy frequency/rehab potential

## 2020-07-30 NOTE — PROGRESS NOTE ADULT - ATTENDING COMMENTS
Sepsis  supportive rx  f/u labs Sepsis  supportive rx  f/u labs  No improvement in mental status  LP--8/3, stop Eliquis-- change to Heparin 8/1

## 2020-07-30 NOTE — PROGRESS NOTE ADULT - ASSESSMENT
74 y/o F HD2 with a PMHx of Afib (on Eliquis), HFpEF w/ severe aortic stenosis s/p TAVR (May 2020), HTN, CAD s/p STEMI (on ASA), CKD, TIA s/p L CEA, recent ESBL UTI, presented from [facility] rehab due to 2 weeks of worsening mental status. She was found to have increased ventriculomegaly suggestive of hydrocephalus upon presentation in the ED. Patient is currently non verbal. Mildly responsible to verbal stimuli. Neurology is following.

## 2020-07-30 NOTE — PROGRESS NOTE ADULT - SUBJECTIVE AND OBJECTIVE BOX
OVERNIGHT EVENTS: AMARILIS    SUBJECTIVE / INTERVAL HPI: Patient seen and examined at bedside. Used Estonian  this morning, patient says she was fine, but otherwise did not respond to questions or participate in neurological exam. Her nurse tried to speak to her in Salvadorean as well, and believes she speaks Persian instead. Will reattempt exam with Bengali .    VITAL SIGNS:  Vital Signs Last 24 Hrs  T(C): 36.4 (2020 08:46), Max: 36.8 (2020 13:09)  T(F): 97.5 (2020 08:46), Max: 98.2 (2020 13:09)  HR: 83 (2020 08:46) (78 - 103)  BP: 147/89 (2020 08:46) (128/85 - 153/99)  BP(mean): --  RR: 20 (2020 08:46) (17 - 20)  SpO2: 95% (2020 08:46) (95% - 96%)  I&O's Summary      PHYSICAL EXAM:  Constitutional: awake and alert but not following commands (may be due to wrong ), soft spoken  HF: Unable to assess orientation. Patient with depressed affect.   CN: CUCA, EOMI but does not follow commands  Motor: normal bulk and tone, no tremor, rigidity  Sens: unable to assess  Reflexes: Symmetric and normal . BJ 2+, BR 2+, KJ 2+, AJ 2+,   Coord:  not assessed  Gait/Balance: not assessed      MEDICATIONS:  MEDICATIONS  (STANDING):  apixaban 5 milliGRAM(s) Oral every 12 hours  atorvastatin 40 milliGRAM(s) Oral at bedtime  diltiazem    milliGRAM(s) Oral daily  latanoprost 0.005% Ophthalmic Solution 1 Drop(s) Both EYES at bedtime  metoprolol tartrate 37.5 milliGRAM(s) Oral two times a day  pantoprazole    Tablet 40 milliGRAM(s) Oral before breakfast  senna 2 Tablet(s) Oral at bedtime    MEDICATIONS  (PRN):      ALLERGIES:  Allergies    No Known Allergies    Intolerances        LABS:                        13.0   7.53  )-----------( 175      ( 2020 07:32 )             41.1     07-30    142  |  103  |  22  ----------------------------<  88  4.0   |  27  |  0.97    Ca    9.6      2020 07:32  Phos  4.0     07-30  Mg     2.0     07-30    TPro  6.1  /  Alb  3.6  /  TBili  0.4  /  DBili  x   /  AST  22  /  ALT  21  /  AlkPhos  54  07-28    PT/INR - ( 2020 18:19 )   PT: 18.2 sec;   INR: 1.55          PTT - ( 2020 18:19 )  PTT:30.1 sec  Urinalysis Basic - ( 2020 18:46 )    Color: Yellow / Appearance: SL Cloudy / S.015 / pH: x  Gluc: x / Ketone: NEGATIVE  / Bili: Negative / Urobili: 0.2 E.U./dL   Blood: x / Protein: NEGATIVE mg/dL / Nitrite: NEGATIVE   Leuk Esterase: NEGATIVE / RBC: < 5 /HPF / WBC > 10 /HPF   Sq Epi: x / Non Sq Epi: 0-5 /HPF / Bacteria: Present /HPF      CAPILLARY BLOOD GLUCOSE  102 (2020 17:57)      POCT Blood Glucose.: 102 mg/dL (2020 17:41)      RADIOLOGY & ADDITIONAL TESTS: Reviewed.

## 2020-07-30 NOTE — PHYSICAL THERAPY INITIAL EVALUATION ADULT - PERTINENT HX OF CURRENT PROBLEM, REHAB EVAL
76 y/o woman with a PMHx of Afib (on Eliquis), HFpEF w/ severe aortic stenosis s/p TAVR (May 2020), HTN, CAD s/p STEMI (on ASA), CKD, TIA s/p L CEA, presented from [facility] rehab for 2 weeks of worsening AMS.

## 2020-07-30 NOTE — PROGRESS NOTE ADULT - PROBLEM SELECTOR PLAN 2
Pt presented with 2 weeks of worsening AMS. CTH noncontrast in ED showing increased diffuse ventriculomegaly compared to May 2020.  - Per neurology recs: MRI Head with contrast   - LP Monday (Continue to hold ASA, Hold Eliquis Saturday)

## 2020-07-30 NOTE — PROGRESS NOTE ADULT - PROBLEM SELECTOR PLAN 3
Pt presented with 2 weeks of worsening AMS with recent UCx performed at [facility] showing ESBL (bacteria unclear) sensitive to ertapenem. Did not meet SIRS criteria on admission. Pt was started on ertapenem 1g qd on 7/22 (planned for course to be completed on 7/29)    - Complete course of ertapenem on 7/29  - Urine culture: Final No growth

## 2020-07-30 NOTE — PHYSICAL THERAPY INITIAL EVALUATION ADULT - FOLLOWS COMMANDS/ANSWERS QUESTIONS, REHAB EVAL
able to follow single-step instructions/75% of the time/50% of the time/able to squeeze fingers, wiggle toes on command

## 2020-07-30 NOTE — PROGRESS NOTE ADULT - PROBLEM SELECTOR PLAN 10
Plan:   F: -  E: replete K<4, Mg<2  N: Diet per Speech and Swallow  VTE Prophylaxis: Eliquis, SCDs  C: Full Code  D: RMF

## 2020-07-30 NOTE — PHYSICAL THERAPY INITIAL EVALUATION ADULT - GROSSLY INTACT, SENSORY
Grossly Intact/pt able to nod yes when asked if she can feel sensation in b/l LEs and if it feels the same b/l

## 2020-07-30 NOTE — PROGRESS NOTE ADULT - PROBLEM SELECTOR PLAN 1
Pt reported to be AAOx3, conversant and ambulatory prior to TAVR in May 2020. Recently sent to [facility] rehab after June admission for dizziness/weakness. Pt reported to be AAOx0 at [facility]. Multiple negative septic workup, until recent UCx growing ESBL bacteria sensitive to ertapenem (started on IV ertapenem 1g qd from 7/22-7/29). Stroke code called in ED for nonverbal state, CTH negative for acute pathologies, however showing increasing hydrocephalus. CTA head/neck without large vessel occlusion. DDx include 2/2 ESBL UTI vs. worsening hydrocephalus.  - Hold ASA for LP (IR to perform on Monday),   - Hold Eliquis Saturday for LP (IR to perform Monday)  - Appreciate Speech and Swallow Recs (Holding solids and advancing as tolerated)  - Appreciate PT (Will re-attempt therapy when daughter present)

## 2020-07-30 NOTE — PHYSICAL THERAPY INITIAL EVALUATION ADULT - PLANNED THERAPY INTERVENTIONS, PT EVAL
gait training/postural re-education/bed mobility training/balance training/strengthening/ROM/stretching/transfer training

## 2020-07-30 NOTE — PROGRESS NOTE ADULT - SUBJECTIVE AND OBJECTIVE BOX
74 y/o [place] woman with a PMHx of Afib (on Eliquis), HFpEF w/ severe aortic stenosis s/p TAVR (May 2020), HTN, CAD s/p STEMI (on ASA), CKD, TIA s/p L CEA, presented from [facility] rehab for 2 weeks of worsening mental status. Per Dr. Zhong from [facility], pt arrived at [facility] rehab about 3 weeks ago after recent admission for dizziness and weakness last month (2020). Based on the H&P patient was AAOx1 during her time at the [facility] rehab facility but was ambulatory throughout her 3 weeks there. She was noted to have decreased appetite and had multiple negative sepsis workups that were negative. Her latest UCx grew ESBL sensitive to ertapenem (started IV 1g qd on , to end on ).     On arrival at [facility] ED, patient was non-verbal and stroke called was called. No acute stroke or bleeds on CTH, only notable for increasing hydrocephalus from May. Per H&P, at bedside, patient was AAOx2 (person and place), but not following commands or conversant even when using a [place] phone . Patient denied having any pain, but was unable to further answer other ROS questions at bedside.    ED Vitals: T 98.2F, /90, , RR 20, SpO2 94% RA  ED Labs: WBC 8.82, Hb 12.4, INR 1.55, Cr 1.28 (baseline 1.13 from 2020). Trop neg x1.  ED Imaging: CTH noncontrast: No acute hemorrhage or infarct, +Diffuse ventriculomegaly increased from May 2020. CTA head/neck without large vessel occlusion. CXR with unchanged cardiomegaly, no acute infiltrates.  ED Management: IV CTX 1g x1    : HD 2- No acute events overnight. Pt completed course of ertapenem for UTI (last dose yest ). Seen by neurology team yesterday (), differential for altered mental status: obstruction, fungal, TB, Cryptococcus Meningitis, Sarcoid. NPH less likely as presentation is too acute and absence of incontinence or ataxia.   AM today: PT unable to perform therapy due to pt's non responsive/ non verbal status (even with ). Will re-attempt with daughter present.  PM today: Neurology recs: IR guided LP monday, Eliquis to heparin Saturday. Will re-attempt exam with [place] .     Vital Signs Last 24 Hrs  T(C): 36.4 (2020 08:46), Max: 36.8 (2020 20:52)  T(F): 97.5 (2020 08:46), Max: 98.2 (2020 20:52)  HR: 83 (2020 08:46) (83 - 103)  BP: 147/89 (2020 08:46) (128/85 - 153/99)  BP(mean): --  RR: 20 (2020 08:46) (18 - 20)  SpO2: 95% (2020 08:46) (95% - 96%)    PAST MEDICAL & SURGICAL HISTORY:  H/O carotid stenosis  TIA (transient ischemic attack)  Chronic kidney disease (CKD)  Aortic stenosis  HLD (hyperlipidemia)  HTN (hypertension)  H/O carotid endarterectomy    Allergies: No Known Allergies    Home Medications:  acetaminophen 325 mg oral tablet: 2 tab(s) orally every 6 hours, As needed, Mild Pain (1 - 3) (10 Tanvir 2020 09:)  apixaban 5 mg oral tablet: 1 tab(s) orally every 12 hours (10 Tanvir 2020 09:)  aspirin 81 mg oral delayed release tablet: 1 tab(s) orally once a day (10 Tanvir 2020 09:)  atorvastatin 40 mg oral tablet: 1 tab(s) orally once a day (at bedtime) (10 Tanvir 2020 09:)  dilTIAZem 120 mg/24 hours oral capsule, extended release: 1 cap(s) orally once a day (10 Tanvir 2020 09:)  metoprolol tartrate 37.5 mg oral tablet: 1 tab(s) orally every 12 hours (10 Tanvir 2020 09:)  pantoprazole 40 mg oral delayed release tablet: 1 tab(s) orally once a day (before a meal) (10 Tanvir 2020 09:19)  senna oral tablet: 2 tab(s) orally once a day (at bedtime) (10 Tanvir 2020 09:19)  Travatan Z 0.004% ophthalmic solution: 1 drop(s) to each affected eye once a day (in the evening) (2020 12:31)    MEDICATIONS  (STANDING):  apixaban 5 milliGRAM(s) Oral every 12 hours  atorvastatin 40 milliGRAM(s) Oral at bedtime  diltiazem    milliGRAM(s) Oral daily  latanoprost 0.005% Ophthalmic Solution 1 Drop(s) Both EYES at bedtime  metoprolol tartrate 37.5 milliGRAM(s) Oral two times a day  pantoprazole    Tablet 40 milliGRAM(s) Oral before breakfast  senna 2 Tablet(s) Oral at bedtime      Physical Exam:   PHYSICAL EXAM:  GENERAL: NAD, resting comfortably in bed.  HEAD:  Atraumatic, Normocephalic  EYES: EOMI, PERRLA, conjunctiva and sclera clear  NECK: Supple, No JVD  CHEST/LUNG: Clear to auscultation bilaterally; No wheezes  HEART: Irregular rate and rhythm; No murmurs, rubs, or gallops  ABDOMEN: Soft, Nontender, Nondistended; Bowel sounds present  EXTREMITIES:  2+ Peripheral Pulses, No clubbing, cyanosis, or edema  PSYCH: AAOx1  ([place] African  ID: [id number])  NEUROLOGY: Limited. Non Verbal  SKIN: No rashes or lesions    LABS:                        13.0   7.53  )-----------( 175      ( 2020 07:32 )             41.1     07-30    142  |  103  |  22  ----------------------------<  88  4.0   |  27  |  0.97    Ca    9.6      2020 07:32  Phos  4.0     07-30  Mg     2.0     07-30    TPro  6.1  /  Alb  3.6  /  TBili  0.4  /  DBili  x   /  AST  22  /  ALT  21  /  AlkPhos  54  07-28  PT/INR - ( 2020 18:19 )   PT: 18.2 sec;   INR: 1.55     PTT - ( 2020 18:19 )  PTT:30.1 sec  CARDIAC MARKERS ( 2020 18:19 )  x     / <0.01 ng/mL / x     / x     / x        Urinalysis Basic - ( 2020 18:46 )    Color: Yellow / Appearance: SL Cloudy / S.015 / pH: x  Gluc: x / Ketone: NEGATIVE  / Bili: Negative / Urobili: 0.2 E.U./dL   Blood: x / Protein: NEGATIVE mg/dL / Nitrite: NEGATIVE   Leuk Esterase: NEGATIVE / RBC: < 5 /HPF / WBC > 10 /HPF   Sq Epi: x / Non Sq Epi: 0-5 /HPF / Bacteria: Present /HPF    Miscellaneous :  1. Negative Treponemal Antibody   2. U/A: No significant findings. Trace of blood  3. Vitamin B12: 588 (nL)    4. Folate : 10.3 (nL)  4. Urine Culture: Final no growth ()    Imagining:   EXAM: XR CHEST PORTABLE URGENT 1V     PROCEDURE DATE: 2020   INTERPRETATION: Portable chest   HISTORY: Cough and shortness of breath   IMPRESSION:   No infiltrate or pleural effusion. Cardiomegaly and tortuosity thoracic aorta. No pneumothorax. No acute bone abnormality. Mild hypoinflation.   Thank you for the opportunity to participate in the care of this patient.   JOHN DOE M.D., ATTENDING RADIOLOGIST   This document has been electronically signed. 2020 7:54AM     EXAM: CT ANGIO NECK (W)AW IC   EXAM: CT ANGIO BRAIN (W)AW IC   PROCEDURE DATE: 2020     COMPARISON: CT head 2020.   FINDINGS: The CTA examination of the [place] demonstrates the internal carotid arteries to be normal in caliber. There is a normal bifurcation into the A1 and M1 segments. The visualized vertebral arteries are normal in caliber. The basilar artery is normal in caliber. There is a normal bifurcation into the posterior cerebral arteries. There are no areas of stenosis, dilatation or aneurysm.   IMPRESSION: No large vessel occlusion.     PROCEDURE: CTA neck with and without intravenous contrast.   INDICATION: Stroke code. Altered mental status.   TECHNIQUE: Multiple axial thin section were obtained through the neck following the intravenous bolus injection of contrast as above. MIP series are provided.   COMPARISON: None.   FINDINGS: The CTA examination demonstrates the aortic arch appears intact without narrowing of the origin of the great vessels.   The right common carotid artery to be normal in caliber. There is a normal bifurcation into the right internal and external carotid arteries. There is moderate calcified plaque at the carotid bifurcation with approximately 50% stenosis.   The left common carotid artery is normal in caliber. There is a normal bifurcation into the left internal and external carotid arteries. There is mild calcified plaque at the left carotid bifurcation without hemodynamically significant stenosis.   The right vertebral artery is dominant.   IMPRESSION: No large vessel occlusion.     MILTON HU M.D., RADIOLOGY RESIDENT   This document has been electronically signed.   FACUNDO BOOTHE M.D. ATTENDING RADIOLOGIST   This document has been electronically signed. 2020 6:50PM     EXAM: CT ANGIO NECK (W)AW IC   EXAM: CT ANGIO BRAIN (W)AW IC   PROCEDURE DATE: 2020   INTERPRETATION: I, Facundo Boothe MD, have reviewed the images and the report and agree with the findings, with the following modifications:     CTA head: The internal carotid arteries at the skull base are patent. 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 The anterior cerebral artery A1 segments appear encased, at least partially, as well but without occlusion or significant stenosis. The left A1 is somewhat hypoplastic. Both middle cerebral arteries are widely patent and symmetric caliber at their first and second order branches. The posterior circulation appears within normal limits. The right vertebral artery is dominant. The basilar artery is widely patent, as are both posterior cerebral arteries.     CTA neck: Agree, there is no arterial occlusive disease. There is mixed calcified and noncalcified plaque at the origin of the left internal carotid artery, measuring 2.2 mm in minimal luminal diameter and distally measuring 3.9 mm. This is LESS THAN 50% stenosis per NASCET criteria. There is no visible plaque or stenosis of the left carotid. Both vertebral arteries appear patent at their origins and throughout the neck; the right side is dominant.     There is aneurysmal dilatation of the ascending thoracic aorta, 4.7 cm in diameter. The partially seen heart appears enlarged, particularly the left atrium. There is atelectasis and bronchial wall thickening of left basilar segments in the lower lobe of the left lung. There is mosaic attenuation in the upper lobes that likely indicates small airway disease. 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       PRELIMINARY REPORT:   PROCEDURE: CTA brain with intravenous contrast.   INDICATION: Stroke code. Altered mental status.   TECHNIQUE: Multiple axial thin section were obtained through the [place] following the intravenous bolus injection of 85 mL of Isovue-370. MIP series are provided   COMPARISON: CT head 2020.   FINDINGS: The CTA examination of the [place] demonstrates the internal carotid arteries to be normal in caliber. There is a normal bifurcation into the A1 and M1 segments. The visualized vertebral arteries are normal in caliber. The basilar artery is normal in caliber. There is a normal bifurcation into the posterior cerebral arteries. There are no areas of stenosis, dilatation or aneurysm.   IMPRESSION: No large vessel occlusion.       PROCEDURE: CTA neck with and without intravenous contrast.   INDICATION: Stroke code. Altered mental status.   TECHNIQUE: Multiple axial thin section were obtained through the neck following the intravenous bolus injection of contrast as above. MIP series are provided.   COMPARISON: None.   FINDINGS: The CTA examination demonstrates the aortic arch appears intact without narrowing of the origin of the great vessels.   The right common carotid artery to be normal in caliber. There is a normal bifurcation into the right internal and external carotid arteries. There is moderate calcified plaque at the carotid bifurcation with approximately 50% stenosis.     The left common carotid artery is normal in caliber. There is a normal bifurcation into the left internal and external carotid arteries. There is mild calcified plaque at the left carotid bifurcation without hemodynamically significant stenosis.     The right vertebral artery is dominant.     IMPRESSION: No large vessel occlusion.

## 2020-07-30 NOTE — PROGRESS NOTE ADULT - ASSESSMENT
76 y/o woman with a PMHx of Afib (on Eliquis), HFpEF w/ severe aortic stenosis s/p TAVR (May 2020), HTN, CAD s/p STEMI (on ASA), CKD, TIA s/p L CEA, recent ESBL UTI, presented from [facility] rehab due to 2 weeks of worsening mental status. Neurology consulted for AMS and increasing ventriculomegaly.     #Encephalopathy  Upon admission, stroke code called, CTH negative for hemorrhage but showed increasing ventriculomegaly from previous CT in May. Patient currently verbal, but not following commands (may be due to not using [place]ian ). DDX for ventriculomegaly includes obstruction, carcinomatosis meningitis, fungal/TB/Crypto meningitis, sarcoid; less likely NPH as such a rapid increase in ventriculomegaly would not be expected in NPH, and patient not presenting with other clinical symptoms (incontinence or gait disturbance)\  - Will reattempt neurological exam with [place]   - Location of brain lesion seen on imaging unlikely to cause ventriculomegaly  - Recommend MRI with contrast to r/o obstructive cause for hydrocephalus  - Recommend IR guided LP on Monday, transition from eliquis to heparin gtt saturday morning  - Rest of care per primary team    Case discussed with Consult Neurology Attending, Dr. Roach 76 y/o woman with a PMHx of Afib (on Eliquis), HFpEF w/ severe aortic stenosis s/p TAVR (May 2020), HTN, CAD s/p STEMI (on ASA), CKD, TIA s/p L CEA, recent ESBL UTI, presented from [facility] rehab due to 2 weeks of worsening mental status. Neurology consulted for AMS and increasing ventriculomegaly.     #Encephalopathy  Upon admission, stroke code called, CTH negative for hemorrhage but showed increasing ventriculomegaly from previous CT in May. Patient currently verbal, but not following commands (may be due to not using Turkish ). DDX for ventriculomegaly includes obstruction, carcinomatosis meningitis, fungal/TB/Crypto meningitis, sarcoid; less likely NPH as such a rapid increase in ventriculomegaly would not be expected in NPH, and patient not presenting with other clinical symptoms (incontinence or gait disturbance)\  - Location of brain lesion seen on imaging unlikely to cause ventriculomegaly  - Recommend MRI with contrast to r/o obstructive cause for hydrocephalus  - Recommend IR guided LP on Monday, hold eliquis starting saturday - check opening pressure, CSF protein, glucose, cell count, PCR panel, fungal stain and culture, AFB stain and culture, cryptococcal antigen, ACE level and VDRL   - Rest of care per primary team    Case discussed with Consult Neurology Attending, Dr. Roach

## 2020-07-30 NOTE — PROGRESS NOTE ADULT - SUBJECTIVE AND OBJECTIVE BOX
74 y/o [place] woman with a PMHx of Afib (on Eliquis), HFpEF w/ severe aortic stenosis s/p TAVR (May 2020), HTN, CAD s/p STEMI (on ASA), CKD, TIA s/p L CEA, presented from [facility] rehab for 2 weeks of worsening mental status. Per Dr. Zhong from [facility], pt arrived at [facility] rehab about 3 weeks ago after recent admission for dizziness and weakness last month (2020). Based on the H&P patient was AAOx1 during her time at the [facility] rehab facility but was ambulatory throughout her 3 weeks there. She was noted to have decreased appetite and had multiple negative sepsis workups that were negative. Her latest UCx grew ESBL sensitive to ertapenem (started IV 1g qd on , to end on ).     On arrival at [facility] ED, patient was non-verbal and stroke called was called. No acute stroke or bleeds on CTH, only notable for increasing hydrocephalus from May. Per H&P, at bedside, patient was AAOx2 (person and place), but not following commands or conversant even when using a [place] phone . Patient denied having any pain, but was unable to further answer other ROS questions at bedside.    ED Vitals: T 98.2F, /90, , RR 20, SpO2 94% RA  ED Labs: WBC 8.82, Hb 12.4, INR 1.55, Cr 1.28 (baseline 1.13 from 2020). Trop neg x1.  ED Imaging: CTH noncontrast: No acute hemorrhage or infarct, +Diffuse ventriculomegaly increased from May 2020. CTA head/neck without large vessel occlusion. CXR with unchanged cardiomegaly, no acute infiltrates.  ED Management: IV CTX 1g x1    : HD 2- No acute events overnight. Pt completed course of ertapenem for UTI (last dose yest ). Seen by neurology team yesterday (), differential for altered mental status: obstruction, fungal, TB, Cryptococcus Meningitis, Sarcoid. NPH less likely as presentation is too acute and absence of incontinence or ataxia.   AM today: PT unable to perform therapy due to pt's non responsive/ non verbal status (even with ). Will re-attempt with daughter present.  PM today: Neurology recs: IR guided LP monday, Eliquis to heparin Saturday. Will re-attempt exam with Bairon .     Vital Signs Last 24 Hrs  T(C): 36.4 (2020 08:46), Max: 36.8 (2020 20:52)  T(F): 97.5 (2020 08:46), Max: 98.2 (2020 20:52)  HR: 83 (2020 08:46) (83 - 103)  BP: 147/89 (2020 08:46) (128/85 - 153/99)  BP(mean): --  RR: 20 (2020 08:46) (18 - 20)  SpO2: 95% (2020 08:46) (95% - 96%)    PAST MEDICAL & SURGICAL HISTORY:  H/O carotid stenosis  TIA (transient ischemic attack)  Chronic kidney disease (CKD)  Aortic stenosis  HLD (hyperlipidemia)  HTN (hypertension)  H/O carotid endarterectomy    Allergies: No Known Allergies    Home Medications:  acetaminophen 325 mg oral tablet: 2 tab(s) orally every 6 hours, As needed, Mild Pain (1 - 3) (10 Tanvir 2020 09:)  apixaban 5 mg oral tablet: 1 tab(s) orally every 12 hours (10 Tanvir 2020 09:)  aspirin 81 mg oral delayed release tablet: 1 tab(s) orally once a day (10 Tanvir 2020 09:)  atorvastatin 40 mg oral tablet: 1 tab(s) orally once a day (at bedtime) (10 Tanvir 2020 09:)  dilTIAZem 120 mg/24 hours oral capsule, extended release: 1 cap(s) orally once a day (10 Tanvir 2020 09:)  metoprolol tartrate 37.5 mg oral tablet: 1 tab(s) orally every 12 hours (10 Tanvir 2020 09:)  pantoprazole 40 mg oral delayed release tablet: 1 tab(s) orally once a day (before a meal) (10 Tanvir 2020 09:19)  senna oral tablet: 2 tab(s) orally once a day (at bedtime) (10 Tanvir 2020 09:19)  Travatan Z 0.004% ophthalmic solution: 1 drop(s) to each affected eye once a day (in the evening) (2020 12:31)    MEDICATIONS  (STANDING):  apixaban 5 milliGRAM(s) Oral every 12 hours  atorvastatin 40 milliGRAM(s) Oral at bedtime  diltiazem    milliGRAM(s) Oral daily  latanoprost 0.005% Ophthalmic Solution 1 Drop(s) Both EYES at bedtime  metoprolol tartrate 37.5 milliGRAM(s) Oral two times a day  pantoprazole    Tablet 40 milliGRAM(s) Oral before breakfast  senna 2 Tablet(s) Oral at bedtime      Physical Exam:   PHYSICAL EXAM:  GENERAL: NAD, resting comfortably in bed.  HEAD:  Atraumatic, Normocephalic  EYES: EOMI, PERRLA, conjunctiva and sclera clear  NECK: Supple, No JVD  CHEST/LUNG: Clear to auscultation bilaterally; No wheezes  HEART: Irregular rate and rhythm; No murmurs, rubs, or gallops  ABDOMEN: Soft, Nontender, Nondistended; Bowel sounds present  EXTREMITIES:  2+ Peripheral Pulses, No clubbing, cyanosis, or edema  PSYCH: AAOx1  (Q[place]  ID: [id number])  NEUROLOGY: Limited. Non Verbal  SKIN: No rashes or lesions    LABS:                        13.0   7.53  )-----------( 175      ( 2020 07:32 )             41.1     07-30    142  |  103  |  22  ----------------------------<  88  4.0   |  27  |  0.97    Ca    9.6      2020 07:32  Phos  4.0     07-30  Mg     2.0     07-30    TPro  6.1  /  Alb  3.6  /  TBili  0.4  /  DBili  x   /  AST  22  /  ALT  21  /  AlkPhos  54  07-28  PT/INR - ( 2020 18:19 )   PT: 18.2 sec;   INR: 1.55     PTT - ( 2020 18:19 )  PTT:30.1 sec  CARDIAC MARKERS ( 2020 18:19 )  x     / <0.01 ng/mL / x     / x     / x        Urinalysis Basic - ( 2020 18:46 )    Color: Yellow / Appearance: SL Cloudy / S.015 / pH: x  Gluc: x / Ketone: NEGATIVE  / Bili: Negative / Urobili: 0.2 E.U./dL   Blood: x / Protein: NEGATIVE mg/dL / Nitrite: NEGATIVE   Leuk Esterase: NEGATIVE / RBC: < 5 /HPF / WBC > 10 /HPF   Sq Epi: x / Non Sq Epi: 0-5 /HPF / Bacteria: Present /HPF    Miscellaneous :  1. Negative Treponemal Antibody   2. U/A: No significant findings. Trace of blood  3. Vitamin B12: 588 (nL)    4. Folate : 10.3 (nL)  4. Urine Culture: Final no growth ()    Imagining:   EXAM: XR CHEST PORTABLE URGENT 1V     PROCEDURE DATE: 2020   INTERPRETATION: Portable chest   HISTORY: Cough and shortness of breath   IMPRESSION:   No infiltrate or pleural effusion. Cardiomegaly and tortuosity thoracic aorta. No pneumothorax. No acute bone abnormality. Mild hypoinflation.   Thank you for the opportunity to participate in the care of this patient.   JOHN DOE M.D., ATTENDING RADIOLOGIST   This document has been electronically signed. 2020 7:54AM     EXAM: CT ANGIO NECK (W)AW IC   EXAM: CT ANGIO BRAIN (W)AW IC   PROCEDURE DATE: 2020     COMPARISON: CT head 2020.   FINDINGS: The CTA examination of the [place] demonstrates the internal carotid arteries to be normal in caliber. There is a normal bifurcation into the A1 and M1 segments. The visualized vertebral arteries are normal in caliber. The basilar artery is normal in caliber. There is a normal bifurcation into the posterior cerebral arteries. There are no areas of stenosis, dilatation or aneurysm.   IMPRESSION: No large vessel occlusion.     PROCEDURE: CTA neck with and without intravenous contrast.   INDICATION: Stroke code. Altered mental status.   TECHNIQUE: Multiple axial thin section were obtained through the neck following the intravenous bolus injection of contrast as above. MIP series are provided.   COMPARISON: None.   FINDINGS: The CTA examination demonstrates the aortic arch appears intact without narrowing of the origin of the great vessels.   The right common carotid artery to be normal in caliber. There is a normal bifurcation into the right internal and external carotid arteries. There is moderate calcified plaque at the carotid bifurcation with approximately 50% stenosis.   The left common carotid artery is normal in caliber. There is a normal bifurcation into the left internal and external carotid arteries. There is mild calcified plaque at the left carotid bifurcation without hemodynamically significant stenosis.   The right vertebral artery is dominant.   IMPRESSION: No large vessel occlusion.     MILTON HU M.D., RADIOLOGY RESIDENT   This document has been electronically signed.   FACUNDO BOOTHE M.D. ATTENDING RADIOLOGIST   This document has been electronically signed. 2020 6:50PM     EXAM: CT ANGIO NECK (W)AW IC   EXAM: CT ANGIO BRAIN (W)AW IC   PROCEDURE DATE: 2020   INTERPRETATION: I, Facundo Boothe MD, have reviewed the images and the report and agree with the findings, with the following modifications:     CTA head: The internal carotid arteries at the skull base are patent. 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 The anterior cerebral artery A1 segments appear encased, at least partially, as well but without occlusion or significant stenosis. The left A1 is somewhat hypoplastic. Both middle cerebral arteries are widely patent and symmetric caliber at their first and second order branches. The posterior circulation appears within normal limits. The right vertebral artery is dominant. The basilar artery is widely patent, as are both posterior cerebral arteries.     CTA neck: Agree, there is no arterial occlusive disease. There is mixed calcified and noncalcified plaque at the origin of the left internal carotid artery, measuring 2.2 mm in minimal luminal diameter and distally measuring 3.9 mm. This is LESS THAN 50% stenosis per NASCET criteria. There is no visible plaque or stenosis of the left carotid. Both vertebral arteries appear patent at their origins and throughout the neck; the right side is dominant.     There is aneurysmal dilatation of the ascending thoracic aorta, 4.7 cm in diameter. The partially seen heart appears enlarged, particularly the left atrium. There is atelectasis and bronchial wall thickening of left basilar segments in the lower lobe of the left lung. There is mosaic attenuation in the upper lobes that likely indicates small airway disease. 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       PRELIMINARY REPORT:   PROCEDURE: CTA brain with intravenous contrast.   INDICATION: Stroke code. Altered mental status.   TECHNIQUE: Multiple axial thin section were obtained through the [place] following the intravenous bolus injection of 85 mL of Isovue-370. MIP series are provided   COMPARISON: CT head 2020.   FINDINGS: The CTA examination of the [place] demonstrates the internal carotid arteries to be normal in caliber. There is a normal bifurcation into the A1 and M1 segments. The visualized vertebral arteries are normal in caliber. The basilar artery is normal in caliber. There is a normal bifurcation into the posterior cerebral arteries. There are no areas of stenosis, dilatation or aneurysm.   IMPRESSION: No large vessel occlusion.       PROCEDURE: CTA neck with and without intravenous contrast.   INDICATION: Stroke code. Altered mental status.   TECHNIQUE: Multiple axial thin section were obtained through the neck following the intravenous bolus injection of contrast as above. MIP series are provided.   COMPARISON: None.   FINDINGS: The CTA examination demonstrates the aortic arch appears intact without narrowing of the origin of the great vessels.   The right common carotid artery to be normal in caliber. There is a normal bifurcation into the right internal and external carotid arteries. There is moderate calcified plaque at the carotid bifurcation with approximately 50% stenosis.     The left common carotid artery is normal in caliber. There is a normal bifurcation into the left internal and external carotid arteries. There is mild calcified plaque at the left carotid bifurcation without hemodynamically significant stenosis.     The right vertebral artery is dominant.     IMPRESSION: No large vessel occlusion.

## 2020-07-30 NOTE — PHYSICAL THERAPY INITIAL EVALUATION ADULT - MANUAL MUSCLE TESTING RESULTS, REHAB EVAL
unable to assess MMTs 2/2 pt confused, unable to dangle fully at edge of bed; however, b/l  strength 4/5, b/l elbow flex/ext ~4/5 when asked to push/pull; right DF/PF 4/5, left DF/PF 3/5

## 2020-07-30 NOTE — PHYSICAL THERAPY INITIAL EVALUATION ADULT - GENERAL OBSERVATIONS, REHAB EVAL
Pt received semi-supine no acute distress +primafit +bed alarm, daughter present at bedside, cleared for PT by RN Cindi, agreeable to PT Eval. Left as found +call bell +Bed alarm, daughter present, RN aware. FIM 0

## 2020-07-30 NOTE — PHYSICAL THERAPY INITIAL EVALUATION ADULT - IMPAIRMENTS FOUND, PT EVAL
aerobic capacity/endurance/gait, locomotion, and balance/gross motor/posture/poor safety awareness/muscle strength/ROM

## 2020-07-30 NOTE — PHYSICAL THERAPY INITIAL EVALUATION ADULT - LIVES WITH, PROFILE
presents from [facility], lives with daughter and granddaughter at baseline, has ~6 steps to enter one entrance and none at another.

## 2020-07-30 NOTE — PHYSICAL THERAPY INITIAL EVALUATION ADULT - IMPAIRMENTS CONTRIBUTING IMPAIRED BED MOBILITY, REHAB EVAL
impaired balance/cognition/decreased strength/impaired coordination/impaired postural control/decreased ROM/pain

## 2020-07-30 NOTE — PROGRESS NOTE ADULT - PROBLEM SELECTOR PLAN 8
Hx of CKD (baseline 1.13 from June 2020). Cr 1.28 on admission this time. Today: .91.   - Encourage PO fluid intake  - continue to monitor with BMP

## 2020-07-30 NOTE — PROGRESS NOTE ADULT - PROBLEM SELECTOR PLAN 7
Hx of TIA s/p L CEA. CTH noncontrast here without acute bleeds or strokes. CTA head/neck without large vessel occlusion  - Hold ASA until LP completed Monday.

## 2020-07-31 NOTE — PROGRESS NOTE ADULT - ASSESSMENT
76 y/o F HD2 with a PMHx of Afib (on Eliquis), HFpEF w/ severe aortic stenosis s/p TAVR (May 2020), HTN, CAD s/p STEMI (on ASA), CKD, TIA s/p L CEA, recent ESBL UTI, presented from [facility] rehab due to 2 weeks of worsening mental status. She was found to have increased ventriculomegaly suggestive of hydrocephalus upon presentation in the ED. Patient is currently non verbal. Mildly responsible to verbal stimuli. Neurology is following.

## 2020-07-31 NOTE — PROGRESS NOTE ADULT - SUBJECTIVE AND OBJECTIVE BOX
OVERNIGHT EVENTS: AMARILIS    SUBJECTIVE / INTERVAL HPI: Patient seen and examined at bedside. Used French , and patient appeared more alert, attempting to reach for phone and says she was fine when asked about her pain. Otherwise, patient did not participate in the rest of the conversation or exam.     VITAL SIGNS:  Vital Signs Last 24 Hrs  T(C): 36.9 (31 Jul 2020 06:08), Max: 36.9 (31 Jul 2020 06:08)  T(F): 98.4 (31 Jul 2020 06:08), Max: 98.4 (31 Jul 2020 06:08)  HR: 98 (31 Jul 2020 06:54) (92 - 122)  BP: 132/90 (31 Jul 2020 06:08) (128/82 - 140/84)  BP(mean): --  RR: 20 (31 Jul 2020 06:08) (19 - 20)  SpO2: 94% (31 Jul 2020 06:08) (94% - 96%)  I&O's Summary      PHYSICAL EXAM:    General: WDWN  Constitutional: awake and alert but not following commands, soft spoken  HF: Unable to assess orientation. Patient with depressed affect.   CN: CUCA, EOMI but does not follow commands  Motor: normal bulk and tone, no tremor, rigidity  Sens: unable to assess  Reflexes: Symmetric and 2+ in upper and lower extremities  Coord: not assessed as patient did not participate  Gait/Balance: not assessed      MEDICATIONS:  MEDICATIONS  (STANDING):  apixaban 5 milliGRAM(s) Oral every 12 hours  atorvastatin 40 milliGRAM(s) Oral at bedtime  diltiazem    milliGRAM(s) Oral daily  latanoprost 0.005% Ophthalmic Solution 1 Drop(s) Both EYES at bedtime  metoprolol tartrate 37.5 milliGRAM(s) Oral two times a day  pantoprazole    Tablet 40 milliGRAM(s) Oral before breakfast  senna 2 Tablet(s) Oral at bedtime    MEDICATIONS  (PRN):      ALLERGIES:  Allergies    No Known Allergies    Intolerances        LABS:                        12.9   7.79  )-----------( 172      ( 31 Jul 2020 07:34 )             40.9     07-31    144  |  106  |  24<H>  ----------------------------<  101<H>  4.1   |  28  |  1.09    Ca    9.6      31 Jul 2020 07:34  Phos  3.7     07-31  Mg     2.0     07-31          CAPILLARY BLOOD GLUCOSE          RADIOLOGY & ADDITIONAL TESTS: Reviewed.

## 2020-07-31 NOTE — PROGRESS NOTE ADULT - ATTENDING COMMENTS
Neurologic status unchanged  Awaiting MRI brain followed by LP by IR on monday  hold eliquis and start heparin prior to LP

## 2020-07-31 NOTE — PROGRESS NOTE ADULT - SUBJECTIVE AND OBJECTIVE BOX
: HD3- Bed bugs found. Patient now in isolated room. No acute events o/n.   above noted.  Comfortable    Vital Signs Last 24 Hrs    T(C): 36.9 (2020 06:08), Max: 36.9 (2020 06:08)  T(F): 98.4 (2020 06:08), Max: 98.4 (2020 06:08)  HR: 98 (2020 06:54) (83 - 122)  BP: 132/90 (2020 06:08) (128/82 - 147/89)  BP(mean): --  RR: 20 (2020 06:08) (19 - 20)  SpO2: 94% (2020 06:08) (94% - 96%)    PAST MEDICAL & SURGICAL HISTORY:  H/O carotid stenosis  TIA (transient ischemic attack)  Chronic kidney disease (CKD)  Aortic stenosis  HLD (hyperlipidemia)  HTN (hypertension)  H/O carotid endarterectomy    Allergies: No Known Allergies    Home Medications:  acetaminophen 325 mg oral tablet: 2 tab(s) orally every 6 hours, As needed, Mild Pain (1 - 3) (10 Tanvir 2020 09:19)  apixaban 5 mg oral tablet: 1 tab(s) orally every 12 hours (10 Tanvir 2020 09:)  aspirin 81 mg oral delayed release tablet: 1 tab(s) orally once a day (10 Tanvir 2020 09:19)  atorvastatin 40 mg oral tablet: 1 tab(s) orally once a day (at bedtime) (10 Tanvir 2020 09:19)  dilTIAZem 120 mg/24 hours oral capsule, extended release: 1 cap(s) orally once a day (10 Tanvir 2020 09:19)  metoprolol tartrate 37.5 mg oral tablet: 1 tab(s) orally every 12 hours (10 Tanvir 2020 09:)  pantoprazole 40 mg oral delayed release tablet: 1 tab(s) orally once a day (before a meal) (10 Tanvir 2020 09:19)  senna oral tablet: 2 tab(s) orally once a day (at bedtime) (10 Tanvir 2020 09:19)  Travatan Z 0.004% ophthalmic solution: 1 drop(s) to each affected eye once a day (in the evening) (2020 12:31)    MEDICATIONS  (STANDING):  apixaban 5 milliGRAM(s) Oral every 12 hours  atorvastatin 40 milliGRAM(s) Oral at bedtime  diltiazem    milliGRAM(s) Oral daily  latanoprost 0.005% Ophthalmic Solution 1 Drop(s) Both EYES at bedtime  metoprolol tartrate 37.5 milliGRAM(s) Oral two times a day  pantoprazole    Tablet 40 milliGRAM(s) Oral before breakfast  senna 2 Tablet(s) Oral at bedtime      Physical Exam:   PHYSICAL EXAM:  GENERAL: NAD, resting comfortably in bed.  HEAD:  Atraumatic, Normocephalic  EYES: EOMI, PERRLA, conjunctiva and sclera clear  NECK: Supple, No JVD  CHEST/LUNG: Clear to auscultation bilaterally; No wheezes  HEART: Irregular rate and rhythm; No murmurs, rubs, or gallops  ABDOMEN: Soft, Nontender, Nondistended; Bowel sounds present  EXTREMITIES:  2+ Peripheral Pulses, No clubbing, cyanosis, or edema  PSYCH: AAOx1  ([place]  ID: [id number])  NEUROLOGY: Limited. Non Verbal  SKIN: No rashes or lesions    LABS:                        13.0   7.53  )-----------( 175      ( 2020 07:32 )             41.1     07-30    142  |  103  |  22  ----------------------------<  88  4.0   |  27  |  0.97    Ca    9.6      2020 07:32  Phos  4.0     07-30  Mg     2.0     07-30    TPro  6.1  /  Alb  3.6  /  TBili  0.4  /  DBili  x   /  AST  22  /  ALT  21  /  AlkPhos  54  07-28  PT/INR - ( 2020 18:19 )   PT: 18.2 sec;   INR: 1.55     PTT - ( 2020 18:19 )  PTT:30.1 sec  CARDIAC MARKERS ( 2020 18:19 )  x     / <0.01 ng/mL / x     / x     / x        Urinalysis Basic - ( 2020 18:46 )    Color: Yellow / Appearance: SL Cloudy / S.015 / pH: x  Gluc: x / Ketone: NEGATIVE  / Bili: Negative / Urobili: 0.2 E.U./dL   Blood: x / Protein: NEGATIVE mg/dL / Nitrite: NEGATIVE   Leuk Esterase: NEGATIVE / RBC: < 5 /HPF / WBC > 10 /HPF   Sq Epi: x / Non Sq Epi: 0-5 /HPF / Bacteria: Present /HPF      1. Negative Treponemal Antibody   2. U/A: No significant findings. Trace of blood  3. Vitamin B12: 588 (nL)    4. Folate : 10.3 (nL)  4. Urine Culture: Final no growth ()    Imagining:     PROCEDURE DATE: 2020   INTERPRETATION: Portable chest   HISTORY: Cough and shortness of breath   IMPRESSION:   No infiltrate or pleural effusion. Cardiomegaly and tortuosity thoracic aorta. No pneumothorax. No acute bone abnormality. Mild hypoinflation.   Thank you for the opportunity to participate in the care of this patient.   JOHN DOE M.D., ATTENDING RADIOLOGIST   This document has been electronically signed. 2020 7:54AM         COMPARISON: CT head 2020.   FINDINGS: The CTA examination of the [place] demonstrates the internal carotid arteries to be normal in caliber. There is a normal bifurcation into the A1 and M1 segments. The visualized vertebral arteries are normal in caliber. The basilar artery is normal in caliber. There is a normal bifurcation into the posterior cerebral arteries. There are no areas of stenosis, dilatation or aneurysm.   IMPRESSION: No large vessel occlusion.     PROCEDURE: CTA neck with and without intravenous contrast.   INDICATION: Stroke code. Altered mental status.   TECHNIQUE: Multiple axial thin section were obtained through the neck following the intravenous bolus injection of contrast as above. MIP series are provided.   COMPARISON: None.   FINDINGS: The CTA examination demonstrates the aortic arch appears intact without narrowing of the origin of the great vessels.   The right common carotid artery to be normal in caliber. There is a normal bifurcation into the right internal and external carotid arteries. There is moderate calcified plaque at the carotid bifurcation with approximately 50% stenosis.   The left common carotid artery is normal in caliber. There is a normal bifurcation into the left internal and external carotid arteries. There is mild calcified plaque at the left carotid bifurcation without hemodynamically significant stenosis.   The right vertebral artery is dominant.   IMPRESSION: No large vessel occlusion.     MILTON PAREDES M.D., RADIOLOGY RESIDENT   This document has been electronically signed.   FACUNDO BLOCK M.D. ATTENDING RADIOLOGIST   This document has been electronically signed. 2020 6:50PM     EXAM: CT ANGIO NECK (W)AW IC   EXAM: CT ANGIO BRAIN (W)AW IC   PROCEDURE DATE: 2020   INTERPRETATION: Facundo SEGOVIA MD, have reviewed the images and the report and agree with the findings, with the following modifications:     CTA head: The internal carotid arteries at the skull base are patent. 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 The anterior cerebral artery A1 segments appear encased, at least partially, as well but without occlusion or significant stenosis. The left A1 is somewhat hypoplastic. Both middle cerebral arteries are widely patent and symmetric caliber at their first and second order branches. The posterior circulation appears within normal limits. The right vertebral artery is dominant. The basilar artery is widely patent, as are both posterior cerebral arteries.     CTA neck: Agree, there is no arterial occlusive disease. There is mixed calcified and noncalcified plaque at the origin of the left internal carotid artery, measuring 2.2 mm in minimal luminal diameter and distally measuring 3.9 mm. This is LESS THAN 50% stenosis per NASCET criteria. There is no visible plaque or stenosis of the left carotid. Both vertebral arteries appear patent at their origins and throughout the neck; the right side is dominant.     There is aneurysmal dilatation of the ascending thoracic aorta, 4.7 cm in diameter. The partially seen heart appears enlarged, particularly the left atrium. There is atelectasis and bronchial wall thickening of left basilar segments in the lower lobe of the left lung. There is mosaic attenuation in the upper lobes that likely indicates small airway disease. 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       PRELIMINARY REPORT:   PROCEDURE: CTA brain with intravenous contrast.   INDICATION: Stroke code. Altered mental status.   TECHNIQUE: Multiple axial thin section were obtained through the [place] following the intravenous bolus injection of 85 mL of Isovue-[id number]. MIP series are provided   COMPARISON: CT head 2020.   FINDINGS: The CTA examination of the [place] demonstrates the internal carotid arteries to be normal in caliber. There is a normal bifurcation into the A1 and M1 segments. The visualized vertebral arteries are normal in caliber. The basilar artery is normal in caliber. There is a normal bifurcation into the posterior cerebral arteries. There are no areas of stenosis, dilatation or aneurysm.   IMPRESSION: No large vessel occlusion.       PROCEDURE: CTA neck with and without intravenous contrast.   INDICATION: Stroke code. Altered mental status.   TECHNIQUE: Multiple axial thin section were obtained through the neck following the intravenous bolus injection of contrast as above. MIP series are provided.   COMPARISON: None.   FINDINGS: The CTA examination demonstrates the aortic arch appears intact without narrowing of the origin of the great vessels.   The right common carotid artery to be normal in caliber. There is a normal bifurcation into the right internal and external carotid arteries. There is moderate calcified plaque at the carotid bifurcation with approximately 50% stenosis.     The left common carotid artery is normal in caliber. There is a normal bifurcation into the left internal and external carotid arteries. There is mild calcified plaque at the left carotid bifurcation without hemodynamically significant stenosis.     The right vertebral artery is dominant.     IMPRESSION: No large vessel occlusion.

## 2020-07-31 NOTE — PROGRESS NOTE ADULT - SUBJECTIVE AND OBJECTIVE BOX
76 y/o [place] woman with a PMHx of Afib (on Eliquis), HFpEF w/ severe aortic stenosis s/p TAVR (May 2020), HTN, CAD s/p STEMI (on ASA), CKD, TIA s/p L CEA, presented from [facility] rehab for 2 weeks of worsening mental status. Per Dr. Zhong from [facility], pt arrived at [facility] rehab about 3 weeks ago after recent admission for dizziness and weakness last month (2020). Based on the H&P patient was AAOx1 during her time at the [facility] rehab facility but was ambulatory throughout her 3 weeks there. She was noted to have decreased appetite and had multiple negative sepsis workups that were negative. Her latest UCx grew ESBL sensitive to ertapenem (started IV 1g qd on , to end on ).     On arrival at [facility] ED, patient was non-verbal and stroke called was called. No acute stroke or bleeds on CTH, only notable for increasing hydrocephalus from May. Per H&P, at bedside, patient was AAOx2 (person and place), but not following commands or conversant even when using a [place]olese phone . Patient denied having any pain, but was unable to further answer other ROS questions at bedside.    ED Vitals: T 98.2F, /90, , RR 20, SpO2 94% RA  ED Labs: WBC 8.82, Hb 12.4, INR 1.55, Cr 1.28 (baseline 1.13 from 2020). Trop neg x1.  ED Imaging: CTH noncontrast: No acute hemorrhage or infarct, +Diffuse ventriculomegaly increased from May 2020. CTA head/neck without large vessel occlusion. CXR with unchanged cardiomegaly, no acute infiltrates.  ED Management: IV CTX 1g x1    : HD 2- No acute events overnight. Pt completed course of ertapenem for UTI (last dose yest ). Seen by neurology team yesterday (), differential for altered mental status: obstruction, fungal, TB, Cryptococcus Meningitis, Sarcoid. NPH less likely as presentation is too acute and absence of incontinence or ataxia.   AM today: PT unable to perform therapy due to pt's non responsive/ non verbal status (even with ). Will re-attempt with daughter present.  PM today: Neurology recs: IR guided LP monday, Eliquis to heparin Saturday. Will re-attempt exam with Bairon .    : HD3- Bed bugs found. Patient now in isolated room. No acute events o/n.     Vital Signs Last 24 Hrs    T(C): 36.9 (2020 06:08), Max: 36.9 (2020 06:08)  T(F): 98.4 (2020 06:08), Max: 98.4 (2020 06:08)  HR: 98 (2020 06:54) (83 - 122)  BP: 132/90 (2020 06:08) (128/82 - 147/89)  BP(mean): --  RR: 20 (2020 06:08) (19 - 20)  SpO2: 94% (2020 06:08) (94% - 96%)    PAST MEDICAL & SURGICAL HISTORY:  H/O carotid stenosis  TIA (transient ischemic attack)  Chronic kidney disease (CKD)  Aortic stenosis  HLD (hyperlipidemia)  HTN (hypertension)  H/O carotid endarterectomy    Allergies: No Known Allergies    Home Medications:  acetaminophen 325 mg oral tablet: 2 tab(s) orally every 6 hours, As needed, Mild Pain (1 - 3) (10 Tanvir 2020 09:)  apixaban 5 mg oral tablet: 1 tab(s) orally every 12 hours (10 Tanvir 2020 09:)  aspirin 81 mg oral delayed release tablet: 1 tab(s) orally once a day (10 Tanvir 2020 09:)  atorvastatin 40 mg oral tablet: 1 tab(s) orally once a day (at bedtime) (10 Tanvir 2020 09:)  dilTIAZem 120 mg/24 hours oral capsule, extended release: 1 cap(s) orally once a day (10 Tanvir 2020 09:)  metoprolol tartrate 37.5 mg oral tablet: 1 tab(s) orally every 12 hours (10 Tanvir 2020 09:)  pantoprazole 40 mg oral delayed release tablet: 1 tab(s) orally once a day (before a meal) (10 Tanvir 2020 09:)  senna oral tablet: 2 tab(s) orally once a day (at bedtime) (10 Tanvir 2020 09:)  Travatan Z 0.004% ophthalmic solution: 1 drop(s) to each affected eye once a day (in the evening) (2020 12:31)    MEDICATIONS  (STANDING):  apixaban 5 milliGRAM(s) Oral every 12 hours  atorvastatin 40 milliGRAM(s) Oral at bedtime  diltiazem    milliGRAM(s) Oral daily  latanoprost 0.005% Ophthalmic Solution 1 Drop(s) Both EYES at bedtime  metoprolol tartrate 37.5 milliGRAM(s) Oral two times a day  pantoprazole    Tablet 40 milliGRAM(s) Oral before breakfast  senna 2 Tablet(s) Oral at bedtime      Physical Exam:   PHYSICAL EXAM:  GENERAL: NAD, resting comfortably in bed.  HEAD:  Atraumatic, Normocephalic  EYES: EOMI, PERRLA, conjunctiva and sclera clear  NECK: Supple, No JVD  CHEST/LUNG: Clear to auscultation bilaterally; No wheezes  HEART: Irregular rate and rhythm; No murmurs, rubs, or gallops  ABDOMEN: Soft, Nontender, Nondistended; Bowel sounds present  EXTREMITIES:  2+ Peripheral Pulses, No clubbing, cyanosis, or edema  PSYCH: AAOx1  (Congolese  ID: [id number])  NEUROLOGY: Limited. Non Verbal  SKIN: No rashes or lesions    LABS:                        13.0   7.53  )-----------( 175      ( 2020 07:32 )             41.1     07-30    142  |  103  |  22  ----------------------------<  88  4.0   |  27  |  0.97    Ca    9.6      2020 07:32  Phos  4.0     07-30  Mg     2.0     07-30    TPro  6.1  /  Alb  3.6  /  TBili  0.4  /  DBili  x   /  AST  22  /  ALT  21  /  AlkPhos  54  07-28  PT/INR - ( 2020 18:19 )   PT: 18.2 sec;   INR: 1.55     PTT - ( 2020 18:19 )  PTT:30.1 sec  CARDIAC MARKERS ( 2020 18:19 )  x     / <0.01 ng/mL / x     / x     / x        Urinalysis Basic - ( 2020 18:46 )    Color: Yellow / Appearance: SL Cloudy / S.015 / pH: x  Gluc: x / Ketone: NEGATIVE  / Bili: Negative / Urobili: 0.2 E.U./dL   Blood: x / Protein: NEGATIVE mg/dL / Nitrite: NEGATIVE   Leuk Esterase: NEGATIVE / RBC: < 5 /HPF / WBC > 10 /HPF   Sq Epi: x / Non Sq Epi: 0-5 /HPF / Bacteria: Present /HPF    Miscellaneous :  1. Negative Treponemal Antibody   2. U/A: No significant findings. Trace of blood  3. Vitamin B12: 588 (nL)    4. Folate : 10.3 (nL)  4. Urine Culture: Final no growth ()    Imagining:   EXAM: XR CHEST PORTABLE URGENT 1V     PROCEDURE DATE: 2020   INTERPRETATION: Portable chest   HISTORY: Cough and shortness of breath   IMPRESSION:   No infiltrate or pleural effusion. Cardiomegaly and tortuosity thoracic aorta. No pneumothorax. No acute bone abnormality. Mild hypoinflation.   Thank you for the opportunity to participate in the care of this patient.   JOHN DOE M.D., ATTENDING RADIOLOGIST   This document has been electronically signed. 2020 7:54AM     EXAM: CT ANGIO NECK (W)AW IC   EXAM: CT ANGIO BRAIN (W)AW IC   PROCEDURE DATE: 2020     COMPARISON: CT head 2020.   FINDINGS: The CTA examination of the [place] demonstrates the internal carotid arteries to be normal in caliber. There is a normal bifurcation into the A1 and M1 segments. The visualized vertebral arteries are normal in caliber. The basilar artery is normal in caliber. There is a normal bifurcation into the posterior cerebral arteries. There are no areas of stenosis, dilatation or aneurysm.   IMPRESSION: No large vessel occlusion.     PROCEDURE: CTA neck with and without intravenous contrast.   INDICATION: Stroke code. Altered mental status.   TECHNIQUE: Multiple axial thin section were obtained through the neck following the intravenous bolus injection of contrast as above. MIP series are provided.   COMPARISON: None.   FINDINGS: The CTA examination demonstrates the aortic arch appears intact without narrowing of the origin of the great vessels.   The right common carotid artery to be normal in caliber. There is a normal bifurcation into the right internal and external carotid arteries. There is moderate calcified plaque at the carotid bifurcation with approximately 50% stenosis.   The left common carotid artery is normal in caliber. There is a normal bifurcation into the left internal and external carotid arteries. There is mild calcified plaque at the left carotid bifurcation without hemodynamically significant stenosis.   The right vertebral artery is dominant.   IMPRESSION: No large vessel occlusion.     MILTON PAREDES M.D., RADIOLOGY RESIDENT   This document has been electronically signed.   FACUNDO BOOTHE M.D. ATTENDING RADIOLOGIST   This document has been electronically signed. 2020 6:50PM     EXAM: CT ANGIO NECK (W)AW IC   EXAM: CT ANGIO BRAIN (W)AW IC   PROCEDURE DATE: 2020   INTERPRETATION: I, Facundo Boothe MD, have reviewed the images and the report and agree with the findings, with the following modifications:     CTA head: The internal carotid arteries at the skull base are patent. 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 The anterior cerebral artery A1 segments appear encased, at least partially, as well but without occlusion or significant stenosis. The left A1 is somewhat hypoplastic. Both middle cerebral arteries are widely patent and symmetric caliber at their first and second order branches. The posterior circulation appears within normal limits. The right vertebral artery is dominant. The basilar artery is widely patent, as are both posterior cerebral arteries.     CTA neck: Agree, there is no arterial occlusive disease. There is mixed calcified and noncalcified plaque at the origin of the left internal carotid artery, measuring 2.2 mm in minimal luminal diameter and distally measuring 3.9 mm. This is LESS THAN 50% stenosis per NASCET criteria. There is no visible plaque or stenosis of the left carotid. Both vertebral arteries appear patent at their origins and throughout the neck; the right side is dominant.     There is aneurysmal dilatation of the ascending thoracic aorta, 4.7 cm in diameter. The partially seen heart appears enlarged, particularly the left atrium. There is atelectasis and bronchial wall thickening of left basilar segments in the lower lobe of the left lung. There is mosaic attenuation in the upper lobes that likely indicates small airway disease. 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       PRELIMINARY REPORT:   PROCEDURE: CTA brain with intravenous contrast.   INDICATION: Stroke code. Altered mental status.   TECHNIQUE: Multiple axial thin section were obtained through the [place] following the intravenous bolus injection of 85 mL of Isovue-370. MIP series are provided   COMPARISON: CT head 2020.   FINDINGS: The CTA examination of the [place] demonstrates the internal carotid arteries to be normal in caliber. There is a normal bifurcation into the A1 and M1 segments. The visualized vertebral arteries are normal in caliber. The basilar artery is normal in caliber. There is a normal bifurcation into the posterior cerebral arteries. There are no areas of stenosis, dilatation or aneurysm.   IMPRESSION: No large vessel occlusion.       PROCEDURE: CTA neck with and without intravenous contrast.   INDICATION: Stroke code. Altered mental status.   TECHNIQUE: Multiple axial thin section were obtained through the neck following the intravenous bolus injection of contrast as above. MIP series are provided.   COMPARISON: None.   FINDINGS: The CTA examination demonstrates the aortic arch appears intact without narrowing of the origin of the great vessels.   The right common carotid artery to be normal in caliber. There is a normal bifurcation into the right internal and external carotid arteries. There is moderate calcified plaque at the carotid bifurcation with approximately 50% stenosis.     The left common carotid artery is normal in caliber. There is a normal bifurcation into the left internal and external carotid arteries. There is mild calcified plaque at the left carotid bifurcation without hemodynamically significant stenosis.     The right vertebral artery is dominant.     IMPRESSION: No large vessel occlusion. 76 y/o [place] woman with a PMHx of Afib (on Eliquis), HFpEF w/ severe aortic stenosis s/p TAVR (May 2020), HTN, CAD s/p STEMI (on ASA), CKD, TIA s/p L CEA, presented from [facility] rehab for 2 weeks of worsening mental status. Per Dr. Zhong from [facility], pt arrived at [facility] rehab about 3 weeks ago after recent admission for dizziness and weakness last month (2020). Based on the H&P patient was AAOx1 during her time at the [facility] rehab facility but was ambulatory throughout her 3 weeks there. She was noted to have decreased appetite and had multiple negative sepsis workups that were negative. Her latest UCx grew ESBL sensitive to ertapenem (started IV 1g qd on , to end on ).     On arrival at [facility] ED, patient was non-verbal and stroke called was called. No acute stroke or bleeds on CTH, only notable for increasing hydrocephalus from May. Per H&P, at bedside, patient was AAOx2 (person and place), but not following commands or conversant even when using a [place] phone . Patient denied having any pain, but was unable to further answer other ROS questions at bedside.    ED Vitals: T 98.2F, /90, , RR 20, SpO2 94% RA  ED Labs: WBC 8.82, Hb 12.4, INR 1.55, Cr 1.28 (baseline 1.13 from 2020). Trop neg x1.  ED Imaging: CTH noncontrast: No acute hemorrhage or infarct, +Diffuse ventriculomegaly increased from May 2020. CTA head/neck without large vessel occlusion. CXR with unchanged cardiomegaly, no acute infiltrates.  ED Management: IV CTX 1g x1    : HD 2- No acute events overnight. Pt completed course of ertapenem for UTI (last dose yest ). Seen by neurology team yesterday (), differential for altered mental status: obstruction, fungal, TB, Cryptococcus Meningitis, Sarcoid. NPH less likely as presentation is too acute and absence of incontinence or ataxia.   AM today: PT unable to perform therapy due to pt's non responsive/ non verbal status (even with ). Will re-attempt with daughter present.  PM today: Neurology recs: IR guided LP monday, Eliquis to heparin Saturday. Will re-attempt exam with Bairon .    : HD3- Bed bugs found. Patient now in isolated room. No acute events o/n.     Vital Signs Last 24 Hrs    T(C): 36.9 (2020 06:08), Max: 36.9 (2020 06:08)  T(F): 98.4 (2020 06:08), Max: 98.4 (2020 06:08)  HR: 98 (2020 06:54) (83 - 122)  BP: 132/90 (2020 06:08) (128/82 - 147/89)  BP(mean): --  RR: 20 (2020 06:08) (19 - 20)  SpO2: 94% (2020 06:08) (94% - 96%)    PAST MEDICAL & SURGICAL HISTORY:  H/O carotid stenosis  TIA (transient ischemic attack)  Chronic kidney disease (CKD)  Aortic stenosis  HLD (hyperlipidemia)  HTN (hypertension)  H/O carotid endarterectomy    Allergies: No Known Allergies    Home Medications:  acetaminophen 325 mg oral tablet: 2 tab(s) orally every 6 hours, As needed, Mild Pain (1 - 3) (10 Tanvir 2020 09:)  apixaban 5 mg oral tablet: 1 tab(s) orally every 12 hours (10 Tanvir 2020 09:)  aspirin 81 mg oral delayed release tablet: 1 tab(s) orally once a day (10 Tanvir 2020 09:)  atorvastatin 40 mg oral tablet: 1 tab(s) orally once a day (at bedtime) (10 Tanvir 2020 09:)  dilTIAZem 120 mg/24 hours oral capsule, extended release: 1 cap(s) orally once a day (10 Tanvir 2020 09:)  metoprolol tartrate 37.5 mg oral tablet: 1 tab(s) orally every 12 hours (10 Tanvir 2020 09:)  pantoprazole 40 mg oral delayed release tablet: 1 tab(s) orally once a day (before a meal) (10 Tanvir 2020 09:)  senna oral tablet: 2 tab(s) orally once a day (at bedtime) (10 Tanvir 2020 09:)  Travatan Z 0.004% ophthalmic solution: 1 drop(s) to each affected eye once a day (in the evening) (2020 12:31)    MEDICATIONS  (STANDING):  apixaban 5 milliGRAM(s) Oral every 12 hours  atorvastatin 40 milliGRAM(s) Oral at bedtime  diltiazem    milliGRAM(s) Oral daily  latanoprost 0.005% Ophthalmic Solution 1 Drop(s) Both EYES at bedtime  metoprolol tartrate 37.5 milliGRAM(s) Oral two times a day  pantoprazole    Tablet 40 milliGRAM(s) Oral before breakfast  senna 2 Tablet(s) Oral at bedtime      Physical Exam:   PHYSICAL EXAM:  GENERAL: NAD, resting comfortably in bed.  HEAD:  Atraumatic, Normocephalic  EYES: EOMI, PERRLA, conjunctiva and sclera clear  NECK: Supple, No JVD  CHEST/LUNG: Clear to auscultation bilaterally; No wheezes  HEART: Irregular rate and rhythm; No murmurs, rubs, or gallops  ABDOMEN: Soft, Nontender, Nondistended; Bowel sounds present  EXTREMITIES:  2+ Peripheral Pulses, No clubbing, cyanosis, or edema  PSYCH: AAOx1  ([place]ian  ID: [id number])  NEUROLOGY: Limited. Non Verbal  SKIN: No rashes or lesions    LABS:                        13.0   7.53  )-----------( 175      ( 2020 07:32 )             41.1     07-30    142  |  103  |  22  ----------------------------<  88  4.0   |  27  |  0.97    Ca    9.6      2020 07:32  Phos  4.0     07-30  Mg     2.0     07-30    TPro  6.1  /  Alb  3.6  /  TBili  0.4  /  DBili  x   /  AST  22  /  ALT  21  /  AlkPhos  54  07-28  PT/INR - ( 2020 18:19 )   PT: 18.2 sec;   INR: 1.55     PTT - ( 2020 18:19 )  PTT:30.1 sec  CARDIAC MARKERS ( 2020 18:19 )  x     / <0.01 ng/mL / x     / x     / x        Urinalysis Basic - ( 2020 18:46 )    Color: Yellow / Appearance: SL Cloudy / S.015 / pH: x  Gluc: x / Ketone: NEGATIVE  / Bili: Negative / Urobili: 0.2 E.U./dL   Blood: x / Protein: NEGATIVE mg/dL / Nitrite: NEGATIVE   Leuk Esterase: NEGATIVE / RBC: < 5 /HPF / WBC > 10 /HPF   Sq Epi: x / Non Sq Epi: 0-5 /HPF / Bacteria: Present /HPF    Miscellaneous :  1. Negative Treponemal Antibody   2. U/A: No significant findings. Trace of blood  3. Vitamin B12: 588 (nL)    4. Folate : 10.3 (nL)  4. Urine Culture: Final no growth ()    Imagining:   EXAM: XR CHEST PORTABLE URGENT 1V     PROCEDURE DATE: 2020   INTERPRETATION: Portable chest   HISTORY: Cough and shortness of breath   IMPRESSION:   No infiltrate or pleural effusion. Cardiomegaly and tortuosity thoracic aorta. No pneumothorax. No acute bone abnormality. Mild hypoinflation.   Thank you for the opportunity to participate in the care of this patient.   JOHN DOE M.D., ATTENDING RADIOLOGIST   This document has been electronically signed. 2020 7:54AM     EXAM: CT ANGIO NECK (W)AW IC   EXAM: CT ANGIO BRAIN (W)AW IC   PROCEDURE DATE: 2020     COMPARISON: CT head 2020.   FINDINGS: The CTA examination of the [place] demonstrates the internal carotid arteries to be normal in caliber. There is a normal bifurcation into the A1 and M1 segments. The visualized vertebral arteries are normal in caliber. The basilar artery is normal in caliber. There is a normal bifurcation into the posterior cerebral arteries. There are no areas of stenosis, dilatation or aneurysm.   IMPRESSION: No large vessel occlusion.     PROCEDURE: CTA neck with and without intravenous contrast.   INDICATION: Stroke code. Altered mental status.   TECHNIQUE: Multiple axial thin section were obtained through the neck following the intravenous bolus injection of contrast as above. MIP series are provided.   COMPARISON: None.   FINDINGS: The CTA examination demonstrates the aortic arch appears intact without narrowing of the origin of the great vessels.   The right common carotid artery to be normal in caliber. There is a normal bifurcation into the right internal and external carotid arteries. There is moderate calcified plaque at the carotid bifurcation with approximately 50% stenosis.   The left common carotid artery is normal in caliber. There is a normal bifurcation into the left internal and external carotid arteries. There is mild calcified plaque at the left carotid bifurcation without hemodynamically significant stenosis.   The right vertebral artery is dominant.   IMPRESSION: No large vessel occlusion.     MILTON PAREDES M.D., RADIOLOGY RESIDENT   This document has been electronically signed.   FACUNDO BOOTHE M.D. ATTENDING RADIOLOGIST   This document has been electronically signed. 2020 6:50PM     EXAM: CT ANGIO NECK (W)AW IC   EXAM: CT ANGIO BRAIN (W)AW IC   PROCEDURE DATE: 2020   INTERPRETATION: I, Facundo Boothe MD, have reviewed the images and the report and agree with the findings, with the following modifications:     CTA head: The internal carotid arteries at the skull base are patent. 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 The anterior cerebral artery A1 segments appear encased, at least partially, as well but without occlusion or significant stenosis. The left A1 is somewhat hypoplastic. Both middle cerebral arteries are widely patent and symmetric caliber at their first and second order branches. The posterior circulation appears within normal limits. The right vertebral artery is dominant. The basilar artery is widely patent, as are both posterior cerebral arteries.     CTA neck: Agree, there is no arterial occlusive disease. There is mixed calcified and noncalcified plaque at the origin of the left internal carotid artery, measuring 2.2 mm in minimal luminal diameter and distally measuring 3.9 mm. This is LESS THAN 50% stenosis per NASCET criteria. There is no visible plaque or stenosis of the left carotid. Both vertebral arteries appear patent at their origins and throughout the neck; the right side is dominant.     There is aneurysmal dilatation of the ascending thoracic aorta, 4.7 cm in diameter. The partially seen heart appears enlarged, particularly the left atrium. There is atelectasis and bronchial wall thickening of left basilar segments in the lower lobe of the left lung. There is mosaic attenuation in the upper lobes that likely indicates small airway disease. 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       PRELIMINARY REPORT:   PROCEDURE: CTA brain with intravenous contrast.   INDICATION: Stroke code. Altered mental status.   TECHNIQUE: Multiple axial thin section were obtained through the [place] following the intravenous bolus injection of 85 mL of Isovue-370. MIP series are provided   COMPARISON: CT head 2020.   FINDINGS: The CTA examination of the [place] demonstrates the internal carotid arteries to be normal in caliber. There is a normal bifurcation into the A1 and M1 segments. The visualized vertebral arteries are normal in caliber. The basilar artery is normal in caliber. There is a normal bifurcation into the posterior cerebral arteries. There are no areas of stenosis, dilatation or aneurysm.   IMPRESSION: No large vessel occlusion.       PROCEDURE: CTA neck with and without intravenous contrast.   INDICATION: Stroke code. Altered mental status.   TECHNIQUE: Multiple axial thin section were obtained through the neck following the intravenous bolus injection of contrast as above. MIP series are provided.   COMPARISON: None.   FINDINGS: The CTA examination demonstrates the aortic arch appears intact without narrowing of the origin of the great vessels.   The right common carotid artery to be normal in caliber. There is a normal bifurcation into the right internal and external carotid arteries. There is moderate calcified plaque at the carotid bifurcation with approximately 50% stenosis.     The left common carotid artery is normal in caliber. There is a normal bifurcation into the left internal and external carotid arteries. There is mild calcified plaque at the left carotid bifurcation without hemodynamically significant stenosis.     The right vertebral artery is dominant.     IMPRESSION: No large vessel occlusion.

## 2020-07-31 NOTE — PROGRESS NOTE ADULT - PROBLEM SELECTOR PLAN 3
Pt presented with 2 weeks of worsening AMS with recent UCx performed at [facility] showing ESBL (bacteria unclear) sensitive to ertapenem. Did not meet SIRS criteria on admission. Pt was started on ertapenem 1g qd on 7/22 (planned for course to be completed on 7/29)    - Complete course of ertapenem on 7/29  - Urine culture: Final No growth

## 2020-07-31 NOTE — PROGRESS NOTE ADULT - PROBLEM SELECTOR PLAN 1
Pt reported to be AAOx3, conversant and ambulatory prior to TAVR in May 2020. Recently sent to [facility] rehab after June admission for dizziness/weakness. Pt reported to be AAOx0 at [facility]. Multiple negative septic workup, until recent UCx growing ESBL bacteria sensitive to ertapenem (started on IV ertapenem 1g qd from 7/22-7/29). Stroke code called in ED for nonverbal state, CTH negative for acute pathologies, however showing increasing hydrocephalus. CTA head/neck without large vessel occlusion. DDx include 2/2 ESBL UTI vs. worsening hydrocephalus.  - Hold ASA for LP (IR to perform on Monday),   - Hold Eliquis Saturday for LP (IR to perform Monday)  - Appreciate Speech and Swallow Recs (Holding solids and advancing as tolerated)  - Appreciate PT (Will re-attempt therapy when daughter present)

## 2020-07-31 NOTE — PROGRESS NOTE ADULT - PROBLEM SELECTOR PLAN 3
Pt presented with 2 weeks of worsening AMS with recent UCx performed at [facility] showing ESBL (bacteria unclear) sensitive to ertapenem. Did not meet SIRS criteria on admission. Pt was started on ertapenem 1g qd on 7/22 (planned for course to be completed on 7/29)    - Complete course of ertapenem on 7/29  - Urine culture: Final No growth

## 2020-07-31 NOTE — PROGRESS NOTE ADULT - ASSESSMENT
per Internal Medicine    74 y/o F HD2 with a PMHx of Afib (on Eliquis), HFpEF w/ severe aortic stenosis s/p TAVR (May 2020), HTN, CAD s/p STEMI (on ASA), CKD, TIA s/p L CEA, recent ESBL UTI, presented from [facility] rehab due to 2 weeks of worsening mental status. She was found to have increased ventriculomegaly suggestive of hydrocephalus upon presentation in the ED. Patient is currently non verbal. Mildly responsible to verbal stimuli.     Problem/Plan - 1:  ·  Problem: Altered mental status, unspecified altered mental status type.  Plan: Pt reported to be AAOx3, conversant and ambulatory prior to TAVR in May 2020. Recently sent to [facility] rehab after June admission for dizziness/weakness. Pt reported to be AAOx0 at [facility]. Multiple negative septic workup, until recent UCx growing ESBL bacteria sensitive to ertapenem (started on IV ertapenem 1g qd from 7/22-7/29). Stroke code called in ED for nonverbal state, CTH negative for acute pathologies, however showing increasing hydrocephalus. CTA head/neck without large vessel occlusion. DDx include 2/2 ESBL UTI vs. worsening hydrocephalus.  - Hold ASA for LP (IR to perform on Monday),   - Hold Eliquis Saturday for LP (IR to perform Monday)  - Appreciate Speech and Swallow Recs (Holding solids and advancing as tolerated)  - Appreciate PT (Will re-attempt therapy when daughter present).     Problem/Plan - 2:  ·  Problem: Hydrocephalus.  Plan: Pt presented with 2 weeks of worsening AMS. CTH noncontrast in ED showing increased diffuse ventriculomegaly compared to May 2020.  - Per neurology recs: MRI Head with contrast   - LP Monday (Continue to hold ASA, Hold Eliquis Saturday).     Problem/Plan - 3:  ·  Problem: UTI (urinary tract infection).  Plan: Pt presented with 2 weeks of worsening AMS with recent UCx performed at [facility] showing ESBL (bacteria unclear) sensitive to ertapenem. Did not meet SIRS criteria on admission. Pt was started on ertapenem 1g qd on 7/22 (planned for course to be completed on 7/29)    - Complete course of ertapenem on 7/29  - Urine culture: Final No growth.     Problem/Plan - 4:  ·  Problem: Afib.  Plan: Hx of afib on home Eliquis 5mg BID, diltiazem 120 qd, lopressor 37.5mg BID  - per stroke neurology, Eliquis. Hold on Saturday for LP Monday.   - c/w Eliquis, diltiazem, and lopressor here.     Problem/Plan - 5:  ·  Problem: CAD (coronary artery disease).  Plan: Hx of CAD s/p STEMI. On home ASA 81mg qd  - per stroke neurology, OK to restart ASA.  - Hold ASA for LP on Monday.  - F/U on repeat ECG. (AM).     Problem/Plan - 6:  Problem: HTN (hypertension). Plan: Hx of HTN on lopressor 37.5mg BID  - sBP 130s here HR tachycardic 90s-110s  - c/w lopressor here.    Problem/Plan - 7:  ·  Problem: TIA (transient ischemic attack).  Plan: Hx of TIA s/p L CEA. CTH noncontrast here without acute bleeds or strokes. CTA head/neck without large vessel occlusion  - Hold ASA until LP completed Monday.     Problem/Plan - 8:  ·  Problem: Chronic kidney disease (CKD).  Plan: Hx of CKD (baseline 1.13 from June 2020). Cr 1.28 on admission this time. Today: .91.   - Encourage PO fluid intake  - continue to monitor with BMP.     Problem/Plan - 9:  ·  Problem: Aortic stenosis.  Plan: Hx of aortic stenosis s/p TAVR in May 2020. Lung exam CTAB.    #HFpEF   Echo 5/2020: EF 64%, +pHTN (PASP 64), mod-severe mtiral regurg, LVH, mildly dilated LA. On home lopressor 37.5mg BID. Lungs CTAB on exam. CXR with cardiomegaly consistent with prior CXR in June, not fluid overloaded at this moment. Satting well on RA.  - c/w lopressor.     Problem/Plan - 10:  Problem: Nutrition, metabolism, and development symptoms. Plan; Plan:   F: -  E: replete K<4, Mg<2  N: Diet per Speech and Swallow  VTE Prophylaxis: Eliquis, SCDs  C: Full Code  D: RMF.

## 2020-07-31 NOTE — PROGRESS NOTE ADULT - ASSESSMENT
76 y/o woman with a PMHx of Afib (on Eliquis), HFpEF w/ severe aortic stenosis s/p TAVR (May 2020), HTN, CAD s/p STEMI (on ASA), CKD, TIA s/p L CEA, recent ESBL UTI, presented from [facility] rehab due to 2 weeks of worsening mental status. Neurology consulted for AMS and increasing ventriculomegaly.     #Encephalopathy  Upon admission, stroke code called, CTH negative for hemorrhage but showed increasing ventriculomegaly from previous CT in May. Patient currently verbal, but not following commands. DDX for ventriculomegaly includes obstruction, carcinomatosis meningitis, fungal/TB/Crypto meningitis, sarcoid; less likely NPH as such a rapid increase in ventriculomegaly would not be expected in NPH, and patient not presenting with other clinical symptoms (incontinence or gait disturbance)\  - Location of brain lesion seen on imaging unlikely to cause ventriculomegaly  - Recommend MRI with contrast to r/o obstructive cause for hydrocephalus  - Recommend IR guided LP on Monday - check opening pressure, CSF protein, glucose, cell count, PCR panel, fungal stain and culture, AFB stain and culture, cryptococcal antigen, ACE level and VDRL   - hold eliquis starting saturday morning (transition to heparin gtt) and NPO sunday night at midnight  - Rest of care per primary team    Case discussed with Consult Neurology Attending, Dr. Roach

## 2020-07-31 NOTE — PROGRESS NOTE ADULT - SUBJECTIVE AND OBJECTIVE BOX
Physical Medicine and Rehabilitation Progress Note:    Patient is a 75y old  Female who presents with a chief complaint of AMS, UTI (31 Jul 2020 09:31)      HPI:  74 y/o woman with a PMHx of Afib (on Eliquis), HFpEF w/ severe aortic stenosis s/p TAVR (May 2020), HTN, CAD s/p STEMI (on ASA), CKD, TIA s/p L CEA, presented from [facility] rehab for 2 weeks of worsening AMS. Per Dr. Zhong from [facility], pt arrived at [facility] rehab about 3 weeks ago after recent admission for dizziness and weakness last month (6/2020). At [facility] rehab, patient was AAOx1, but was ambulatory thoughout her 3 weeks there. She was noted to be not eating as much recently and had multiple negative sepsis workup. Her latest UCx however did grow ESBL sensitive to ertapenem (started IV 1g qd on 7/22, to end on 7/29). She was reported to be AAOx3, conversant and ambulatory. On arrival at [facility] ED, stroke code was called given patient was nonverbal. No acute stroke or bleeds on CTH, only notable for increasing hydrocephalus from May. At bedside, patient was AAOx2 (person and place), but not following much commands or conversant when using a Martiniquais phone . Patient denied having any pain, but was unable to further answer other ROS questions at bedside.    ED Vitals: T 98.2F, /90, , RR 20, SpO2 94% RA  ED Labs: WBC 8.82, Hb 12.4, INR 1.55, Cr 1.28 (baseline 1.13 from June 2020). Trop neg x1.  ED Imaging: CTH noncontrast: No acute hemorrhage or infarct, +Diffuse ventriculomegaly increased from May 2020. CTA head/neck without large vessel occlusion. CXR with unchanged cardiomegaly, no acute infiltrates.  ED Management: IV CTX 1g x1 (28 Jul 2020 21:40)                            12.9   7.79  )-----------( 172      ( 31 Jul 2020 07:34 )             40.9       07-31    144  |  106  |  24<H>  ----------------------------<  101<H>  4.1   |  28  |  1.09    Ca    9.6      31 Jul 2020 07:34  Phos  3.7     07-31  Mg     2.0     07-31      Vital Signs Last 24 Hrs  T(C): 36.9 (31 Jul 2020 06:08), Max: 36.9 (31 Jul 2020 06:08)  T(F): 98.4 (31 Jul 2020 06:08), Max: 98.4 (31 Jul 2020 06:08)  HR: 98 (31 Jul 2020 06:54) (92 - 122)  BP: 132/90 (31 Jul 2020 06:08) (128/82 - 140/84)  BP(mean): --  RR: 20 (31 Jul 2020 06:08) (19 - 20)  SpO2: 94% (31 Jul 2020 06:08) (94% - 96%)    MEDICATIONS  (STANDING):  apixaban 5 milliGRAM(s) Oral every 12 hours  atorvastatin 40 milliGRAM(s) Oral at bedtime  diltiazem    milliGRAM(s) Oral daily  latanoprost 0.005% Ophthalmic Solution 1 Drop(s) Both EYES at bedtime  metoprolol tartrate 37.5 milliGRAM(s) Oral two times a day  pantoprazole    Tablet 40 milliGRAM(s) Oral before breakfast  senna 2 Tablet(s) Oral at bedtime    MEDICATIONS  (PRN):    Currently Undergoing Physical/ Occupational Therapy at bedside.    Functional Status Assessment:   7/30/2020    Previous Level of Function:     · Additional Comments	Pt presents from [facility] 2/2 AMS (has been there 3 weeks, daughter reports pt was able to ambulate with walker prior to this but has not been ambulating at rehab recently). Pt previously used RW when community ambulating and SC inside home. She has ~6 steps to enter one entrance and none at another.	    Cognitive Status Examination:   · Orientation	person	  · Level of Consciousness	alert; confused; lethargic/somnolent	  · Follows Commands and Answers Questions	50% of the time; 75% of the time; able to follow single-step instructions; able to squeeze fingers, wiggle toes on command	  · Personal Safety and Judgment	impaired	  · Short Term Memory	impaired	  · Long Term Memory	impaired	    Range of Motion Exam:   · Range of Motion Examination	left neck side flexion contracture with pain when attempting to correct	    Manual Muscle Testing:   · Manual Muscle Testing Results	unable to assess MMTs 2/2 pt confused, unable to dangle fully at edge of bed; however, b/l  strength 4/5, b/l elbow flex/ext ~4/5 when asked to push/pull; right DF/PF 4/5, left DF/PF 3/5	    Bed Mobility: Rolling/Turning:     · Level of [place]	dependent (less than 25% patients effort)	  · Physical Assist/Nonphysical Assist	verbal cues; nonverbal cues (demo/gestures); 2 person assist	    Bed Mobility: Scooting/Bridging:     · Level of [place]	dependent (less than 25% patients effort)	  · Physical Assist/Nonphysical Assist	verbal cues; nonverbal cues (demo/gestures); 2 person assist	    Bed Mobility: Sit to Supine:     · Level of [place]	dependent (less than 25% patients effort)	  · Physical Assist/Nonphysical Assist	verbal cues; nonverbal cues (demo/gestures); 2 person assist	    Bed Mobility: Supine to Sit:     · Level of [place]	dependent (less than 25% patients effort)	  · Physical Assist/Nonphysical Assist	verbal cues; nonverbal cues (demo/gestures); 2 person assist	    Bed Mobility Analysis:     · Bed Mobility Limitations	decreased ability to use arms for pushing/pulling; decreased ability to use legs for bridging/pushing; impaired ability to control trunk for mobility	  · Impairments Contributing to Impaired Bed Mobility	impaired balance; cognition; impaired coordination; pain; impaired postural control; decreased strength; decreased ROM	    Transfer: Sit to Stand:     · Level of [place]	unable to perform	    Balance Skills Assessment:     · Sitting Balance: Static	poor balance	  · Sitting Balance: Dynamic	poor balance	  · Identified Impairments Contributing to Balance Disturbance	pain; decreased strength; decreased ROM	    Sensory Examination:   Sensory Examination:    Grossly Intact:   · Gross Sensory Examination	Grossly Intact; pt able to nod yes when asked if she can feel sensation in b/l LEs and if it feels the same b/l	      Clinical Impressions:   · Criteria for Skilled Therapeutic Interventions	impairments found; predicted duration of therapy intervention; functional limitations in following categories; anticipated equipment needs at discharge; anticipated discharge recommendation; risk reduction/prevention; rehab potential; therapy frequency	  · Impairments Found (describe specific impairments)	aerobic capacity/endurance; gross motor; poor safety awareness; gait, locomotion, and balance; muscle strength; ROM; posture	  · Functional Limitations in Following Categories (describe specific limitations)	self-care; home management; community/leisure	  · Risk Reduction/Prevention (Describe Specific Areas of risk reduction/prevention)	risk factors	  · Risk Areas	fall; impaired judgment; safety awareness	  · Rehab Potential	fair, will monitor progress closely	  · Therapy Frequency	2-3x/week	        PM&R Impression: as above    Current Disposition Plan Recommendations:    return to [facility]/subacute rehab

## 2020-07-31 NOTE — PROGRESS NOTE ADULT - PROBLEM SELECTOR PLAN 1
Pt reported to be AAOx3, conversant and ambulatory prior to TAVR in May 2020. Recently sent to [facility] rehab after June admission for dizziness/weakness. Pt reported to be AAOx0 at [facility]. Multiple negative septic workup, until recent UCx growing ESBL bacteria sensitive to ertapenem (started on IV ertapenem 1g qd from 7/22-7/29). Stroke code called in ED for nonverbal state, CTH negative for acute pathologies, however showing increasing hydrocephalus. CTA head/neck without large vessel occlusion. DDx include 2/2 ESBL UTI vs. worsening hydrocephalus.  - Hold ASA for LP (IR to perform on Monday),   - Hold Eliquis Saturday for LP (IR to perform Monday)  - Appreciate Speech and Swallow Recs (Holding solids and advancing as tolerated)  - Appreciate PT (Will re-attempt therapy when daughter present)

## 2020-07-31 NOTE — PROGRESS NOTE ADULT - ASSESSMENT
74 y/o F HD2 with a PMHx of Afib (on Eliquis), HFpEF w/ severe aortic stenosis s/p TAVR (May 2020), HTN, CAD s/p STEMI (on ASA), CKD, TIA s/p L CEA, recent ESBL UTI, presented from [facility] rehab due to 2 weeks of worsening mental status. She was found to have increased ventriculomegaly suggestive of hydrocephalus upon presentation in the ED. Patient is currently non verbal. Mildly responsible to verbal stimuli.

## 2020-08-01 NOTE — PROGRESS NOTE ADULT - ASSESSMENT
74 y/o F HD2 with a PMHx of Afib (on Eliquis), HFpEF w/ severe aortic stenosis s/p TAVR (May 2020), HTN, CAD s/p STEMI (on ASA), CKD, TIA s/p L CEA, recent ESBL UTI, presented from [facility] rehab due to 2 weeks of worsening mental status. She was found to have increased ventriculomegaly suggestive of hydrocephalus upon presentation in the ED. Patient is currently non verbal. Mildly responsible to verbal stimuli.

## 2020-08-01 NOTE — PROGRESS NOTE ADULT - SUBJECTIVE AND OBJECTIVE BOX
HPI: comfortable  Vital Signs Last 24 Hrs  T(C): 37 (01 Aug 2020 06:21), Max: 37 (01 Aug 2020 06:21)  T(F): 98.6 (01 Aug 2020 06:21), Max: 98.6 (01 Aug 2020 06:21)  HR: 98 (01 Aug 2020 06:21) (86 - 98)  BP: 127/81 (01 Aug 2020 06:21) (127/81 - 134/84)  BP(mean): --  RR: 16 (01 Aug 2020 06:21) (16 - 18)  SpO2: 96% (01 Aug 2020 06:21) (94% - 96%)  PAST MEDICAL & SURGICAL HISTORY:  H/O carotid stenosis  TIA (transient ischemic attack)  Chronic kidney disease (CKD)  Aortic stenosis  HLD (hyperlipidemia)  HTN (hypertension)  H/O carotid endarterectomy    Allergies: No Known Allergies    Home Medications:  acetaminophen 325 mg oral tablet: 2 tab(s) orally every 6 hours, As needed, Mild Pain (1 - 3) (10 Tanvir 2020 09:)  apixaban 5 mg oral tablet: 1 tab(s) orally every 12 hours (10 Tanvir 2020 09:19)  aspirin 81 mg oral delayed release tablet: 1 tab(s) orally once a day (10 Tanvir 2020 09:)  atorvastatin 40 mg oral tablet: 1 tab(s) orally once a day (at bedtime) (10 Tanvir 2020 09:19)  dilTIAZem 120 mg/24 hours oral capsule, extended release: 1 cap(s) orally once a day (10 Tanvir 2020 09:19)  metoprolol tartrate 37.5 mg oral tablet: 1 tab(s) orally every 12 hours (10 Tanvir 2020 09:19)  pantoprazole 40 mg oral delayed release tablet: 1 tab(s) orally once a day (before a meal) (10 Tanvir 2020 09:)  senna oral tablet: 2 tab(s) orally once a day (at bedtime) (10 Tanvir 2020 09:19)  Travatan Z 0.004% ophthalmic solution: 1 drop(s) to each affected eye once a day (in the evening) (2020 12:31)    MEDICATIONS  (STANDING):  apixaban 5 milliGRAM(s) Oral every 12 hours  atorvastatin 40 milliGRAM(s) Oral at bedtime  diltiazem    milliGRAM(s) Oral daily  latanoprost 0.005% Ophthalmic Solution 1 Drop(s) Both EYES at bedtime  metoprolol tartrate 37.5 milliGRAM(s) Oral two times a day  pantoprazole    Tablet 40 milliGRAM(s) Oral before breakfast  senna 2 Tablet(s) Oral at bedtime      Physical Exam:   PHYSICAL EXAM:  GENERAL: NAD, resting comfortably in bed.  HEAD:  Atraumatic, Normocephalic  EYES: EOMI, PERRLA, conjunctiva and sclera clear  NECK: Supple, No JVD  CHEST/LUNG: Clear to auscultation bilaterally; No wheezes  HEART: Irregular rate and rhythm; No murmurs, rubs, or gallops  ABDOMEN: Soft, Nontender, Nondistended; Bowel sounds present  EXTREMITIES:  2+ Peripheral Pulses, No clubbing, cyanosis, or edema  PSYCH: AAOx1  ([place]  ID: [id number])  NEUROLOGY: Limited. Non Verbal  SKIN: No rashes or lesions    LABS:                           12.7   7.52  )-----------( 171      ( 01 Aug 2020 08:28 )             40.7                      13.0   7.53  )-----------( 175      ( 2020 07:32 )             41.1   07-31    144  |  106  |  24<H>  ----------------------------<  101<H>  4.1   |  28  |  1.09    Ca    9.6      2020 07:34  Phos  3.7     07-31  Mg     2.0     07-31      07-30    142  |  103  |  22  ----------------------------<  88  4.0   |  27  |  0.97    Ca    9.6      2020 07:32  Phos  4.0     07-30  Mg     2.0     07-30    TPro  6.1  /  Alb  3.6  /  TBili  0.4  /  DBili  x   /  AST  22  /  ALT  21  /  AlkPhos  54  07-28  PT/INR - ( 2020 18:19 )   PT: 18.2 sec;   INR: 1.55     PTT - ( 2020 18:19 )  PTT:30.1 sec  CARDIAC MARKERS ( 2020 18:19 )  x     / <0.01 ng/mL / x     / x     / x        Urinalysis Basic - ( 2020 18:46 )    Color: Yellow / Appearance: SL Cloudy / S.015 / pH: x  Gluc: x / Ketone: NEGATIVE  / Bili: Negative / Urobili: 0.2 E.U./dL   Blood: x / Protein: NEGATIVE mg/dL / Nitrite: NEGATIVE   Leuk Esterase: NEGATIVE / RBC: < 5 /HPF / WBC > 10 /HPF   Sq Epi: x / Non Sq Epi: 0-5 /HPF / Bacteria: Present /HPF          Imagining:     PROCEDURE DATE: 2020   INTERPRETATION: Portable chest   HISTORY: Cough and shortness of breath   IMPRESSION:   No infiltrate or pleural effusion. Cardiomegaly and tortuosity thoracic aorta. No pneumothorax. No acute bone abnormality. Mild hypoinflation.   Thank you for the opportunity to participate in the care of this patient.   JOHN DOE M.D., ATTENDING RADIOLOGIST   This document has been electronically signed. 2020 7:54AM         COMPARISON: CT head 2020.   FINDINGS: The CTA examination of the [facility] demonstrates the internal carotid arteries to be normal in caliber. There is a normal bifurcation into the A1 and M1 segments. The visualized vertebral arteries are normal in caliber. The basilar artery is normal in caliber. There is a normal bifurcation into the posterior cerebral arteries. There are no areas of stenosis, dilatation or aneurysm.   IMPRESSION: No large vessel occlusion.     PROCEDURE: CTA neck with and without intravenous contrast.   INDICATION: Stroke code. Altered mental status.   TECHNIQUE: Multiple axial thin section were obtained through the neck following the intravenous bolus injection of contrast as above. MIP series are provided.   COMPARISON: None.   FINDINGS: The CTA examination demonstrates the aortic arch appears intact without narrowing of the origin of the great vessels.   The right common carotid artery to be normal in caliber. There is a normal bifurcation into the right internal and external carotid arteries. There is moderate calcified plaque at the carotid bifurcation with approximately 50% stenosis.   The left common carotid artery is normal in caliber. There is a normal bifurcation into the left internal and external carotid arteries. There is mild calcified plaque at the left carotid bifurcation without hemodynamically significant stenosis.   The right vertebral artery is dominant.   IMPRESSION: No large vessel occlusion.     MILTON PAREDES M.D., RADIOLOGY RESIDENT   This document has been electronically signed.   FACUNDO BOOTHE M.D. ATTENDING RADIOLOGIST   This document has been electronically signed. 2020 6:50PM     EXAM: CT ANGIO NECK (W)AW IC   EXAM: CT ANGIO BRAIN (W)AW IC   PROCEDURE DATE: 2020   INTERPRETATION: I, Facundo Boothe MD, have reviewed the images and the report and agree with the findings, with the following modifications:     CTA head: The internal carotid arteries at the skull base are patent. 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 The anterior cerebral artery A1 segments appear encased, at least partially, as well but without occlusion or significant stenosis. The left A1 is somewhat hypoplastic. Both middle cerebral arteries are widely patent and symmetric caliber at their first and second order branches. The posterior circulation appears within normal limits. The right vertebral artery is dominant. The basilar artery is widely patent, as are both posterior cerebral arteries.     CTA neck: Agree, there is no arterial occlusive disease. There is mixed calcified and noncalcified plaque at the origin of the left internal carotid artery, measuring 2.2 mm in minimal luminal diameter and distally measuring 3.9 mm. This is LESS THAN 50% stenosis per NASCET criteria. There is no visible plaque or stenosis of the left carotid. Both vertebral arteries appear patent at their origins and throughout the neck; the right side is dominant.     There is aneurysmal dilatation of the ascending thoracic aorta, 4.7 cm in diameter. The partially seen heart appears enlarged, particularly the left atrium. There is atelectasis and bronchial wall thickening of left basilar segments in the lower lobe of the left lung. There is mosaic attenuation in the upper lobes that likely indicates small airway disease. 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       PRELIMINARY REPORT:   PROCEDURE: CTA brain with intravenous contrast.   INDICATION: Stroke code. Altered mental status.   TECHNIQUE: Multiple axial thin section were obtained through the [facility] following the intravenous bolus injection of 85 mL of Isovue-370. MIP series are provided   COMPARISON: CT head 2020.   FINDINGS: The CTA examination of the [facility] demonstrates the internal carotid arteries to be normal in caliber. There is a normal bifurcation into the A1 and M1 segments. The visualized vertebral arteries are normal in caliber. The basilar artery is normal in caliber. There is a normal bifurcation into the posterior cerebral arteries. There are no areas of stenosis, dilatation or aneurysm.   IMPRESSION: No large vessel occlusion.       PROCEDURE: CTA neck with and without intravenous contrast.   INDICATION: Stroke code. Altered mental status.   TECHNIQUE: Multiple axial thin section were obtained through the neck following the intravenous bolus injection of contrast as above. MIP series are provided.   COMPARISON: None.   FINDINGS: The CTA examination demonstrates the aortic arch appears intact without narrowing of the origin of the great vessels.   The right common carotid artery to be normal in caliber. There is a normal bifurcation into the right internal and external carotid arteries. There is moderate calcified plaque at the carotid bifurcation with approximately 50% stenosis.     The left common carotid artery is normal in caliber. There is a normal bifurcation into the left internal and external carotid arteries. There is mild calcified plaque at the left carotid bifurcation without hemodynamically significant stenosis.     The right vertebral artery is dominant.     IMPRESSION: No large vessel occlusion.

## 2020-08-01 NOTE — PROGRESS NOTE ADULT - PROBLEM SELECTOR PLAN 1
Pt reported to be AAOx3, conversant and ambulatory prior to TAVR in May 2020. Recently sent to [facility] rehab after June admission for dizziness/weakness. Pt reported to be AAOx0 at [facility]. Multiple negative septic workup, until recent UCx growing ESBL bacteria sensitive to ertapenem (started on IV ertapenem 1g qd from 7/22-7/29). Stroke code called in ED for nonverbal state, CTH negative for acute pathologies, however showing increasing hydrocephalus. CTA head/neck without large vessel occlusion. DDx include 2/2 ESBL UTI vs. worsening hydrocephalus.  - Hold ASA for LP (IR to perform on Monday),   - Hold Eliquis Saturday for LP (IR to perform Monday)  - Appreciate Speech and Swallow Recs (Holding solids and advancing as tolerated)  - Appreciate PT (Will re-attempt therapy when daughter present)

## 2020-08-01 NOTE — PROGRESS NOTE ADULT - PROBLEM SELECTOR PLAN 3
Pt presented with 2 weeks of worsening AMS with recent UCx performed at [facility] showing ESBL (bacteria unclear) sensitive to ertapenem. Did not meet SIRS criteria on admission. Pt was started on ertapenem 1g qd on 7/22 (planned for course to be completed on 7/29)    - Complete course of ertapenem on 7/29  - Urine culture: Final No growth

## 2020-08-02 NOTE — PROGRESS NOTE ADULT - PROBLEM SELECTOR PLAN 5
Hx of CAD s/p STEMI. On home ASA 81mg qd  - per stroke neurology, OK to restart ASA.  - Hold ASA for LP on Monday.

## 2020-08-02 NOTE — PROGRESS NOTE ADULT - SUBJECTIVE AND OBJECTIVE BOX
Patient: GRACE KEY   Age: 75y   Gender:Female    OVERNIGHT EVENTS: NAEO    SUBJECTIVE / INTERVAL HPI: alert, awake on entry, on heparin drip.     VITAL SIGNS:  Vital Signs Last 24 Hrs  T(C): 37.1 (02 Aug 2020 09:34), Max: 37.1 (02 Aug 2020 09:34)  T(F): 98.7 (02 Aug 2020 09:34), Max: 98.7 (02 Aug 2020 09:34)  HR: 98 (02 Aug 2020 09:34) (91 - 103)  BP: 127/64 (02 Aug 2020 09:34) (127/64 - 145/101)  BP(mean): --  RR: 19 (02 Aug 2020 09:34) (16 - 19)  SpO2: 96% (02 Aug 2020 09:34) (93% - 96%)    08-01-20 @ 07:01  -  08-02-20 @ 07:00  --------------------------------------------------------  IN: 140 mL / OUT: 0 mL / NET: 140 mL    08-02-20 @ 07:01  -  08-02-20 @ 11:46  --------------------------------------------------------  IN: 16 mL / OUT: 0 mL / NET: 16 mL          PHYSICAL EXAM:  GENERAL: NAD, resting comfortably in bed.  HEAD:  Atraumatic, Normocephalic  EYES: EOMI, PERRLA, conjunctiva and sclera clear  NECK: Supple, No JVD  CHEST/LUNG: Clear to auscultation bilaterally; No wheezes  HEART: Irregular rate and rhythm; No murmurs, rubs, or gallops  ABDOMEN: Soft, Nontender, Nondistended; Bowel sounds present  EXTREMITIES:  2+ Peripheral Pulses, No clubbing, cyanosis, or edema  PSYCH: AAOx1, moves all four ext   NEUROLOGY: Limited. Non Verbal  SKIN: No rashes or lesions    MEDICATIONS:  MEDICATIONS  (STANDING):  atorvastatin 40 milliGRAM(s) Oral at bedtime  diltiazem    milliGRAM(s) Oral daily  latanoprost 0.005% Ophthalmic Solution 1 Drop(s) Both EYES at bedtime  metoprolol tartrate 37.5 milliGRAM(s) Oral two times a day  pantoprazole    Tablet 40 milliGRAM(s) Oral before breakfast  senna 2 Tablet(s) Oral at bedtime    MEDICATIONS  (PRN):      ALLERGIES:  Allergies    No Known Allergies    Intolerances        LABS:                        12.2   7.76  )-----------( 179      ( 02 Aug 2020 08:58 )             39.1     08-02    146<H>  |  110<H>  |  23  ----------------------------<  107<H>  3.6   |  24  |  1.00    Ca    9.4      02 Aug 2020 08:58  Phos  3.7     08-02  Mg     1.9     08-02      PT/INR - ( 01 Aug 2020 17:20 )   PT: 18.8 sec;   INR: 1.60          PTT - ( 02 Aug 2020 10:34 )  PTT:>200.0 sec    CAPILLARY BLOOD GLUCOSE          RADIOLOGY & ADDITIONAL TESTS: Reviewed.

## 2020-08-02 NOTE — PROGRESS NOTE ADULT - ASSESSMENT
74 y/o F HD2 with a PMHx of Afib (on Eliquis), HFpEF w/ severe aortic stenosis s/p TAVR (May 2020), HTN, CAD s/p STEMI (on ASA), CKD, TIA s/p L CEA, recent ESBL UTI, presented from [facility] rehab due to 2 weeks of worsening mental status. She was found to have increased ventriculomegaly suggestive of hydrocephalus upon presentation in the ED. Patient is currently non verbal. Mildly responsible to verbal stimuli.

## 2020-08-02 NOTE — PROGRESS NOTE ADULT - ASSESSMENT
76 y/o woman with a PMHx of Afib (on Eliquis), HFpEF w/ severe aortic stenosis s/p TAVR (May 2020), HTN, CAD s/p STEMI (on ASA), CKD, TIA s/p L CEA, recent ESBL UTI, presented from [facility] rehab due to 2 weeks of worsening mental status. Neurology consulted for AMS and increasing ventriculomegaly.     #Encephalopathy  Upon admission, stroke code called, CTH negative for hemorrhage but showed increasing ventriculomegaly from previous CT in May. Patient currently verbal, but not following commands. DDX for ventriculomegaly includes obstruction, carcinomatosis meningitis, fungal/TB/Crypto meningitis, sarcoid; less likely NPH as such a rapid increase in ventriculomegaly would not be expected in NPH, and patient not presenting with other clinical symptoms (incontinence or gait disturbance)\  - Location of brain lesion seen on imaging unlikely to cause ventriculomegaly  - Reviewed MRI brain with neuro-radiology. No evidence of obstruction of the foramen of monro. Mass does not appear to be cause of hydrocephalus.  - Recommend Neurosurgery eval of brain lesion and assessment regarding safety of LP  - Plan for IR guided LP on Monday - check opening pressure, CSF protein, glucose, cell count, PCR panel, fungal stain and culture, AFB stain and culture, cryptococcal antigen, ACE level and VDRL   - on Heparin drip, NPO sunday night at midnight

## 2020-08-02 NOTE — PROGRESS NOTE ADULT - PROBLEM SELECTOR PLAN 3
Pt presented with 2 weeks of worsening AMS with recent UCx performed at [facility] showing ESBL (bacteria unclear) sensitive to ertapenem. Did not meet SIRS criteria on admission. Pt was started on ertapenem 1g qd on 7/22 (planned for course to be completed on 7/29)    - Complete course of ertapenem on 7/29  - Urine culture: Final No growth

## 2020-08-02 NOTE — PROGRESS NOTE ADULT - SUBJECTIVE AND OBJECTIVE BOX
Subjective  No events overnight.     ROS  As above, otherwise negative for constitutional/HEENT/CV/pulm/GI//MSK/neuro/derm/endocrine/psych.     MEDICATIONS  (STANDING):  atorvastatin 40 milliGRAM(s) Oral at bedtime  diltiazem    milliGRAM(s) Oral daily  heparin  Infusion 1200 Unit(s)/Hr (12 mL/Hr) IV Continuous <Continuous>  latanoprost 0.005% Ophthalmic Solution 1 Drop(s) Both EYES at bedtime  metoprolol tartrate 37.5 milliGRAM(s) Oral two times a day  pantoprazole    Tablet 40 milliGRAM(s) Oral before breakfast  senna 2 Tablet(s) Oral at bedtime    MEDICATIONS  (PRN):      T(C): 37.1 (08-02-20 @ 09:34), Max: 37.1 (08-02-20 @ 09:34)  HR: 98 (08-02-20 @ 09:34) (91 - 103)  BP: 127/64 (08-02-20 @ 09:34) (127/64 - 145/101)  RR: 19 (08-02-20 @ 09:34) (16 - 19)  SpO2: 96% (08-02-20 @ 09:34) (93% - 96%)  Wt(kg): --    Eyes open spontaneously. attentive to examiner   EOMI. Visual fields full.  Face symmetrical.  moving all extremities    CBC Full  -  ( 02 Aug 2020 08:58 )  WBC Count : 7.76 K/uL  RBC Count : 4.09 M/uL  Hemoglobin : 12.2 g/dL  Hematocrit : 39.1 %  Platelet Count - Automated : 179 K/uL  Mean Cell Volume : 95.6 fl  Mean Cell Hemoglobin : 29.8 pg  Mean Cell Hemoglobin Concentration : 31.2 gm/dL  Auto Neutrophil # : x  Auto Lymphocyte # : x  Auto Monocyte # : x  Auto Eosinophil # : x  Auto Basophil # : x  Auto Neutrophil % : x  Auto Lymphocyte % : x  Auto Monocyte % : x  Auto Eosinophil % : x  Auto Basophil % : x    08-02    146<H>  |  110<H>  |  23  ----------------------------<  107<H>  3.6   |  24  |  1.00    Ca    9.4      02 Aug 2020 08:58  Phos  3.7     08-02  Mg     1.9     08-02        PT/INR - ( 01 Aug 2020 17:20 )   PT: 18.8 sec;   INR: 1.60          PTT - ( 02 Aug 2020 12:59 )  PTT:69.2 sec

## 2020-08-02 NOTE — PROGRESS NOTE ADULT - PROBLEM SELECTOR PLAN 10
Plan:   F: - none  E: replete K<4, Mg<2  N: Diet per Speech and Swallow  VTE Prophylaxis: Eliquis, SCDs  C: Full Code  D: RMF

## 2020-08-02 NOTE — PROGRESS NOTE ADULT - PROBLEM SELECTOR PLAN 3
Pt presented with 2 weeks of worsening AMS with recent UCx performed at [facility] showing ESBL (bacteria unclear) sensitive to ertapenem. Did not meet SIRS criteria on admission. Pt was started on ertapenem 1g qd on 7/22 (planned for course to be completed on 7/29)    - Complete course of ertapenem on 7/29  - Urine culture: Final No growth

## 2020-08-02 NOTE — PROGRESS NOTE ADULT - PROBLEM SELECTOR PLAN 1
Pt reported to be AAOx3, conversant and ambulatory prior to TAVR in May 2020. Recently sent to [facility] rehab after June admission for dizziness/weakness. Pt reported to be AAOx0 at [facility]. Multiple negative septic workup, until recent UCx growing ESBL bacteria sensitive to ertapenem (started on IV ertapenem 1g qd from 7/22-7/29). Stroke code called in ED for nonverbal state, CTH negative for acute pathologies, however showing increasing hydrocephalus. CTA head/neck without large vessel occlusion. DDx include 2/2 ESBL UTI vs. worsening hydrocephalus.  - Hold ASA for LP (IR to perform on Monday),   - Hold Eliquis Saturday for LP (IR to perform Monday)  - Appreciate Speech and Swallow Recs (Holding solids and advancing as tolerated)  - Appreciate PT (Will re-attempt therapy when daughter present)

## 2020-08-02 NOTE — CONSULT NOTE ADULT - ASSESSMENT
Assessment & Plan  76 y/o woman with a PMHx of Afib (on Eliquis), HFpEF w/ severe aortic stenosis s/p TAVR (May 2020), HTN, CAD s/p STEMI (on ASA), CKD, TIA s/p L CEA, presented from [facility] rehab for 2 weeks of worsening AMS. Per Dr. Zhong from [facility], pt arrived at [facility] rehab about 3 weeks ago after recent admission for dizziness and weakness last month (6/2020). At [facility] rehab, patient was AAOx1, but was ambulatory throughout her 3 weeks there. She was noted to be not eating as much recently and had multiple negative sepsis workup.  Neurosurgery consulted regarding IR guided LP scheduled for tomorrow in the setting of sella turcica mass and hydrocephalus for CSF infectious w/u and opening pressure.     1. AMS/encephalopathy w/u for IR guided LP   - case and imaging reviewed w/ Dr. D'Amico, agree that LP will be beneficial for encephalopathy w/u   - recommend low volume LP tomorrow w/ IR, would rec against high volume LP in setting of increased size of known sellar mass and hydrocephalus    2. Hydrocephalus   - would recommend obtaining CSF flow study if pt is able to tolerate and if concern for cause of hydrocephalus is obstruction    3. 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 calcified meningioma   - recommend obtaining endocrine panel for sellar mass w/u meningioma vs pituitary adenoma; TSH, free T4, FSH, LH, prolactin, ACTH, cortisol, GH, IGF-1   - Neurosurgery to follow, recommend repeat MRI brain w/ and w/o contrast when pt able to tolerate to re-assess sellar mass, recommend possible surgery after encephalopathy w/u and once pt is medically stable for surgery Assessment & Plan  74 y/o woman with a PMHx of Afib (on Eliquis), HFpEF w/ severe aortic stenosis s/p TAVR (May 2020), HTN, CAD s/p STEMI (on ASA), CKD, TIA s/p L CEA, presented from [facility] rehab for 2 weeks of worsening AMS. Per Dr. Zhong from [facility], pt arrived at [facility] rehab about 3 weeks ago after recent admission for dizziness and weakness last month (6/2020). At [facility] rehab, patient was AAOx1, but was ambulatory throughout her 3 weeks there. She was noted to be not eating as much recently and had multiple negative sepsis workup.  Neurosurgery consulted regarding IR guided LP scheduled for tomorrow in the setting of sella turcica mass and hydrocephalus for CSF infectious w/u and opening pressure.     1. AMS/encephalopathy w/u for IR guided LP   - case and imaging reviewed w/ Dr. D'Amico, agree that LP will be beneficial for encephalopathy w/u   - recommend low volume LP tomorrow w/ IR, would rec against high volume LP in setting of increased size of known sellar mass and hydrocephalus    2. Hydrocephalus   - would recommend obtaining CSF flow study if pt is able to tolerate and if concern for cause of hydrocephalus is obstruction    3. 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 calcified meningioma   - recommend obtaining endocrine panel for sellar mass w/u meningioma vs pituitary adenoma; TSH, free T4, FSH, LH, prolactin, ACTH, cortisol, GH, IGF-1   - Neurosurgery to follow, recommend repeat MRI brain w/ and w/o contrast when pt able to tolerate to re-assess sellar mass, recommend possible surgery after encephalopathy w/u and once pt is medically stable for surgery     Neurosurgery Pager #: [phone] Assessment & Plan  74 y/o woman with a PMHx of Afib (on Eliquis), HFpEF w/ severe aortic stenosis s/p TAVR (May 2020), HTN, CAD s/p STEMI (on ASA), CKD, TIA s/p L CEA, presented from [facility] rehab for 2 weeks of worsening AMS. Per Dr. Zhong from [facility], pt arrived at [facility] rehab about 3 weeks ago after recent admission for dizziness and weakness last month (6/2020). At [facility] rehab, patient was AAOx1, but was ambulatory throughout her 3 weeks there. She was noted to be not eating as much recently and had multiple negative sepsis workup.  Neurosurgery consulted regarding IR guided LP scheduled for tomorrow in the setting of sella turcica mass and hydrocephalus for CSF infectious w/u and opening pressure.     1. AMS/encephalopathy w/u for IR guided LP   - case and imaging reviewed w/ Dr. D'Amico, agree that LP will be beneficial for encephalopathy w/u   - recommend low volume LP tomorrow w/ IR, would rec against high volume LP in setting of increased size of known sellar mass and hydrocephalus    2. Hydrocephalus   - would recommend obtaining CSF flow study if pt is able to tolerate and if concern for cause of hydrocephalus is obstruction    3. 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 calcified meningioma   - recommend obtaining endocrine panel for sellar mass w/u meningioma vs pituitary adenoma; TSH, free T4, FSH, LH, prolactin, ACTH, cortisol, GH, IGF-1   - Neurosurgery to follow, recommend possible surgery for resection of sellar mass after encephalopathy w/u and once pt is medically stable for surgery     Neurosurgery Pager #: [phone]

## 2020-08-02 NOTE — PROGRESS NOTE ADULT - PROBLEM SELECTOR PLAN 1
Pt reported to be AAOx3, conversant and ambulatory prior to TAVR in May 2020. Recently sent to [facility] rehab after June admission for dizziness/weakness. Pt reported to be AAOx0 at [facility]. Multiple negative septic workup, until recent UCx growing ESBL bacteria sensitive to ertapenem (started on IV ertapenem 1g qd from 7/22-7/29). Stroke code called in ED for nonverbal state, CTH negative for acute pathologies, however showing increasing hydrocephalus. CTA head/neck without large vessel occlusion. DDx include 2/2 ESBL UTI vs. worsening hydrocephalus.  - Hold ASA for LP (IR to perform on Monday),   - Hold Eliquis Saturday for LP (IR to perform Monday)  - Appreciate Speech and Swallow Recs (Holding solids and advancing as tolerated)  - Appreciate PT (Will re-attempt therapy when daughter present)

## 2020-08-02 NOTE — PROGRESS NOTE ADULT - ASSESSMENT
74 y/o F HD2 with a PMHx of Afib (on Eliquis), HFpEF w/ severe aortic stenosis s/p TAVR (May 2020), HTN, CAD s/p STEMI (on ASA), CKD, TIA s/p L CEA, recent ESBL UTI, presented from [facility] rehab due to 2 weeks of worsening mental status. She was found to have increased ventriculomegaly suggestive of hydrocephalus upon presentation in the ED. Patient is currently non verbal. Mildly responsible to verbal stimuli.

## 2020-08-03 NOTE — PROGRESS NOTE ADULT - ASSESSMENT
74 y/o F HD2 with a PMHx of Afib (on Eliquis), HFpEF w/ severe aortic stenosis s/p TAVR (May 2020), HTN, CAD s/p STEMI (on ASA), CKD, TIA s/p L CEA, recent ESBL UTI, presented from [facility] rehab due to 2 weeks of worsening mental status. She was found to have increased ventriculomegaly suggestive of hydrocephalus upon presentation in the ED. Patient is currently non verbal. Mildly responsible to verbal stimuli.

## 2020-08-03 NOTE — PROGRESS NOTE ADULT - PROBLEM SELECTOR PLAN 5
Hx of CAD s/p STEMI. On home ASA 81mg qd  - per stroke neurology, OK to restart ASA.  - Hold ASA for LP on held

## 2020-08-03 NOTE — PROGRESS NOTE ADULT - PROBLEM SELECTOR PLAN 7
Hx of TIA s/p L CEA. CTH noncontrast here without acute bleeds or strokes. CTA head/neck without large vessel occlusion  - Hold ASA until LP completed Today

## 2020-08-03 NOTE — PROGRESS NOTE ADULT - PROBLEM SELECTOR PLAN 1
Pt reported to be AAOx3, conversant and ambulatory prior to TAVR in May 2020. Recently sent to [facility] rehab after June admission for dizziness/weakness. Pt reported to be AAOx0 at [facility]. Multiple negative septic workup, until recent UCx growing ESBL bacteria sensitive to ertapenem (started on IV ertapenem 1g qd from 7/22-7/29). Stroke code called in ED for nonverbal state, CTH negative for acute pathologies, however showing increasing hydrocephalus. CTA head/neck without large vessel occlusion. DDx include 2/2 ESBL UTI vs. worsening hydrocephalus.  - Held ASA for LP (IR to perform on today)  - Held Eliquis Saturday for LP (IR to perform today)  - Appreciate Speech and Swallow Recs (Holding solids and advancing as tolerated)  - Appreciate PT (Will re-attempt therapy when daughter present)

## 2020-08-03 NOTE — PROGRESS NOTE ADULT - ASSESSMENT
76 y/o F HD2 with a PMHx of Afib (on Eliquis), HFpEF w/ severe aortic stenosis s/p TAVR (May 2020), HTN, CAD s/p STEMI (on ASA), CKD, TIA s/p L CEA, recent ESBL UTI, presented from [facility] rehab due to 2 weeks of worsening mental status. She was found to have increased ventriculomegaly suggestive of hydrocephalus upon presentation in the ED. Patient is currently non verbal. Mildly responsible to verbal stimuli.

## 2020-08-03 NOTE — PROGRESS NOTE ADULT - ATTENDING COMMENTS
I was physically present for the key portions of the evaluation and managemnent (E/M) service provided.  I agree with the above history, physical, and plan which I have reviewed and edited where appropriate, with the exceptions as per my note.    idiopathic communicating hydrocephalus. may need a  shunt if csf is unrevealing.

## 2020-08-03 NOTE — PROGRESS NOTE ADULT - PROBLEM SELECTOR PLAN 2
Pt presented with 2 weeks of worsening AMS. CTH noncontrast in ED showing increased diffuse ventriculomegaly compared to May 2020.  - Per neurology recs: MRI Head with contrast   - LP Monday (Continue to hold ASA, Held Eliquis Saturday)

## 2020-08-03 NOTE — PROGRESS NOTE ADULT - PROBLEM SELECTOR PLAN 1
Pt reported to be AAOx3, conversant and ambulatory prior to TAVR in May 2020. Recently sent to [facility] rehab after June admission for dizziness/weakness. Pt reported to be AAOx0 at [facility]. Multiple negative septic workup, until recent UCx growing ESBL bacteria sensitive to ertapenem (started on IV ertapenem 1g qd from 7/22-7/29). Stroke code called in ED for nonverbal state, CTH negative for acute pathologies, however showing increasing hydrocephalus. CTA head/neck without large vessel occlusion. DDx include 2/2 ESBL UTI vs. worsening hydrocephalus.  - Held ASA for LP (IR to perform on today)  - Held Eliquis Saturday for LP (IR to perform today)  - Appreciate Speech and Swallow Recs (Holding solids and advancing as tolerated)  - Appreciate PT (Will re-attempt therapy when daughter present)

## 2020-08-03 NOTE — PROGRESS NOTE ADULT - SUBJECTIVE AND OBJECTIVE BOX
NP Note Neurosurgery    HPI:  74 y/o woman with a PMHx of Afib (on Eliquis), HFpEF w/ severe aortic stenosis s/p TAVR (May 2020), HTN, CAD s/p STEMI (on ASA), CKD, TIA s/p L CEA, presented from [facility] rehab for 2 weeks of worsening AMS. Per Dr. Zhong from [facility], pt arrived at [facility] rehab about 3 weeks ago after recent admission for dizziness and weakness last month (6/2020). At [facility] rehab, patient was AAOx1, but was ambulatory thoughout her 3 weeks there. She was noted to be not eating as much recently and had multiple negative sepsis workup. Her latest UCx however did grow ESBL sensitive to ertapenem (started IV 1g qd on 7/22, to end on 7/29). She was reported to be AAOx3, conversant and ambulatory. On arrival at [facility] ED, stroke code was called given patient was nonverbal. No acute stroke or bleeds on CTH, only notable for increasing hydrocephalus from May. At bedside, patient was AAOx2 (person and place), but not following much commands or conversant when using a [place] phone . Patient denied having any pain, but was unable to further answer other ROS questions at bedside.    ED Vitals: T 98.2F, /90, , RR 20, SpO2 94% RA  ED Labs: WBC 8.82, Hb 12.4, INR 1.55, Cr 1.28 (baseline 1.13 from June 2020). Trop neg x1.  ED Imaging: CTH noncontrast: No acute hemorrhage or infarct, +Diffuse ventriculomegaly increased from May 2020. CTA head/neck without large vessel occlusion. CXR with unchanged cardiomegaly, no acute infiltrates.  ED Management: IV CTX 1g x1 (28 Jul 2020 21:40)    OVERNIGHT EVENTS:  Vital Signs Last 24 Hrs  T(C): 36.6 (03 Aug 2020 08:38), Max: 36.7 (02 Aug 2020 20:50)  T(F): 97.8 (03 Aug 2020 08:38), Max: 98.1 (02 Aug 2020 20:50)  HR: 83 (03 Aug 2020 08:38) (83 - 91)  BP: 119/90 (03 Aug 2020 08:38) (119/90 - 135/94)  BP(mean): --  RR: 19 (03 Aug 2020 08:38) (18 - 19)  SpO2: 95% (03 Aug 2020 08:38) (93% - 96%)    I&O's Summary    02 Aug 2020 07:01  -  03 Aug 2020 07:00  --------------------------------------------------------  IN: 16 mL / OUT: 0 mL / NET: 16 mL          EXAM:   General: NAD, pt is comfortably sitting up in bed, A&O x1 (pt intermittently answers "yes" to name using [employer] )  HEENT: PERRL b/l 3mm, EOMI b/l, face symmetric, tongue midline, neck supple, non-tender to palpation  Cardiovascular: regular rate and rhythm, normal S1 and S2   Respiratory: lungs clear to auscultation throughout, no wheezing, rhonchi, or crackles   GI: normoactive BS to auscultation, abd soft, NTND   Neuro: non-verbal, unable to assess speech at time of exam, not following commands   DIETZ x4 spontaneously,        LABS:                        12.1   8.03  )-----------( 183      ( 03 Aug 2020 06:00 )             38.4     08-03    146<H>  |  108  |  20  ----------------------------<  99  3.7   |  25  |  1.03    Ca    9.3      03 Aug 2020 06:00  Phos  3.6     08-03  Mg     1.9     08-03      PT/INR - ( 03 Aug 2020 06:00 )   PT: 16.2 sec;   INR: 1.37          PTT - ( 03 Aug 2020 06:00 )  PTT:156.0 sec        CAPILLARY BLOOD GLUCOSE          Drug Levels: [] N/A    CSF Analysis: [] N/A      Allergies    No Known Allergies    Intolerances      MEDICATIONS:  Antibiotics:    Neuro:    Anticoagulation:  heparin  Infusion 700 Unit(s)/Hr IV Continuous <Continuous>    OTHER:  atorvastatin 40 milliGRAM(s) Oral at bedtime  diltiazem    milliGRAM(s) Oral daily  latanoprost 0.005% Ophthalmic Solution 1 Drop(s) Both EYES at bedtime  metoprolol tartrate 37.5 milliGRAM(s) Oral two times a day  pantoprazole    Tablet 40 milliGRAM(s) Oral before breakfast  senna 2 Tablet(s) Oral at bedtime    IVF:    CULTURES:  Culture Results:   No growth (07-28 @ 21:53)    RADIOLOGY & ADDITIONAL TESTS:        Assessment & Plan  74 y/o woman with a PMHx of Afib (on Eliquis), HFpEF w/ severe aortic stenosis s/p TAVR (May 2020), HTN, CAD s/p STEMI (on ASA), CKD, TIA s/p L CEA, presented from [facility] rehab for 2 weeks of worsening AMS. Per Dr. Zhong from [facility], pt arrived at [facility] rehab about 3 weeks ago after recent admission for dizziness and weakness last month (6/2020). At [facility] rehab, patient was AAOx1, but was ambulatory throughout her 3 weeks there. She was noted to be not eating as much recently and had multiple negative sepsis workup.  Neurosurgery consulted regarding IR guided LP scheduled for today of sella turcica mass and hydrocephalus for CSF infectious w/u and opening pressure.     PLAN:  NEURO:    Monitor neuro status  LP by IR  F/U Endocrine panel  Continue current medical regime as per primary    Dispo: Discussed with attending    [

## 2020-08-03 NOTE — CHART NOTE - NSCHARTNOTEFT_GEN_A_CORE
Admitting Diagnosis:   Patient is a 75y old  Female who presents with a chief complaint of AMS, UTI (03 Aug 2020 11:13)    PAST MEDICAL & SURGICAL HISTORY:  H/O carotid stenosis  TIA (transient ischemic attack)  Chronic kidney disease (CKD)  Aortic stenosis  HLD (hyperlipidemia)  HTN (hypertension)  H/O carotid endarterectomy    Current Nutrition Order:  DASH, pureed, thin liquids    PO Intake: Good (%) [   ]  Fair (50-75%) [   ] Poor (<25%) [   ]-NPO at time of visit    GI Issues:   Per flowsheet, GI wdl    Pain:  Non verbal indicators absent    Skin Integrity:  Intact pressure wise    Labs:   08-03    146<H>  |  108  |  20  ----------------------------<  99  3.7   |  25  |  1.03    Ca    9.3      03 Aug 2020 06:00  Phos  3.6     08-03  Mg     1.9     08-03    CAPILLARY BLOOD GLUCOSE    Medications:  MEDICATIONS  (STANDING):  atorvastatin 40 milliGRAM(s) Oral at bedtime  diltiazem    milliGRAM(s) Oral daily  latanoprost 0.005% Ophthalmic Solution 1 Drop(s) Both EYES at bedtime  metoprolol tartrate 37.5 milliGRAM(s) Oral two times a day  pantoprazole  Injectable 40 milliGRAM(s) IV Push two times a day  senna 2 Tablet(s) Oral at bedtime    MEDICATIONS  (PRN):    Admission Anthropometrics:  Height: 66" Weight: 233lbs,  lbs+/-10%, %%, BMI 37.6,  IBW used for calculations as pt >120% of IBW, adjusted for age    Weight:  7/31 237lbs    Weight Change: No new wts to assess    Estimated energy needs:   1472-1767kcal (25-30kcal/kg)  59-71g pro (1-1.2g/kg)  1767-2061ml (30-35ml/kg)    Subjective:   74 y/o woman with a PMHx of Afib (on Eliquis), HFpEF w/ severe aortic stenosis s/p TAVR (May 2020), HTN, CAD s/p STEMI (on ASA), CKD, TIA s/p L CEA, recent ESBL UTI, presented from [facility] rehab for 2 weeks of worsening AMS, found to have increasing hydrocephalus. Pt seen in room, unable to obtain hx 2/2 pt status. Pt NPO at time of visit for LP today, DASH pureed/thin liquid diet now restarted. S/p swallow eval with recs for puree/thin liquids, diet advanced 7/31, no reports of intolerance. No new wts to assess. Please see recommendations below. Will continue to follow per RD protocol.    Previous Nutrition Diagnosis:  Inadequate energy intake RT NPO status AEB meeting 0% EER at this time    Active []  Resolved [ x ]    If resolved, new PES: Increased nutrient needs RT age AEB increased demands     Goal: 1. Meet >75% EER consistently     Recommendations:  1. Continue DASH, puree diet with thin liquids, monitor tolerance and %PO intake  2. Trend wts weekly  3. Monitor lytes and replete prn    Education: N/A    Risk Level: High [   ] Moderate [ x  ] Low [   ]

## 2020-08-03 NOTE — PROGRESS NOTE ADULT - PROBLEM SELECTOR PLAN 3
Pt presented with 2 weeks of worsening AMS with recent UCx performed at [facility] showing ESBL (bacteria unclear) sensitive to ertapenem. Did not meet SIRS criteria on admission. Pt was started on ertapenem 1g qd on 7/22 (planned for course to be completed on 7/29)    - Complete course of ertapenem on 7/29  - Urine culture: Final No growth

## 2020-08-03 NOTE — PROVIDER CONTACT NOTE (CRITICAL VALUE NOTIFICATION) - ACTION/TREATMENT ORDERED:
Heparin to be restarted when ordered
stop heparin for 1 hour
Stop Heparin Drip for 1 hour then restart at 7cc/hr per DR VARGAS

## 2020-08-03 NOTE — PROGRESS NOTE ADULT - ASSESSMENT
76 y/o woman with a PMHx of Afib (on Eliquis), HFpEF w/ severe aortic stenosis s/p TAVR (May 2020), HTN, CAD s/p STEMI (on ASA), CKD, TIA s/p L CEA, recent ESBL UTI, presented from [facility] rehab due to 2 weeks of worsening mental status. Neurology consulted for AMS and increasing ventriculomegaly.     #Encephalopathy  Upon admission, stroke code called, CTH negative for hemorrhage but showed increasing ventriculomegaly from previous CT in May. Patient currently verbal, but not following commands. DDX for ventriculomegaly includes obstruction, carcinomatosis meningitis, fungal/TB/Crypto meningitis, sarcoid; less likely NPH as such a rapid increase in ventriculomegaly would not be expected in NPH, and patient not presenting with other clinical symptoms (incontinence or gait disturbance)\  - Reviewed MRI brain with neuro-radiology. No evidence of obstruction of the foramen of monro. Mass does not appear to be cause of hydrocephalus.  - Plan for IR guided LP today - check opening pressure, CSF protein, glucose, cell count, PCR panel, fungal stain and culture, AFB stain and culture, cryptococcal antigen, ACE level and VDRL, and flow cytometry

## 2020-08-03 NOTE — PROGRESS NOTE ADULT - SUBJECTIVE AND OBJECTIVE BOX
OVERNIGHT EVENTS: AMARILIS    SUBJECTIVE / INTERVAL HPI: Patient seen and examined at bedside. Patient much more alert this morning. Patient responded to questions when using the [facility]  and followed commands. Although, patient remains confused as she thought she was still in [place]. Patient denies any pain at this time.    VITAL SIGNS:  Vital Signs Last 24 Hrs  T(C): 36.6 (03 Aug 2020 08:38), Max: 36.7 (02 Aug 2020 20:50)  T(F): 97.8 (03 Aug 2020 08:38), Max: 98.1 (02 Aug 2020 20:50)  HR: 83 (03 Aug 2020 08:38) (83 - 91)  BP: 119/90 (03 Aug 2020 08:38) (119/90 - 135/94)  BP(mean): --  RR: 19 (03 Aug 2020 08:38) (18 - 19)  SpO2: 95% (03 Aug 2020 08:38) (93% - 96%)  I&O's Summary    02 Aug 2020 07:01  -  03 Aug 2020 07:00  --------------------------------------------------------  IN: 16 mL / OUT: 0 mL / NET: 16 mL      PHYSICAL EXAM:  Constitutional: awake and alert and following commands, soft spoken  HF: A x O x 1 (name). Patient is conversant, Speech fluent, No Aphasia or paraphasic errors.   CN: CUCA, EOMI  Motor: No pronator drift, Strength 5/5 in all 4 ext, normal bulk and tone, no tremor, rigidity or bradykinesia.    Sens: Intact to light touch    Reflexes: Symmetric and normal   Coord:  No FNFA, dysmetria   Gait/Balance: not assessed    MEDICATIONS:  MEDICATIONS  (STANDING):  atorvastatin 40 milliGRAM(s) Oral at bedtime  diltiazem    milliGRAM(s) Oral daily  latanoprost 0.005% Ophthalmic Solution 1 Drop(s) Both EYES at bedtime  metoprolol tartrate 37.5 milliGRAM(s) Oral two times a day  pantoprazole  Injectable 40 milliGRAM(s) IV Push two times a day  senna 2 Tablet(s) Oral at bedtime    MEDICATIONS  (PRN):      ALLERGIES:  Allergies    No Known Allergies    Intolerances        LABS:                        12.1   8.03  )-----------( 183      ( 03 Aug 2020 06:00 )             38.4     08-03    146<H>  |  108  |  20  ----------------------------<  99  3.7   |  25  |  1.03    Ca    9.3      03 Aug 2020 06:00  Phos  3.6     08-03  Mg     1.9     08-03      PT/INR - ( 03 Aug 2020 06:00 )   PT: 16.2 sec;   INR: 1.37          PTT - ( 03 Aug 2020 06:00 )  PTT:156.0 sec    CAPILLARY BLOOD GLUCOSE          RADIOLOGY & ADDITIONAL TESTS: Reviewed. OVERNIGHT EVENTS: AMARILIS    SUBJECTIVE / INTERVAL HPI: Patient seen and examined at bedside. Patient much more alert this morning. Patient responded to questions when using the [facility]  and followed commands. Although, patient remains confused as she thought she was still in [place]. Patient denies any pain at this time.    VITAL SIGNS:  Vital Signs Last 24 Hrs  T(C): 36.6 (03 Aug 2020 08:38), Max: 36.7 (02 Aug 2020 20:50)  T(F): 97.8 (03 Aug 2020 08:38), Max: 98.1 (02 Aug 2020 20:50)  HR: 83 (03 Aug 2020 08:38) (83 - 91)  BP: 119/90 (03 Aug 2020 08:38) (119/90 - 135/94)  BP(mean): --  RR: 19 (03 Aug 2020 08:38) (18 - 19)  SpO2: 95% (03 Aug 2020 08:38) (93% - 96%)  I&O's Summary    02 Aug 2020 07:01  -  03 Aug 2020 07:00  --------------------------------------------------------  IN: 16 mL / OUT: 0 mL / NET: 16 mL      PHYSICAL EXAM:  Constitutional: awake and alert. soft spoken  HF: A x O x 1 (name). Patient is conversant. follows some commands but difficulty with complex commands.  CN: CUCA, EOMI, face symm  Motor: No pronator drift, Strength 5/5 in all 4 ext, normal bulk and tone, no tremor, rigidity or bradykinesia.    Sens: Intact to light touch    Reflexes: Symmetric and normal   Coord:  No FNFA, dysmetria   Gait/Balance: not assessed    MEDICATIONS:  MEDICATIONS  (STANDING):  atorvastatin 40 milliGRAM(s) Oral at bedtime  diltiazem    milliGRAM(s) Oral daily  latanoprost 0.005% Ophthalmic Solution 1 Drop(s) Both EYES at bedtime  metoprolol tartrate 37.5 milliGRAM(s) Oral two times a day  pantoprazole  Injectable 40 milliGRAM(s) IV Push two times a day  senna 2 Tablet(s) Oral at bedtime    MEDICATIONS  (PRN):      ALLERGIES:  Allergies    No Known Allergies    Intolerances        LABS:                        12.1   8.03  )-----------( 183      ( 03 Aug 2020 06:00 )             38.4     08-03    146<H>  |  108  |  20  ----------------------------<  99  3.7   |  25  |  1.03    Ca    9.3      03 Aug 2020 06:00  Phos  3.6     08-03  Mg     1.9     08-03      PT/INR - ( 03 Aug 2020 06:00 )   PT: 16.2 sec;   INR: 1.37          PTT - ( 03 Aug 2020 06:00 )  PTT:156.0 sec    CAPILLARY BLOOD GLUCOSE          RADIOLOGY & ADDITIONAL TESTS: Reviewed.

## 2020-08-03 NOTE — PROGRESS NOTE ADULT - SUBJECTIVE AND OBJECTIVE BOX
Patient: GRACE KEY   Age: 75y   Gender:Female    OVERNIGHT EVENTS: NAEO    SUBJECTIVE / INTERVAL   HPI: HD6: No acute events o/n. alert, awake on entry. Held heparin for LP today.    VITAL SIGNS:  Vital Signs Last 24 Hrs  T(C): 36.6 (03 Aug 2020 08:38), Max: 36.7 (02 Aug 2020 20:50)  T(F): 97.8 (03 Aug 2020 08:38), Max: 98.1 (02 Aug 2020 20:50)  HR: 83 (03 Aug 2020 08:38) (83 - 91)  BP: 119/90 (03 Aug 2020 08:38) (119/90 - 135/94)  BP(mean): --  RR: 19 (03 Aug 2020 08:38) (18 - 19)  SpO2: 95% (03 Aug 2020 08:38) (93% - 96%)    MEDICATIONS  (STANDING):  atorvastatin 40 milliGRAM(s) Oral at bedtime  diltiazem    milliGRAM(s) Oral daily  latanoprost 0.005% Ophthalmic Solution 1 Drop(s) Both EYES at bedtime  metoprolol tartrate 37.5 milliGRAM(s) Oral two times a day  pantoprazole  Injectable 40 milliGRAM(s) IV Push two times a day  senna 2 Tablet(s) Oral at bedtime    MEDICATIONS  (PRN):    PHYSICAL EXAM:  GENERAL: NAD, resting comfortably in bed.  HEAD:  Atraumatic, Normocephalic  EYES: EOMI, PERRLA, conjunctiva and sclera clear  NECK: Supple, No JVD  CHEST/LUNG: Clear to auscultation bilaterally; No wheezes  HEART: Irregular rate and rhythm; No murmurs, rubs, or gallops  ABDOMEN: Soft, Nontender, Nondistended; Bowel sounds present. Mildly firm this morning  EXTREMITIES:  2+ Peripheral Pulses, No clubbing, cyanosis, or edema  PSYCH: AAOx1, moves all four ext   NEUROLOGY: Limited. Non Verbal  SKIN: No rashes or lesions    MEDICATIONS:  MEDICATIONS  (STANDING):  atorvastatin 40 milliGRAM(s) Oral at bedtime  diltiazem    milliGRAM(s) Oral daily  latanoprost 0.005% Ophthalmic Solution 1 Drop(s) Both EYES at bedtime  metoprolol tartrate 37.5 milliGRAM(s) Oral two times a day  pantoprazole    Tablet 40 milliGRAM(s) Oral before breakfast  senna 2 Tablet(s) Oral at bedtime    MEDICATIONS  (PRN):      ALLERGIES:  Allergies  No Known Allergies  Intolerances    LABS:                      12.1   8.03  )-----------( 183      ( 03 Aug 2020 06:00 )             38.4     08-03  146<H>  |  108  |  20  ----------------------------<  99  3.7   |  25  |  1.03    Ca    9.3      03 Aug 2020 06:00  Phos  3.6     08-03  Mg     1.9     08-03  PT/INR - ( 03 Aug 2020 06:00 )   PT: 16.2 sec;   INR: 1.37     PTT - ( 03 Aug 2020 06:00 )  PTT:156.0 sec      Imaging:   PTT - ( 02 Aug 2020 10:34 )  PTT:>200.0 sec  CAPILLARY BLOOD GLUCOSE      EXAM: MR BRAIN IC   PROCEDURE DATE: 08/01/2020   INTERPRETATION: INova MD, have reviewed the images and the report and agree with the findings.     PROCEDURE INFORMATION:   Exam: MR Head With Contrast   Exam date and time: 8/1/2020 9:31 AM   Age: 75 years old   Clinical indication: Other: AMS hydrocephalus, tumor on CT     TECHNIQUE:   Imaging protocol: MR of the head with intravenous contrast.   3D rendering: MIP and/or 3D reconstructed images were created by the technologist.     COMPARISON:   CT HEAD STROKE PROTOCOL 7/28/2020 5:49 PM     FINDINGS:   Brain: No acute infarct identified on the diffusion-weighted imaging. No parenchymal hemorrhage.   A midline mass along plane of sphenoidale extending into the sella turcica measuring approximately   3.5 cm in AP x 3.1 cm in craniocaudal x 2.5 cm in transverse dimensions, image 88 series [id number],   image 20 series [id number]. This is predominantly T1 isointense/soft tissue signal intensity with avid   enhancement. Within the the right aspect of the mass, an area of low signal intensity in keeping with   calcification seen on the prior CT study. The mass may represent a partially calcified meningioma;   craniopharyngioma or collision tumor cannot be excluded. There are some limitations characterizing   the mass on the sequences due to considerable motion artifacts. The mass impinges upon the optic   apparatus.   Ventricles: Marked ventriculomegaly consistent with hydrocephalus. Periventricular T2 hyperintensity   consistent with transependymal flow of CSF. The ventricular enlargement is diffuse, including 4th   ventricle and aqueduct of Sylvius.   Sinuses: Mild sinus mucosal thickening. No acute sinusitis.   Mastoid air cells: Normal as visualized. No mastoid effusion.   Orbits: Prior right lens surgery.   Soft tissues: Unremarkable.     IMPRESSION:   1. Images are motion degraded.   2. Findings consistent with hydrocephalus; periventricular T2 hyperintensity in keeping with   transependymal flow of CSF. The ventricular enlargement is diffuse suggesting communicating   hydrocephalus.   3. Midline mass in the region of the sella turcica, as above.     EXAM: IR INTERVENTIONAL RAD PROC   PROCEDURE DATE: 08/03/2020   INTERPRETATION: CLINICAL INDICATION: Hydrocephalus     Exam: Fluoroscopic guided lumbar puncture   : Abdi Mane MD   Complications: None immediate   Fluoroscopy time: 0.1 minute   Prior to the procedure, the risks, benefits and alternatives to a fluoroscopic-guided lumbar puncture were explained to the patient's family. The patient's family agreed to the procedure and Informed consent was obtained.     PROCEDURE:   Using fluoroscopy, the L2-3 level was selected as most amenable to lumbar puncture. Using sterile technique, a 20 gauge spinal needle was inserted at the L2-3 level with prompt egress of CSF. A total of 12 cc of clear, colorless CSF was obtained, and sent to the laboratory for analysis. The patient was monitored after the procedure and was discharged from the radiology department in stable condition.     IMPRESSION:   Successful fluoroscopic-guided diagnostic lumbar puncture

## 2020-08-03 NOTE — PROGRESS NOTE ADULT - SUBJECTIVE AND OBJECTIVE BOX
Patient: GRACE KEY   Age: 75y   Gender:Female    OVERNIGHT EVENTS: NAEO    SUBJECTIVE / INTERVAL   HPI: HD6: No acute events o/n. alert, awake on entry. Held heparin for LP today.    VITAL SIGNS:  Vital Signs Last 24 Hrs  T(C): 36.6 (03 Aug 2020 08:38), Max: 36.7 (02 Aug 2020 20:50)  T(F): 97.8 (03 Aug 2020 08:38), Max: 98.1 (02 Aug 2020 20:50)  HR: 83 (03 Aug 2020 08:38) (83 - 91)  BP: 119/90 (03 Aug 2020 08:38) (119/90 - 135/94)  BP(mean): --  RR: 19 (03 Aug 2020 08:38) (18 - 19)  SpO2: 95% (03 Aug 2020 08:38) (93% - 96%)    MEDICATIONS  (STANDING):  atorvastatin 40 milliGRAM(s) Oral at bedtime  diltiazem    milliGRAM(s) Oral daily  latanoprost 0.005% Ophthalmic Solution 1 Drop(s) Both EYES at bedtime  metoprolol tartrate 37.5 milliGRAM(s) Oral two times a day  pantoprazole  Injectable 40 milliGRAM(s) IV Push two times a day  senna 2 Tablet(s) Oral at bedtime    MEDICATIONS  (PRN):    PHYSICAL EXAM:  GENERAL: NAD, resting comfortably in bed.  HEAD:  Atraumatic, Normocephalic  EYES: EOMI, PERRLA, conjunctiva and sclera clear  NECK: Supple, No JVD  CHEST/LUNG: Clear to auscultation bilaterally; No wheezes  HEART: Irregular rate and rhythm; No murmurs, rubs, or gallops  ABDOMEN: Soft, Nontender, Nondistended; Bowel sounds present  EXTREMITIES:  2+ Peripheral Pulses, No clubbing, cyanosis, or edema  PSYCH: AAOx1, moves all four ext   NEUROLOGY: Limited. Non Verbal  SKIN: No rashes or lesions    MEDICATIONS:  MEDICATIONS  (STANDING):  atorvastatin 40 milliGRAM(s) Oral at bedtime  diltiazem    milliGRAM(s) Oral daily  latanoprost 0.005% Ophthalmic Solution 1 Drop(s) Both EYES at bedtime  metoprolol tartrate 37.5 milliGRAM(s) Oral two times a day  pantoprazole    Tablet 40 milliGRAM(s) Oral before breakfast  senna 2 Tablet(s) Oral at bedtime    MEDICATIONS  (PRN):      ALLERGIES:  Allergies  No Known Allergies  Intolerances    LABS:                      12.1   8.03  )-----------( 183      ( 03 Aug 2020 06:00 )             38.4     08-03  146<H>  |  108  |  20  ----------------------------<  99  3.7   |  25  |  1.03    Ca    9.3      03 Aug 2020 06:00  Phos  3.6     08-03  Mg     1.9     08-03  PT/INR - ( 03 Aug 2020 06:00 )   PT: 16.2 sec;   INR: 1.37     PTT - ( 03 Aug 2020 06:00 )  PTT:156.0 sec      Imaging:   PTT - ( 02 Aug 2020 10:34 )  PTT:>200.0 sec  CAPILLARY BLOOD GLUCOSE      EXAM: MR BRAIN IC   PROCEDURE DATE: 08/01/2020   INTERPRETATION: INova MD, have reviewed the images and the report and agree with the findings.     PROCEDURE INFORMATION:   Exam: MR Head With Contrast   Exam date and time: 8/1/2020 9:31 AM   Age: 75 years old   Clinical indication: Other: AMS hydrocephalus, tumor on CT     TECHNIQUE:   Imaging protocol: MR of the head with intravenous contrast.   3D rendering: MIP and/or 3D reconstructed images were created by the technologist.     COMPARISON:   CT HEAD STROKE PROTOCOL 7/28/2020 5:49 PM     FINDINGS:   Brain: No acute infarct identified on the diffusion-weighted imaging. No parenchymal hemorrhage.   A midline mass along plane of sphenoidale extending into the sella turcica measuring approximately   3.5 cm in AP x 3.1 cm in craniocaudal x 2.5 cm in transverse dimensions, image 88 series [id number],   image 20 series [id number]. This is predominantly T1 isointense/soft tissue signal intensity with avid   enhancement. Within the the right aspect of the mass, an area of low signal intensity in keeping with   calcification seen on the prior CT study. The mass may represent a partially calcified meningioma;   craniopharyngioma or collision tumor cannot be excluded. There are some limitations characterizing   the mass on the sequences due to considerable motion artifacts. The mass impinges upon the optic   apparatus.   Ventricles: Marked ventriculomegaly consistent with hydrocephalus. Periventricular T2 hyperintensity   consistent with transependymal flow of CSF. The ventricular enlargement is diffuse, including 4th   ventricle and aqueduct of Sylvius.   Sinuses: Mild sinus mucosal thickening. No acute sinusitis.   Mastoid air cells: Normal as visualized. No mastoid effusion.   Orbits: Prior right lens surgery.   Soft tissues: Unremarkable.     IMPRESSION:   1. Images are motion degraded.   2. Findings consistent with hydrocephalus; periventricular T2 hyperintensity in keeping with   transependymal flow of CSF. The ventricular enlargement is diffuse suggesting communicating   hydrocephalus.   3. Midline mass in the region of the sella turcica, as above.     EXAM: IR INTERVENTIONAL RAD PROC   PROCEDURE DATE: 08/03/2020   INTERPRETATION: CLINICAL INDICATION: Hydrocephalus     Exam: Fluoroscopic guided lumbar puncture   : Abdi Mane MD   Complications: None immediate   Fluoroscopy time: 0.1 minute   Prior to the procedure, the risks, benefits and alternatives to a fluoroscopic-guided lumbar puncture were explained to the patient's family. The patient's family agreed to the procedure and Informed consent was obtained.     PROCEDURE:   Using fluoroscopy, the L2-3 level was selected as most amenable to lumbar puncture. Using sterile technique, a 20 gauge spinal needle was inserted at the L2-3 level with prompt egress of CSF. A total of 12 cc of clear, colorless CSF was obtained, and sent to the laboratory for analysis. The patient was monitored after the procedure and was discharged from the radiology department in stable condition.     IMPRESSION:   Successful fluoroscopic-guided diagnostic lumbar puncture Patient: GRACE KEY   Age: 75y   Gender:Female    OVERNIGHT EVENTS: NAEO    SUBJECTIVE / INTERVAL   HPI: HD6: No acute events o/n. alert, awake on entry. Held heparin for LP today.    VITAL SIGNS:  Vital Signs Last 24 Hrs  T(C): 36.6 (03 Aug 2020 08:38), Max: 36.7 (02 Aug 2020 20:50)  T(F): 97.8 (03 Aug 2020 08:38), Max: 98.1 (02 Aug 2020 20:50)  HR: 83 (03 Aug 2020 08:38) (83 - 91)  BP: 119/90 (03 Aug 2020 08:38) (119/90 - 135/94)  BP(mean): --  RR: 19 (03 Aug 2020 08:38) (18 - 19)  SpO2: 95% (03 Aug 2020 08:38) (93% - 96%)    MEDICATIONS  (STANDING):  atorvastatin 40 milliGRAM(s) Oral at bedtime  diltiazem    milliGRAM(s) Oral daily  latanoprost 0.005% Ophthalmic Solution 1 Drop(s) Both EYES at bedtime  metoprolol tartrate 37.5 milliGRAM(s) Oral two times a day  pantoprazole  Injectable 40 milliGRAM(s) IV Push two times a day  senna 2 Tablet(s) Oral at bedtime    MEDICATIONS  (PRN):    PHYSICAL EXAM:  GENERAL: NAD, resting comfortably in bed.  HEAD:  Atraumatic, Normocephalic  EYES: EOMI, PERRLA, conjunctiva and sclera clear  NECK: Supple, No JVD  CHEST/LUNG: Clear to auscultation bilaterally; No wheezes  HEART: Irregular rate and rhythm; No murmurs, rubs, or gallops  ABDOMEN: Soft, Nontender, Nondistended; Bowel sounds present. Mildly firm this morning  EXTREMITIES:  2+ Peripheral Pulses, No clubbing, cyanosis, or edema  PSYCH: AAOx1, moves all four ext   NEUROLOGY: Limited. Non Verbal  SKIN: No rashes or lesions    MEDICATIONS:  MEDICATIONS  (STANDING):  atorvastatin 40 milliGRAM(s) Oral at bedtime  diltiazem    milliGRAM(s) Oral daily  latanoprost 0.005% Ophthalmic Solution 1 Drop(s) Both EYES at bedtime  metoprolol tartrate 37.5 milliGRAM(s) Oral two times a day  pantoprazole    Tablet 40 milliGRAM(s) Oral before breakfast  senna 2 Tablet(s) Oral at bedtime    MEDICATIONS  (PRN):      ALLERGIES:  Allergies  No Known Allergies  Intolerances    LABS:                      12.1   8.03  )-----------( 183      ( 03 Aug 2020 06:00 )             38.4     08-03  146<H>  |  108  |  20  ----------------------------<  99  3.7   |  25  |  1.03    Ca    9.3      03 Aug 2020 06:00  Phos  3.6     08-03  Mg     1.9     08-03  PT/INR - ( 03 Aug 2020 06:00 )   PT: 16.2 sec;   INR: 1.37     PTT - ( 03 Aug 2020 06:00 )  PTT:156.0 sec      Imaging:   PTT - ( 02 Aug 2020 10:34 )  PTT:>200.0 sec  CAPILLARY BLOOD GLUCOSE      EXAM: MR BRAIN IC   PROCEDURE DATE: 08/01/2020   INTERPRETATION: INova MD, have reviewed the images and the report and agree with the findings.     PROCEDURE INFORMATION:   Exam: MR Head With Contrast   Exam date and time: 8/1/2020 9:31 AM   Age: 75 years old   Clinical indication: Other: AMS hydrocephalus, tumor on CT     TECHNIQUE:   Imaging protocol: MR of the head with intravenous contrast.   3D rendering: MIP and/or 3D reconstructed images were created by the technologist.     COMPARISON:   CT HEAD STROKE PROTOCOL 7/28/2020 5:49 PM     FINDINGS:   Brain: No acute infarct identified on the diffusion-weighted imaging. No parenchymal hemorrhage.   A midline mass along plane of sphenoidale extending into the sella turcica measuring approximately   3.5 cm in AP x 3.1 cm in craniocaudal x 2.5 cm in transverse dimensions, image 88 series [id number],   image 20 series [id number]. This is predominantly T1 isointense/soft tissue signal intensity with avid   enhancement. Within the the right aspect of the mass, an area of low signal intensity in keeping with   calcification seen on the prior CT study. The mass may represent a partially calcified meningioma;   craniopharyngioma or collision tumor cannot be excluded. There are some limitations characterizing   the mass on the sequences due to considerable motion artifacts. The mass impinges upon the optic   apparatus.   Ventricles: Marked ventriculomegaly consistent with hydrocephalus. Periventricular T2 hyperintensity   consistent with transependymal flow of CSF. The ventricular enlargement is diffuse, including 4th   ventricle and aqueduct of Sylvius.   Sinuses: Mild sinus mucosal thickening. No acute sinusitis.   Mastoid air cells: Normal as visualized. No mastoid effusion.   Orbits: Prior right lens surgery.   Soft tissues: Unremarkable.     IMPRESSION:   1. Images are motion degraded.   2. Findings consistent with hydrocephalus; periventricular T2 hyperintensity in keeping with   transependymal flow of CSF. The ventricular enlargement is diffuse suggesting communicating   hydrocephalus.   3. Midline mass in the region of the sella turcica, as above.     EXAM: IR INTERVENTIONAL RAD PROC   PROCEDURE DATE: 08/03/2020   INTERPRETATION: CLINICAL INDICATION: Hydrocephalus     Exam: Fluoroscopic guided lumbar puncture   : Abdi Mane MD   Complications: None immediate   Fluoroscopy time: 0.1 minute   Prior to the procedure, the risks, benefits and alternatives to a fluoroscopic-guided lumbar puncture were explained to the patient's family. The patient's family agreed to the procedure and Informed consent was obtained.     PROCEDURE:   Using fluoroscopy, the L2-3 level was selected as most amenable to lumbar puncture. Using sterile technique, a 20 gauge spinal needle was inserted at the L2-3 level with prompt egress of CSF. A total of 12 cc of clear, colorless CSF was obtained, and sent to the laboratory for analysis. The patient was monitored after the procedure and was discharged from the radiology department in stable condition.     IMPRESSION:   Successful fluoroscopic-guided diagnostic lumbar puncture

## 2020-08-03 NOTE — CONSULT NOTE ADULT - SUBJECTIVE AND OBJECTIVE BOX
Patient is a 75y old  Female who presents with a chief complaint of AMS, UTI (03 Aug 2020 14:59)  History from daughter who is at bedside and chart note    74 y/o woman per daughter was fully function premorbid state  with a PMHx of Afib (on Eliquis), HFpEF w/ severe aortic stenosis s/p TAVR (May 2020), HTN, CAD s/p STEMI (on ASA), CKD, TIA s/p L CEA, presented from [facility] rehab for 2 weeks of worsening AMS. Patient had TAVR then went to  [facility] rehab about 3 weeks ago after recent admission for dizziness and weakness last month (6/2020). At [facility] rehab, patient was AAOx1, but was ambulatory thoughout her 3 weeks there. She was noted to be not eating as much recently and had multiple negative sepsis workup.  She was reported to be AAOx3, conversant and ambulatory. On arrival at [facility] ED, stroke code was called given patient was nonverbal. No acute stroke or bleeds on CTH, only notable for increasing hydrocephalus from May. Called to evaluate reports of melena while she had LP    Last colonoscopy a few years ago  unsure if she ever had EGD    REVIEW OF SYSTEMS:  unable    PAST MEDICAL & SURGICAL HISTORY:  H/O carotid stenosis  TIA (transient ischemic attack)  Chronic kidney disease (CKD)  Aortic stenosis  HLD (hyperlipidemia)  HTN (hypertension)  H/O carotid endarterectomy      FAMILY HISTORY:      SOCIAL HISTORY:  Smoking Status: [ ] Current, [ ] Former, [ ] Never  Pack Years:    MEDICATIONS:  MEDICATIONS  (STANDING):  atorvastatin 40 milliGRAM(s) Oral at bedtime  diltiazem    milliGRAM(s) Oral daily  latanoprost 0.005% Ophthalmic Solution 1 Drop(s) Both EYES at bedtime  metoprolol tartrate 37.5 milliGRAM(s) Oral two times a day  pantoprazole  Injectable 40 milliGRAM(s) IV Push two times a day  senna 2 Tablet(s) Oral at bedtime    MEDICATIONS  (PRN):      Allergies    No Known Allergies    Intolerances        Vital Signs Last 24 Hrs  T(C): 36.4 (03 Aug 2020 16:29), Max: 36.7 (02 Aug 2020 20:50)  T(F): 97.6 (03 Aug 2020 16:29), Max: 98.1 (02 Aug 2020 20:50)  HR: 100 (03 Aug 2020 16:29) (83 - 100)  BP: 135/93 (03 Aug 2020 16:29) (119/90 - 135/93)  BP(mean): --  RR: 19 (03 Aug 2020 08:38) (18 - 19)  SpO2: 95% (03 Aug 2020 08:38) (95% - 96%)    08-02 @ 07:01  -  08-03 @ 07:00  --------------------------------------------------------  IN: 16 mL / OUT: 0 mL / NET: 16 mL          PHYSICAL EXAM:    General: overweight; in no acute distress, stares blankly  HEENT: MMM, conjunctiva and sclera clear  Gastrointestinal: Soft, non-tender obese, non-distended; Normal bowel sounds; No rebound or guarding  Extremities: Normal range of motion, No clubbing, cyanosis or edema  Neurological: Alert and oriented x1  Skin: Warm and dry. No obvious rash      LABS:                        12.7   7.59  )-----------( 170      ( 03 Aug 2020 15:16 )             40.9     08-03    146<H>  |  108  |  20  ----------------------------<  99  3.7   |  25  |  1.03    Ca    9.3      03 Aug 2020 06:00  Phos  3.6     08-03  Mg     1.9     08-03            RADIOLOGY & ADDITIONAL STUDIES:

## 2020-08-03 NOTE — PROGRESS NOTE ADULT - PROBLEM SELECTOR PLAN 3
Pt presented with 2 weeks of worsening AMS with recent UCx performed at [facility] showing ESBL (bacteria unclear) sensitive to ertapenem. Did not meet SIRS criteria on admission. Pt was started on ertapenem 1g qd on 7/22 (planned for course to be completed on 7/29)    - Complete course of ertapenem on 7/29  - Urine culture: Final No growth

## 2020-08-04 NOTE — PROGRESS NOTE ADULT - PROBLEM SELECTOR PLAN 1
Pt reported to be AAOx3, conversant and ambulatory prior to TAVR in May 2020. Recently sent to [facility] rehab after June admission for dizziness/weakness. Pt reported to be AAOx0 at [facility]. Multiple negative septic workup, until recent UCx growing ESBL bacteria sensitive to ertapenem (started on IV ertapenem 1g qd from 7/22-7/29). Stroke code called in ED for nonverbal state, CTH negative for acute pathologies, however showing increasing hydrocephalus. CTA head/neck without large vessel occlusion. DDx include 2/2 ESBL UTI vs. worsening hydrocephalus.  - Held ASA for LP (IR to perform on today)  - Held Eliquis Saturday for LP (IR to perform today)  - Appreciate Speech and Swallow Recs (Holding solids and advancing as tolerated)  - Appreciate PT (Will re-attempt therapy when daughter present)

## 2020-08-04 NOTE — PROGRESS NOTE ADULT - ATTENDING COMMENTS
I was physically present for the key portions of the evaluation and managemnent (E/M) service provided.  I agree with the above history, physical, and plan which I have reviewed and edited where appropriate, with the exceptions as per my note.    idiopathic communicating hydrocephalus. unclear if related to meningioma but there are reports of intracranial tumors leading to increased protein which in turn could cause hydro.    - discussed with nsgy and primary team - likely will need  shunt to treat hydrocephalus.

## 2020-08-04 NOTE — PROGRESS NOTE ADULT - PROBLEM SELECTOR PLAN 10
Plan:   F: - none  E: replete K<4, Mg<2  N: Diet per Speech and Swallow  VTE Prophylaxis: Eliquis, SCDs  C: Full Code  D: RMF    #Nurse reported scant blood around anal area:  - FOTB. F/U results

## 2020-08-04 NOTE — PROGRESS NOTE ADULT - PROBLEM SELECTOR PLAN 3
Pt presented with 2 weeks of worsening AMS with recent UCx performed at [facility] showing ESBL (bacteria unclear) sensitive to ertapenem. Did not meet SIRS criteria on admission. Pt was started on ertapenem 1g qd on 7/22 (planned for course to be completed on 7/29)    - Completed course of ertapenem on 7/29  - Urine culture: Final No growth

## 2020-08-04 NOTE — PROGRESS NOTE ADULT - PROBLEM SELECTOR PLAN 1
Pt reported to be AAOx3, conversant and ambulatory prior to TAVR in May 2020. Recently sent to [facility] rehab after June admission for dizziness/weakness. Pt reported to be AAOx0 at [facility]. Multiple negative septic workup, until recent UCx growing ESBL bacteria sensitive to ertapenem (started on IV ertapenem 1g qd from 7/22-7/29). Stroke code called in ED for nonverbal state, CTH negative for acute pathologies, however showing increasing hydrocephalus. CTA head/neck without large vessel occlusion. DDx include 2/2 ESBL UTI vs. worsening hydrocephalus.  - Held ASA for LP (IR to perform on today)  - Held Eliquis Saturday for LP (IR to perform today)  - Appreciate Speech and Swallow Recs (Holding solids and advancing as tolerated)  - Appreciate PT (Will re-attempt therapy when daughter present)

## 2020-08-04 NOTE — PROGRESS NOTE ADULT - ASSESSMENT
74 y/o woman with a PMHx of Afib (on Eliquis), HFpEF w/ severe aortic stenosis s/p TAVR (May 2020), HTN, CAD s/p STEMI (on ASA), CKD, TIA s/p L CEA, recent ESBL UTI, presented from [facility] rehab due to 2 weeks of worsening mental status. Neurology consulted for AMS and increasing ventriculomegaly.     #Encephalopathy  Upon admission, stroke code called, CTH negative for hemorrhage but showed increasing ventriculomegaly from previous CT in May. Pt intermittently verbal and compliant with exam, today both nonverbal and mostly noncompliant with exam.   -LP performed 8/3, showing elevated protein in CSF. Unlikely infectious meningitis (CSF PCR negative). Unknown opening pressure from lumbar puncture  -Reviewed MRI brain with neuro-radiology. No evidence of obstruction of the foramen of monro. Mass does not appear to be cause of hydrocephalus.  Etiology: likely 2/2 hydrocephalus. Etiology of hydrocephalus currently unknown, possible 2/2 obstruction of CSF drainage vs viral meningitis. Less likely NPH as such a rapid increase in ventriculomegaly would not be expected in NPH, and patient not presenting with other clinical symptoms (incontinence or gait disturbance)    Recommendations:    -Pt to possibly undergo  shunt for hydrocephalus. If  shunt placed, would recommend assessing opening pressure, cytology, and flow cytometry    -f/u from LP: CR panel, fungal stain and culture, AFB stain and culture, cryptococcal antigen, ACE level and VDRL, and flow cytometry 76 y/o woman with a PMHx of Afib (on Eliquis), HFpEF w/ severe aortic stenosis s/p TAVR (May 2020), HTN, CAD s/p STEMI (on ASA), CKD, TIA s/p L CEA, recent ESBL UTI, presented from [facility] rehab due to 2 weeks of worsening mental status. Neurology consulted for AMS and increasing ventriculomegaly.     #Encephalopathy  Upon admission, stroke code called, CTH negative for hemorrhage but showed increasing ventriculomegaly from previous CT in May. Pt intermittently verbal and compliant with exam, today both nonverbal and mostly noncompliant with exam.   -LP performed 8/3, showing elevated protein in CSF. Unlikely infectious meningitis (CSF PCR negative). Unknown opening pressure from lumbar puncture  -Reviewed MRI brain with neuro-radiology. No evidence of obstruction of the foramen of monro. Mass does not appear to be cause of hydrocephalus.  Etiology: likely 2/2 hydrocephalus. Etiology of hydrocephalus currently unknown, possible 2/2 obstruction of CSF drainage vs viral meningitis. Less likely NPH as such a rapid increase in ventriculomegaly would not be expected in NPH, and patient not presenting with other clinical symptoms (incontinence or gait disturbance)    Recommendations:    -f/u from LP: CR panel, fungal stain and culture, AFB stain and culture, cryptococcal antigen, ACE level and VDRL, and flow cytometry    -Pt to possibly undergo  shunt for hydrocephalus. If  shunt placed, would recommend assessing opening pressure, cytology, and flow cytometry    -Pt currently off anticoagulation due to melena. Continue to hold AC if pt to receive  shunt, discuss risk/benefits of if/when to resume AC

## 2020-08-04 NOTE — PROGRESS NOTE ADULT - ASSESSMENT
74 y/o F HD7 with a PMHx of Afib (on Eliquis), HFpEF w/ severe aortic stenosis s/p TAVR (May 2020), HTN, CAD s/p STEMI (on ASA), CKD, TIA s/p L CEA, recent ESBL UTI, presented from [facility] rehab due to 2 weeks of worsening mental status. She was found to have increased ventriculomegaly suggestive of hydrocephalus upon presentation in the ED. Patient is currently non verbal. Mildly responsible to verbal stimuli.

## 2020-08-04 NOTE — PROGRESS NOTE ADULT - PROBLEM SELECTOR PLAN 3
Pt presented with 2 weeks of worsening AMS with recent UCx performed at [facility] showing ESBL (bacteria unclear) sensitive to ertapenem. Did not meet SIRS criteria on admission. Pt was started on ertapenem 1g qd on 7/22 (planned for course to be completed on 7/29)    - Completed course of ertapenem on 7/29  - Urine culture: Final No growth

## 2020-08-04 NOTE — PROGRESS NOTE ADULT - PROBLEM SELECTOR PLAN 2
Pt presented with 2 weeks of worsening AMS. CTH noncontrast in ED showing increased diffuse ventriculomegaly compared to May 2020.  - Per neurology recs: MRI Head with contrast- Completed  - LP completed: waiting neurosurg / neurology recs

## 2020-08-04 NOTE — CHART NOTE - NSCHARTNOTEFT_GEN_A_CORE
notified by RN of small streaks of blood from rectum s/p cleaning pt today.   CBC in AM stable, at baseline of 12. VSS.   Rectal exam positive.   Repeat cbc ordered, f/u result.   continue holding ASA, eliquis. C/w IV protonix 40 bid.  Dr Willian CM notified

## 2020-08-04 NOTE — PROGRESS NOTE ADULT - SUBJECTIVE AND OBJECTIVE BOX
NP Note Neurosurgery Preop    HPI:  74 y/o woman with a PMHx of Afib (on Eliquis), HFpEF w/ severe aortic stenosis s/p TAVR (May 2020), HTN, CAD s/p STEMI (on ASA), CKD, TIA s/p L CEA, presented from [facility] rehab for 2 weeks of worsening AMS. Per Dr. Zhong from [facility], pt arrived at [facility] rehab about 3 weeks ago after recent admission for dizziness and weakness last month (6/2020). At [facility] rehab, patient was AAOx1, but was ambulatory thoughout her 3 weeks there. She was noted to be not eating as much recently and had multiple negative sepsis workup. Her latest UCx however did grow ESBL sensitive to ertapenem (started IV 1g qd on 7/22, to end on 7/29). She was reported to be AAOx3, conversant and ambulatory. On arrival at [facility] ED, stroke code was called given patient was nonverbal. No acute stroke or bleeds on CTH, only notable for increasing hydrocephalus from May. At bedside, patient was AAOx2 (person and place), but not following much commands or conversant when using a [place] phone . Patient denied having any pain, but was unable to further answer other ROS questions at bedside.    ED Vitals: T 98.2F, /90, , RR 20, SpO2 94% RA  ED Labs: WBC 8.82, Hb 12.4, INR 1.55, Cr 1.28 (baseline 1.13 from June 2020). Trop neg x1.  ED Imaging: CTH noncontrast: No acute hemorrhage or infarct, +Diffuse ventriculomegaly increased from May 2020. CTA head/neck without large vessel occlusion. CXR with unchanged cardiomegaly, no acute infiltrates.  ED Management: IV CTX 1g x1 (28 Jul 2020 21:40)    OVERNIGHT EVENTS:  Vital Signs Last 24 Hrs  T(C): 37 (04 Aug 2020 16:43), Max: 37 (03 Aug 2020 21:08)  T(F): 98.6 (04 Aug 2020 16:43), Max: 98.6 (03 Aug 2020 21:08)  HR: 96 (04 Aug 2020 16:43) (76 - 106)  BP: 131/90 (04 Aug 2020 16:43) (126/93 - 141/99)  BP(mean): --  RR: 18 (04 Aug 2020 16:43) (18 - 20)  SpO2: 96% (04 Aug 2020 16:43) (94% - 97%)    I&O's Detail    I&O's Summary      PHYSICAL EXAM:  Neurological:      EXAM:   General: NAD, pt is comfortably sitting up in bed, A&O x1 (pt intermittently answers "yes" to name using [employer] )  HEENT: PERRL b/l 3mm, EOMI b/l, face symmetric, tongue midline, neck supple, non-tender to palpation  Cardiovascular: regular rate and rhythm, normal S1 and S2   Respiratory: lungs clear to auscultation throughout, no wheezing, rhonchi, or crackles   GI: normoactive BS to auscultation, abd soft, NTND   Neuro: non-verbal, unable to assess speech at time of exam, not following commands   DIETZ x4 spontaneously,      Cardiovascular: RRR  Respiratory: Lungs CTAB  Gastrointestinal:  +BS   Genitourinary: Voiding without difficulty  Extremities: warm and dry  Incision/Wound:      DIET: NPO after MN    LABS:                        12.5   7.50  )-----------( 174      ( 04 Aug 2020 14:50 )             41.3     08-04    147<H>  |  111<H>  |  22  ----------------------------<  99  4.0   |  23  |  1.07    Ca    9.6      04 Aug 2020 06:46  Phos  3.6     08-04  Mg     1.8     08-04      PT/INR - ( 03 Aug 2020 06:00 )   PT: 16.2 sec;   INR: 1.37          PTT - ( 03 Aug 2020 06:00 )  PTT:156.0 sec    CAPILLARY BLOOD GLUCOSE      Drug Levels: [] N/A    CSF Analysis: [] N/A      Allergies    No Known Allergies    Intolerances      MEDICATIONS:  Antibiotics:    Neuro:    Anticoagulation:    OTHER:  atorvastatin 40 milliGRAM(s) Oral at bedtime  diltiazem    milliGRAM(s) Oral daily  latanoprost 0.005% Ophthalmic Solution 1 Drop(s) Both EYES at bedtime  metoprolol tartrate 37.5 milliGRAM(s) Oral two times a day  pantoprazole  Injectable 40 milliGRAM(s) IV Push two times a day  senna 2 Tablet(s) Oral at bedtime    IVF:  sodium chloride 0.9%. 600 milliLiter(s) IV Continuous <Continuous>    CULTURES:  Culture Results:   No growth to date (08-03 @ 14:52)  Culture Results:   No growth (07-28 @ 21:53)    RADIOLOGY & ADDITIONAL TESTS:      ASSESSMENT:  75y Female s/p Low Volume Lumbar Puncture with improvement neurologically    PLAN:    NEURO:    Monitor neuro status  Preop for OR in AM  NPO/IVF   Follow up AM labs  PTT should be within normal range  Consent will be obtained  by family in AM  Needs Medical clearance  Continue current medical regime  Covid negative    Dispo:  Disccussed with attending

## 2020-08-04 NOTE — PROGRESS NOTE ADULT - SUBJECTIVE AND OBJECTIVE BOX
Patient: GRACE KEY   Age: 75y   Gender:Female    OVERNIGHT EVENTS: NAEO    SUBJECTIVE / INTERVAL   HPI: HD7: No acute events o/n. alert, awake on entry. No changes.       VITAL SIGNS:  Vital Signs Last 24 Hrs  T(C): 36.8 (04 Aug 2020 08:47), Max: 37 (03 Aug 2020 21:08)  T(F): 98.2 (04 Aug 2020 08:47), Max: 98.6 (03 Aug 2020 21:08)  HR: 76 (04 Aug 2020 08:47) (76 - 106)  BP: 126/93 (04 Aug 2020 08:47) (126/93 - 141/99)  BP(mean): --  RR: 20 (04 Aug 2020 08:47) (18 - 20)  SpO2: 96% (04 Aug 2020 08:47) (94% - 97%)    PHYSICAL EXAM:  GENERAL: NAD, resting comfortably in bed.  HEAD:  Atraumatic, Normocephalic  EYES: EOMI, PERRLA, conjunctiva and sclera clear  NECK: Supple, No JVD  CHEST/LUNG: Clear to auscultation bilaterally; No wheezes  HEART: Irregular rate and rhythm; No murmurs, rubs, or gallops  ABDOMEN: Soft, Nontender, Nondistended; Bowel sounds present. Mildly firm this morning  EXTREMITIES:  2+ Peripheral Pulses, No clubbing, cyanosis, or edema  PSYCH: AAOx (to name) 1, moves all four ext   NEUROLOGY: Limited. Non Verbal  SKIN: No rashes or lesions    MEDICATIONS  (STANDING):  atorvastatin 40 milliGRAM(s) Oral at bedtime  diltiazem    milliGRAM(s) Oral daily  latanoprost 0.005% Ophthalmic Solution 1 Drop(s) Both EYES at bedtime  metoprolol tartrate 37.5 milliGRAM(s) Oral two times a day  pantoprazole  Injectable 40 milliGRAM(s) IV Push two times a day  senna 2 Tablet(s) Oral at bedtime    MEDICATIONS  (PRN):  ALLERGIES:  Allergies  No Known Allergies  Intolerances    LABS:                      12.0   7.36  )-----------( 162      ( 04 Aug 2020 06:46 )             39.0     08-04  147<H>  |  111<H>  |  22  ----------------------------<  99  4.0   |  23  |  1.07    Ca    9.6      04 Aug 2020 06:46  Phos  3.6     08-04  Mg     1.8     08-04    PT/INR - ( 03 Aug 2020 06:00 )   PT: 16.2 sec;   INR: 1.37     PTT - ( 03 Aug 2020 06:00 )  PTT:156.0 sec      Imaging:   PTT - ( 02 Aug 2020 10:34 )  PTT:>200.0 sec  CAPILLARY BLOOD GLUCOSE      EXAM: MR BRAIN IC   PROCEDURE DATE: 08/01/2020   INTERPRETATION: Nova SEGOVIA MD, have reviewed the images and the report and agree with the findings.     PROCEDURE INFORMATION:   Exam: MR Head With Contrast   Exam date and time: 8/1/2020 9:31 AM   Age: 75 years old   Clinical indication: Other: AMS hydrocephalus, tumor on CT     TECHNIQUE:   Imaging protocol: MR of the head with intravenous contrast.   3D rendering: MIP and/or 3D reconstructed images were created by the technologist.     COMPARISON:   CT HEAD STROKE PROTOCOL 7/28/2020 5:49 PM     FINDINGS:   Brain: No acute infarct identified on the diffusion-weighted imaging. No parenchymal hemorrhage.   A midline mass along plane of sphenoidale extending into the sella turcica measuring approximately   3.5 cm in AP x 3.1 cm in craniocaudal x 2.5 cm in transverse dimensions, image 88 series [id number],   image 20 series [id number]. This is predominantly T1 isointense/soft tissue signal intensity with avid   enhancement. Within the the right aspect of the mass, an area of low signal intensity in keeping with   calcification seen on the prior CT study. The mass may represent a partially calcified meningioma;   craniopharyngioma or collision tumor cannot be excluded. There are some limitations characterizing   the mass on the sequences due to considerable motion artifacts. The mass impinges upon the optic   apparatus.   Ventricles: Marked ventriculomegaly consistent with hydrocephalus. Periventricular T2 hyperintensity   consistent with transependymal flow of CSF. The ventricular enlargement is diffuse, including 4th   ventricle and aqueduct of Sylvius.   Sinuses: Mild sinus mucosal thickening. No acute sinusitis.   Mastoid air cells: Normal as visualized. No mastoid effusion.   Orbits: Prior right lens surgery.   Soft tissues: Unremarkable.     IMPRESSION:   1. Images are motion degraded.   2. Findings consistent with hydrocephalus; periventricular T2 hyperintensity in keeping with   transependymal flow of CSF. The ventricular enlargement is diffuse suggesting communicating   hydrocephalus.   3. Midline mass in the region of the sella turcica, as above.     EXAM: IR INTERVENTIONAL RAD PROC   PROCEDURE DATE: 08/03/2020   INTERPRETATION: CLINICAL INDICATION: Hydrocephalus     Exam: Fluoroscopic guided lumbar puncture   : Abdi Mane MD   Complications: None immediate   Fluoroscopy time: 0.1 minute   Prior to the procedure, the risks, benefits and alternatives to a fluoroscopic-guided lumbar puncture were explained to the patient's family. The patient's family agreed to the procedure and Informed consent was obtained.     PROCEDURE:   Using fluoroscopy, the L2-3 level was selected as most amenable to lumbar puncture. Using sterile technique, a 20 gauge spinal needle was inserted at the L2-3 level with prompt egress of CSF. A total of 12 cc of clear, colorless CSF was obtained, and sent to the laboratory for analysis. The patient was monitored after the procedure and was discharged from the radiology department in stable condition.     IMPRESSION:   Successful fluoroscopic-guided diagnostic lumbar puncture

## 2020-08-04 NOTE — PROGRESS NOTE ADULT - PROBLEM SELECTOR PLAN 8
Hx of CKD (baseline 1.13 from June 2020). Cr 1.28 on admission this time. Today: 1.07   - Encourage PO fluid intake  - continue to monitor with BMP

## 2020-08-04 NOTE — PROGRESS NOTE ADULT - PROBLEM SELECTOR PLAN 7
Hx of TIA s/p L CEA. CTH noncontrast here without acute bleeds or strokes. CTA head/neck without large vessel occlusion  - Hold ASA until FOTB results are back

## 2020-08-04 NOTE — PROGRESS NOTE ADULT - SUBJECTIVE AND OBJECTIVE BOX
NEUROLOGY CONSULT PROGRESS NOTE  : [id number] used for interview and exam    INTERVAL HISTORY:  pt with episode of melena (small volume), GI consulted and pt taken off anticoagulation (on for a fib)    REVIEW OF SYSTEMS:  patient nonverbal and nonresponsive to questions and intermittently compliant with commands during physical exam    MEDICATIONS:  atorvastatin 40 milliGRAM(s) Oral at bedtime  diltiazem    milliGRAM(s) Oral daily  latanoprost 0.005% Ophthalmic Solution 1 Drop(s) Both EYES at bedtime  metoprolol tartrate 37.5 milliGRAM(s) Oral two times a day  pantoprazole  Injectable 40 milliGRAM(s) IV Push two times a day  senna 2 Tablet(s) Oral at bedtime    VITAL SIGNS:  Vital Signs Last 24 Hrs  T(C): 36.8 (04 Aug 2020 08:47), Max: 37 (03 Aug 2020 21:08)  T(F): 98.2 (04 Aug 2020 08:47), Max: 98.6 (03 Aug 2020 21:08)  HR: 76 (04 Aug 2020 08:47) (76 - 106)  BP: 126/93 (04 Aug 2020 08:47) (126/93 - 141/99)  BP(mean): --  RR: 20 (04 Aug 2020 08:47) (18 - 20)  SpO2: 96% (04 Aug 2020 08:47) (94% - 97%)    PHYSICAL EXAMINATION:  General: obese elderly female, comfortable, in no acute distress  Neurologic:     -Mental Status: awake and alert, pt nonverbal, unable to assess orientation     -Cranial Nerves:          II: noncompliant with exam          III, IV, VI: EOMI without nystagmus. PERRL b/l          V:  noncompliant with exam          VII: Face is symmetric with normal eye closure and smile          VIII: noncompliant with exam          IX, X: Uvula is midline and soft palate rises symmetrically          XI: Head turning and shoulder shrug are intact.          XII: Tongue protrudes midline     -Motor: Normal bulk and tone. noncompliant with exam     -Sensation: noncompliant with exam     -Coordination: not assessed     -Reflexes: not assessed     -Gait: deferred    LABS:                          12.0   7.36  )-----------( 162      ( 04 Aug 2020 06:46 )             39.0     08-04    147<H>  |  111<H>  |  22  ----------------------------<  99  4.0   |  23  |  1.07    Ca    9.6      04 Aug 2020 06:46  Phos  3.6     08-04  Mg     1.8     08-04      PT/INR - ( 03 Aug 2020 06:00 )   PT: 16.2 sec;   INR: 1.37          PTT - ( 03 Aug 2020 06:00 )  PTT:156.0 sec      RADIOLOGY & ADDITIONAL STUDIES:    Lactate Dehydrogenase, CSF (08.04.20 @ 02:35) -   Lactate Dehydrogenase, CSF: 14  Glucose, CSF (08.03.20 @ 14:05) -   Glucose, CSF: 57 mg/dL  Protein, CSF (08.03.20 @ 14:05) -   Protein, CSF: 181 mg/dL    Cerebrospinal Fluid Cell Count-1 (08.03.20 @ 14:05)    Total Nucleated Cell Count, CSF: 4 /uL    CSF Color: No Color    CSF Appearance: Clear    CSF Comments: Differential is based on 4_ cells counted after cytocentrifugation.    CSF Lymphocytes: 3 %    CSF Segmented Neutrophils: 1 %    Culture - CSF with Gram Stain (08.03.20 @ 14:52)    Gram Stain:  No organisms seen, Rare White blood cells    Culture Results: No growth to date    CSF PCR Panel (08.03.20 @ 14:01) -  CSF PCR Result: NotDetec    IMPRESSION & RECOMMENDATIONS:

## 2020-08-04 NOTE — PROGRESS NOTE ADULT - SUBJECTIVE AND OBJECTIVE BOX
Pt seen and examined   no further reports of melena    REVIEW OF SYSTEMS:  denies abdominal pain       MEDICATIONS:  MEDICATIONS  (STANDING):  atorvastatin 40 milliGRAM(s) Oral at bedtime  diltiazem    milliGRAM(s) Oral daily  latanoprost 0.005% Ophthalmic Solution 1 Drop(s) Both EYES at bedtime  metoprolol tartrate 37.5 milliGRAM(s) Oral two times a day  pantoprazole  Injectable 40 milliGRAM(s) IV Push two times a day  senna 2 Tablet(s) Oral at bedtime    MEDICATIONS  (PRN):      Allergies    No Known Allergies    Intolerances        Vital Signs Last 24 Hrs  T(C): 36.8 (04 Aug 2020 08:47), Max: 37 (03 Aug 2020 21:08)  T(F): 98.2 (04 Aug 2020 08:47), Max: 98.6 (03 Aug 2020 21:08)  HR: 76 (04 Aug 2020 08:47) (76 - 106)  BP: 126/93 (04 Aug 2020 08:47) (126/93 - 141/99)  BP(mean): --  RR: 20 (04 Aug 2020 08:47) (18 - 20)  SpO2: 96% (04 Aug 2020 08:47) (94% - 97%)      PHYSICAL EXAM:    General:  in no acute distress  HEENT: MMM, conjunctiva and sclera clear  Gastrointestinal: Soft non-tender non-distended; Normal bowel sounds;   Skin: Warm and dry. No obvious rash    LABS:      CBC Full  -  ( 04 Aug 2020 06:46 )  WBC Count : 7.36 K/uL  RBC Count : 4.03 M/uL  Hemoglobin : 12.0 g/dL  Hematocrit : 39.0 %  Platelet Count - Automated : 162 K/uL  Mean Cell Volume : 96.8 fl  Mean Cell Hemoglobin : 29.8 pg  Mean Cell Hemoglobin Concentration : 30.8 gm/dL  Auto Neutrophil # : x  Auto Lymphocyte # : x  Auto Monocyte # : x  Auto Eosinophil # : x  Auto Basophil # : x  Auto Neutrophil % : x  Auto Lymphocyte % : x  Auto Monocyte % : x  Auto Eosinophil % : x  Auto Basophil % : x    08-04    147<H>  |  111<H>  |  22  ----------------------------<  99  4.0   |  23  |  1.07    Ca    9.6      04 Aug 2020 06:46  Phos  3.6     08-04  Mg     1.8     08-04      PT/INR - ( 03 Aug 2020 06:00 )   PT: 16.2 sec;   INR: 1.37          PTT - ( 03 Aug 2020 06:00 )  PTT:156.0 sec                  RADIOLOGY & ADDITIONAL STUDIES (The following images were personally reviewed):

## 2020-08-04 NOTE — PROGRESS NOTE ADULT - SUBJECTIVE AND OBJECTIVE BOX
Patient: GRACE KEY   Age: 75y   Gender:Female    OVERNIGHT EVENTS: NAEO    SUBJECTIVE / INTERVAL no change      VITAL SIGNS:  Vital Signs Last 24 Hrs  T(C): 36.8 (04 Aug 2020 08:47), Max: 37 (03 Aug 2020 21:08)  T(F): 98.2 (04 Aug 2020 08:47), Max: 98.6 (03 Aug 2020 21:08)  HR: 76 (04 Aug 2020 08:47) (76 - 106)  BP: 126/93 (04 Aug 2020 08:47) (126/93 - 141/99)  BP(mean): --  RR: 20 (04 Aug 2020 08:47) (18 - 20)  SpO2: 96% (04 Aug 2020 08:47) (94% - 97%)    PHYSICAL EXAM:  GENERAL: NAD, resting comfortably in bed.  HEAD:  Atraumatic, Normocephalic  EYES: EOMI, PERRLA, conjunctiva and sclera clear  NECK: Supple, No JVD  CHEST/LUNG: Clear to auscultation bilaterally; No wheezes  HEART: Irregular rate and rhythm; No murmurs, rubs, or gallops  ABDOMEN: Soft, Nontender, Nondistended; Bowel sounds present. Mildly firm this morning  EXTREMITIES:  2+ Peripheral Pulses, No clubbing, cyanosis, or edema  PSYCH: AAOx (to name) 1, moves all four ext   NEUROLOGY: Limited. Non Verbal  SKIN: No rashes or lesions    MEDICATIONS  (STANDING):  atorvastatin 40 milliGRAM(s) Oral at bedtime  diltiazem    milliGRAM(s) Oral daily  latanoprost 0.005% Ophthalmic Solution 1 Drop(s) Both EYES at bedtime  metoprolol tartrate 37.5 milliGRAM(s) Oral two times a day  pantoprazole  Injectable 40 milliGRAM(s) IV Push two times a day  senna 2 Tablet(s) Oral at bedtime    MEDICATIONS  (PRN):  ALLERGIES:  Allergies  No Known Allergies  Intolerances    LABS:                      12.0   7.36  )-----------( 162      ( 04 Aug 2020 06:46 )             39.0     08-04  147<H>  |  111<H>  |  22  ----------------------------<  99  4.0   |  23  |  1.07    Ca    9.6      04 Aug 2020 06:46  Phos  3.6     08-04  Mg     1.8     08-04    PT/INR - ( 03 Aug 2020 06:00 )   PT: 16.2 sec;   INR: 1.37     PTT - ( 03 Aug 2020 06:00 )  PTT:156.0 sec      Imaging:   PTT - ( 02 Aug 2020 10:34 )  PTT:>200.0 sec  CAPILLARY BLOOD GLUCOSE      EXAM: MR BRAIN IC   PROCEDURE DATE: 08/01/2020   INTERPRETATION: INova MD, have reviewed the images and the report and agree with the findings.     PROCEDURE INFORMATION:   Exam: MR Head With Contrast   Exam date and time: 8/1/2020 9:31 AM   Age: 75 years old   Clinical indication: Other: AMS hydrocephalus, tumor on CT     TECHNIQUE:   Imaging protocol: MR of the head with intravenous contrast.   3D rendering: MIP and/or 3D reconstructed images were created by the technologist.     COMPARISON:   CT HEAD STROKE PROTOCOL 7/28/2020 5:49 PM     FINDINGS:   Brain: No acute infarct identified on the diffusion-weighted imaging. No parenchymal hemorrhage.   A midline mass along plane of sphenoidale extending into the sella turcica measuring approximately   3.5 cm in AP x 3.1 cm in craniocaudal x 2.5 cm in transverse dimensions, image 88 series [id number],   image 20 series [id number]. This is predominantly T1 isointense/soft tissue signal intensity with avid   enhancement. Within the the right aspect of the mass, an area of low signal intensity in keeping with   calcification seen on the prior CT study. The mass may represent a partially calcified meningioma;   craniopharyngioma or collision tumor cannot be excluded. There are some limitations characterizing   the mass on the sequences due to considerable motion artifacts. The mass impinges upon the optic   apparatus.   Ventricles: Marked ventriculomegaly consistent with hydrocephalus. Periventricular T2 hyperintensity   consistent with transependymal flow of CSF. The ventricular enlargement is diffuse, including 4th   ventricle and aqueduct of Sylvius.   Sinuses: Mild sinus mucosal thickening. No acute sinusitis.   Mastoid air cells: Normal as visualized. No mastoid effusion.   Orbits: Prior right lens surgery.   Soft tissues: Unremarkable.     IMPRESSION:   1. Images are motion degraded.   2. Findings consistent with hydrocephalus; periventricular T2 hyperintensity in keeping with   transependymal flow of CSF. The ventricular enlargement is diffuse suggesting communicating   hydrocephalus.   3. Midline mass in the region of the sella turcica, as above.     EXAM: IR INTERVENTIONAL RAD PROC   PROCEDURE DATE: 08/03/2020   INTERPRETATION: CLINICAL INDICATION: Hydrocephalus     Exam: Fluoroscopic guided lumbar puncture   : Abdi Mane MD   Complications: None immediate   Fluoroscopy time: 0.1 minute   Prior to the procedure, the risks, benefits and alternatives to a fluoroscopic-guided lumbar puncture were explained to the patient's family. The patient's family agreed to the procedure and Informed consent was obtained.     PROCEDURE:   Using fluoroscopy, the L2-3 level was selected as most amenable to lumbar puncture. Using sterile technique, a 20 gauge spinal needle was inserted at the L2-3 level with prompt egress of CSF. A total of 12 cc of clear, colorless CSF was obtained, and sent to the laboratory for analysis. The patient was monitored after the procedure and was discharged from the radiology department in stable condition.     IMPRESSION:   Successful fluoroscopic-guided diagnostic lumbar puncture

## 2020-08-04 NOTE — PROGRESS NOTE ADULT - ASSESSMENT
76 y/o F HD7 with a PMHx of Afib (on Eliquis), HFpEF w/ severe aortic stenosis s/p TAVR (May 2020), HTN, CAD s/p STEMI (on ASA), CKD, TIA s/p L CEA, recent ESBL UTI, presented from [facility] rehab due to 2 weeks of worsening mental status. She was found to have increased ventriculomegaly suggestive of hydrocephalus upon presentation in the ED. Patient is currently non verbal. Mildly responsible to verbal stimuli.

## 2020-08-04 NOTE — PROGRESS NOTE ADULT - PROBLEM SELECTOR PLAN 5
Hx of CAD s/p STEMI. On home ASA 81mg qd  - per stroke neurology, OK to restart ASA.  - Hold ASA for LP, -- Completed.  (Will hold until FOTB is completed).

## 2020-08-05 NOTE — PROGRESS NOTE ADULT - PROBLEM SELECTOR PLAN 2
Pt presented with 2 weeks of worsening AMS. CTH noncontrast in ED showing increased diffuse ventriculomegaly compared to May 2020.  - Per neurology recs: MRI Head with contrast- Completed  - LP completed: waiting neurosurg / neurology recs Pt presented with 2 weeks of worsening AMS. CTH noncontrast in ED showing increased diffuse ventriculomegaly compared to May 2020.  - Per neurology recs: f/u CSF autommune enchapalitis panal, flow cytology, ACE  - LP completed: waiting neurosurg / neurology recs

## 2020-08-05 NOTE — PROGRESS NOTE ADULT - SUBJECTIVE AND OBJECTIVE BOX
Patient: GRACE KEY   Age: 75y   Gender:Female    OVERNIGHT EVENTS: NAEO    SUBJECTIVE / INTERVAL   HPI: HD7: No acute events o/n. alert, awake on entry. No changes.       VITAL SIGNS:  Vital Signs Last 24 Hrs  T(C): 36.8 (04 Aug 2020 08:47), Max: 37 (03 Aug 2020 21:08)  T(F): 98.2 (04 Aug 2020 08:47), Max: 98.6 (03 Aug 2020 21:08)  HR: 76 (04 Aug 2020 08:47) (76 - 106)  BP: 126/93 (04 Aug 2020 08:47) (126/93 - 141/99)  BP(mean): --  RR: 20 (04 Aug 2020 08:47) (18 - 20)  SpO2: 96% (04 Aug 2020 08:47) (94% - 97%)    PHYSICAL EXAM:  GENERAL: NAD, resting comfortably in bed.  HEAD:  Atraumatic, Normocephalic  EYES: EOMI, PERRLA, conjunctiva and sclera clear  NECK: Supple, No JVD  CHEST/LUNG: Clear to auscultation bilaterally; No wheezes  HEART: Irregular rate and rhythm; No murmurs, rubs, or gallops  ABDOMEN: Soft, Nontender, Nondistended; Bowel sounds present. Mildly firm this morning  EXTREMITIES:  2+ Peripheral Pulses, No clubbing, cyanosis, or edema  PSYCH: AAOx (to name) 1, moves all four ext   NEUROLOGY: Limited. Non Verbal  SKIN: No rashes or lesions    MEDICATIONS  (STANDING):  atorvastatin 40 milliGRAM(s) Oral at bedtime  diltiazem    milliGRAM(s) Oral daily  latanoprost 0.005% Ophthalmic Solution 1 Drop(s) Both EYES at bedtime  metoprolol tartrate 37.5 milliGRAM(s) Oral two times a day  pantoprazole  Injectable 40 milliGRAM(s) IV Push two times a day  senna 2 Tablet(s) Oral at bedtime    MEDICATIONS  (PRN):  ALLERGIES:  Allergies  No Known Allergies  Intolerances    LABS:                      12.0   7.36  )-----------( 162      ( 04 Aug 2020 06:46 )             39.0     08-04  147<H>  |  111<H>  |  22  ----------------------------<  99  4.0   |  23  |  1.07    Ca    9.6      04 Aug 2020 06:46  Phos  3.6     08-04  Mg     1.8     08-04    PT/INR - ( 03 Aug 2020 06:00 )   PT: 16.2 sec;   INR: 1.37     PTT - ( 03 Aug 2020 06:00 )  PTT:156.0 sec      Imaging:   PTT - ( 02 Aug 2020 10:34 )  PTT:>200.0 sec  CAPILLARY BLOOD GLUCOSE      EXAM: MR BRAIN IC   PROCEDURE DATE: 08/01/2020   INTERPRETATION: Nova SEGOVIA MD, have reviewed the images and the report and agree with the findings.     PROCEDURE INFORMATION:   Exam: MR Head With Contrast   Exam date and time: 8/1/2020 9:31 AM   Age: 75 years old   Clinical indication: Other: AMS hydrocephalus, tumor on CT     TECHNIQUE:   Imaging protocol: MR of the head with intravenous contrast.   3D rendering: MIP and/or 3D reconstructed images were created by the technologist.     COMPARISON:   CT HEAD STROKE PROTOCOL 7/28/2020 5:49 PM     FINDINGS:   Brain: No acute infarct identified on the diffusion-weighted imaging. No parenchymal hemorrhage.   A midline mass along plane of sphenoidale extending into the sella turcica measuring approximately   3.5 cm in AP x 3.1 cm in craniocaudal x 2.5 cm in transverse dimensions, image 88 series [id number],   image 20 series [id number]. This is predominantly T1 isointense/soft tissue signal intensity with avid   enhancement. Within the the right aspect of the mass, an area of low signal intensity in keeping with   calcification seen on the prior CT study. The mass may represent a partially calcified meningioma;   craniopharyngioma or collision tumor cannot be excluded. There are some limitations characterizing   the mass on the sequences due to considerable motion artifacts. The mass impinges upon the optic   apparatus.   Ventricles: Marked ventriculomegaly consistent with hydrocephalus. Periventricular T2 hyperintensity   consistent with transependymal flow of CSF. The ventricular enlargement is diffuse, including 4th   ventricle and aqueduct of Sylvius.   Sinuses: Mild sinus mucosal thickening. No acute sinusitis.   Mastoid air cells: Normal as visualized. No mastoid effusion.   Orbits: Prior right lens surgery.   Soft tissues: Unremarkable.     IMPRESSION:   1. Images are motion degraded.   2. Findings consistent with hydrocephalus; periventricular T2 hyperintensity in keeping with   transependymal flow of CSF. The ventricular enlargement is diffuse suggesting communicating   hydrocephalus.   3. Midline mass in the region of the sella turcica, as above.     EXAM: IR INTERVENTIONAL RAD PROC   PROCEDURE DATE: 08/03/2020   INTERPRETATION: CLINICAL INDICATION: Hydrocephalus     Exam: Fluoroscopic guided lumbar puncture   : Abdi Mane MD   Complications: None immediate   Fluoroscopy time: 0.1 minute   Prior to the procedure, the risks, benefits and alternatives to a fluoroscopic-guided lumbar puncture were explained to the patient's family. The patient's family agreed to the procedure and Informed consent was obtained.     PROCEDURE:   Using fluoroscopy, the L2-3 level was selected as most amenable to lumbar puncture. Using sterile technique, a 20 gauge spinal needle was inserted at the L2-3 level with prompt egress of CSF. A total of 12 cc of clear, colorless CSF was obtained, and sent to the laboratory for analysis. The patient was monitored after the procedure and was discharged from the radiology department in stable condition.     IMPRESSION:   Successful fluoroscopic-guided diagnostic lumbar puncture

## 2020-08-05 NOTE — PROGRESS NOTE ADULT - PROBLEM SELECTOR PLAN 3
Pt presented with 2 weeks of worsening AMS with [facility] (bacteria unclear) sensitive to ertapenem. Did not meet SIRS criteria on admission. Pt was started on ertapenem 1g qd on 7/22 (planned for course to be completed on 7/29)    - Completed course of ertapenem on 7/29  - Urine culture: Final No growth  - f/u repeat UA (reflex culture ordered)

## 2020-08-05 NOTE — BRIEF OPERATIVE NOTE - NSICDXBRIEFPROCEDURE_GEN_ALL_CORE_FT
PROCEDURES:  Ventriculoperitoneal shunt 05-Aug-2020 19:12:42  Yasir Hassan
PROCEDURES:  Ventriculoperitoneal shunt 05-Aug-2020 19:12:42  Yasir Hassan

## 2020-08-05 NOTE — PROGRESS NOTE ADULT - SUBJECTIVE AND OBJECTIVE BOX
Patient: GRACE KEY   Age: 75y   Gender:Female    OVERNIGHT EVENTS: NAEO    SUBJECTIVE / INTERVAL   HPI: HD7: No acute events o/n. alert, awake on entry. No changes.       VITAL SIGNS:  Vital Signs Last 24 Hrs  T(C): 36.8 (04 Aug 2020 08:47), Max: 37 (03 Aug 2020 21:08)  T(F): 98.2 (04 Aug 2020 08:47), Max: 98.6 (03 Aug 2020 21:08)  HR: 76 (04 Aug 2020 08:47) (76 - 106)  BP: 126/93 (04 Aug 2020 08:47) (126/93 - 141/99)  BP(mean): --  RR: 20 (04 Aug 2020 08:47) (18 - 20)  SpO2: 96% (04 Aug 2020 08:47) (94% - 97%)    PHYSICAL EXAM:  GENERAL: NAD, resting comfortably in bed.  HEAD:  Atraumatic, Normocephalic  EYES: EOMI, PERRLA, conjunctiva and sclera clear  NECK: Supple, No JVD  CHEST/LUNG: Clear to auscultation bilaterally; No wheezes  HEART: Irregular rate and rhythm; No murmurs, rubs, or gallops  ABDOMEN: Soft, Nontender, Nondistended; Bowel sounds present. Mildly firm this morning  EXTREMITIES:  2+ Peripheral Pulses, No clubbing, cyanosis, or edema  PSYCH: AAOx (to name) 1, moves all four ext   NEUROLOGY: Limited. Non Verbal  SKIN: No rashes or lesions    MEDICATIONS  (STANDING):  atorvastatin 40 milliGRAM(s) Oral at bedtime  diltiazem    milliGRAM(s) Oral daily  latanoprost 0.005% Ophthalmic Solution 1 Drop(s) Both EYES at bedtime  metoprolol tartrate 37.5 milliGRAM(s) Oral two times a day  pantoprazole  Injectable 40 milliGRAM(s) IV Push two times a day  senna 2 Tablet(s) Oral at bedtime    MEDICATIONS  (PRN):  ALLERGIES:  Allergies  No Known Allergies  Intolerances    LABS:                      12.0   7.36  )-----------( 162      ( 04 Aug 2020 06:46 )             39.0     08-04  147<H>  |  111<H>  |  22  ----------------------------<  99  4.0   |  23  |  1.07    Ca    9.6      04 Aug 2020 06:46  Phos  3.6     08-04  Mg     1.8     08-04    PT/INR - ( 03 Aug 2020 06:00 )   PT: 16.2 sec;   INR: 1.37     PTT - ( 03 Aug 2020 06:00 )  PTT:156.0 sec      Imaging:   PTT - ( 02 Aug 2020 10:34 )  PTT:>200.0 sec  CAPILLARY BLOOD GLUCOSE      EXAM: MR BRAIN IC   PROCEDURE DATE: 08/01/2020   INTERPRETATION: Nova SEGOVIA MD, have reviewed the images and the report and agree with the findings.     PROCEDURE INFORMATION:   Exam: MR Head With Contrast   Exam date and time: 8/1/2020 9:31 AM   Age: 75 years old   Clinical indication: Other: AMS hydrocephalus, tumor on CT     TECHNIQUE:   Imaging protocol: MR of the head with intravenous contrast.   3D rendering: MIP and/or 3D reconstructed images were created by the technologist.     COMPARISON:   CT HEAD STROKE PROTOCOL 7/28/2020 5:49 PM     FINDINGS:   Brain: No acute infarct identified on the diffusion-weighted imaging. No parenchymal hemorrhage.   A midline mass along plane of sphenoidale extending into the sella turcica measuring approximately   3.5 cm in AP x 3.1 cm in craniocaudal x 2.5 cm in transverse dimensions, image 88 series [id number],   image 20 series [id number]. This is predominantly T1 isointense/soft tissue signal intensity with avid   enhancement. Within the the right aspect of the mass, an area of low signal intensity in keeping with   calcification seen on the prior CT study. The mass may represent a partially calcified meningioma;   craniopharyngioma or collision tumor cannot be excluded. There are some limitations characterizing   the mass on the sequences due to considerable motion artifacts. The mass impinges upon the optic   apparatus.   Ventricles: Marked ventriculomegaly consistent with hydrocephalus. Periventricular T2 hyperintensity   consistent with transependymal flow of CSF. The ventricular enlargement is diffuse, including 4th   ventricle and aqueduct of Sylvius.   Sinuses: Mild sinus mucosal thickening. No acute sinusitis.   Mastoid air cells: Normal as visualized. No mastoid effusion.   Orbits: Prior right lens surgery.   Soft tissues: Unremarkable.     IMPRESSION:   1. Images are motion degraded.   2. Findings consistent with hydrocephalus; periventricular T2 hyperintensity in keeping with   transependymal flow of CSF. The ventricular enlargement is diffuse suggesting communicating   hydrocephalus.   3. Midline mass in the region of the sella turcica, as above.     EXAM: IR INTERVENTIONAL RAD PROC   PROCEDURE DATE: 08/03/2020   INTERPRETATION: CLINICAL INDICATION: Hydrocephalus     Exam: Fluoroscopic guided lumbar puncture   : Abdi Mane MD   Complications: None immediate   Fluoroscopy time: 0.1 minute   Prior to the procedure, the risks, benefits and alternatives to a fluoroscopic-guided lumbar puncture were explained to the patient's family. The patient's family agreed to the procedure and Informed consent was obtained.     PROCEDURE:   Using fluoroscopy, the L2-3 level was selected as most amenable to lumbar puncture. Using sterile technique, a 20 gauge spinal needle was inserted at the L2-3 level with prompt egress of CSF. A total of 12 cc of clear, colorless CSF was obtained, and sent to the laboratory for analysis. The patient was monitored after the procedure and was discharged from the radiology department in stable condition.     IMPRESSION:   Successful fluoroscopic-guided diagnostic lumbar puncture

## 2020-08-05 NOTE — BRIEF OPERATIVE NOTE - NSICDXBRIEFPREOP_GEN_ALL_CORE_FT
PRE-OP DIAGNOSIS:  Hydrocephalus 05-Aug-2020 19:12:52  Yasir Hassan
PRE-OP DIAGNOSIS:  Hydrocephalus 05-Aug-2020 19:12:52  Yasir Hassan

## 2020-08-05 NOTE — PROGRESS NOTE ADULT - ASSESSMENT
since hemoglobin stable continue protonix once daily, proceed with neuro workup   will be available if she has more bleed and if so will consider endoscopy

## 2020-08-05 NOTE — PROGRESS NOTE ADULT - ASSESSMENT
per Internal Medicine    76 y/o F HD7 with a PMHx of Afib (on Eliquis), HFpEF w/ severe aortic stenosis s/p TAVR (May 2020), HTN, CAD s/p STEMI (on ASA), CKD, TIA s/p L CEA, recent ESBL UTI, presented from [facility] rehab due to 2 weeks of worsening mental status. She was found to have increased ventriculomegaly suggestive of hydrocephalus upon presentation in the ED. Patient is currently non verbal. Mildly responsible to verbal stimuli.     Problem/Plan - 1:  ·  Problem: Altered mental status, unspecified altered mental status type.  Plan: Pt reported to be AAOx3, conversant and ambulatory prior to TAVR in May 2020. Recently sent to [facility] rehab after June admission for dizziness/weakness. Pt reported to be AAOx0 at [facility]. Multiple negative septic workup, until recent UCx growing ESBL bacteria sensitive to ertapenem (started on IV ertapenem 1g qd from 7/22-7/29). Stroke code called in ED for nonverbal state, CTH negative for acute pathologies, however showing increasing hydrocephalus. CTA head/neck without large vessel occlusion. DDx include 2/2 ESBL UTI vs. worsening hydrocephalus.  - Held ASA for LP (IR to perform on today)  - Held Eliquis Saturday for LP (IR to perform today)  - Appreciate Speech and Swallow Recs (Holding solids and advancing as tolerated)  - Appreciate PT (Will re-attempt therapy when daughter present).     Problem/Plan - 2:  ·  Problem: Hydrocephalus.  Plan: Pt presented with 2 weeks of worsening AMS. CTH noncontrast in ED showing increased diffuse ventriculomegaly compared to May 2020.  - Per neurology recs: MRI Head with contrast- Completed  - LP completed: waiting neurosurg / neurology recs.     Problem/Plan - 3:  ·  Problem: UTI (urinary tract infection).  Plan: Pt presented with 2 weeks of worsening AMS with recent UCx performed at [facility] rehab showing ESBL (bacteria unclear) sensitive to ertapenem. Did not meet SIRS criteria on admission. Pt was started on ertapenem 1g qd on 7/22 (planned for course to be completed on 7/29)    - Completed course of ertapenem on 7/29  - Urine culture: Final No growth.     Problem/Plan - 4:  ·  Problem: Afib.  Plan: Hx of afib on home Eliquis 5mg BID, diltiazem 120 qd, lopressor 37.5mg BID  - per stroke neurology, Eliquis. Hold on Saturday for LP Monday.   - c/w Eliquis, diltiazem, and lopressor here  - heparin ggt, with ptt >200 x2, will recheck again and start as indicated for full A/C. ptt >156 today. Re-dose s/p LP. Will hold until FOBT is completed.     Problem/Plan - 5:  ·  Problem: CAD (coronary artery disease).  Plan: Hx of CAD s/p STEMI. On home ASA 81mg qd  - per stroke neurology, OK to restart ASA.  - Hold ASA for LP, -- Completed.  (Will hold until FOTB is completed).     Problem/Plan - 6:  Problem: HTN (hypertension). Plan: Hx of HTN on lopressor 37.5mg BID  - sBP 130s here HR tachycardic 90s-110s  - c/w lopressor here.    Problem/Plan - 7:  ·  Problem: TIA (transient ischemic attack).  Plan: Hx of TIA s/p L CEA. CTH noncontrast here without acute bleeds or strokes. CTA head/neck without large vessel occlusion  - Hold ASA until FOTB results are back.     Problem/Plan - 8:  ·  Problem: Chronic kidney disease (CKD).  Plan: Hx of CKD (baseline 1.13 from June 2020). Cr 1.28 on admission this time. Today: 1.07   - Encourage PO fluid intake  - continue to monitor with BMP.     Problem/Plan - 9:  ·  Problem: Aortic stenosis.  Plan: Hx of aortic stenosis s/p TAVR in May 2020. Lung exam CTAB.    #HFpEF   Echo 5/2020: EF 64%, +pHTN (PASP 64), mod-severe mtiral regurg, LVH, mildly dilated LA. On home lopressor 37.5mg BID. Lungs CTAB on exam. CXR with cardiomegaly consistent with prior CXR in June, not fluid overloaded at this moment. Satting well on RA.  - c/w lopressor.     Problem/Plan - 10:  Problem: Nutrition, metabolism, and development symptoms. Plan; Plan:   F: - none  E: replete K<4, Mg<2  N: Diet per Speech and Swallow  VTE Prophylaxis: Eliquis, SCDs  C: Full Code  D: RMF

## 2020-08-05 NOTE — PROGRESS NOTE ADULT - ASSESSMENT
74 y/o F HD7 with a PMHx of Afib (on Eliquis), HFpEF w/ severe aortic stenosis s/p TAVR (May 2020), HTN, CAD s/p STEMI (on ASA), CKD, TIA s/p L CEA, recent ESBL UTI, presented from [facility] rehab due to 2 weeks of worsening mental status. She was found to have increased ventriculomegaly suggestive of hydrocephalus upon presentation in the ED. Patient is currently non verbal. Mildly responsible to verbal stimuli.

## 2020-08-05 NOTE — PROGRESS NOTE ADULT - PROBLEM SELECTOR PLAN 2
Pt presented with 2 weeks of worsening AMS. CTH noncontrast in ED showing increased diffuse ventriculomegaly compared to May 2020.  - Per neurology recs: f/u CSF autommune enchapalitis panal, flow cytology, ACE  - LP completed: waiting neurosurg / neurology recs

## 2020-08-05 NOTE — PROGRESS NOTE ADULT - ASSESSMENT
76 y/o F HD7 with a PMHx of Afib (on Eliquis), HFpEF w/ severe aortic stenosis s/p TAVR (May 2020), HTN, CAD s/p STEMI (on ASA), CKD, TIA s/p L CEA, recent ESBL UTI, presented from [facility] rehab due to 2 weeks of worsening mental status. She was found to have increased ventriculomegaly suggestive of hydrocephalus upon presentation in the ED. Patient is currently non verbal. Mildly responsible to verbal stimuli.

## 2020-08-05 NOTE — PROGRESS NOTE ADULT - SUBJECTIVE AND OBJECTIVE BOX
GENERAL SURGERY TEAM 4 CONSULT POST-OPERATIVE NOTE    Procedure: Ventriculoperitoneal shunt, general surgery consulted for laparoscopic assist    Diagnosis/Indication: hydrocephalus    Surgeon: Dr. Carmichael     S: Pt has no complaints. Abdomen not tender to palpation. Resting comfortably without CP, SOB, MIRZA, calf tenderness. Pain controlled with medication. No nausea, vomiting.    O:  T(C): 36.3 (08-05-20 @ 21:00), Max: 36.3 (08-05-20 @ 21:00)  T(F): 97.4 (08-05-20 @ 21:00), Max: 97.4 (08-05-20 @ 21:00)  HR: 98 (08-05-20 @ 21:45) (92 - 107)  BP: 115/75 (08-05-20 @ 21:45) (81/57 - 129/63)  RR: 19 (08-05-20 @ 21:45) (18 - 36)  SpO2: 90% (08-05-20 @ 21:45) (61% - 100%)  Wt(kg): --                        12.5   7.95  )-----------( 173      ( 05 Aug 2020 05:49 )             40.5     08-05    143  |  106  |  20  ----------------------------<  102<H>  4.0   |  25  |  1.12    Ca    9.6      05 Aug 2020 05:49  Phos  3.4     08-05  Mg     1.7     08-05        Gen: NAD, resting comfortably in bed  C/V: NSR  Pulm: Nonlabored breathing, no respiratory distress  Abd: soft, NT/ND, abdominal incisions clean/intact, mild oozing from umbilical incision  Extrem: WWP, no calf edema or tenderness, SCDs in place      A/P: 75y Female s/p ventriculoperitoneal shunt, team 4c consulted for laparoscopic assist  Diet: NPO  IVF: NS@50  Pain/nausea control  SQH/SCDs/OOBA/IS  Dispo pending pain control, PO tolerance, clinical improvement

## 2020-08-05 NOTE — PROGRESS NOTE ADULT - SUBJECTIVE AND OBJECTIVE BOX
Physical Medicine and Rehabilitation Progress Note:    Patient is a 75y old  Female who presents with a chief complaint of AMS, UTI (05 Aug 2020 15:29)      HPI:  76 y/o woman with a PMHx of Afib (on Eliquis), HFpEF w/ severe aortic stenosis s/p TAVR (May 2020), HTN, CAD s/p STEMI (on ASA), CKD, TIA s/p L CEA, presented from [facility] rehab for 2 weeks of worsening AMS. Per Dr. Zhong from [facility], pt arrived at [facility] rehab about 3 weeks ago after recent admission for dizziness and weakness last month (6/2020). At [facility] rehab, patient was AAOx1, but was ambulatory thoughout her 3 weeks there. She was noted to be not eating as much recently and had multiple negative sepsis workup. Her latest UCx however did grow ESBL sensitive to ertapenem (started IV 1g qd on 7/22, to end on 7/29). She was reported to be AAOx3, conversant and ambulatory. On arrival at [facility] ED, stroke code was called given patient was nonverbal. No acute stroke or bleeds on CTH, only notable for increasing hydrocephalus from May. At bedside, patient was AAOx2 (person and place), but not following much commands or conversant when using a [place] phone . Patient denied having any pain, but was unable to further answer other ROS questions at bedside.    ED Vitals: T 98.2F, /90, , RR 20, SpO2 94% RA  ED Labs: WBC 8.82, Hb 12.4, INR 1.55, Cr 1.28 (baseline 1.13 from June 2020). Trop neg x1.  ED Imaging: CTH noncontrast: No acute hemorrhage or infarct, +Diffuse ventriculomegaly increased from May 2020. CTA head/neck without large vessel occlusion. CXR with unchanged cardiomegaly, no acute infiltrates.  ED Management: IV CTX 1g x1 (28 Jul 2020 21:40)                            12.5   7.95  )-----------( 173      ( 05 Aug 2020 05:49 )             40.5       08-05    143  |  106  |  20  ----------------------------<  102<H>  4.0   |  25  |  1.12    Ca    9.6      05 Aug 2020 05:49  Phos  3.4     08-05  Mg     1.7     08-05      Vital Signs Last 24 Hrs  T(C): 36.9 (05 Aug 2020 09:28), Max: 37 (04 Aug 2020 16:43)  T(F): 98.5 (05 Aug 2020 09:28), Max: 98.6 (04 Aug 2020 16:43)  HR: 102 (05 Aug 2020 09:28) (94 - 102)  BP: 134/94 (05 Aug 2020 09:28) (130/56 - 143/90)  BP(mean): --  RR: 18 (05 Aug 2020 09:28) (18 - 18)  SpO2: 95% (05 Aug 2020 09:28) (95% - 98%)    MEDICATIONS  (STANDING):  atorvastatin 40 milliGRAM(s) Oral at bedtime  diltiazem    milliGRAM(s) Oral daily  latanoprost 0.005% Ophthalmic Solution 1 Drop(s) Both EYES at bedtime  metoprolol tartrate 37.5 milliGRAM(s) Oral two times a day  pantoprazole  Injectable 40 milliGRAM(s) IV Push two times a day  povidone iodine 5% Nasal Swab 1 Application(s) Both Nostrils once  senna 2 Tablet(s) Oral at bedtime  sodium chloride 0.9%. 600 milliLiter(s) (50 mL/Hr) IV Continuous <Continuous>    MEDICATIONS  (PRN):    Currently Undergoing Physical/ Occupational Therapy at bedside.    Functional Status Assessment:   8/4/2020    Cognitive/Perceptual/Neuro  Cognitive/Neuro/Behavioral [WDL Definition: Alert; opens eyes spontaneously; arouses to voice or touch; oriented x 4; follows commands; speech spontaneous, logical; purposeful motor response; behavior appropriate to situation]: WDL except  Level of Consciousness: confused;  alert  Orientation: disoriented to;  place;  situation;  time  Speech: hypophonic  Mood/Behavior: calm;  cooperative    Language Assistance  Preferred Language to Address Healthcare Preferred Language to Address Healthcare: [place]  's name: PT is a native speaker.     Therapeutic Interventions      Bed Mobility  Bed Mobility Training Rehab Potential: good, to achieve stated therapy goals  Bed Mobility Training Rolling/Turning: maximum assist (25% patient effort);  moderate assist (50% patient effort);  2 person assist;  verbal cues;  bed rails  Bed Mobility Training Scooting: maximum assist (25% patient effort);  2 person assist  Bed Mobility Training Sit-to-Supine: maximum assist (25% patient effort);  2 person assist;  verbal cues;  bed rails  Bed Mobility Training Supine-to-Sit: maximum assist (25% patient effort);  2 person assist;  bed rails;  verbal cues  Bed Mobility Training Limitations: decreased strength;  cognitive, decreased safety awareness    Sit-Stand Transfer Training  Sit-to-Stand Transfer Training Rehab Potential: good, to achieve stated therapy goals  Transfer Training Sit-to-Stand Transfer: maximum assist (25% patient effort);  moderate assist (50% patient effort);  2 person assist;  verbal cues;  weight-bearing as tolerated   rolling walker  Transfer Training Stand-to-Sit Transfer: maximum assist (25% patient effort);  moderate assist (50% patient effort);  2 person assist;  verbal cues;  weight-bearing as tolerated   rolling walker  Sit-to-Stand Transfer Training Transfer Safety Analysis: cognitive, decreased safety awareness;  impaired balance;  decreased strength    Therapeutic Exercise  Therapeutic Exercise Rehab Effort: good  Therapeutic Exercise Detail: LAQ x10 bilat.             PM&R Impression: as above    Current Disposition Plan Recommendations:    subacute rehab placement

## 2020-08-05 NOTE — BRIEF OPERATIVE NOTE - OPERATION/FINDINGS
Laparoscopic ventriculoperitoneal shunt placement. Peritoneal cavity was accessed with a supra-umbilical incision cut down, 12 mm trocar was placed. 5 mm port site placed in R mid abdomen. Abdomen was insufflated. Shunt was placed in right peritoneal region. Shunt flushed. Fascia closed with Macron figure of eight stitch x3. Umbilicus reconstructed with vycril stitch. 5mm port site closed with vicryl. 4-0 Monocryl used to approximate skin. Dermabond applied and steristrips placed.
placement of RIGHT frontal VPS w/ laparoscopic assist; Certas valve @ 4

## 2020-08-05 NOTE — PROGRESS NOTE ADULT - PROBLEM SELECTOR PLAN 1
Pt reported to be AAOx3, conversant and ambulatory prior to TAVR in May 2020. Recently sent to [facility] rehab after June admission for dizziness/weakness. Pt reported to be AAOx0 at [facility]. Multiple negative septic workup, until recent UCx growing ESBL bacteria sensitive to ertapenem (started on IV ertapenem 1g qd from 7/22-7/29). Stroke code called in ED for nonverbal state, CTH negative for acute pathologies, however showing increasing hydrocephalus. CTA head/neck without large vessel occlusion. DDx include 2/2 ESBL UTI vs. worsening hydrocephalus.  - Held ASA for LP (IR to perform on today)  - Held Eliquis Saturday for LP (IR to perform today)  - Appreciate Speech and Swallow Recs (Holding solids and advancing as tolerated)  - Appreciate PT (Will re-attempt therapy when daughter present)

## 2020-08-05 NOTE — PROGRESS NOTE ADULT - PROBLEM SELECTOR PLAN 1
Pt reported to be AAOx3, conversant and ambulatory prior to TAVR in May 2020. Recently sent to [facility] rehab after June admission for dizziness/weakness. Pt reported to be AAOx0 at [facility]. Multiple negative septic workup, until recent UCx growing ESBL bacteria sensitive to ertapenem (started on IV ertapenem 1g qd from 7/22-7/29). Stroke code called in ED for nonverbal state, CTH negative for acute pathologies, however showing increasing hydrocephalus. CTA head/neck without large vessel occlusion. DDx include 2/2 ESBL UTI vs. worsening hydrocephalus.  - Held ASA for LP (IR to perform on today)  - Held Eliquis Saturday for LP (IR to perform today)  - Appreciate Speech and Swallow Recs (Holding solids and advancing as tolerated)  - Appreciate PT (Will re-attempt therapy when daughter present)

## 2020-08-05 NOTE — PROGRESS NOTE ADULT - SUBJECTIVE AND OBJECTIVE BOX
HPI: 75y year old Female with communicating hydrocephalus, meningioma.    Subj interval: no new events.    ROS: unable    PMHX:  AMS  Handoff  MEWS Score  H/O carotid stenosis  TIA (transient ischemic attack)  Chronic kidney disease (CKD)  Aortic stenosis  HLD (hyperlipidemia)  HTN (hypertension)  Altered mental status, unspecified altered mental status type  Hyperlipidemia, unspecified hyperlipidemia type  Hypertension, unspecified type  Chronic kidney disease, unspecified CKD stage  Urinary tract infection with hematuria, site unspecified  Aortic valve stenosis, etiology of cardiac valve disease unspecified  Nutrition, metabolism, and development symptoms  Aortic stenosis  Chronic kidney disease (CKD)  TIA (transient ischemic attack)  HTN (hypertension)  CAD (coronary artery disease)  Afib  UTI (urinary tract infection)  Hydrocephalus  Altered mental status, unspecified altered mental status type  H/O carotid endarterectomy  AMS  48  [email or web address]      MEDS:  atorvastatin 40 milliGRAM(s) Oral at bedtime  diltiazem    milliGRAM(s) Oral daily  latanoprost 0.005% Ophthalmic Solution 1 Drop(s) Both EYES at bedtime  metoprolol tartrate 37.5 milliGRAM(s) Oral two times a day  pantoprazole  Injectable 40 milliGRAM(s) IV Push two times a day  povidone iodine 5% Nasal Swab 1 Application(s) Both Nostrils once  senna 2 Tablet(s) Oral at bedtime  sodium chloride 0.9%. 600 milliLiter(s) IV Continuous <Continuous>      SHX: nc    No Known Allergies      FHX: nc    Vitals:  T(C): 36.9 (08-05-20 @ 09:28), Max: 37 (08-04-20 @ 16:43)  HR: 102 (08-05-20 @ 09:28) (94 - 102)  BP: 134/94 (08-05-20 @ 09:28) (130/56 - 143/90)  RR: 18 (08-05-20 @ 09:28) (18 - 18)  SpO2: 95% (08-05-20 @ 09:28) (95% - 98%)    Exam:  alert, one word answers, oXself. follows simple commands but not complex commands.  moves all 4 purposefully.  intact to LT throughout    AP: 75y year old Female with comm hydro, meningioma.    - f/u csf studies.  -  shunt via [facility]. have asked them to send AI encephalopathy panel, cytology, flow cytometry. also ACE level. as the 1st LP was small volume, we could not send for these. however, regardless of etiology,  shunt is deemed appropriate at this time.  - check UA w reflex  - will follow    called pt's contact info X2, no answer.    discussed with [facility] and primary team.

## 2020-08-05 NOTE — BRIEF OPERATIVE NOTE - NSICDXBRIEFPOSTOP_GEN_ALL_CORE_FT
POST-OP DIAGNOSIS:  Hydrocephalus 05-Aug-2020 19:13:02  Yasir Hassan
POST-OP DIAGNOSIS:  Hydrocephalus 05-Aug-2020 19:13:02  Yasir Hassan

## 2020-08-05 NOTE — PROGRESS NOTE ADULT - PROBLEM SELECTOR PLAN 3
Pt presented with 2 weeks of worsening AMS with recent UCx performed at [facility] showing ESBL (bacteria unclear) sensitive to ertapenem. Did not meet SIRS criteria on admission. Pt was started on ertapenem 1g qd on 7/22 (planned for course to be completed on 7/29)    - Completed course of ertapenem on 7/29  - Urine culture: Final No growth Pt presented with 2 weeks of worsening AMS with [facility] ESBL (bacteria unclear) sensitive to ertapenem. Did not meet SIRS criteria on admission. Pt was started on ertapenem 1g qd on 7/22 (planned for course to be completed on 7/29)    - Completed course of ertapenem on 7/29  - Urine culture: Final No growth  - f/u repeat UA (reflex culture ordered)

## 2020-08-05 NOTE — PROGRESS NOTE ADULT - SUBJECTIVE AND OBJECTIVE BOX
Pt seen and examined   no reports of melena but apparently had heme positive stools   H/H stable    REVIEW OF SYSTEMS:  no intelligible conversation      MEDICATIONS:  MEDICATIONS  (STANDING):  atorvastatin 40 milliGRAM(s) Oral at bedtime  diltiazem    milliGRAM(s) Oral daily  latanoprost 0.005% Ophthalmic Solution 1 Drop(s) Both EYES at bedtime  metoprolol tartrate 37.5 milliGRAM(s) Oral two times a day  pantoprazole  Injectable 40 milliGRAM(s) IV Push two times a day  povidone iodine 5% Nasal Swab 1 Application(s) Both Nostrils once  senna 2 Tablet(s) Oral at bedtime  sodium chloride 0.9%. 600 milliLiter(s) (50 mL/Hr) IV Continuous <Continuous>    MEDICATIONS  (PRN):      Allergies    No Known Allergies    Intolerances        Vital Signs Last 24 Hrs  T(C): 36.9 (05 Aug 2020 09:28), Max: 37 (04 Aug 2020 16:43)  T(F): 98.5 (05 Aug 2020 09:28), Max: 98.6 (04 Aug 2020 16:43)  HR: 102 (05 Aug 2020 09:28) (94 - 102)  BP: 134/94 (05 Aug 2020 09:28) (130/56 - 143/90)  BP(mean): --  RR: 18 (05 Aug 2020 09:28) (18 - 18)  SpO2: 95% (05 Aug 2020 09:28) (95% - 98%)    08-04 @ 07:01  -  08-05 @ 07:00  --------------------------------------------------------  IN: 550 mL / OUT: 0 mL / NET: 550 mL        PHYSICAL EXAM:    General: ; in no acute distress  HEENT: MMM, conjunctiva and sclera clear  Gastrointestinal: Soft non-tender non-distended; Normal bowel sounds;   Skin: Warm and dry. No obvious rash    LABS:      CBC Full  -  ( 05 Aug 2020 05:49 )  WBC Count : 7.95 K/uL  RBC Count : 4.13 M/uL  Hemoglobin : 12.5 g/dL  Hematocrit : 40.5 %  Platelet Count - Automated : 173 K/uL  Mean Cell Volume : 98.1 fl  Mean Cell Hemoglobin : 30.3 pg  Mean Cell Hemoglobin Concentration : 30.9 gm/dL  Auto Neutrophil # : x  Auto Lymphocyte # : x  Auto Monocyte # : x  Auto Eosinophil # : x  Auto Basophil # : x  Auto Neutrophil % : x  Auto Lymphocyte % : x  Auto Monocyte % : x  Auto Eosinophil % : x  Auto Basophil % : x    08-05    143  |  106  |  20  ----------------------------<  102<H>  4.0   |  25  |  1.12    Ca    9.6      05 Aug 2020 05:49  Phos  3.4     08-05  Mg     1.7     08-05      PT/INR - ( 05 Aug 2020 05:49 )   PT: 14.8 sec;   INR: 1.25          PTT - ( 05 Aug 2020 05:49 )  PTT:28.1 sec                  RADIOLOGY & ADDITIONAL STUDIES (The following images were personally reviewed):

## 2020-08-06 NOTE — PROGRESS NOTE ADULT - PROBLEM SELECTOR PROBLEM 4
Chronic kidney disease, unspecified CKD stage
Afib

## 2020-08-06 NOTE — PROGRESS NOTE ADULT - ASSESSMENT
75y/F with  H/O carotid stenosis  TIA (transient ischemic attack)  Chronic kidney disease (CKD)  Aortic stenosis  HLD (hyperlipidemia)  HTN (hypertension)      PLAN:   NEURO:  REHAB:  physical therapy evaluation and management    EARLY MOB:      PULM:  Room air, incentive spirometry  CARDIO:  SBP goal 100-150mm Hg  ENDO:  Blood sugar goals 140-180 mg/dL, continue insulin sliding scale  GI:  PPI for GI prophylaxis  DIET:  RENAL:    HEM/ONC:  VTE Prophylaxis:     ID: afebrile, no leukocytosis  ====================   T(F): , Max: 98.5 (08-05-20 @ 09:28)    WBC Count: 10.10 (08-06)  WBC Count: 11.95 (08-05)  WBC Count: 7.95 (08-05)  WBC Count: 7.50 (08-04)    ====================  Social: will update family    ATTENDING ATTESTATION:  I was physically present for the key portions of the evaluation and management (E/M) service provided.  I agree with the above history, physical and plan, which I have reviewed and edited where appropriate.    Patient at high risk for neurological deterioration or death due to:  ICU delirium, aspiration PNA, DVT / PE.  Critical care time:  I have personally provided 60 minutes of critical care time, excluding time spent on separate procedures.      Plan discussed with RN, house staff. 75y/F with  H/O carotid stenosis  TIA (transient ischemic attack)  Chronic kidney disease (CKD)  Aortic stenosis  HLD (hyperlipidemia)  HTN (hypertension)      PLAN:   NEURO: neurochecks q1h PRN pain meds with Tylenol  REHAB:  physical therapy evaluation and management    EARLY MOB:      PULM:  Room air, incentive spirometry  CARDIO:  SBP goal 100-150mm Hg continue diltiazem, metoprolol  ENDO:  Blood sugar goals 140-180 mg/dL, continue insulin sliding scale continue atorvastatin   GI:  PPI for GI bleed - switch to PO BID   DIET: start diet onc ewith flatus  RENAL:   d/c IVF once eating well  HEM/ONC: Hb stable  VTE Prophylaxis: SCDs, no DVT chemoprophylaxis for now as patient is high risk for bleed (fresh post-op)  ID: afebrile, no leukocytosis, periop ancef then d/c  Social: will update family    ATTENDING ATTESTATION:  I was physically present for the key portions of the evaluation and management (E/M) service provided.  I agree with the above history, physical and plan, which I have reviewed and edited where appropriate.    Patient at high risk for neurological deterioration or death due to:  ICU delirium, aspiration PNA, DVT / PE.  Critical care time:  I have personally provided 60 minutes of critical care time, excluding time spent on separate procedures.      Plan discussed with RN, house staff. 75y/F with  1.	hydrocephalus, s/p VPS R  2.	h/o TIA, carotid stenosis, aortic stenosis  3.	Hypertension dyslipidemia  4.	CKD    PLAN:   NEURO: neurochecks q1h PRN pain meds with Tylenol  REHAB:  physical therapy evaluation and management    EARLY MOB:  OOB     PULM:  Room air, incentive spirometry  CARDIO:  SBP goal 100-150mm Hg continue diltiazem, metoprolol  ENDO:  Blood sugar goals 140-180 mg/dL, continue insulin sliding scale continue atorvastatin   GI:  PPI for GI bleed - switch to PO BID   DIET: start diet onc ewith flatus  RENAL:   d/c IVF once eating well  HEM/ONC: Hb stable  VTE Prophylaxis: SCDs, no DVT chemoprophylaxis for now as patient is high risk for bleed (fresh post-op)  ID: afebrile, no leukocytosis, periop ancef then d/c  Social: will update family    ATTENDING ATTESTATION:  I was physically present for the key portions of the evaluation and management (E/M) service provided.  I agree with the above history, physical and plan, which I have reviewed and edited where appropriate.    Patient at high risk for neurological deterioration or death due to:  ICU delirium, aspiration PNA, DVT / PE.  Critical care time:  I have personally provided 60 minutes of critical care time, excluding time spent on separate procedures.      Plan discussed with RN, house staff. 75y/F with  1.	hydrocephalus, s/p VPS R  2.	calcified meningioma, brain compression  3.	h/o TIA, carotid stenosis, aortic stenosis  4.	Hypertension dyslipidemia  5.	CKD    PLAN:   NEURO: neurochecks q1h PRN pain meds with Tylenol  REHAB:  physical therapy evaluation and management    EARLY MOB:  OOB     PULM:  Room air, incentive spirometry  CARDIO:  SBP goal 100-150mm Hg continue diltiazem, metoprolol  ENDO:  Blood sugar goals 140-180 mg/dL, continue insulin sliding scale continue atorvastatin   GI:  PPI for GI bleed - switch to PO BID   DIET: start diet onc ewith flatus  RENAL:   d/c IVF once eating well  HEM/ONC: Hb stable  VTE Prophylaxis: SCDs, no DVT chemoprophylaxis for now as patient is high risk for bleed (fresh post-op)  ID: afebrile, no leukocytosis, periop ancef then d/c  Social: will update family    ATTENDING ATTESTATION:  I was physically present for the key portions of the evaluation and management (E/M) service provided.  I agree with the above history, physical and plan, which I have reviewed and edited where appropriate.    Patient at high risk for neurological deterioration or death due to:  ICU delirium, aspiration PNA, DVT / PE.  Critical care time:  I have personally provided 60 minutes of critical care time, excluding time spent on separate procedures.      Plan discussed with RN, house staff.

## 2020-08-06 NOTE — PROGRESS NOTE ADULT - ATTENDING COMMENTS
I was physically present for the key portions of the evaluation and managemnent (E/M) service provided.  I agree with the above history, physical, and plan which I have reviewed and edited where appropriate, with the exceptions as per my note.    pt awake and follows some commands when given by her daughter over phone. s/p  shunt for hydrocephalus. Would expect some slow improvement in cognition. Await further csf studies. dispo planning.

## 2020-08-06 NOTE — PROGRESS NOTE ADULT - PROBLEM SELECTOR PROBLEM 6
HTN (hypertension)
Hyperlipidemia, unspecified hyperlipidemia type
HTN (hypertension)

## 2020-08-06 NOTE — PROGRESS NOTE ADULT - PROBLEM SELECTOR PROBLEM 2
Urinary tract infection with hematuria, site unspecified
Hydrocephalus

## 2020-08-06 NOTE — PROGRESS NOTE ADULT - PROBLEM SELECTOR PLAN 6
Hx of HTN on lopressor 37.5mg BID  - sBP 130s here HR tachycardic 90s-110s  - c/w lopressor here

## 2020-08-06 NOTE — OCCUPATIONAL THERAPY INITIAL EVALUATION ADULT - PERTINENT HX OF CURRENT PROBLEM, REHAB EVAL
POD#1 s/p placement of R frontal VPS on 8/5/20 75y Females/p VPS certas @ 4 gen surg laparascopic assist. medicine cleared for OR s/p placement of R frontal VPS, Certas at 4. pending CTH, neuro at baseline, extubated, on room air. episode of afib with RVR, s/p lopressor x1. BRBPR noted, assessed at bedside, likely hemorrhoids impacted stool noted. give bowel regimen and watch.

## 2020-08-06 NOTE — OCCUPATIONAL THERAPY INITIAL EVALUATION ADULT - ORIENTATION, REHAB EVAL
place/person/able to identify place when given 2 choices (home vs. hospital) person/able to identify place when given 2 choices (home vs. hospital)./place

## 2020-08-06 NOTE — OCCUPATIONAL THERAPY INITIAL EVALUATION ADULT - GROSSLY INTACT, SENSORY
Grossly Intact Left LE/Right LE/Grossly Intact/Left UE/Right UE/though unable to fo.llow formal Sensory testing 2/2 decrease command multistep command  follow though overall SILT and no complaints regarding sensation.

## 2020-08-06 NOTE — OCCUPATIONAL THERAPY INITIAL EVALUATION ADULT - MANUAL MUSCLE TESTING RESULTS, REHAB EVAL
unable to fully assess due to confusion, BUE elbow flexion/extension and b/l hands  strength grossly 4/5

## 2020-08-06 NOTE — PROGRESS NOTE ADULT - PROBLEM SELECTOR PROBLEM 1
Aortic valve stenosis, etiology of cardiac valve disease unspecified
Altered mental status, unspecified altered mental status type

## 2020-08-06 NOTE — OCCUPATIONAL THERAPY INITIAL EVALUATION ADULT - RANGE OF MOTION EXAMINATION
unable to fully assess 2/2 confusion and decreased ability to follow commands; patient able to raise BUE to 1/2 full range of shoulder flexion/extension. Noted L facial droop unable to fully assess 2/2 confusion and decreased ability to follow commands; patient able to raise BUE to 1/2 full range of shoulder flexion/extension. Noted L facial droop, not new and Neurosurgery aware

## 2020-08-06 NOTE — PROGRESS NOTE ADULT - PROBLEM SELECTOR PLAN 1
Pt reported to be AAOx3, conversant and ambulatory prior to TAVR in May 2020. Recently sent to [facility] rehab after June admission for dizziness/weakness. Pt reported to be AAOx0 at [facility]. Multiple negative septic workup, until recent UCx growing ESBL bacteria sensitive to ertapenem (started on IV ertapenem 1g qd from 7/22-7/29). Stroke code called in ED for nonverbal state, CTH negative for acute pathologies, however showing increasing hydrocephalus. CTA head/neck without large vessel occlusion. DDx include 2/2 ESBL UTI vs. worsening hydrocephalus.  -per neuro,  likely 2/2 hydrocephalus. Etiology of hydrocephalus currently unknown, possible 2/2 obstruction of CSF drainage vs viral meningitis. Less likely NPH as such a rapid increase in ventriculomegaly would not be expected in NPH, and patient not presenting with other clinical symptoms (incontinence or gait disturbance)  -f/u from  shunt: CSF flow cytometry, cytology, ACE level, autoimmune encephalitis panel  -f/u urinalysis with reflex to culture  - Appreciate Speech and Swallow Recs (Holding solids and advancing as tolerated)  - Appreciate PT (Will re-attempt therapy when daughter present)

## 2020-08-06 NOTE — OCCUPATIONAL THERAPY INITIAL EVALUATION ADULT - LIVES WITH, PROFILE
presents from [facility], lives with daughter and granddaughter at baseline, has ~6 steps to enter one entrance and none at another.

## 2020-08-06 NOTE — PROGRESS NOTE ADULT - SUBJECTIVE AND OBJECTIVE BOX
***NEUROLOGY CONSULT PROGRESS NOTE***    INTERVAL HISTORY:  pt s/p  shunt placed yesterday (8/5)    SUBJECTIVE: interview conducted with daughter on phone for translation. pt more cooperative today however still noncompliant with majority of physical exam and only answering yes/no questions. pt denies pain or headaches, did not answer other ROS questions.    MEDICATIONS:  acetaminophen   Tablet .. 650 milliGRAM(s) Oral every 6 hours PRN  atorvastatin 40 milliGRAM(s) Oral at bedtime  bisacodyl 5 milliGRAM(s) Oral every 12 hours PRN  diltiazem    milliGRAM(s) Oral daily  latanoprost 0.005% Ophthalmic Solution 1 Drop(s) Both EYES at bedtime  metoprolol tartrate 37.5 milliGRAM(s) Oral two times a day  pantoprazole    Tablet 40 milliGRAM(s) Oral every 12 hours  senna 2 Tablet(s) Oral at bedtime  sodium chloride 0.9%. 600 milliLiter(s) IV Continuous <Continuous>    VITAL SIGNS:  Vital Signs Last 24 Hrs  T(C): 36.4 (06 Aug 2020 09:00), Max: 36.8 (05 Aug 2020 16:31)  T(F): 97.5 (06 Aug 2020 09:00), Max: 98.2 (05 Aug 2020 16:31)  HR: 89 (06 Aug 2020 15:00) (83 - 139)  BP: 109/69 (06 Aug 2020 15:00) (81/57 - 142/82)  BP(mean): 83 (06 Aug 2020 15:00) (65 - 101)  RR: 20 (06 Aug 2020 15:00) (17 - 36)  SpO2: 95% (06 Aug 2020 15:00) (61% - 100%)    PHYSICAL EXAMINATION:  General: obese elderly female, comfortable, in no acute distress  Neurologic:     -Mental Status: awake and alert, pt answering questions only with yes/no answers therefore unable to assess orientation     -Intermittently follows commands     -moves extremities spontaneously with full range of motion.     -face symmetric, EOMI, PERRL     -Motor: Normal bulk and tone. noncompliant with exam     -Sensation: noncompliant with exam     -Coordination: not assessed     -Reflexes: not assessed     -Gait: deferred    LABS:                          11.2   10.10 )-----------( 164      ( 06 Aug 2020 05:19 )             36.3     08-06    141  |  107  |  21  ----------------------------<  104<H>  4.4   |  23  |  1.27    Ca    9.3      06 Aug 2020 05:19  Phos  4.5     08-06  Mg     1.6     08-06      PT/INR - ( 05 Aug 2020 05:49 )   PT: 14.8 sec;   INR: 1.25     PTT - ( 05 Aug 2020 05:49 )  PTT:28.1 sec      RADIOLOGY & ADDITIONAL STUDIES:  reviewed  Lactate Dehydrogenase, CSF (08.04.20 @ 02:35) -   Lactate Dehydrogenase, CSF: 14  Glucose, CSF (08.03.20 @ 14:05) -   Glucose, CSF: 57 mg/dL  Protein, CSF (08.03.20 @ 14:05) -   Protein, CSF: 181 mg/dL    Cerebrospinal Fluid Cell Count-1 (08.03.20 @ 14:05)    Total Nucleated Cell Count, CSF: 4 /uL    CSF Color: No Color    CSF Appearance: Clear    CSF Comments: Differential is based on 4_ cells counted after cytocentrifugation.    CSF Lymphocytes: 3 %    CSF Segmented Neutrophils: 1 %    Culture - CSF with Gram Stain (08.03.20 @ 14:52)    Gram Stain:  No organisms seen, Rare White blood cells    Culture Results: No growth to date    Herpes Simplex Virus 1/2 PCR, CSF (08.04.20 @ 23:38) -   HSV 1/2 PCR: NotDetec  Cryptococcal Antigen - CSF (08.04.20 @ 02:35) -   Cryptococcal Antigen - CSF: Negative  VDRL Titer, CSF (08.03.20 @ 23:42) -   VDRL Titer, CSF: Negative: Test Performed by:    CSF PCR Panel (08.03.20 @ 14:01) -  CSF PCR Result: NotDetec    Culture - CSF with Gram Stain (08.05.20 @ 21:20)    Gram Stain: No organisms seen. No WBC's seen.    Specimen Source: .CSF    Culture Results: No growth to date    Protein, CSF (08.05.20 @ 21:11) -   Protein, CSF: 106 mg/dL  Glucose, CSF (08.05.20 @ 21:11) -  Glucose, CSF: 69 mg/dL    Cerebrospinal Fluid Cell Count-1 (08.05.20 @ 21:11)    CSF Segmented Neutrophils: 2 %    Total Nucleated Cell Count, CSF: 3 /uL    CSF Color: Pink    CSF Lymphocytes: 1 %    CSF Monocytes/Macrophages: 0 %    CSF Comments: see note: DIFF. BASED ON WBC COUNTED    CSF Appearance: Sl Bloody

## 2020-08-06 NOTE — PROGRESS NOTE ADULT - SUBJECTIVE AND OBJECTIVE BOX
HPI:  74 y/o woman with a PMHx of Afib (on Eliquis), HFpEF w/ severe aortic stenosis s/p TAVR (May 2020), HTN, CAD s/p STEMI (on ASA), CKD, TIA s/p L CEA, presented from [facility] re[facility] for 2 weeks of worsening AMS. Per Dr. Zhong from [facility], pt arrived at [facility] rehab about 3 weeks ago after recent admission for dizziness and weakness last month (6/2020). At [facility] rehab, patient was AAOx1, but was ambulatory thoughout her 3 weeks there. She was noted to be not eating as much recently and had multiple negative sepsis workup. Her latest UCx however did grow ESBL sensitive to ertapenem (started IV 1g qd on 7/22, to end on 7/29). She was reported to be AAOx3, conversant and ambulatory. On arrival at [facility] ED, stroke code was called given patient was nonverbal. No acute stroke or bleeds on CTH, only notable for increasing hydrocephalus from May. At bedside, patient was AAOx2 (person and place), but not following much commands or conversant when using a [place] phone . Patient denied having any pain, but was unable to further answer other ROS questions at bedside.    ED Vitals: T 98.2F, /90, , RR 20, SpO2 94% RA  ED Labs: WBC 8.82, Hb 12.4, INR 1.55, Cr 1.28 (baseline 1.13 from June 2020). Trop neg x1.  ED Imaging: CTH noncontrast: No acute hemorrhage or infarct, +Diffuse ventriculomegaly increased from May 2020. CTA head/neck without large vessel occlusion. CXR with unchanged cardiomegaly, no acute infiltrates.  ED Management: IV CTX 1g x1 (28 Jul 2020 21:40)        S/Overnight events: on 8/6 pt is s/p cerebral angio unsuccessful attempt at embolization of L cerebral AVM, post op patient developed acute R sided plegia and aphasia, stroke code called, urgent CT showed L ICH with extensive IVH, brain compression/herniation, midline shift, urgen EVD placed.       Hospital Course:   POD#       Vital Signs Last 24 Hrs  T(C): 36.3 (05 Aug 2020 21:00), Max: 37 (05 Aug 2020 05:59)  T(F): 97.4 (05 Aug 2020 21:00), Max: 98.6 (05 Aug 2020 05:59)  HR: 139 (06 Aug 2020 02:00) (91 - 139)  BP: 137/79 (06 Aug 2020 02:00) (81/57 - 143/90)  BP(mean): 95 (06 Aug 2020 02:00) (65 - 95)  RR: 24 (06 Aug 2020 02:00) (18 - 36)  SpO2: 95% (06 Aug 2020 02:00) (61% - 100%)    I&O's Detail    04 Aug 2020 07:01  -  05 Aug 2020 07:00  --------------------------------------------------------  IN:    sodium chloride 0.9%: 550 mL  Total IN: 550 mL    OUT:  Total OUT: 0 mL    Total NET: 550 mL      05 Aug 2020 07:01  -  06 Aug 2020 03:00  --------------------------------------------------------  IN:    sodium chloride 0.9%: 100 mL  Total IN: 100 mL    OUT:  Total OUT: 0 mL    Total NET: 100 mL        I&O's Summary    04 Aug 2020 07:01  -  05 Aug 2020 07:00  --------------------------------------------------------  IN: 550 mL / OUT: 0 mL / NET: 550 mL    05 Aug 2020 07:01  -  06 Aug 2020 03:00  --------------------------------------------------------  IN: 100 mL / OUT: 0 mL / NET: 100 mL        PHYSICAL EXAM:  Neurological:    Motor exam:         [] Upper extremity              Bi(c5)  WE(c6)  EE(c7)   FF(c8)                                                R         5/5        5/5        5/5       5/5                                               L          5/5        5/5        5/5       5/5         [] Lower extremeity          HF(l2)   KE(l3)    TA(l4)   EHL(l5)  GS(s1)                                                 R        5/5        5/5        5/5       5/5         5/5                                               L         5/5        5/5       5/5       5/5          5/5                                                        [] warm well perfused; capillary refill <3 seconds     Sensation: [] intact to light touch  [] decreased:       Cardiovascular:  Respiratory:  Gastrointestinal:  Genitourinary:  Extremities:    Incision/Wound:    DEVICE/DRAIN DRESSING:    TUBES/LINES:  [] CVC  [] A-line  [] Lumbar Drain  [] Ventriculostomy  [] Other    DIET:  [] NPO  [] Mechanical  [] Tube feeds    LABS:                        12.1   11.95 )-----------( 163      ( 05 Aug 2020 23:34 )             38.8     08-05    142  |  107  |  21  ----------------------------<  112<H>  4.4   |  23  |  1.21    Ca    9.4      05 Aug 2020 23:34  Phos  4.4     08-05  Mg     1.6     08-05      PT/INR - ( 05 Aug 2020 05:49 )   PT: 14.8 sec;   INR: 1.25          PTT - ( 05 Aug 2020 05:49 )  PTT:28.1 sec    CARDIAC MARKERS ( 05 Aug 2020 23:34 )  x     / 0.01 ng/mL / x     / x     / x          CAPILLARY BLOOD GLUCOSE      POCT Blood Glucose.: 97 mg/dL (05 Aug 2020 11:45)  POCT Blood Glucose.: 102 mg/dL (05 Aug 2020 06:43)      Drug Levels: [] N/A    CSF Analysis: [] N/A  RBC Count - Spinal Fluid: 38021 /uL (08-05 @ 21:11)  CSF Lymphocytes: 1 % (08-05 @ 21:11)  Glucose, CSF: 69 mg/dL (08-05 @ 21:11)  Protein, CSF: 106 mg/dL (08-05 @ 21:11)      Allergies    No Known Allergies    Intolerances      MEDICATIONS:  Antibiotics:  ceFAZolin   IVPB 1000 milliGRAM(s) IV Intermittent every 8 hours    Neuro:  acetaminophen   Tablet .. 650 milliGRAM(s) Oral every 6 hours PRN    Anticoagulation:    OTHER:  atorvastatin 40 milliGRAM(s) Oral at bedtime  diltiazem    milliGRAM(s) Oral daily  latanoprost 0.005% Ophthalmic Solution 1 Drop(s) Both EYES at bedtime  metoprolol tartrate 37.5 milliGRAM(s) Oral two times a day  pantoprazole  Injectable 40 milliGRAM(s) IV Push two times a day  senna 2 Tablet(s) Oral at bedtime    IVF:  sodium chloride 0.9%. 600 milliLiter(s) IV Continuous <Continuous>    CULTURES:  Culture Results:   No growth to date (08-03 @ 14:52)  Culture Results:   No growth (07-28 @ 21:53)    RADIOLOGY & ADDITIONAL TESTS:      ASSESSMENT:  75y Female s/p    AMS  Handoff  MEWS Score  H/O carotid stenosis  TIA (transient ischemic attack)  Chronic kidney disease (CKD)  Aortic stenosis  HLD (hyperlipidemia)  HTN (hypertension)  Hydrocephalus  Hydrocephalus  Altered mental status, unspecified altered mental status type  Hyperlipidemia, unspecified hyperlipidemia type  Hypertension, unspecified type  Chronic kidney disease, unspecified CKD stage  Urinary tract infection with hematuria, site unspecified  Aortic valve stenosis, etiology of cardiac valve disease unspecified  Nutrition, metabolism, and development symptoms  Aortic stenosis  Chronic kidney disease (CKD)  TIA (transient ischemic attack)  HTN (hypertension)  CAD (coronary artery disease)  Afib  UTI (urinary tract infection)  Hydrocephalus  Altered mental status, unspecified altered mental status type  Ventriculoperitoneal shunt  H/O carotid endarterectomy  AMS  48  SysAdmin_VisitLink      Plan:     Neuro:   - neurochecks  - vitals   - pain control prn with   - OOB/amb/PT   - activities  - diet   - final path?   - post op MRI in 48 hours   - post op CT?   - cont. decadron/keppra ?   - DI watch  - Stroke core measure   Cardio:       Pulm:   - IS     Renal:   - IVF? IVL     GI:   - Diet  - PPI for ulcer prophylaxis?   - Bowel regimen?     Heme:   - H&H stable     ID:   - afebrile, no leukocytosis     Endo:   - ISS       Misc:     DVT ppx:     Fall risk     Dispo:       DVT PROPHYLAXIS:  [] Venodynes                                [] Heparin/Lovenox    FALL RISK:  [] Low Risk                                    [] Impulsive    DISPOSITION:       Assessment:  Present when checked    []  GCS  E   V  M     Heart Failure: []Acute, [] acute on chronic , []chronic  Heart Failure:  [] Diastolic (HFpEF), [] Systolic (HFrEF), []Combined (HFpEF and HFrEF), [] RHF, [] Pulm HTN, [] Other    [] NESTOR, [] ATN, [] AIN, [] other  [] CKD1, [] CKD2, [] CKD 3, [] CKD 4, [] CKD 5, []ESRD    Encephalopathy: [] Metabolic, [] Hepatic, [] toxic, [] Neurological, [] Other    Abnormal Nurtitional Status: [] malnurtition (see nutrition note), [ ]underweight: BMI < 19, [] morbid obesity: BMI >40, [] Cachexia    [] Sepsis  [] hypovolemic shock,[] cardiogenic shock, [] hemorrhagic shock, [] neuogenic shock  [] Acute Respiratory Failure  []Cerebral edema, [] Brain compression/ herniation,   [] Functional quadriplegia  [] Acute blood loss anemia HPI:  74 y/o woman with a PMHx of Afib (on Eliquis), HFpEF w/ severe aortic stenosis s/p TAVR (May 2020), HTN, CAD s/p STEMI (on ASA), CKD, TIA s/p L CEA, presented from [facility] rehab for 2 weeks of worsening AMS. Per Dr. Zhong from [facility], pt arrived at [facility] rehab about 3 weeks ago after recent admission for dizziness and weakness last month (6/2020). At [facility] rehab, patient was AAOx1, but was ambulatory thoughout her 3 weeks there. She was noted to be not eating as much recently and had multiple negative sepsis workup. Her latest UCx however did grow ESBL sensitive to ertapenem (started IV 1g qd on 7/22, to end on 7/29). She was reported to be AAOx3, conversant and ambulatory. On arrival at [facility] ED, stroke code was called given patient was nonverbal. No acute stroke or bleeds on CTH, only notable for increasing hydrocephalus from May. At bedside, patient was AAOx2 (person and place), but not following much commands or conversant when using a [place] phone . Patient denied having any pain, but was unable to further answer other ROS questions at bedside.    ED Vitals: T 98.2F, /90, , RR 20, SpO2 94% RA  ED Labs: WBC 8.82, Hb 12.4, INR 1.55, Cr 1.28 (baseline 1.13 from June 2020). Trop neg x1.  ED Imaging: CTH noncontrast: No acute hemorrhage or infarct, +Diffuse ventriculomegaly increased from May 2020. CTA head/neck without large vessel occlusion. CXR with unchanged cardiomegaly, no acute infiltrates.  ED Management: IV CTX 1g x1 (28 Jul 2020 21:40)        S/Overnight events: s/p placement of R frontal VPS, Certas at 4. pending CTH, neuro at baseline, extubated, on room air. episode of afib with RVR, s/p lopressor x1. BRBPR noted, assessed at bedside, likely hemorrhoids impacted stool noted. give bowel regimen and watch. hg and baseline ekg stable.       Hospital Course:   POD#   8/6: s/p VPS certas @ 4 gen surg laparascopic assist. medicine cleared for OR    s/p placement of R frontal VPS, Certas at 4. pending CTH, neuro at baseline, extubated, on room air. episode of afib with RVR, s/p lopressor x1. BRBPR noted, assessed at bedside, likely hemorrhoids impacted stool noted. give bowel regimen and watch. hg and baseline ekg stable. IVF kept at 50 cc/hr; post op xray with no congestion, mildly increased breathing effort noted, however saturating well. baseline CHF     Vital Signs Last 24 Hrs  T(C): 36.3 (05 Aug 2020 21:00), Max: 37 (05 Aug 2020 05:59)  T(F): 97.4 (05 Aug 2020 21:00), Max: 98.6 (05 Aug 2020 05:59)  HR: 139 (06 Aug 2020 02:00) (91 - 139)  BP: 137/79 (06 Aug 2020 02:00) (81/57 - 143/90)  BP(mean): 95 (06 Aug 2020 02:00) (65 - 95)  RR: 24 (06 Aug 2020 02:00) (18 - 36)  SpO2: 95% (06 Aug 2020 02:00) (61% - 100%)    I&O's Detail    04 Aug 2020 07:01  -  05 Aug 2020 07:00  --------------------------------------------------------  IN:    sodium chloride 0.9%: 550 mL  Total IN: 550 mL    OUT:  Total OUT: 0 mL    Total NET: 550 mL      05 Aug 2020 07:01  -  06 Aug 2020 03:00  --------------------------------------------------------  IN:    sodium chloride 0.9%: 100 mL  Total IN: 100 mL    OUT:  Total OUT: 0 mL    Total NET: 100 mL        I&O's Summary    04 Aug 2020 07:01  -  05 Aug 2020 07:00  --------------------------------------------------------  IN: 550 mL / OUT: 0 mL / NET: 550 mL    05 Aug 2020 07:01  -  06 Aug 2020 03:00  --------------------------------------------------------  IN: 100 mL / OUT: 0 mL / NET: 100 mL        PHYSICAL EXAM:  polish speaking; a&O x 1-2   with translation, patient has L face droop.   moving all ext spont. LUE slightly spastic.   PERRL, eomi   Incision/Wound: crani incsion site c/d/i, abdomen incision site steri strips placed   c.d.i     DEVICE/DRAIN DRESSING:    TUBES/LINES:  [] CVC  [x] A-line  [] Lumbar Drain  [] Ventriculostomy  [x] Other: hubbard     DIET:  x[] NPO  [] Mechanical  [] Tube feeds    LABS:                        12.1   11.95 )-----------( 163      ( 05 Aug 2020 23:34 )             38.8     08-05    142  |  107  |  21  ----------------------------<  112<H>  4.4   |  23  |  1.21    Ca    9.4      05 Aug 2020 23:34  Phos  4.4     08-05  Mg     1.6     08-05      PT/INR - ( 05 Aug 2020 05:49 )   PT: 14.8 sec;   INR: 1.25          PTT - ( 05 Aug 2020 05:49 )  PTT:28.1 sec    CARDIAC MARKERS ( 05 Aug 2020 23:34 )  x     / 0.01 ng/mL / x     / x     / x          CAPILLARY BLOOD GLUCOSE      POCT Blood Glucose.: 97 mg/dL (05 Aug 2020 11:45)  POCT Blood Glucose.: 102 mg/dL (05 Aug 2020 06:43)      Drug Levels: [] N/A    CSF Analysis: [] N/A  RBC Count - Spinal Fluid: 00067 /uL (08-05 @ 21:11)  CSF Lymphocytes: 1 % (08-05 @ 21:11)  Glucose, CSF: 69 mg/dL (08-05 @ 21:11)  Protein, CSF: 106 mg/dL (08-05 @ 21:11)      Allergies    No Known Allergies    Intolerances      MEDICATIONS:  Antibiotics:  ceFAZolin   IVPB 1000 milliGRAM(s) IV Intermittent every 8 hours    Neuro:  acetaminophen   Tablet .. 650 milliGRAM(s) Oral every 6 hours PRN    Anticoagulation:    OTHER:  atorvastatin 40 milliGRAM(s) Oral at bedtime  diltiazem    milliGRAM(s) Oral daily  latanoprost 0.005% Ophthalmic Solution 1 Drop(s) Both EYES at bedtime  metoprolol tartrate 37.5 milliGRAM(s) Oral two times a day  pantoprazole  Injectable 40 milliGRAM(s) IV Push two times a day  senna 2 Tablet(s) Oral at bedtime    IVF:  sodium chloride 0.9%. 600 milliLiter(s) IV Continuous <Continuous>    CULTURES:  Culture Results:   No growth to date (08-03 @ 14:52)  Culture Results:   No growth (07-28 @ 21:53)    RADIOLOGY & ADDITIONAL TESTS:      ASSESSMENT:  75y Females/p VPS certas @ 4 gen surg laparascopic assist. medicine cleared for OR    s/p placement of R frontal VPS, Certas at 4. pending CTH, neuro at baseline, extubated, on room air. episode of afib with RVR, s/p lopressor x1. BRBPR noted, assessed at bedside, likely hemorrhoids impacted stool noted. give bowel regimen and watch. hg and baseline ekg stable. IVF kept at 50 cc/hr; post op xray with no congestion, mildly increased breathing effort noted, however saturating well. baseline CHF     AMS  Handoff  MEWS Score  H/O carotid stenosis  TIA (transient ischemic attack)  Chronic kidney disease (CKD)  Aortic stenosis  HLD (hyperlipidemia)  HTN (hypertension)  Hydrocephalus  Hydrocephalus  Altered mental status, unspecified altered mental status type  Hyperlipidemia, unspecified hyperlipidemia type  Hypertension, unspecified type  Chronic kidney disease, unspecified CKD stage  Urinary tract infection with hematuria, site unspecified  Aortic valve stenosis, etiology of cardiac valve disease unspecified  Nutrition, metabolism, and development symptoms  Aortic stenosis  Chronic kidney disease (CKD)  TIA (transient ischemic attack)  HTN (hypertension)  CAD (coronary artery disease)  Afib  UTI (urinary tract infection)  Hydrocephalus  Altered mental status, unspecified altered mental status type  Ventriculoperitoneal shunt  H/O carotid endarterectomy  AMS  48  SysAdmin_VisitLink      Plan:     Neuro:   - neurochecks q 1   - vitals q 1   - pain control prn with tyl  - OOB/amb/PT  - post op CT  Cardio:   - afib hx   - cont. diltaezem   - cont. metoprolol po   - lopressor prn for rvr > 130  - cont. lipitor   - baseline chf, judicious fluids   - xray without significant congestion     Pulm:   - IS     Renal:   - IVF at 50 until po intake   - dc if congestion sx   - remove hubbard   - tov     GI:   - keep npo until ok with gen surg   - Bowel regimen?     Heme:   - H&H stable     ID:   - afebrile, no leukocytosis     Endo:   - ISS     DVT PROPHYLAXIS:  [x] Venodynes                                [] Heparin/Lovenox    FALL RISK:  [] Low Risk                                    [] Impulsive    DISPOSITION: Transfer back to medicine service today       Assessment:  Present when checked    []  GCS  E   V  M     Heart Failure: []Acute, [] acute on chronic , []chronic  Heart Failure:  [] Diastolic (HFpEF), [] Systolic (HFrEF), []Combined (HFpEF and HFrEF), [] RHF, [] Pulm HTN, [] Other    [] NESTOR, [] ATN, [] AIN, [] other  [] CKD1, [] CKD2, [] CKD 3, [] CKD 4, [] CKD 5, []ESRD    Encephalopathy: [] Metabolic, [] Hepatic, [] toxic, [] Neurological, [] Other    Abnormal Nurtitional Status: [] malnurtition (see nutrition note), [ ]underweight: BMI < 19, [] morbid obesity: BMI >40, [] Cachexia    [] Sepsis  [] hypovolemic shock,[] cardiogenic shock, [] hemorrhagic shock, [] neuogenic shock  [] Acute Respiratory Failure  []Cerebral edema, [] Brain compression/ herniation,   [] Functional quadriplegia  [] Acute blood loss anemia

## 2020-08-06 NOTE — PROGRESS NOTE ADULT - ASSESSMENT
74 y/o woman with a PMHx of Afib (on Eliquis), HFpEF w/ severe aortic stenosis s/p TAVR (May 2020), HTN, CAD s/p STEMI (on ASA), CKD, TIA s/p L CEA, recent ESBL UTI, presented from [facility] rehab due to 2 weeks of worsening mental status. Neurology consulted for AMS and increasing ventriculomegaly.     #Encephalopathy  Upon admission, stroke code called, CTH negative for hemorrhage but showed increasing ventriculomegaly from previous CT in May. Pt intermittently verbal and compliant with exam, today both nonverbal and mostly noncompliant with exam.   -LP performed 8/3, showing elevated protein in CSF. Unlikely infectious meningitis (CSF PCR negative). Unknown opening pressure from lumbar puncture  -Reviewed MRI brain with neuro-radiology. No evidence of obstruction of the foramen of monro. Mass does not appear to be cause of hydrocephalus.  -s/p  shunt 8/5  Etiology: likely 2/2 hydrocephalus. Etiology of hydrocephalus currently unknown, possible 2/2 obstruction of CSF drainage vs viral meningitis. Less likely NPH as such a rapid increase in ventriculomegaly would not be expected in NPH, and patient not presenting with other clinical symptoms (incontinence or gait disturbance)    Recommendations:    -f/u from  shunt: CSF flow cytometry, cytology, ACE level, autoimmune encephalitis panel    -Pt currently off anticoagulation due to melena. Continue to hold AC if pt to receive  shunt, discuss risk/benefits of if/when to resume AC    -f/u urinalysis with reflex to culture

## 2020-08-06 NOTE — PROGRESS NOTE ADULT - SUBJECTIVE AND OBJECTIVE BOX
=================================  NEUROCRITICAL CARE ATTENDING NOTE  =================================    GRACE KEY   MRN-[id number]  Summary:  75y/F    HPI:  76 y/o woman with a PMHx of Afib (on Eliquis), HFpEF w/ severe aortic stenosis s/p TAVR (May 2020), HTN, CAD s/p STEMI (on ASA), CKD, TIA s/p L CEA, presented from [facility] rehab for 2 weeks of worsening AMS. Per Dr. Zhong from [facility], pt arrived at [facility] rehab about 3 weeks ago after recent admission for dizziness and weakness last month (6/2020). At [facility] rehab, patient was AAOx1, but was ambulatory thoughout her 3 weeks there. She was noted to be not eating as much recently and had multiple negative sepsis workup. Her latest UCx however did grow ESBL sensitive to ertapenem (started IV 1g qd on 7/22, to end on 7/29). She was reported to be AAOx3, conversant and ambulatory. On arrival at [facility] ED, stroke code was called given patient was nonverbal. No acute stroke or bleeds on CTH, only notable for increasing hydrocephalus from May. At bedside, patient was AAOx2 (person and place), but not following much commands or conversant when using a [place] phone . Patient denied having any pain, but was unable to further answer other ROS questions at bedside.    ED Vitals: T 98.2F, /90, , RR 20, SpO2 94% RA  ED Labs: WBC 8.82, Hb 12.4, INR 1.55, Cr 1.28 (baseline 1.13 from June 2020). Trop neg x1.  ED Imaging: CTH noncontrast: No acute hemorrhage or infarct, +Diffuse ventriculomegaly increased from May 2020. CTA head/neck without large vessel occlusion. CXR with unchanged cardiomegaly, no acute infiltrates.  ED Management: IV CTX 1g x1 (28 Jul 2020 21:40)      COURSE IN THE HOSPITAL:  AFIB RVR given metoprolol, improved  BRB hemorrhoids    Past Medical History: H/O carotid stenosis  TIA (transient ischemic attack)  Chronic kidney disease (CKD)  Aortic stenosis  HLD (hyperlipidemia)  HTN (hypertension)    Allergies:  No Known Allergies    Home meds:   acetaminophen 325 mg oral tablet: 2 tab(s) orally every 6 hours, As needed, Mild Pain (1 - 3)  apixaban 5 mg oral tablet: 1 tab(s) orally every 12 hours  aspirin 81 mg oral delayed release tablet: 1 tab(s) orally once a day  atorvastatin 40 mg oral tablet: 1 tab(s) orally once a day (at bedtime)  dilTIAZem 120 mg/24 hours oral capsule, extended release: 1 cap(s) orally once a day  metoprolol tartrate 37.5 mg oral tablet: 1 tab(s) orally every 12 hours  pantoprazole 40 mg oral delayed release tablet: 1 tab(s) orally once a day (before a meal)  senna oral tablet: 2 tab(s) orally once a day (at bedtime)  Travatan Z 0.004% ophthalmic solution: 1 drop(s) to each affected eye once a day (in the evening)      PHYSICAL EXAMINATION  T(C): 36.3 (08-05 @ 21:00), Max: 36.9 (08-05 @ 09:28)  HR: 101 (08-06 @ 04:00) (91 - 139)  BP: 117/72 (08-06 @ 04:00) (81/57 - 137/79)  RR: 17 (08-06 @ 04:00) (17 - 36)  SpO2: 100% (08-06 @ 04:00) (61% - 100%)  CVP(mm Hg): --  NEUROLOGIC EXAMINATION:  Patient is awake, alert, fully oriented, pupils 2-3mm equal and briskly reactive to light, EOMs intact, muscle strength 5/5 on all 4s.  GENERAL: not intubated, not in cardiorespiratory distress  EENT:  anicteric  CARDIOVASCULAR: (+) S1 S2, normal rate and regular rhythm  PULMONARY: clear to auscultation bilaterally  ABDOMEN: soft, nontender with normoactive bowel sounds  EXTREMITIES: no edema  SKIN: no rash    LABS:  CAPILLARY BLOOD GLUCOSE      POCT Blood Glucose.: 97 mg/dL (05 Aug 2020 11:45)                          11.2   10.10 )-----------( 164      ( 06 Aug 2020 05:19 )             36.3     08-06    141  |  107  |  21  ----------------------------<  104<H>  4.4   |  23  |  1.27    Ca    9.3      06 Aug 2020 05:19  Phos  4.5     08-06  Mg     1.6     08-06 08-04 @ 07:01  -  08-05 @ 07:00  --------------------------------------------------------  IN: 550 mL / OUT: 0 mL / NET: 550 mL    08-05 @ 07:01  -  08-06 @ 06:59  --------------------------------------------------------  IN: 300 mL / OUT: 0 mL / NET: 300 mL        Bacteriology:  CSF studies:  EEG:  Neuroimaging:  Other imaging:    MEDICATIONS:    ceFAZolin   IVPB 1000 milliGRAM(s) IV Intermittent every 8 hours    acetaminophen   Tablet .. 650 milliGRAM(s) Oral every 6 hours PRN    diltiazem    milliGRAM(s) Oral daily  metoprolol tartrate 37.5 milliGRAM(s) Oral two times a day      bisacodyl 5 milliGRAM(s) Oral every 12 hours PRN  pantoprazole  Injectable 40 milliGRAM(s) IV Push two times a day  senna 2 Tablet(s) Oral at bedtime      atorvastatin 40 milliGRAM(s) Oral at bedtime    latanoprost 0.005% Ophthalmic Solution 1 Drop(s) Both EYES at bedtime  sodium chloride 0.9%. 600 milliLiter(s) IV Continuous <Continuous>      IV FLUIDS:  DRIPS:  DIET:  Lines:  Drains:    08-04-20 @ 07:01  -  08-05-20 @ 07:00  --------------------------------------------------------  OUT:  Total OUT: 0 mL        Wounds:    CODE STATUS:  Full Code                       GOALS OF CARE:  aggressive                      DISPOSITION:  ICU =================================  NEUROCRITICAL CARE ATTENDING NOTE  =================================    GRACE KEY   [id number]  Summary:  75y/F with Afib, HFpEF with severe aortic stenosis, s/p TAVR (05/2020) Hypertension CAD STEMI CKD TIA s/p L CEA, presented with worseinng AMS.  Imaging showed increasing HCP.       COURSE IN THE HOSPITAL:  08/05 s/p VPS  08/06 overnight - Afib in RVR, given metoprolol, improved,     Past Medical History: H/O carotid stenosis TIA (transient ischemic attack)Chronic kidney disease (CKD) Aortic stenosis HLD (hyperlipidemia) HTN (hypertension)  Allergies:  No Known Allergies  Home meds:   ·	acetaminophen 325 mg oral tablet: 2 tab(s) orally every 6 hours, As needed, Mild Pain (1 - 3)  ·	apixaban 5 mg oral tablet: 1 tab(s) orally every 12 hours  ·	aspirin 81 mg oral delayed release tablet: 1 tab(s) orally once a day  ·	atorvastatin 40 mg oral tablet: 1 tab(s) orally once a day (at bedtime)  ·	dilTIAZem 120 mg/24 hours oral capsule, extended release: 1 cap(s) orally once a day  ·	metoprolol tartrate 37.5 mg oral tablet: 1 tab(s) orally every 12 hours  ·	pantoprazole 40 mg oral delayed release tablet: 1 tab(s) orally once a day (before a meal)  ·	senna oral tablet: 2 tab(s) orally once a day (at bedtime)  ·	Travatan Z 0.004% ophthalmic solution: 1 drop(s) to each affected eye once a day (in the evening)    PHYSICAL EXAMINATION   T(C): 36.3 (08-05 @ 21:00), Max: 36.9 (08-05 @ 09:28) HR: 101 (08-06 @ 04:00) (91 - 139) BP: 117/72 (08-06 @ 04:00) (81/57 - 137/79) RR: 17 (08-06 @ 04:00) (17 - 36) SpO2: 100% (08-06 @ 04:00) (61% - 100%)  NEUROLOGIC EXAMINATION:  Patient is awake, alert, pupils 2-3mm equal and briskly reactive to light, no facial droop, moving all 4s   GENERAL: not intubated, not in cardiorespiratory distress  EENT:  anicteric  CARDIOVASCULAR: (+) S1 S2, normal rate and regular rhythm  PULMONARY: clear to auscultation bilaterally  ABDOMEN: soft, nontender with normoactive bowel sounds  EXTREMITIES: no edema  SKIN: no rash    LABS:  CAPILLARY BLOOD GLUCOSE 97               11.2   10.10 )-----------( 164      ( 06 Aug 2020 05:19 )             36.3     141  |  107  |  21  ----------------------------<  104<H>  4.4   |  23  |  1.27    Ca    9.3      06 Aug 2020 05:19  Phos  4.5     08-06  Mg     1.6     08-06 08-04 @ 07:01  -  08-05 @ 07:00  IN: 550 mL / OUT: 0 mL / NET: 550 mL    Bacteriology:  CSF studies:  EEG:  Neuroimaging:  Other imaging:    MEDICATIONS: cefazolin 1G IV q8h diltiazem 120mg PO daily metoprolol 37.5 mg PO BID bisacodyl 5mg PO q12h pantoprazole 40 IV BID senna HS atorvastatin 40mg PO HS latanoprost HS     IV FLUIDS: NS@50cc/hr  DRIPS:  DIET:  Lines:  Drains:    Wounds:    CODE STATUS:  Full Code                       GOALS OF CARE:  aggressive                      DISPOSITION:  ICU =================================  NEUROCRITICAL CARE ATTENDING NOTE  =================================    GRACE KEY   MRN-[id number]  Summary:  75y/F with Afib, HFpEF with severe aortic stenosis, s/p TAVR (2020) Hypertension CAD STEMI CKD TIA s/p L CEA, presented with worseinng AMS.  Imaging showed increasing HCP.       COURSE IN THE HOSPITAL:   s/p VPS   overnight - Afib in RVR, given metoprolol, improved,     Past Medical History: H/O carotid stenosis TIA (transient ischemic attack)Chronic kidney disease (CKD) Aortic stenosis HLD (hyperlipidemia) HTN (hypertension)  Allergies:  No Known Allergies  Home meds:   ·	acetaminophen 325 mg oral tablet: 2 tab(s) orally every 6 hours, As needed, Mild Pain (1 - 3)  ·	apixaban 5 mg oral tablet: 1 tab(s) orally every 12 hours  ·	aspirin 81 mg oral delayed release tablet: 1 tab(s) orally once a day  ·	atorvastatin 40 mg oral tablet: 1 tab(s) orally once a day (at bedtime)  ·	dilTIAZem 120 mg/24 hours oral capsule, extended release: 1 cap(s) orally once a day  ·	metoprolol tartrate 37.5 mg oral tablet: 1 tab(s) orally every 12 hours  ·	pantoprazole 40 mg oral delayed release tablet: 1 tab(s) orally once a day (before a meal)  ·	senna oral tablet: 2 tab(s) orally once a day (at bedtime)  ·	Travatan Z 0.004% ophthalmic solution: 1 drop(s) to each affected eye once a day (in the evening)    PHYSICAL EXAMINATION   T(C): 36.3 ( @ 21:00), Max: 36.9 ( @ 09:28) HR: 101 ( @ 04:00) (91 - 139) BP: 117/72 ( @ 04:00) (81/57 - 137/79) RR: 17 ( @ 04:00) (17 - 36) SpO2: 100% ( @ 04:00) (61% - 100%)  NEUROLOGIC EXAMINATION:  Patient is awake, alert, pupils 2-3mm equal and briskly reactive to light, no facial droop, moving all 4s   GENERAL: not intubated, not in cardiorespiratory distress  EENT:  anicteric  CARDIOVASCULAR: (+) S1 S2, normal rate and regular rhythm  PULMONARY: clear to auscultation bilaterally  ABDOMEN: soft, nontender with normoactive bowel sounds  EXTREMITIES: no edema  SKIN: no rash    LABS:  CAPILLARY BLOOD GLUCOSE 97               11.2   10.10 )-----------( 164      ( 06 Aug 2020 05:19 )             36.3     141  |  107  |  21  ----------------------------<  104<H>  4.4   |  23  |  1.27    Ca    9.3      06 Aug 2020 05:19  Phos  4.5     08-06  Mg     1.6     -04 @ 07:01  -  08-05 @ 07:00  IN: 550 mL / OUT: 0 mL / NET: 550 mL    Bacteriology:  CSF studies:  EEG:  Neuroimagin/06 CT head:  R frontal drainage catheter, tip in body of L lateral ventricle, ventriculomegaly with transependymal morcellation of CSF c/w acute HCP; calcified meningioma  Other imaging:    MEDICATIONS: cefazolin 1G IV q8h diltiazem 120mg PO daily metoprolol 37.5 mg PO BID bisacodyl 5mg PO q12h pantoprazole 40 IV BID senna HS atorvastatin 40mg PO HS latanoprost HS     IV FLUIDS: NS@50cc/hr  DRIPS:  DIET:  Lines:  Drains:    Wounds:    CODE STATUS:  Full Code                       GOALS OF CARE:  aggressive                      DISPOSITION:  ICU

## 2020-08-06 NOTE — PROGRESS NOTE ADULT - SUBJECTIVE AND OBJECTIVE BOX
TRANSFER NOTE  HPI:  76 y/o woman with a PMHx of Afib (on Eliquis), HFpEF w/ severe aortic stenosis s/p TAVR (May 2020), HTN, CAD s/p STEMI (on ASA), CKD, TIA s/p L CEA, presented from [facility] rehab for 2 weeks of worsening AMS. Per Dr. Zhong from [facility], pt arrived at [facility] rehab about 3 weeks ago after recent admission for dizziness and weakness last month (6/2020). At [facility] rehab, patient was AAOx1, but was ambulatory thoughout her 3 weeks there. She was noted to be not eating as much recently and had multiple negative sepsis workup. Her latest UCx however did grow ESBL sensitive to ertapenem (started IV 1g qd on 7/22, to end on 7/29). She was reported to be AAOx3, conversant and ambulatory. On arrival at [facility] ED, stroke code was called given patient was nonverbal. No acute stroke or bleeds on CTH, only notable for increasing hydrocephalus from May. At bedside, patient was AAOx2 (person and place), but not following much commands or conversant when using a [place] phone . Patient denied having any pain, but was unable to further answer other ROS questions at bedside.    ED Vitals: T 98.2F, /90, , RR 20, SpO2 94% RA  ED Labs: WBC 8.82, Hb 12.4, INR 1.55, Cr 1.28 (baseline 1.13 from June 2020). Trop neg x1.  ED Imaging: CTH noncontrast: No acute hemorrhage or infarct, +Diffuse ventriculomegaly increased from May 2020. CTA head/neck without large vessel occlusion. CXR with unchanged cardiomegaly, no acute infiltrates.  ED Management: IV CTX 1g x1 (28 Jul 2020 21:40)    Hospital Course:   8/6: POD #1 s/p VPS certas @ 4 gen surg laparascopic assist. medicine cleared for OR    s/p placement of R frontal VPS, Certas at 4. pending CTH, neuro at baseline, extubated, on room air. episode of afib with RVR, s/p lopressor x1. BRBPR noted, assessed at bedside, likely hemorrhoids impacted stool noted. give bowel regimen and watch. hg and baseline ekg stable. IVF kept at 50 cc/hr; post op xray with no congestion, mildly increased breathing effort noted, however saturating well. baseline CHF     Vital Signs Last 24 Hrs  T(C): 36.4 (06 Aug 2020 09:00), Max: 36.8 (05 Aug 2020 16:31)  T(F): 97.5 (06 Aug 2020 09:00), Max: 98.2 (05 Aug 2020 16:31)  HR: 91 (06 Aug 2020 13:00) (83 - 139)  BP: 135/78 (06 Aug 2020 13:00) (81/57 - 142/82)  BP(mean): 99 (06 Aug 2020 13:00) (65 - 101)  RR: 23 (06 Aug 2020 13:00) (17 - 36)  SpO2: 97% (06 Aug 2020 13:00) (61% - 100%)    I&O's Detail    05 Aug 2020 07:01  -  06 Aug 2020 07:00  --------------------------------------------------------  IN:    IV PiggyBack: 50 mL    sodium chloride 0.9%: 250 mL    sodium chloride 0.9%.: 50 mL  Total IN: 350 mL    OUT:  Total OUT: 0 mL    Total NET: 350 mL      06 Aug 2020 07:01  -  06 Aug 2020 13:52  --------------------------------------------------------  IN:    IV PiggyBack: 50 mL  Total IN: 50 mL    OUT:  Total OUT: 0 mL    Total NET: 50 mL        I&O's Summary    05 Aug 2020 07:01  -  06 Aug 2020 07:00  --------------------------------------------------------  IN: 350 mL / OUT: 0 mL / NET: 350 mL    06 Aug 2020 07:01  -  06 Aug 2020 13:52  --------------------------------------------------------  IN: 50 mL / OUT: 0 mL / NET: 50 mL        PHYSICAL EXAM:  polish speaking; a&O x 1-2   with translation, patient has L face droop.   moving all ext spont. LUE slightly spastic.   PERRL, eomi   Incision/Wound: crani incsion site c/d/i, abdomen incision site steri strips placed   c.d.i     TUBES/LINES:  [] CVC  [] A-line  [] Lumbar Drain  [] Ventriculostomy  [] Other: hubbard    DIET:  [x] NPO  [] Mechanical  [] Tube feeds    LABS:                        11.2   10.10 )-----------( 164      ( 06 Aug 2020 05:19 )             36.3     08-06    141  |  107  |  21  ----------------------------<  104<H>  4.4   |  23  |  1.27    Ca    9.3      06 Aug 2020 05:19  Phos  4.5     08-06  Mg     1.6     08-06      PT/INR - ( 05 Aug 2020 05:49 )   PT: 14.8 sec;   INR: 1.25          PTT - ( 05 Aug 2020 05:49 )  PTT:28.1 sec    CARDIAC MARKERS ( 05 Aug 2020 23:34 )  x     / 0.01 ng/mL / x     / x     / x          CAPILLARY BLOOD GLUCOSE          Drug Levels: [] N/A    CSF Analysis: [] N/A  RBC Count - Spinal Fluid: 01602 /uL (08-05 @ 21:11)  CSF Lymphocytes: 1 % (08-05 @ 21:11)  Glucose, CSF: 69 mg/dL (08-05 @ 21:11)  Protein, CSF: 106 mg/dL (08-05 @ 21:11)      Allergies    No Known Allergies    Intolerances      MEDICATIONS:  Antibiotics:    Neuro:  acetaminophen   Tablet .. 650 milliGRAM(s) Oral every 6 hours PRN    Anticoagulation:    OTHER:  atorvastatin 40 milliGRAM(s) Oral at bedtime  bisacodyl 5 milliGRAM(s) Oral every 12 hours PRN  diltiazem    milliGRAM(s) Oral daily  latanoprost 0.005% Ophthalmic Solution 1 Drop(s) Both EYES at bedtime  metoprolol tartrate 37.5 milliGRAM(s) Oral two times a day  pantoprazole    Tablet 40 milliGRAM(s) Oral every 12 hours  senna 2 Tablet(s) Oral at bedtime    IVF:  sodium chloride 0.9%. 600 milliLiter(s) IV Continuous <Continuous>    CULTURES:  Culture Results:   No growth to date (08-05 @ 21:20)  Culture Results:   No growth to date (08-03 @ 14:52)    RADIOLOGY & ADDITIONAL TESTS:      ASSESSMENT:  75y Females/p VPS certas @ 4 gen surg laparascopic assist. medicine cleared for OR    s/p placement of R frontal VPS, Certas at 4. pending CTH, neuro at baseline, extubated, on room air. episode of afib with RVR, s/p lopressor x1. BRBPR noted, assessed at bedside, likely hemorrhoids impacted stool noted. give bowel regimen and watch. hg and baseline ekg stable. IVF kept at 50 cc/hr; post op xray with no congestion, mildly increased breathing effort noted, however saturating well. baseline CHF     PLAN:  Neuro:   - neurochecks    - vitals   - pain control prn with tyl  - OOB/amb/PT  - post op CT    Cardio:   - afib hx   - cont. diltaezem   - cont. metoprolol po   - lopressor prn for rvr > 130  - cont. lipitor   - baseline chf, judicious fluids   - xray without significant congestion     Pulm:   - IS     Renal:   - IVF at 50 until po intake   - dc if congestion sx   - remove hubbard   - tov     GI:   - keep npo until ok with gen surg   - Bowel regimen     Heme:   - H&H stable     ID:   - afebrile, no leukocytosis     Endo:   - ISS     DVT PROPHYLAXIS:  [x] Venodynes                                [] Heparin/Lovenox      DISPOSITION: Transfer back to medicine service today       Assessment:  Present when checked    []  GCS  E   V  M     Heart Failure: []Acute, [] acute on chronic , []chronic  Heart Failure:  [] Diastolic (HFpEF), [] Systolic (HFrEF), []Combined (HFpEF and HFrEF), [] RHF, [] Pulm HTN, [] Other    [] NESTOR, [] ATN, [] AIN, [] other  [] CKD1, [] CKD2, [] CKD 3, [] CKD 4, [] CKD 5, []ESRD    Encephalopathy: [] Metabolic, [] Hepatic, [] toxic, [] Neurological, [] Other    Abnormal Nurtitional Status: [] malnurtition (see nutrition note), [ ]underweight: BMI < 19, [] morbid obesity: BMI >40, [] Cachexia    [] Sepsis  [] hypovolemic shock,[] cardiogenic shock, [] hemorrhagic shock, [] neuogenic shock  [] Acute Respiratory Failure  []Cerebral edema, [] Brain compression/ herniation,   [] Functional quadriplegia  [] Acute blood loss anemia

## 2020-08-06 NOTE — PROGRESS NOTE ADULT - PROBLEM SELECTOR PLAN 4
Hx of afib on home Eliquis 5mg BID, diltiazem 120 qd, lopressor 37.5mg BID  - per stroke neurology, Eliquis. Hold on Saturday for LP Monday.   - c/w Eliquis, diltiazem, and lopressor here  - heparin ggt, with ptt >200 x2, will recheck again and start as indicated for full A/C
Hx of afib on home Eliquis 5mg BID, diltiazem 120 qd, lopressor 37.5mg BID  - per stroke neurology, Eliquis. Hold on Saturday for LP Monday.   - c/w Eliquis, diltiazem, and lopressor here
Hx of afib on home Eliquis 5mg BID, diltiazem 120 qd, lopressor 37.5mg BID  - per stroke neurology, Eliquis. Hold on Saturday for LP Monday.   - c/w Eliquis, diltiazem, and lopressor here
Hx of afib on home Eliquis 5mg BID, diltiazem 120 qd, lopressor 37.5mg BID  - per stroke neurology, Eliquis. Hold on Saturday for LP Monday.   - c/w Eliquis, diltiazem, and lopressor here  - heparin ggt, with ptt >200 x2, will recheck again and start as indicated for full A/C
Hx of afib on home Eliquis 5mg BID, diltiazem 120 qd, lopressor 37.5mg BID  - per stroke neurology, Eliquis. Hold on Saturday for LP Monday.   - c/w Eliquis, diltiazem, and lopressor here  - heparin ggt, with ptt >200 x2, will recheck again and start as indicated for full A/C. ptt >156 today. Re-dose s/p LP.
Hx of afib on home Eliquis 5mg BID, diltiazem 120 qd, lopressor 37.5mg BID  - per stroke neurology, Eliquis. Hold on Saturday for LP Monday.   - c/w Eliquis, diltiazem, and lopressor here  - heparin ggt, with ptt >200 x2, will recheck again and start as indicated for full A/C. ptt >156 today. Re-dose s/p LP.
Hx of afib on home Eliquis 5mg BID, diltiazem 120 qd, lopressor 37.5mg BID  - per stroke neurology, Eliquis. Hold on Saturday for LP Monday.   - c/w Eliquis, diltiazem, and lopressor here  - heparin ggt, with ptt >200 x2, will recheck again and start as indicated for full A/C. ptt >156 today. Re-dose s/p LP. Will hold until FOBT is completed
Hx of afib on home Eliquis 5mg BID, diltiazem 120 qd, lopressor 37.5mg BID  - per stroke neurology, OK to restart Eliquis  - c/w Eliquis, diltiazem, and lopressor here
Hx of afib on home Eliquis 5mg BID, diltiazem 120 qd, lopressor 37.5mg BID  - per stroke neurology, OK to restart Eliquis  - c/w Eliquis, diltiazem, and lopressor here
Hx of afib on home Eliquis 5mg BID, diltiazem 120 qd, lopressor 37.5mg BID  - per stroke neurology, Eliquis. Hold on Saturday for LP Monday.   - c/w Eliquis, diltiazem, and lopressor here
Hx of afib on home Eliquis 5mg BID, diltiazem 120 qd, lopressor 37.5mg BID  - per stroke neurology, Eliquis. Hold on Saturday for LP Monday.   - c/w Eliquis, diltiazem, and lopressor here

## 2020-08-06 NOTE — OCCUPATIONAL THERAPY INITIAL EVALUATION ADULT - GENERAL OBSERVATIONS, REHAB EVAL
Patient received in bed +IV +tele +A-line +suctioned hubbard +SCD's; sx incision site c/d/i. Patient with no c/o pain, agreeable to pain. Patient received in bed +IV +tele +A-line +suctioned hubbard +SCD's; sx incision site c/d/i. Patient with no c/o pain, agreeable to pain. Cleared by VERNON Anglin to be seen by OT today. Patient received in bed +IV +tele +A-line R radial, +hubbard to wall suction +SCD's BLE; incision site c/d/i. Patient with no c/o pain, agreeable to OT,  Cleared by VERNON Anglin to be seen by OT today.

## 2020-08-06 NOTE — OCCUPATIONAL THERAPY INITIAL EVALUATION ADULT - ADDITIONAL COMMENTS
Pt presents from [facility] 2/[facility] AMS (has been there 3 weeks, daughter reports pt was able to ambulate with walker prior to this but has not been ambulating at rehab recently). Pt previously used RW when community ambulating and SC inside home. She has ~6 steps to enter one entrance and none at another PT familiar to PT consuelo and hx obtained from previous PT session. Pt unable to provide history 2/2 confusion. Pt presents from [facility] 2/2 AMS (has been there 3 weeks, daughter reports pt was able to ambulate with walker prior to this but has not been ambulating at rehab recently). Pt previously used RW when community ambulating and SC inside home. She has ~6 steps to enter one entrance and none at another no SARAH.   Pt Congolese speaking and PT therapist able to communicate with patient in Congolese.

## 2020-08-06 NOTE — OCCUPATIONAL THERAPY INITIAL EVALUATION ADULT - VISUAL ASSESSMENT: TRACKING
unable to fully assess vision 2/2 patient appears confused unable to fully assess vision 2/2 patient appears confused and difficulty with multistep commands.

## 2020-08-06 NOTE — PROGRESS NOTE ADULT - PROBLEM SELECTOR PROBLEM 7
TIA (transient ischemic attack)

## 2020-08-06 NOTE — OCCUPATIONAL THERAPY INITIAL EVALUATION ADULT - PLANNED THERAPY INTERVENTIONS, OT EVAL
neuromuscular re-education/transfer training/cognitive, visual perceptual/ROM/balance training/joint mobilization/motor coordination training/ADL retraining/bed mobility training/strengthening

## 2020-08-06 NOTE — PROGRESS NOTE ADULT - PROBLEM SELECTOR PLAN 3
Pt presented with 2 weeks of worsening AMS with [facility] (bacteria unclear) sensitive to ertapenem. Did not meet SIRS criteria on admission. Pt was started on ertapenem 1g qd on 7/22 (planned for course to be completed on 7/29)    - Completed course of ertapenem on 7/29  - Urine culture: Final No growth  - f/u repeat UA (reflex culture ordered)

## 2020-08-06 NOTE — PROGRESS NOTE ADULT - PROBLEM SELECTOR PROBLEM 5
CAD (coronary artery disease)
Hypertension, unspecified type
CAD (coronary artery disease)

## 2020-08-06 NOTE — OCCUPATIONAL THERAPY INITIAL EVALUATION ADULT - BED MOBILITY LIMITATIONS, REHAB EVAL
impaired ability to control trunk for mobility/decreased ability to use arms for pushing/pulling/decreased ability to use legs for bridging/pushing Date Of Previous Biopsy (Optional): 05/28/2019

## 2020-08-06 NOTE — OCCUPATIONAL THERAPY INITIAL EVALUATION ADULT - IMPAIRED TRANSFERS: SIT/STAND, REHAB EVAL
decreased ROM/impaired balance/impaired postural control/impaired motor control/cognition/decreased strength/impaired coordination

## 2020-08-06 NOTE — PROGRESS NOTE ADULT - SUBJECTIVE AND OBJECTIVE BOX
8/6: POD #1 s/p VPS certas @ 4 gen surg laparascopic assist. medicine cleared for OR    s/p placement of R frontal VPS, Certas at 4. pending CTH, neuro at baseline, extubated, on room air. episode of afib with RVR, s/p lopressor x1. BRBPR noted, assessed at bedside, likely hemorrhoids impacted stool noted. give bowel regimen and watch. hg and baseline ekg stable. IVF kept at 50 cc/hr; post op xray with no congestion, mildly increased breathing effort noted, however saturating well. baseline CHF     OVERNIGHT EVENTS:    SUBJECTIVE / INTERVAL HPI: Patient seen and examined at bedside.       PHYSICAL EXAM:    PHYSICAL EXAM:  GENERAL: NAD, resting comfortably in bed.  HEAD:  Atraumatic, Normocephalic  EYES: EOMI, PERRLA, conjunctiva and sclera clear  NECK: Supple, No JVD  CHEST/LUNG: Clear to auscultation bilaterally; No wheezes  HEART: Irregular rate and rhythm; No murmurs, rubs, or gallops  ABDOMEN: Soft, Nontender, Nondistended; Bowel sounds present. Mildly firm this morning  EXTREMITIES:  2+ Peripheral Pulses, No clubbing, cyanosis, or edema  PSYCH: AAOx (to name) 1, moves all four ext   NEUROLOGY: Limited. Non Verbal      VITAL SIGNS:  Vital Signs Last 24 Hrs  T(C): 37.2 (06 Aug 2020 20:58), Max: 37.2 (06 Aug 2020 20:58)  T(F): 98.9 (06 Aug 2020 20:58), Max: 98.9 (06 Aug 2020 20:58)  HR: 87 (06 Aug 2020 20:58) (83 - 139)  BP: 114/69 (06 Aug 2020 20:58) (96/63 - 142/82)  BP(mean): 83 (06 Aug 2020 15:00) (73 - 101)  RR: 18 (06 Aug 2020 20:58) (17 - 29)  SpO2: 94% (06 Aug 2020 20:58) (78% - 100%)      MEDICATIONS:  MEDICATIONS  (STANDING):  atorvastatin 40 milliGRAM(s) Oral at bedtime  diltiazem    milliGRAM(s) Oral daily  latanoprost 0.005% Ophthalmic Solution 1 Drop(s) Both EYES at bedtime  metoprolol tartrate 37.5 milliGRAM(s) Oral two times a day  pantoprazole    Tablet 40 milliGRAM(s) Oral every 12 hours  senna 2 Tablet(s) Oral at bedtime  sodium chloride 0.9%. 600 milliLiter(s) (50 mL/Hr) IV Continuous <Continuous>    MEDICATIONS  (PRN):  acetaminophen   Tablet .. 650 milliGRAM(s) Oral every 6 hours PRN Temp greater or equal to 38C (100.4F), Mild Pain (1 - 3)  bisacodyl 5 milliGRAM(s) Oral every 12 hours PRN Constipation      ALLERGIES:  Allergies    No Known Allergies    Intolerances        LABS:                        11.2   10.10 )-----------( 164      ( 06 Aug 2020 05:19 )             36.3     08-06    141  |  107  |  21  ----------------------------<  104<H>  4.4   |  23  |  1.27    Ca    9.3      06 Aug 2020 05:19  Phos  4.5     08-06  Mg     1.6     08-06      PT/INR - ( 05 Aug 2020 05:49 )   PT: 14.8 sec;   INR: 1.25          PTT - ( 05 Aug 2020 05:49 )  PTT:28.1 sec    CAPILLARY BLOOD GLUCOSE      POCT Blood Glucose.: 97 mg/dL (05 Aug 2020 11:45)      RADIOLOGY & ADDITIONAL TESTS: Reviewed. 8/6: POD #1 s/p VPS certas @ 4 gen surg laparascopic assist. medicine cleared for OR    s/p placement of R frontal VPS, Certas at 4. pending CTH, neuro at baseline, extubated, on room air. episode of afib with RVR, s/p lopressor x1. BRBPR noted, assessed at bedside, likely hemorrhoids impacted stool noted. give bowel regimen and watch. hg and baseline ekg stable. IVF kept at 50 cc/hr; post op xray with no congestion, mildly increased breathing effort noted, however saturating well. baseline CHF       PHYSICAL EXAM:  GENERAL: NAD, resting comfortably in bed.  HEAD:  Atraumatic, Normocephalic  EYES: EOMI, PERRLA, conjunctiva and sclera clear  NECK: Supple, No JVD  CHEST/LUNG: Clear to auscultation bilaterally; No wheezes  HEART: Irregular rate and rhythm; No murmurs, rubs, or gallops  ABDOMEN: Soft, Nontender, Nondistended; Bowel sounds present. Mildly firm this morning  EXTREMITIES:  2+ Peripheral Pulses, No clubbing, cyanosis, or edema  PSYCH: AAOx (to name) 1, moves all four ext   NEUROLOGY: Limited. Non Verbal      VITAL SIGNS:  Vital Signs Last 24 Hrs  T(C): 37.2 (06 Aug 2020 20:58), Max: 37.2 (06 Aug 2020 20:58)  T(F): 98.9 (06 Aug 2020 20:58), Max: 98.9 (06 Aug 2020 20:58)  HR: 87 (06 Aug 2020 20:58) (83 - 139)  BP: 114/69 (06 Aug 2020 20:58) (96/63 - 142/82)  BP(mean): 83 (06 Aug 2020 15:00) (73 - 101)  RR: 18 (06 Aug 2020 20:58) (17 - 29)  SpO2: 94% (06 Aug 2020 20:58) (78% - 100%)      MEDICATIONS:  MEDICATIONS  (STANDING):  atorvastatin 40 milliGRAM(s) Oral at bedtime  diltiazem    milliGRAM(s) Oral daily  latanoprost 0.005% Ophthalmic Solution 1 Drop(s) Both EYES at bedtime  metoprolol tartrate 37.5 milliGRAM(s) Oral two times a day  pantoprazole    Tablet 40 milliGRAM(s) Oral every 12 hours  senna 2 Tablet(s) Oral at bedtime  sodium chloride 0.9%. 600 milliLiter(s) (50 mL/Hr) IV Continuous <Continuous>    MEDICATIONS  (PRN):  acetaminophen   Tablet .. 650 milliGRAM(s) Oral every 6 hours PRN Temp greater or equal to 38C (100.4F), Mild Pain (1 - 3)  bisacodyl 5 milliGRAM(s) Oral every 12 hours PRN Constipation      ALLERGIES:  Allergies    No Known Allergies    Intolerances        LABS:                        11.2   10.10 )-----------( 164      ( 06 Aug 2020 05:19 )             36.3     08-06    141  |  107  |  21  ----------------------------<  104<H>  4.4   |  23  |  1.27    Ca    9.3      06 Aug 2020 05:19  Phos  4.5     08-06  Mg     1.6     08-06      PT/INR - ( 05 Aug 2020 05:49 )   PT: 14.8 sec;   INR: 1.25          PTT - ( 05 Aug 2020 05:49 )  PTT:28.1 sec    CAPILLARY BLOOD GLUCOSE      POCT Blood Glucose.: 97 mg/dL (05 Aug 2020 11:45)      RADIOLOGY & ADDITIONAL TESTS: Reviewed.

## 2020-08-06 NOTE — OCCUPATIONAL THERAPY INITIAL EVALUATION ADULT - MD ORDER
75y Females/p VPS certas @ 4 gen surg laparascopic assist. medicine cleared for OR    s/p placement of R frontal VPS, Certas at 4. pending CTH, neuro at baseline, extubated, on room air. episode of afib with RVR, s/p lopressor x1. BRBPR noted, assessed at bedside, likely hemorrhoids impacted stool noted. give bowel regimen and watch. hg and baseline ekg stable. IVF kept at 50 cc/hr; post op xray with no congestion, mildly increased breathing effort noted, however saturating well. baseline CHF

## 2020-08-06 NOTE — PROGRESS NOTE ADULT - ASSESSMENT
76 y/o F HD7 with a PMHx of Afib (on Eliquis), HFpEF w/ severe aortic stenosis s/p TAVR (May 2020), HTN, CAD s/p STEMI (on ASA), CKD, TIA s/p L CEA, recent ESBL UTI, presented from [facility] rehab due to 2 weeks of worsening mental status. Found to have increased ventriculomegaly suggestive of hydrocephalus s/p  placement of R frontal VPS, Certas at 4, stable,  transfered back to [facility]

## 2020-08-06 NOTE — OCCUPATIONAL THERAPY INITIAL EVALUATION ADULT - IMPAIRMENTS CONTRIBUTING IMPAIRED BED MOBILITY, REHAB EVAL
decreased strength/impaired postural control/impaired coordination/decreased ROM/impaired balance/cognition/impaired motor control

## 2020-08-06 NOTE — PROGRESS NOTE ADULT - PROBLEM SELECTOR PLAN 2
Pt presented with 2 weeks of worsening AMS. CTH noncontrast in ED showing increased diffuse ventriculomegaly compared to May 2020.  - Per neurology recs: f/u CSF autommune enchapalitis panal, flow cytology, ACE  - LP completed: waiting neurosurg / neurology recs  - s/p placement of R frontal VPS, Certas at 4, stable,  - f/u post op CT

## 2020-08-06 NOTE — PROGRESS NOTE ADULT - SUBJECTIVE AND OBJECTIVE BOX
76 y/o woman with a PMHx of Afib (on Eliquis), HFpEF w/ severe aortic stenosis s/p TAVR (May 2020), HTN, CAD s/p STEMI (on ASA), CKD, TIA s/p L CEA, presented from [facility] rehab for 2 weeks of worsening AMS. Per Dr. Zhong from [facility], pt arrived at [facility] rehab about 3 weeks ago after recent admission for dizziness and weakness last month (6/2020). At [facility] rehab, patient was AAOx1, but was ambulatory thoughout her 3 weeks there. She was noted to be not eating as much recently and had multiple negative sepsis workup. Her latest UCx however did grow ESBL sensitive to ertapenem (started IV 1g qd on 7/22, to end on 7/29). She was reported to be AAOx3, conversant and ambulatory. On arrival at [facility] ED, stroke code was called given patient was nonverbal. No acute stroke or bleeds on CTH, only notable for increasing hydrocephalus from May. At bedside, patient was AAOx2 (person and place), but not following much commands or conversant when using a [place] phone . Patient denied having any pain, but was unable to further answer other ROS questions at bedside.        F     Vital Signs Last 24 Hrs  T(C): 36.4 (06 Aug 2020 09:00), Max: 36.8 (05 Aug 2020 16:31)  T(F): 97.5 (06 Aug 2020 09:00), Max: 98.2 (05 Aug 2020 16:31)  HR: 91 (06 Aug 2020 13:00) (83 - 139)  BP: 135/78 (06 Aug 2020 13:00) (81/57 - 142/82)  BP(mean): 99 (06 Aug 2020 13:00) (65 - 101)  RR: 23 (06 Aug 2020 13:00) (17 - 36)  SpO2: 97% (06 Aug 2020 13:00) (61% - 100%)        05 Aug 2020 07:01  -  06 Aug 2020 07:00  .: 50 mL  Total IN: 350 mL    OUT:  Total OUT: 0 mL    Total NET: 350 mL            I&O's Summary    PE  HEENT s/p V-P shunt  Cor RRR  Lung Clear  Abd soft  Extr/neuro-unchanged                   DIET:  [x] NPO  [] Mechanical  [] Tube feeds    LABS:                        11.2   10.10 )-----------( 164      ( 06 Aug 2020 05:19 )             36.3     08-06    141  |  107  |  21  ----------------------------<  104<H>  4.4   |  23  |  1.27    Ca    9.3      06 Aug 2020 05:19  Phos  4.5     08-06  Mg     1.6     08-06      PT/INR - ( 05 Aug 2020 05:49 )   PT: 14.8 sec;   INR: 1.25          PTT - ( 05 Aug 2020 05:49 )  PTT:28.1 sec    CARDIAC MARKERS ( 05 Aug 2020 23:34 )  x     / 0.01 ng/mL / x     / x     / x          CAPILLARY BLOOD GLUCOSE          Drug Levels: [] N/A    CSF Analysis: [] N/A  RBC Count - Spinal Fluid: 08483 /uL (08-05 @ 21:11)  CSF Lymphocytes: 1 % (08-05 @ 21:11)  Glucose, CSF: 69 mg/dL (08-05 @ 21:11)  Protein, CSF: 106 mg/dL (08-05 @ 21:11)      Allergies    No Known Allergies    Intolerances      MEDICATIONS:  Antibiotics:    Neuro:  acetaminophen   Tablet .. 650 milliGRAM(s) Oral every 6 hours PRN    Anticoagulation:    OTHER:  atorvastatin 40 milliGRAM(s) Oral at bedtime  bisacodyl 5 milliGRAM(s) Oral every 12 hours PRN  diltiazem    milliGRAM(s) Oral daily  latanoprost 0.005% Ophthalmic Solution 1 Drop(s) Both EYES at bedtime  metoprolol tartrate 37.5 milliGRAM(s) Oral two times a day  pantoprazole    Tablet 40 milliGRAM(s) Oral every 12 hours  senna 2 Tablet(s) Oral at bedtime    IVF:  sodium chloride 0.9%. 600 milliLiter(s) IV Continuous <Continuous>    CULTURES:  Culture Results:   No growth to date (08-05 @ 21:20)  Culture Results:   No growth to date (08-03 @ 14:52)    RADIOLOGY & ADDITIONAL TESTS:      ASSESSMENT:  75y Females/p VPS certas @ 4 gen surg laparascopic assist. medicine cleared for OR    s/p placement of R frontal VPS, Certas at 4. pending CTH, neuro at baseline, extubated, on room air. episode of afib with RVR, s/p lopressor x1. BRBPR noted, assessed at bedside, likely hemorrhoids impacted stool noted. give bowel regimen and watch. hg and baseline ekg stable. IVF kept at 50 cc/hr; post op xray with no congestion, mildly increased breathing effort noted, however saturating well. baseline CHF     PLAN:  Neuro:   - neurochecks    - vitals   - pain control prn with tyl  - OOB/amb/PT  - post op CT    Cardio:   - afib hx   - cont. diltaezem   - cont. metoprolol po   - lopressor prn for rvr > 130  - cont. lipitor   - baseline chf, judicious fluids   - xray without significant congestion     Pulm:   - IS     Renal:   - IVF at 50 until po intake   - dc if congestion sx   - remove hubbard   - tov     GI:   - keep npo until ok with gen surg   - Bowel regimen     Heme:   - H&H stable     ID:   - afebrile, no leukocytosis     Endo:   - ISS     DVT PROPHYLAXIS:  [x] Venodynes                                [] Heparin/Lovenox      DISPOSITION: Transfer back to medicine service today       Assessment:  Present when checked    []  GCS  E   V  M     Heart Failure: []Acute, [] acute on chronic , []chronic  Heart Failure:  [] Diastolic (HFpEF), [] Systolic (HFrEF), []Combined (HFpEF and HFrEF), [] RHF, [] Pulm HTN, [] Other    [] NESTOR, [] ATN, [] AIN, [] other  [] CKD1, [] CKD2, [] CKD 3, [] CKD 4, [] CKD 5, []ESRD    Encephalopathy: [] Metabolic, [] Hepatic, [] toxic, [] Neurological, [] Other    Abnormal Nurtitional Status: [] malnurtition (see nutrition note), [ ]underweight: BMI < 19, [] morbid obesity: BMI >40, [] Cachexia    [] Sepsis  [] hypovolemic shock,[] cardiogenic shock, [] hemorrhagic shock, [] neuogenic shock  [] Acute Respiratory Failure  []Cerebral edema, [] Brain compression/ herniation,   [] Functional quadriplegia  [] Acute blood loss anemia

## 2020-08-06 NOTE — PROGRESS NOTE ADULT - PROBLEM SELECTOR PROBLEM 8
Chronic kidney disease (CKD)

## 2020-08-06 NOTE — PROGRESS NOTE ADULT - SUBJECTIVE AND OBJECTIVE BOX
24 hr events: s/p placement of R frontal VPS, Certas at 4. pending CTH, neuro at baseline, extubated, on room air. episode of afib with RVR, s/p lopressor x1. BRBPR noted, assessed at bedside, likely hemorrhoids impacted stool noted. give bowel regimen and watch. hg and baseline ekg stable.       SUBJECTIVE: The Patient was assessed at bedside by Dr. Warner Ramírez. Patient laying in bed in NAD. Reports passing lot of gas and multiple bowel movements overnight.    Vital Signs Last 24 Hrs  T(C): 36.4 (06 Aug 2020 07:00), Max: 36.9 (05 Aug 2020 09:28)  T(F): 97.6 (06 Aug 2020 07:00), Max: 98.5 (05 Aug 2020 09:28)  HR: 91 (06 Aug 2020 08:00) (91 - 139)  BP: 121/80 (06 Aug 2020 08:00) (81/57 - 142/82)  BP(mean): 93 (06 Aug 2020 08:00) (65 - 101)  RR: 23 (06 Aug 2020 08:00) (17 - 36)  SpO2: 98% (06 Aug 2020 08:00) (61% - 100%)    Physical Exam:  General: NAD  Pulmonary: Nonlabored breathing, no respiratory distress  Abdominal: soft, NT/ND, Incisions C/D/I.  Neuro: A/O x3, no focal deficits,       Lines/drains/tubes:    I&O's Summary    05 Aug 2020 07:01  -  06 Aug 2020 07:00  --------------------------------------------------------  IN: 350 mL / OUT: 0 mL / NET: 350 mL    06 Aug 2020 07:01  -  06 Aug 2020 09:05  --------------------------------------------------------  IN: 50 mL / OUT: 0 mL / NET: 50 mL        LABS:                        11.2   10.10 )-----------( 164      ( 06 Aug 2020 05:19 )             36.3     08-06    141  |  107  |  21  ----------------------------<  104<H>  4.4   |  23  |  1.27    Ca    9.3      06 Aug 2020 05:19  Phos  4.5     08-06  Mg     1.6     08-06      PT/INR - ( 05 Aug 2020 05:49 )   PT: 14.8 sec;   INR: 1.25          PTT - ( 05 Aug 2020 05:49 )  PTT:28.1 sec    CAPILLARY BLOOD GLUCOSE      POCT Blood Glucose.: 97 mg/dL (05 Aug 2020 11:45)        RADIOLOGY & ADDITIONAL STUDIES:

## 2020-08-06 NOTE — PROGRESS NOTE ADULT - ASSESSMENT
75y Females/p VPS certas @ 4 gen surg laparascopic assist. medicine cleared for OR s/p placement of R frontal VPS,. Gen surg was consulted for laporoscopic assist.    Plan  - Patient appears to be doing well from abdominal surgical perspective.  - We will sign off.  - Plan discussed with Dr. Carmichael.    Thank you for allowing us to participate in the care of your patient. If you have any questions or concerns, please do not hesitate to reach out to us.

## 2020-08-06 NOTE — PROGRESS NOTE ADULT - PROBLEM SELECTOR PROBLEM 9
Aortic stenosis

## 2020-08-07 NOTE — DISCHARGE NOTE NURSING/CASE MANAGEMENT/SOCIAL WORK - NSDCFUADDAPPT_GEN_ALL_CORE_FT
ypu have an appointment scheduled for follow up with Neurosurgeon Dr. D'Amico next Wednesday 8/12 @ 10AM  [address] [place] [address], [place] [address]  Phone: [phone]  Fax: [phone]

## 2020-08-07 NOTE — DISCHARGE NOTE PROVIDER - NSDCFUADDAPPT_GEN_ALL_CORE_FT
ypu have an appointment scheduled for follow up with Neurosurgeon Dr. D'Amico next Wednesday 8/12 @ 10AM  [address] [place] [address], [place] [address]  Phone: [phone]  Fax: [phone]

## 2020-08-07 NOTE — DISCHARGE NOTE NURSING/CASE MANAGEMENT/SOCIAL WORK - NSDCPEPTSTRK_GEN_ALL_CORE
Risk factors for stroke/Stroke warning signs and symptoms/Signs and symptoms of stroke/Call 911 for stroke/Prescribed medications/Stroke support groups for patients, families, and friends/Need for follow up after discharge/Stroke education booklet

## 2020-08-07 NOTE — DISCHARGE NOTE PROVIDER - HOSPITAL COURSE
76 y/o F HD7 with a PMHx of Afib (on Eliquis), HFpEF w/ severe aortic stenosis s/p TAVR (May 2020), HTN, CAD s/p STEMI (on ASA), CKD, TIA s/p L CEA, recent ESBL UTI, presented from [facility] rehab due to 2 weeks of worsening mental status. Found to have increased ventriculomegaly suggestive of hydrocephalus s/p  placement of R frontal VPS, Certas at 4, stable,             Problem List/Main Diagnoses: Altered mental status, unspecified altered mental status type.     Inpatient treatment course: Pt reported to be AAOx3, conversant and ambulatory prior to TAVR in May 2020. Recently sent to [facility] rehab after June admission for dizziness/weakness. Pt reported to be AAOx0 at [facility]. Multiple negative septic workup, until recent UCx growing ESBL bacteria sensitive to ertapenem (started on IV ertapenem 1g qd from 7/22-7/29). Stroke code called in ED for nonverbal state, CTH negative for acute pathologies, however showing increasing hydrocephalus. CTA head/neck without large vessel occlusion. DDx include 2/2 ESBL UTI vs. worsening hydrocephalus. per neuro,  likely 2/2 hydrocephalus. Etiology of hydrocephalus currently unknown, possible 2/2 obstruction of CSF drainage vs viral meningitis. Less likely NPH as such a rapid increase in ventriculomegaly would not be expected in NPH, and patient not presenting with other clinical symptoms (incontinence or gait disturbance)    New medications:     Labs to be followed outpatient:     Exam to be followed outpatient: 76 y/o F HD7 with a PMHx of Afib (on Eliquis), HFpEF w/ severe aortic stenosis s/p TAVR (May 2020), HTN, CAD s/p STEMI (on ASA), CKD, TIA s/p L CEA, recent ESBL UTI, presented from [facility] rehab due to 2 weeks of worsening mental status. Found to have increased ventriculomegaly suggestive of hydrocephalus s/p  placement of R frontal VPS, Certas at 4, stable,             Problem List/Main Diagnoses: Altered mental status, unspecified altered mental status type.     Inpatient treatment course: Pt reported to be AAOx3, conversant and ambulatory prior to TAVR in May 2020. Recently sent to [facility] rehab after June admission for dizziness/weakness. Pt reported to be AAOx0 at [facility]. Multiple negative septic workup, until recent UCx growing ESBL bacteria sensitive to ertapenem (started on IV ertapenem 1g qd from 7/22-7/29). Stroke code called in ED for nonverbal state, CTH negative for acute pathologies, however showing increasing hydrocephalus. CTA head/neck without large vessel occlusion. DDx include 2/2 ESBL UTI vs. worsening hydrocephalus. per neuro,  likely 2/2 hydrocephalus. Etiology of hydrocephalus currently unknown, possible 2/2 obstruction of CSF drainage vs viral meningitis. s/p placement of R frontal VPS, Certas at 4. post op CT head Compared to 08/06/2020, showed there is slight improvement in hydrocephalus with right transfrontal ventricular shunt in place. No acute intracranial hemorrhage. neuro at baseline.    New medications: none    Labs to be followed outpatient:  f/u  shunt: CSF flow cytometry, cytology, ACE level, autoimmune encephalitis panel    anticoagulants being held on discharge. to be re-evaluated by Neurosurgery or PCP            Problem List/Main Diagnoses: UTI (urinary tract infection).    Inpatient treatment course: Pt presented with 2 weeks of worsening AMS with recent UCx performed at [facility] rehab showing ESBL (bacteria unclear) sensitive to ertapenem. Did not meet SIRS criteria on admission. Pt was started on ertapenem 1g qd on 7/22 completed on 7/29

## 2020-08-07 NOTE — PROGRESS NOTE ADULT - SUBJECTIVE AND OBJECTIVE BOX
to follow consult note: Neurosurgery:    Patient was evaluated at bedside. She is neurologically stable. At base line, she does not participate in exam.  Scalp staple line is dry and clean.   PERRL  Abd : soft, non tender, +BS. Minimal erythema around umbilical incision, not tender.  Plan: Patient may be discharged from our standing point.  She needs to follow up with Dr. D'Amico next Wednesday 8/12.   Please, arrange for her to get a head CT prior to coming to office to see him.

## 2020-08-07 NOTE — DISCHARGE NOTE PROVIDER - NSDCCPTREATMENT_GEN_ALL_CORE_FT
PRINCIPAL PROCEDURE  Procedure:  shunt  Findings and Treatment: Laparoscopic ventriculoperitoneal shunt placement for hydrocephalus drainage.  also, cerebral spinal fluid was collected and sent to lab. results pending      SECONDARY PROCEDURE  Procedure: CT head wo con  Findings and Treatment: *  Interval placement of a right frontal approach ventricular drainage catheter, crossing the midline with tip terminating in the body of the left lateral ventricle. Ventriculomegaly involving the lateral, third and fourth ventricles with evidence of transependymal morcellation of cerebrospinal fluid, compatible with acute hydrocephalus. Delayed caliber is stable compared to the eighth/1/2020 brain MR and 7/20/2020 CT of the head but mildly increased when compared to the 5/22/2020 head CT.  *  Partially imaged calcified mass centered along the planum sphenoidale better characterized on 8/1/2020 brain MR, likely representing a calcified meningioma.  *  No acute intracranial hemorrhage or extra-axial fluid collection. No CT evidence for transcortical infarction.  post procedure: there is slight improvement in hydrocephalus with right transfrontal ventricular shunt in place. No acute intracranial hemorrhage.

## 2020-08-07 NOTE — DISCHARGE NOTE NURSING/CASE MANAGEMENT/SOCIAL WORK - PATIENT PORTAL LINK FT
You can access the FollowMyHealth Patient Portal offered by [facility] by registering at the following website: [email or web address]. By joining [employer]’s FollowMyHealth portal, you will also be able to view your health information using other applications (apps) compatible with our system.

## 2020-08-07 NOTE — DISCHARGE NOTE PROVIDER - PROVIDER TOKENS
PROVIDER:[TOKEN:[[id number]],SCHEDULEDAPPT:[08/12/2020],SCHEDULEDAPPTTIME:[10:00 AM],ESTABLISHEDPATIENT:[T]]

## 2020-08-07 NOTE — PROGRESS NOTE ADULT - SUBJECTIVE AND OBJECTIVE BOX
Physical Medicine and Rehabilitation Progress Note:    Patient is a 75y old  Female who presents with a chief complaint of AMS, UTI (06 Aug 2020 22:21)      HPI:  74 y/o woman with a PMHx of Afib (on Eliquis), HFpEF w/ severe aortic stenosis s/p TAVR (May 2020), HTN, CAD s/p STEMI (on ASA), CKD, TIA s/p L CEA, presented from [facility] rehab for 2 weeks of worsening AMS. Per Dr. Zhong from [facility], pt arrived at [facility] rehab about 3 weeks ago after recent admission for dizziness and weakness last month (6/2020). At [facility] rehab, patient was AAOx1, but was ambulatory thoughout her 3 weeks there. She was noted to be not eating as much recently and had multiple negative sepsis workup. Her latest UCx however did grow ESBL sensitive to ertapenem (started IV 1g qd on 7/22, to end on 7/29). She was reported to be AAOx3, conversant and ambulatory. On arrival at [facility] ED, stroke code was called given patient was nonverbal. No acute stroke or bleeds on CTH, only notable for increasing hydrocephalus from May. At bedside, patient was AAOx2 (person and place), but not following much commands or conversant when using a [place] phone . Patient denied having any pain, but was unable to further answer other ROS questions at bedside.    ED Vitals: T 98.2F, /90, , RR 20, SpO2 94% RA  ED Labs: WBC 8.82, Hb 12.4, INR 1.55, Cr 1.28 (baseline 1.13 from June 2020). Trop neg x1.  ED Imaging: CTH noncontrast: No acute hemorrhage or infarct, +Diffuse ventriculomegaly increased from May 2020. CTA head/neck without large vessel occlusion. CXR with unchanged cardiomegaly, no acute infiltrates.  ED Management: IV CTX 1g x1 (28 Jul 2020 21:40)                            11.5   9.03  )-----------( 160      ( 07 Aug 2020 09:03 )             37.4       08-07    141  |  105  |  17  ----------------------------<  91  4.1   |  25  |  1.02    Ca    9.1      07 Aug 2020 09:03  Phos  3.2     08-07  Mg     2.0     08-07      Vital Signs Last 24 Hrs  T(C): 37.1 (07 Aug 2020 08:53), Max: 37.2 (06 Aug 2020 20:58)  T(F): 98.7 (07 Aug 2020 08:53), Max: 98.9 (06 Aug 2020 20:58)  HR: 79 (07 Aug 2020 08:53) (79 - 102)  BP: 106/76 (07 Aug 2020 08:53) (106/69 - 137/82)  BP(mean): 83 (06 Aug 2020 15:00) (83 - 99)  RR: 16 (07 Aug 2020 08:53) (16 - 25)  SpO2: 95% (07 Aug 2020 08:53) (93% - 97%)    MEDICATIONS  (STANDING):  atorvastatin 40 milliGRAM(s) Oral at bedtime  diltiazem    milliGRAM(s) Oral daily  latanoprost 0.005% Ophthalmic Solution 1 Drop(s) Both EYES at bedtime  metoprolol tartrate 37.5 milliGRAM(s) Oral two times a day  pantoprazole    Tablet 40 milliGRAM(s) Oral every 12 hours  senna 2 Tablet(s) Oral at bedtime  sodium chloride 0.9%. 600 milliLiter(s) (50 mL/Hr) IV Continuous <Continuous>    MEDICATIONS  (PRN):  acetaminophen   Tablet .. 650 milliGRAM(s) Oral every 6 hours PRN Temp greater or equal to 38C (100.4F), Mild Pain (1 - 3)  bisacodyl 5 milliGRAM(s) Oral every 12 hours PRN Constipation    Currently Undergoing Physical/ Occupational Therapy at bedside.    Functional Status Assessment:   8/6/2020    Therapeutic Interventions      Bed Mobility  Bed Mobility Training Symptoms Noted During/After Treatment: fatigue  Bed Mobility Training Rolling/Turning: maximum assist (25% patient effort);  verbal cues;  nonverbal cues (demo/gestures);  2 person assist  Bed Mobility Training Scooting: maximum assist (25% patient effort);  verbal cues;  nonverbal cues (demo/gestures);  2 person assist  Bed Mobility Training Sit-to-Supine: maximum assist (25% patient effort);  verbal cues;  nonverbal cues (demo/gestures);  2 person assist  Bed Mobility Training Supine-to-Sit: maximum assist (25% patient effort);  verbal cues;  nonverbal cues (demo/gestures);  2 person assist  Bed Mobility Training Limitations: decreased ability to use arms for pushing/pulling;  decreased ability to use legs for bridging/pushing;  impaired ability to control trunk for mobility;  decreased flexibility;  decreased strength;  impaired balance;  impaired coordination;  impaired postural control;  cognitive, decreased safety awareness    Sit-Stand Transfer Training  Sit-to-Stand Transfer Training Symptoms Noted During/After Treatment: fatigue  Transfer Training Sit-to-Stand Transfer: maximum assist (25% patient effort);  verbal cues;  nonverbal cues (demo/gestures);  3 person assist;  weight-bearing as tolerated   rolling walker  Transfer Training Stand-to-Sit Transfer: maximum assist (25% patient effort);  verbal cues;  nonverbal cues (demo/gestures);  3 person assist;  weight-bearing as tolerated   rolling walker  Sit-to-Stand Transfer Training Transfer Safety Analysis: decreased balance;  decreased sequencing ability;  decreased step length;  decreased weight-shifting ability;  decreased flexibility;  decreased strength;  impaired balance;  impaired coordination;  impaired postural control;  cognitive, decreased safety awareness    Therapeutic Exercise  Therapeutic Exercise Detail: Dangled edge of bed ~10min with fluctuating min-max A posteriorly to maintain static sitting balance seated: LAQs x5 b/l, Upper Extremity reachingsit>stand +RW +Max A of 3 (b/l Upper Extremity support and b/l knee blocking)           PM&R Impression: as above    Current Disposition Plan Recommendations:    subacute rehab placement

## 2020-08-07 NOTE — PROGRESS NOTE ADULT - PROVIDER SPECIALTY LIST ADULT
Gastroenterology
Gastroenterology
Internal Medicine
NSICU
Neurology
Neurosurgery
Rehab Medicine
Surgery
Surgery
Neurosurgery
Internal Medicine

## 2020-08-07 NOTE — DISCHARGE NOTE PROVIDER - NSDCCPCAREPLAN_GEN_ALL_CORE_FT
PRINCIPAL DISCHARGE DIAGNOSIS  Diagnosis: Altered mental status, unspecified altered mental status type  Assessment and Plan of Treatment: CTH negative for acute pathologies, however showing increasing hydrocephalus. CTA head/neck without large vessel occlusion. DDx include 2/2 ESBL UTI vs. worsening hydrocephalus. per neuro,  likely 2/2 hydrocephalus. Etiology of hydrocephalus currently unknown, possible 2/2 obstruction of CSF drainage vs viral meningitis. s/p placement of R frontal VPS, Certas at 4. post op CT head Compared to 08/06/2020, showed there is slight improvement in hydrocephalus with right transfrontal ventricular shunt in place. No acute intracranial hemorrhage. neuro at baseline.

## 2020-08-07 NOTE — PROGRESS NOTE ADULT - ASSESSMENT
74 y/o woman with a PMHx of Afib (on Eliquis), HFpEF w/ severe aortic stenosis s/p TAVR (May 2020), HTN, CAD s/p STEMI (on ASA), CKD, TIA s/p L CEA, recent ESBL UTI, presented from [facility] rehab due to 2 weeks of worsening mental status. Neurology consulted for AMS and increasing ventriculomegaly.     #Encephalopathy  Upon admission, stroke code called, CTH negative for hemorrhage but showed increasing ventriculomegaly from previous CT in May. Pt intermittently verbal and compliant with exam, today both nonverbal and mostly noncompliant with exam.   -LP performed 8/3, showing elevated protein in CSF. Unlikely infectious meningitis (CSF PCR negative). Unknown opening pressure from lumbar puncture  -Reviewed MRI brain with neuro-radiology. No evidence of obstruction of the foramen of monro. Mass does not appear to be cause of hydrocephalus.  -s/p  shunt 8/5. Post-op imaging showing improvement.  Etiology: likely 2/2 hydrocephalus. Etiology of hydrocephalus currently unknown, possible 2/2 obstruction of CSF drainage vs viral meningitis. Less likely NPH as such a rapid increase in ventriculomegaly would not be expected in NPH, and patient not presenting with other clinical symptoms (incontinence or gait disturbance)    Recommendations:    -f/u from  shunt: CSF flow cytometry, cytology, ACE level, autoimmune encephalitis panel    -Pt currently off anticoagulation due to melena. Continue to hold AC if pt to receive  shunt, discuss risk/benefits of if/when to resume AC    -f/u urinalysis with reflex to culture    -will need outpatient follow up to monitor improvement of hydrocephalus

## 2020-08-07 NOTE — PROGRESS NOTE ADULT - REASON FOR ADMISSION
AMS, UTI
AMS, UTI. general surgery consulted for laparoscopic assist.
AMS, UTI
AMS, UTI    UES  patient will follow
AMS, UTI

## 2020-08-07 NOTE — PROGRESS NOTE ADULT - ASSESSMENT
per Internal Medicine    76 y/o F HD7 with a PMHx of Afib (on Eliquis), HFpEF w/ severe aortic stenosis s/p TAVR (May 2020), HTN, CAD s/p STEMI (on ASA), CKD, TIA s/p L CEA, recent ESBL UTI, presented from [facility] rehab due to 2 weeks of worsening mental status. Found to have increased ventriculomegaly suggestive of hydrocephalus s/p  placement of R frontal VPS, Certas at 4, stable,  transfered back to [facility]    Problem/Plan - 1:  ·  Problem: Altered mental status, unspecified altered mental status type.  Plan: Pt reported to be AAOx3, conversant and ambulatory prior to TAVR in May 2020. Recently sent to [facility] rehab after June admission for dizziness/weakness. Pt reported to be AAOx0 at [facility]. Multiple negative septic workup, until recent UCx growing ESBL bacteria sensitive to ertapenem (started on IV ertapenem 1g qd from 7/22-7/29). Stroke code called in ED for nonverbal state, CTH negative for acute pathologies, however showing increasing hydrocephalus. CTA head/neck without large vessel occlusion. DDx include 2/2 ESBL UTI vs. worsening hydrocephalus.  -per neuro,  likely 2/2 hydrocephalus. Etiology of hydrocephalus currently unknown, possible 2/2 obstruction of CSF drainage vs viral meningitis. Less likely NPH as such a rapid increase in ventriculomegaly would not be expected in NPH, and patient not presenting with other clinical symptoms (incontinence or gait disturbance)  -f/u from  shunt: CSF flow cytometry, cytology, ACE level, autoimmune encephalitis panel  -f/u urinalysis with reflex to culture  - Appreciate Speech and Swallow Recs (Holding solids and advancing as tolerated)  - Appreciate PT (Will re-attempt therapy when daughter present).     Problem/Plan - 2:  ·  Problem: Hydrocephalus.  Plan: Pt presented with 2 weeks of worsening AMS. CTH noncontrast in ED showing increased diffuse ventriculomegaly compared to May 2020.  - Per neurology recs: f/u CSF autommune enchapalitis panal, flow cytology, ACE  - LP completed: waiting neurosurg / neurology recs  - s/p placement of R frontal VPS, Certas at 4, stable,  - f/u post op CT.     Problem/Plan - 3:  ·  Problem: UTI (urinary tract infection).  Plan: Pt presented with 2 weeks of worsening AMS with recent UCx performed at [facility] rehab showing ESBL (bacteria unclear) sensitive to ertapenem. Did not meet SIRS criteria on admission. Pt was started on ertapenem 1g qd on 7/22 (planned for course to be completed on 7/29)    - Completed course of ertapenem on 7/29  - Urine culture: Final No growth  - f/u repeat UA (reflex culture ordered).     Problem/Plan - 4:  ·  Problem: Afib.  Plan: Hx of afib on home Eliquis 5mg BID, diltiazem 120 qd, lopressor 37.5mg BID  - per stroke neurology, Eliquis. Hold on Saturday for LP Monday.   - c/w Eliquis, diltiazem, and lopressor here  - heparin ggt, with ptt >200 x2, will recheck again and start as indicated for full A/C. ptt >156 today. Re-dose s/p LP. Will hold until FOBT is completed.     Problem/Plan - 5:  ·  Problem: CAD (coronary artery disease).  Plan: Hx of CAD s/p STEMI. On home ASA 81mg qd  - per stroke neurology, OK to restart ASA.  - Hold ASA for LP, -- Completed.  (Will hold until FOTB is completed).     Problem/Plan - 6:  Problem: HTN (hypertension). Plan: Hx of HTN on lopressor 37.5mg BID  - sBP 130s here HR tachycardic 90s-110s  - c/w lopressor here.    Problem/Plan - 7:  ·  Problem: TIA (transient ischemic attack).  Plan: Hx of TIA s/p L CEA. CTH noncontrast here without acute bleeds or strokes. CTA head/neck without large vessel occlusion  - Hold ASA until FOTB results are back.     Problem/Plan - 8:  ·  Problem: Chronic kidney disease (CKD).  Plan: Hx of CKD (baseline 1.13 from June 2020). Cr 1.28 on admission this time. Today: 1.07   - Encourage PO fluid intake  - continue to monitor with BMP.     Problem/Plan - 9:  ·  Problem: Aortic stenosis.  Plan: Hx of aortic stenosis s/p TAVR in May 2020. Lung exam CTAB.    #HFpEF   Echo 5/2020: EF 64%, +pHTN (PASP 64), mod-severe mtiral regurg, LVH, mildly dilated LA. On home lopressor 37.5mg BID. Lungs CTAB on exam. CXR with cardiomegaly consistent with prior CXR in June, not fluid overloaded at this moment. Satting well on RA.  - c/w lopressor.     Problem/Plan - 10:  Problem: Nutrition, metabolism, and development symptoms. Plan; Plan:   F: - none  E: replete K<4, Mg<2  N: Diet per Speech and Swallow  VTE Prophylaxis: Eliquis, SCDs  C: Full Code  D: RMF

## 2020-08-07 NOTE — PROGRESS NOTE ADULT - SUBJECTIVE AND OBJECTIVE BOX
***NEUROLOGY CONSULT PROGRESS NOTE***    INTERVAL HISTORY:  s/p  shunt 8/5    SUBJECTIVE:  pt intermittently cooperative with interview, denies complaints    MEDICATIONS:  acetaminophen   Tablet .. 650 milliGRAM(s) Oral every 6 hours PRN  atorvastatin 40 milliGRAM(s) Oral at bedtime  bisacodyl 5 milliGRAM(s) Oral every 12 hours PRN  diltiazem    milliGRAM(s) Oral daily  latanoprost 0.005% Ophthalmic Solution 1 Drop(s) Both EYES at bedtime  metoprolol tartrate 37.5 milliGRAM(s) Oral two times a day  pantoprazole    Tablet 40 milliGRAM(s) Oral every 12 hours  senna 2 Tablet(s) Oral at bedtime  sodium chloride 0.9%. 600 milliLiter(s) IV Continuous <Continuous>    VITAL SIGNS:  Vital Signs Last 24 Hrs  T(C): 37.1 (07 Aug 2020 08:53), Max: 37.2 (06 Aug 2020 20:58)  T(F): 98.7 (07 Aug 2020 08:53), Max: 98.9 (06 Aug 2020 20:58)  HR: 79 (07 Aug 2020 08:53) (79 - 102)  BP: 106/76 (07 Aug 2020 08:53) (106/76 - 137/82)  BP(mean): --  RR: 16 (07 Aug 2020 08:53) (16 - 18)  SpO2: 95% (07 Aug 2020 08:53) (93% - 95%)    PHYSICAL EXAMINATION:  General: obese elderly female, comfortable, in no acute distress  Neurologic:     -Mental Status: awake and alert, pt answering questions only with yes/no answers therefore unable to assess orientation     -Intermittently follows commands     -moves extremities spontaneously with full range of motion.     -face symmetric, EOMI, PERRL     -Motor: Normal bulk and tone. noncompliant with exam     -Sensation: noncompliant with exam     -Coordination: not assessed     -Reflexes: not assessed     -Gait: deferred    LABS:                          11.5   9.03  )-----------( 160      ( 07 Aug 2020 09:03 )             37.4     08-07    141  |  105  |  17  ----------------------------<  91  4.1   |  25  |  1.02    Ca    9.1      07 Aug 2020 09:03  Phos  3.2     08-07  Mg     2.0     08-07            RADIOLOGY & ADDITIONAL STUDIES:  reviewed

## 2020-08-07 NOTE — DISCHARGE NOTE PROVIDER - CARE PROVIDER_API CALL
DAmico, Randy Scott  NEUROSURGERY  [address] [id number]  Phone: [phone]  Fax: [phone]  Established Patient  Scheduled Appointment: 08/12/2020 10:00 AM

## 2020-08-07 NOTE — PROGRESS NOTE ADULT - ATTENDING COMMENTS
I was physically present for the key portions of the evaluation and managemnent (E/M) service provided.  I agree with the above history, physical, and plan which I have reviewed and edited where appropriate, with the exceptions as per my note.    neuro exam is similar.    CTH reviewed and is improved.    AP:  - management of shunt per nsgy.  - await further CSF study results but these will be followed up in an outpt neuro appt.  - dispo planning  - will follow

## 2020-08-07 NOTE — DISCHARGE NOTE PROVIDER - NSDCMRMEDTOKEN_GEN_ALL_CORE_FT
acetaminophen 325 mg oral tablet: 2 tab(s) orally every 6 hours, As needed, Mild Pain (1 - 3)  apixaban 5 mg oral tablet: 1 tab(s) orally every 12 hours  aspirin 81 mg oral delayed release tablet: 1 tab(s) orally once a day  atorvastatin 40 mg oral tablet: 1 tab(s) orally once a day (at bedtime)  dilTIAZem 120 mg/24 hours oral capsule, extended release: 1 cap(s) orally once a day  metoprolol tartrate 37.5 mg oral tablet: 1 tab(s) orally every 12 hours  pantoprazole 40 mg oral delayed release tablet: 1 tab(s) orally once a day (before a meal)  senna oral tablet: 2 tab(s) orally once a day (at bedtime)  Travatan Z 0.004% ophthalmic solution: 1 drop(s) to each affected eye once a day (in the evening) acetaminophen 325 mg oral tablet: 2 tab(s) orally every 6 hours, As needed, Mild Pain (1 - 3)  atorvastatin 40 mg oral tablet: 1 tab(s) orally once a day (at bedtime)  bisacodyl 5 mg oral delayed release tablet: 1 tab(s) orally every 12 hours, As needed, Constipation  dilTIAZem 120 mg/24 hours oral capsule, extended release: 1 cap(s) orally once a day  metoprolol tartrate 37.5 mg oral tablet: 1 tab(s) orally every 12 hours  pantoprazole 40 mg oral delayed release tablet: 1 tab(s) orally once a day (before a meal)  senna oral tablet: 2 tab(s) orally once a day (at bedtime)  Travatan Z 0.004% ophthalmic solution: 1 drop(s) to each affected eye once a day (in the evening)

## 2020-08-12 PROBLEM — I25.10 CORONARY ARTERY DISEASE: Status: RESOLVED | Noted: 2020-01-01 | Resolved: 2020-01-01

## 2020-08-12 PROBLEM — N19 RENAL FAILURE: Status: RESOLVED | Noted: 2020-01-01 | Resolved: 2020-01-01

## 2020-08-12 PROBLEM — I10 HIGH BLOOD PRESSURE: Status: RESOLVED | Noted: 2020-01-01 | Resolved: 2020-01-01

## 2020-08-12 NOTE — REASON FOR VISIT
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

## 2020-08-12 NOTE — REVIEW OF SYSTEMS
[As Noted in HPI] : as noted in HPI [Confused or Disoriented] : confusion [Decr. Concentrating Ability] : decreased concentrating ability [Memory Lapses or Loss] : memory loss [Difficulty with Language] : no ~M difficulty with language [Inability to Walk] : inability to walk

## 2020-08-12 NOTE — ASSESSMENT
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 Patient improved after LP and decision made to proceed with VPS insertion. Patient underwent VPS insertion on 8/5/20 with siginificant improvement in cognitive function. Persistent confusion and memory loss. No changes in vision. Presents for staple remova and CT scan to evaluate if more drainage fo CSF necessary. We again spoke that if we can get her to an improved neurologic baseline, that it might make sense to decompress the skull base tumor given the risk to her vision. Furthermore plan on having abdominal incison evaluated. Plan\par \par CT head\par Gen surg referral for wound evaluation - for now advised to use bacitracin and keep clean\par f/u in 4 weeks after shunt dialing. \par

## 2020-08-12 NOTE — HISTORY OF PRESENT ILLNESS
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 Patient improved after LP and decision made to proceed with VPS insertion. Patient underwent VPS insertion on 8/5/20 with siginificant improvement in cognitive function. Persistent confusion and memory loss. No changes in vision. Presents for staple removal. Abdominal incision with mild breakdown of lateral margin. \par \par

## 2020-08-12 NOTE — PHYSICAL EXAM
[FreeTextEntry1] : A&O x 1\par PERRLA\par EOMI\par F = \par TML\par BUE 5/5\par BLE 4/5 * baseline\par Scalp incision c/d/i\par abodminal incision with lateral breakdown

## 2020-09-02 NOTE — ASSESSMENT
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 Patient improved after LP and decision made to proceed with VPS insertion. Patient underwent VPS insertion on 8/5/20 with siginificant improvement in cognitive function at a setting of 3. MRI from 2010 shows minimal growth in the skull base meningioma since then. No changes in vision. All wounds c/d/i. Will refer to ophtalmology for f/u. Plan

## 2020-09-08 NOTE — ED PROVIDER NOTE - MUSCULOSKELETAL, MLM
Copyright © 7943-9442 [employer]  
Spine appears normal, range of motion is not limited, no muscle or joint tenderness

## 2020-09-14 NOTE — DISCHARGE NOTE PROVIDER - NSDCQMERRANDS_GEN_ALL_CORE
I have reviewed discharge instructions with the patient and mother. The patient and parent verbalized understanding. Patient left ED via Discharge Method: ambulatory to Home with mother    Opportunity for questions and clarification provided. Patient given 4 scripts. No esign        To continue your aftercare when you leave the hospital, you may receive an automated call from our care team to check in on how you are doing. This is a free service and part of our promise to provide the best care and service to meet your aftercare needs.  If you have questions, or wish to unsubscribe from this service please call [phone]. Thank you for Choosing our [facility].
Yes

## 2020-10-14 PROBLEM — H53.9 VISUAL DISTURBANCE: Status: ACTIVE | Noted: 2020-01-01

## 2020-10-14 PROBLEM — Z98.2 VENTRICULAR SHUNT IN PLACE: Status: ACTIVE | Noted: 2020-01-01

## 2020-10-14 NOTE — REASON FOR VISIT
[de-identified] : RIGHT frontal ventriculoperitoneal shunt insertion for communicating hydrocephalus [de-identified] : 8/5/2020 [de-identified] : \par \par TODAY'S VISIT:\par Patient returns to review new Head CT done at [facility] in [place], s/p  Shunt placement, Certas #4. Patient has kindly refused  services for Gerogian, daughter is present and providing translation. Pateint c/o continued blurry/hazy vision changes, declining hearing and continued headaches (Tylenol). She also notes worsening balance since  Shunt placement, she uses walker at home to aid in ambulation. Today she presents in w/c, daughter states she wears diapers and has occasional episodes of urge incontinence. Denies recent falls. She has not followed up with Neuro Ophtho as of yet, we will provide referral for Dr. Mauri Hernandez.\par Daughter states she had a recent heart valve placed.\par

## 2020-10-14 NOTE — HISTORY OF PRESENT ILLNESS
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 Patient improved after LP and decision made to proceed with VPS insertion. Patient underwent VPS insertion on 8/5/20 with significant improvement in cognitive function. She continued to suffer from persistent confusion and memory loss. No changes in vision. CT last visit demonstrated persistent hydrocephalus and so the shunt was dialed to 3. New CT demonstrates resolution of hydrocephalus and transependymal flow. Her cognitive function has improved significantly. Her daughter is significantly happier. She brings with her a CD showing serial MRI's since 2010 which demonstrates minimal growth in the past 10 years. Vision is stable. No new deficits. All incisions c/d/i. \par

## 2020-10-14 NOTE — PHYSICAL EXAM
[General Appearance - Alert] : alert [General Appearance - In No Acute Distress] : in no acute distress [General Appearance - Well Developed] : well developed [Transverse] : transverse [Clean] : clean [Dry] : dry [Well-Healed] : well-healed [Normal Skin] : normal [No Drainage] : without drainage [Oriented To Time, Place, And Person] : oriented to person, place, and time [Affect] : the affect was normal [Impaired Insight] : insight and judgment were intact [Sensation Tactile Decrease] : light touch was intact [Sensation Pain / Temperature Decrease] : pain and temperature was intact [Sclera] : the sclera and conjunctiva were normal [Neck Appearance] : the appearance of the neck was normal [Outer Ear] : the ears and nose were normal in appearance [] : no respiratory distress [I: Normal Smell] : smell intact bilaterally [FreeTextEntry1] : Cranial [Cranial Nerves Trigeminal (V)] : facial sensation intact symmetrically [Cranial Nerves Oculomotor (III)] : extraocular motion intact [Cranial Nerves Facial (VII)] : face symmetrical [Motor Tone] : muscle tone was normal in all four extremities [Cranial Nerves Accessory (XI - Cranial And Spinal)] : head turning and shoulder shrug symmetric [Cranial Nerves Vestibulocochlear (VIII)] : hearing was intact bilaterally [Limited Balance] : the patient's balance was impaired [FreeTextEntry5] : bilateral vision loss described as blurry

## 2020-10-14 NOTE — ASSESSMENT
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 Patient improved after LP and decision made to proceed with VPS insertion. Patient underwent VPS insertion on 8/5/20 with significant improvement in cognitive function at a setting of 3. Comparison of meningioma from 2010 to 2020 shows approximately 0.5 cm growth in the skull base meningioma over 10 years. While her mental status continues to improve she is reporting worsening vision described as blurriness. It is possible that this is due to her improving mental status and improved recognition of symptoms. However, she has yet to undergo visual field testing. All wounds c/d/i. Will refer to ophtalmology for f/u. Plan.

## 2020-10-21 PROBLEM — G91.9 HYDROCEPHALUS: Status: ACTIVE | Noted: 2020-01-01

## 2020-10-21 PROBLEM — G45.9 TIA (TRANSIENT ISCHEMIC ATTACK): Status: ACTIVE | Noted: 2018-09-20

## 2020-10-23 PROBLEM — N18.2 CHRONIC RENAL FAILURE, STAGE 2 (MILD): Status: ACTIVE | Noted: 2020-02-21

## 2020-10-23 NOTE — PHYSICAL EXAM
[Normal Conjunctiva] : the conjunctiva exhibited no abnormalities [Eyelids - No Xanthelasma] : the eyelids demonstrated no xanthelasmas [Normal Oral Mucosa] : normal oral mucosa [No Oral Pallor] : no oral pallor [No Oral Cyanosis] : no oral cyanosis [Normal Jugular Venous A Waves Present] : normal jugular venous A waves present [Normal Jugular Venous V Waves Present] : normal jugular venous V waves present [No Jugular Venous Valle A Waves] : no jugular venous valle A waves [FreeTextEntry1] : dec DONNY aguilera  [Irregularly Irregular] : irregularly irregular [Normal S1] : normal S1 [Normal S2] : normal S2 [II] : a grade 2 [___ +] : bilateral [unfilled]U+ pretibial pitting edema [Abdomen Soft] : soft [Abdomen Tenderness] : non-tender [Abdomen Mass (___ Cm)] : no abdominal mass palpated [Abnormal Walk] : normal gait [Gait - Sufficient For Exercise Testing] : the gait was sufficient for exercise testing [Nail Clubbing] : no clubbing of the fingernails [Cyanosis, Localized] : no localized cyanosis [Petechial Hemorrhages (___cm)] : no petechial hemorrhages [] : no ischemic changes

## 2020-10-23 NOTE — ASSESSMENT
[FreeTextEntry1] : Assessment \par Evidence of CHF on exan will add torsemide for bnp elev and edema \par f/u echo 1 week \par d/c cardizem as is on beta blocker and would avoid 2 chronotropic agents in TAVR pateint\par Noac for embolic protection

## 2020-10-23 NOTE — HISTORY OF PRESENT ILLNESS
[FreeTextEntry1] : s/p tavr and post ip afib \par had  shunt ?reason \par has mod edema and baseline on NOAc for AFIB \par

## 2020-11-18 PROBLEM — I35.0 AORTIC STENOSIS: Status: ACTIVE | Noted: 2018-09-20

## 2020-11-18 NOTE — PHYSICAL EXAM
[Normal Conjunctiva] : the conjunctiva exhibited no abnormalities [Eyelids - No Xanthelasma] : the eyelids demonstrated no xanthelasmas [Normal Oral Mucosa] : normal oral mucosa [No Oral Pallor] : no oral pallor [No Oral Cyanosis] : no oral cyanosis [Normal Jugular Venous A Waves Present] : normal jugular venous A waves present [Normal Jugular Venous V Waves Present] : normal jugular venous V waves present [No Jugular Venous Valle A Waves] : no jugular venous valle A waves [Abdomen Soft] : soft [Abdomen Tenderness] : non-tender [Abdomen Mass (___ Cm)] : no abdominal mass palpated [Abnormal Walk] : normal gait [Gait - Sufficient For Exercise Testing] : the gait was sufficient for exercise testing [Nail Clubbing] : no clubbing of the fingernails [Cyanosis, Localized] : no localized cyanosis [Petechial Hemorrhages (___cm)] : no petechial hemorrhages [] : no ischemic changes [Irregularly Irregular] : irregularly irregular [Normal S1] : normal S1 [Normal S2] : normal S2 [II] : a grade 2 [___ +] : bilateral [unfilled]U+ pretibial pitting edema [FreeTextEntry1] : absent BS left base

## 2020-11-18 NOTE — ASSESSMENT
[FreeTextEntry1] : Assessment \par CHF on exam and clinically \par Lvef 50% AFIB noted \par 3+ MR AVR working well \par MOD PHTN dilated IVC 3.0 cm  \par In explained at length w  need for diuretic as she has all clinical parameters of CHF w 3+ MRand PHTN\par continue eliquis for AC protection

## 2020-11-18 NOTE — REVIEW OF SYSTEMS
[Feeling Fatigued] : feeling fatigued [Shortness Of Breath] : shortness of breath [Dyspnea on exertion] : dyspnea during exertion [Lower Ext Edema] : lower extremity edema [Negative] : Genitourinary

## 2020-11-18 NOTE — HISTORY OF PRESENT ILLNESS
[FreeTextEntry1] : s/p tavr and post ip afib \par had  shunt ?reason \par has mod edema and baseline on NOAc for AFIB \par 11/18/2020 \par seen 3 weeks ago s/p TAVR \par had orthopnea PND and edema \par weas given torseomide \par daughter refused o give meds ? reason \par Continues to have edema and sob BNP >3000 last visit bnp preop 1300

## 2021-01-01 ENCOUNTER — APPOINTMENT (OUTPATIENT)
Dept: MRI IMAGING | Facility: HOSPITAL | Age: 76
End: 2021-01-01

## 2021-01-01 ENCOUNTER — LABORATORY RESULT (OUTPATIENT)
Age: 76
End: 2021-01-01

## 2021-01-01 ENCOUNTER — APPOINTMENT (OUTPATIENT)
Dept: NEUROSURGERY | Facility: CLINIC | Age: 76
End: 2021-01-01
Payer: MEDICARE

## 2021-01-01 ENCOUNTER — NON-APPOINTMENT (OUTPATIENT)
Age: 76
End: 2021-01-01

## 2021-01-01 ENCOUNTER — APPOINTMENT (OUTPATIENT)
Dept: CT IMAGING | Facility: HOSPITAL | Age: 76
End: 2021-01-01

## 2021-01-01 ENCOUNTER — APPOINTMENT (OUTPATIENT)
Dept: HEART AND VASCULAR | Facility: CLINIC | Age: 76
End: 2021-01-01

## 2021-01-01 ENCOUNTER — APPOINTMENT (OUTPATIENT)
Dept: CARDIOTHORACIC SURGERY | Facility: CLINIC | Age: 76
End: 2021-01-01
Payer: MEDICARE

## 2021-01-01 ENCOUNTER — RESULT CHARGE (OUTPATIENT)
Age: 76
End: 2021-01-01

## 2021-01-01 ENCOUNTER — INPATIENT (INPATIENT)
Facility: HOSPITAL | Age: 76
LOS: 10 days | DRG: 306 | End: 2021-03-12
Attending: INTERNAL MEDICINE | Admitting: INTERNAL MEDICINE
Payer: MEDICARE

## 2021-01-01 ENCOUNTER — APPOINTMENT (OUTPATIENT)
Dept: HEART AND VASCULAR | Facility: CLINIC | Age: 76
End: 2021-01-01
Payer: MEDICARE

## 2021-01-01 ENCOUNTER — OUTPATIENT (OUTPATIENT)
Dept: OUTPATIENT SERVICES | Facility: HOSPITAL | Age: 76
LOS: 1 days | End: 2021-01-01
Payer: MEDICARE

## 2021-01-01 VITALS — RESPIRATION RATE: 18 BRPM | OXYGEN SATURATION: 98 % | TEMPERATURE: 97.7 F | HEART RATE: 88 BPM

## 2021-01-01 VITALS
TEMPERATURE: 96.7 F | HEART RATE: 104 BPM | OXYGEN SATURATION: 94 % | BODY MASS INDEX: 46.74 KG/M2 | HEIGHT: 62 IN | SYSTOLIC BLOOD PRESSURE: 186 MMHG | WEIGHT: 254 LBS | RESPIRATION RATE: 17 BRPM | DIASTOLIC BLOOD PRESSURE: 105 MMHG

## 2021-01-01 VITALS — DIASTOLIC BLOOD PRESSURE: 81 MMHG | SYSTOLIC BLOOD PRESSURE: 137 MMHG | HEART RATE: 102 BPM

## 2021-01-01 VITALS
SYSTOLIC BLOOD PRESSURE: 80 MMHG | TEMPERATURE: 95 F | HEART RATE: 115 BPM | DIASTOLIC BLOOD PRESSURE: 47 MMHG | OXYGEN SATURATION: 73 % | RESPIRATION RATE: 28 BRPM

## 2021-01-01 VITALS
RESPIRATION RATE: 18 BRPM | DIASTOLIC BLOOD PRESSURE: 76 MMHG | BODY MASS INDEX: 44.16 KG/M2 | OXYGEN SATURATION: 98 % | SYSTOLIC BLOOD PRESSURE: 124 MMHG | HEART RATE: 88 BPM | HEIGHT: 62 IN | WEIGHT: 240 LBS | TEMPERATURE: 98 F

## 2021-01-01 VITALS
TEMPERATURE: 98.3 F | BODY MASS INDEX: 46.74 KG/M2 | DIASTOLIC BLOOD PRESSURE: 87 MMHG | OXYGEN SATURATION: 98 % | HEART RATE: 103 BPM | WEIGHT: 254 LBS | SYSTOLIC BLOOD PRESSURE: 137 MMHG | HEIGHT: 62 IN | RESPIRATION RATE: 14 BRPM

## 2021-01-01 VITALS
WEIGHT: 213 LBS | SYSTOLIC BLOOD PRESSURE: 120 MMHG | HEART RATE: 102 BPM | RESPIRATION RATE: 14 BRPM | DIASTOLIC BLOOD PRESSURE: 80 MMHG | BODY MASS INDEX: 39.2 KG/M2 | HEIGHT: 62 IN

## 2021-01-01 VITALS
SYSTOLIC BLOOD PRESSURE: 131 MMHG | TEMPERATURE: 97 F | OXYGEN SATURATION: 97 % | HEART RATE: 90 BPM | HEIGHT: 60 IN | DIASTOLIC BLOOD PRESSURE: 91 MMHG | WEIGHT: 255.07 LBS | RESPIRATION RATE: 24 BRPM

## 2021-01-01 DIAGNOSIS — R53.2 FUNCTIONAL QUADRIPLEGIA: ICD-10-CM

## 2021-01-01 DIAGNOSIS — I50.32 CHRONIC DIASTOLIC (CONGESTIVE) HEART FAILURE: ICD-10-CM

## 2021-01-01 DIAGNOSIS — Z51.5 ENCOUNTER FOR PALLIATIVE CARE: ICD-10-CM

## 2021-01-01 DIAGNOSIS — D32.9 BENIGN NEOPLASM OF MENINGES, UNSPECIFIED: ICD-10-CM

## 2021-01-01 DIAGNOSIS — R06.02 SHORTNESS OF BREATH: ICD-10-CM

## 2021-01-01 DIAGNOSIS — Z98.890 OTHER SPECIFIED POSTPROCEDURAL STATES: Chronic | ICD-10-CM

## 2021-01-01 DIAGNOSIS — Z95.2 PRESENCE OF PROSTHETIC HEART VALVE: ICD-10-CM

## 2021-01-01 DIAGNOSIS — I35.0 NONRHEUMATIC AORTIC (VALVE) STENOSIS: ICD-10-CM

## 2021-01-01 DIAGNOSIS — I34.0 NONRHEUMATIC MITRAL (VALVE) INSUFFICIENCY: ICD-10-CM

## 2021-01-01 DIAGNOSIS — R06.00 DYSPNEA, UNSPECIFIED: ICD-10-CM

## 2021-01-01 DIAGNOSIS — I48.91 UNSPECIFIED ATRIAL FIBRILLATION: ICD-10-CM

## 2021-01-01 DIAGNOSIS — Z71.89 OTHER SPECIFIED COUNSELING: ICD-10-CM

## 2021-01-01 DIAGNOSIS — R42 DIZZINESS AND GIDDINESS: ICD-10-CM

## 2021-01-01 DIAGNOSIS — Z86.79 PERSONAL HISTORY OF OTHER DISEASES OF THE CIRCULATORY SYSTEM: ICD-10-CM

## 2021-01-01 DIAGNOSIS — I65.23 OCCLUSION AND STENOSIS OF BILATERAL CAROTID ARTERIES: ICD-10-CM

## 2021-01-01 DIAGNOSIS — G91.0 COMMUNICATING HYDROCEPHALUS: ICD-10-CM

## 2021-01-01 DIAGNOSIS — K11.7 DISTURBANCES OF SALIVARY SECRETION: ICD-10-CM

## 2021-01-01 DIAGNOSIS — I71.00 DISSECTION OF UNSPECIFIED SITE OF AORTA: ICD-10-CM

## 2021-01-01 DIAGNOSIS — R53.81 OTHER MALAISE: ICD-10-CM

## 2021-01-01 DIAGNOSIS — Z00.00 ENCOUNTER FOR GENERAL ADULT MEDICAL EXAMINATION W/OUT ABNORMAL FINDINGS: ICD-10-CM

## 2021-01-01 DIAGNOSIS — S06.5X9A TRAUMATIC SUBDURAL HEMORRHAGE WITH LOSS OF CONSCIOUSNESS OF UNSPECIFIED DURATION, INITIAL ENCOUNTER: ICD-10-CM

## 2021-01-01 DIAGNOSIS — I10 ESSENTIAL (PRIMARY) HYPERTENSION: ICD-10-CM

## 2021-01-01 LAB
A1C WITH ESTIMATED AVERAGE GLUCOSE RESULT: 4.9 % — SIGNIFICANT CHANGE UP (ref 4–5.6)
ALBUMIN SERPL ELPH-MCNC: 3.6 G/DL — SIGNIFICANT CHANGE UP (ref 3.3–5)
ALBUMIN SERPL ELPH-MCNC: 3.6 G/DL — SIGNIFICANT CHANGE UP (ref 3.3–5)
ALBUMIN SERPL ELPH-MCNC: 3.9 G/DL — SIGNIFICANT CHANGE UP (ref 3.3–5)
ALBUMIN SERPL ELPH-MCNC: 4.2 G/DL
ALP BLD-CCNC: 142 U/L
ALP SERPL-CCNC: 108 U/L — SIGNIFICANT CHANGE UP (ref 40–120)
ALP SERPL-CCNC: 118 U/L — SIGNIFICANT CHANGE UP (ref 40–120)
ALP SERPL-CCNC: 126 U/L — HIGH (ref 40–120)
ALT FLD-CCNC: 16 U/L — SIGNIFICANT CHANGE UP (ref 10–45)
ALT FLD-CCNC: 17 U/L — SIGNIFICANT CHANGE UP (ref 10–45)
ALT FLD-CCNC: 18 U/L — SIGNIFICANT CHANGE UP (ref 10–45)
ALT SERPL-CCNC: 24 U/L
ANION GAP SERPL CALC-SCNC: 11 MMOL/L — SIGNIFICANT CHANGE UP (ref 5–17)
ANION GAP SERPL CALC-SCNC: 11 MMOL/L — SIGNIFICANT CHANGE UP (ref 5–17)
ANION GAP SERPL CALC-SCNC: 12 MMOL/L — SIGNIFICANT CHANGE UP (ref 5–17)
ANION GAP SERPL CALC-SCNC: 13 MMOL/L
ANION GAP SERPL CALC-SCNC: 13 MMOL/L — SIGNIFICANT CHANGE UP (ref 5–17)
ANION GAP SERPL CALC-SCNC: 14 MMOL/L — SIGNIFICANT CHANGE UP (ref 5–17)
ANION GAP SERPL CALC-SCNC: 15 MMOL/L — SIGNIFICANT CHANGE UP (ref 5–17)
ANISOCYTOSIS BLD QL: SLIGHT — SIGNIFICANT CHANGE UP
APPEARANCE UR: CLEAR — SIGNIFICANT CHANGE UP
APTT BLD: 28.6 SEC — SIGNIFICANT CHANGE UP (ref 27.5–35.5)
APTT BLD: 35.5 SEC — SIGNIFICANT CHANGE UP (ref 27.5–35.5)
APTT BLD: 44.9 SEC — HIGH (ref 27.5–35.5)
APTT BLD: 46.5 SEC — HIGH (ref 27.5–35.5)
AST SERPL-CCNC: 19 U/L
AST SERPL-CCNC: 20 U/L — SIGNIFICANT CHANGE UP (ref 10–40)
BACTERIA # UR AUTO: ABNORMAL /HPF
BASOPHILS # BLD AUTO: 0.14 K/UL — SIGNIFICANT CHANGE UP (ref 0–0.2)
BASOPHILS # BLD AUTO: 0.28 K/UL
BASOPHILS NFR BLD AUTO: 2.6 % — HIGH (ref 0–2)
BASOPHILS NFR BLD AUTO: 4.5 %
BILIRUB SERPL-MCNC: 0.7 MG/DL
BILIRUB SERPL-MCNC: 1 MG/DL — SIGNIFICANT CHANGE UP (ref 0.2–1.2)
BILIRUB SERPL-MCNC: 1.1 MG/DL — SIGNIFICANT CHANGE UP (ref 0.2–1.2)
BILIRUB SERPL-MCNC: 1.3 MG/DL — HIGH (ref 0.2–1.2)
BILIRUB UR-MCNC: NEGATIVE — SIGNIFICANT CHANGE UP
BLD GP AB SCN SERPL QL: NEGATIVE — SIGNIFICANT CHANGE UP
BLD GP AB SCN SERPL QL: NEGATIVE — SIGNIFICANT CHANGE UP
BUN SERPL-MCNC: 34 MG/DL — HIGH (ref 7–23)
BUN SERPL-MCNC: 35 MG/DL — HIGH (ref 7–23)
BUN SERPL-MCNC: 36 MG/DL
BUN SERPL-MCNC: 36 MG/DL — HIGH (ref 7–23)
BUN SERPL-MCNC: 38 MG/DL — HIGH (ref 7–23)
BUN SERPL-MCNC: 43 MG/DL — HIGH (ref 7–23)
BUN SERPL-MCNC: 53 MG/DL — HIGH (ref 7–23)
BURR CELLS BLD QL SMEAR: PRESENT — SIGNIFICANT CHANGE UP
CALCIUM SERPL-MCNC: 8.6 MG/DL — SIGNIFICANT CHANGE UP (ref 8.4–10.5)
CALCIUM SERPL-MCNC: 8.6 MG/DL — SIGNIFICANT CHANGE UP (ref 8.4–10.5)
CALCIUM SERPL-MCNC: 8.9 MG/DL — SIGNIFICANT CHANGE UP (ref 8.4–10.5)
CALCIUM SERPL-MCNC: 9.1 MG/DL — SIGNIFICANT CHANGE UP (ref 8.4–10.5)
CALCIUM SERPL-MCNC: 9.2 MG/DL — SIGNIFICANT CHANGE UP (ref 8.4–10.5)
CALCIUM SERPL-MCNC: 9.3 MG/DL
CALCIUM SERPL-MCNC: 9.4 MG/DL — SIGNIFICANT CHANGE UP (ref 8.4–10.5)
CHLORIDE SERPL-SCNC: 104 MMOL/L — SIGNIFICANT CHANGE UP (ref 96–108)
CHLORIDE SERPL-SCNC: 108 MMOL/L — SIGNIFICANT CHANGE UP (ref 96–108)
CHLORIDE SERPL-SCNC: 109 MMOL/L — HIGH (ref 96–108)
CHLORIDE SERPL-SCNC: 110 MMOL/L — HIGH (ref 96–108)
CHLORIDE SERPL-SCNC: 110 MMOL/L — HIGH (ref 96–108)
CHLORIDE SERPL-SCNC: 112 MMOL/L — HIGH (ref 96–108)
CHLORIDE SERPL-SCNC: 114 MMOL/L
CHOLEST SERPL-MCNC: 64 MG/DL — SIGNIFICANT CHANGE UP
CK MB CFR SERPL CALC: 1.8 NG/ML — SIGNIFICANT CHANGE UP (ref 0–6.7)
CK MB CFR SERPL CALC: 2 NG/ML — SIGNIFICANT CHANGE UP (ref 0–6.7)
CK SERPL-CCNC: 37 U/L — SIGNIFICANT CHANGE UP (ref 25–170)
CK SERPL-CCNC: 43 U/L — SIGNIFICANT CHANGE UP (ref 25–170)
CO2 SERPL-SCNC: 17 MMOL/L
CO2 SERPL-SCNC: 19 MMOL/L — LOW (ref 22–31)
CO2 SERPL-SCNC: 20 MMOL/L — LOW (ref 22–31)
CO2 SERPL-SCNC: 21 MMOL/L — LOW (ref 22–31)
CO2 SERPL-SCNC: 23 MMOL/L — SIGNIFICANT CHANGE UP (ref 22–31)
CO2 SERPL-SCNC: 23 MMOL/L — SIGNIFICANT CHANGE UP (ref 22–31)
CO2 SERPL-SCNC: 25 MMOL/L — SIGNIFICANT CHANGE UP (ref 22–31)
COLOR SPEC: YELLOW — SIGNIFICANT CHANGE UP
CREAT SERPL-MCNC: 1.59 MG/DL — HIGH (ref 0.5–1.3)
CREAT SERPL-MCNC: 1.62 MG/DL — HIGH (ref 0.5–1.3)
CREAT SERPL-MCNC: 1.68 MG/DL — HIGH (ref 0.5–1.3)
CREAT SERPL-MCNC: 1.79 MG/DL
CREAT SERPL-MCNC: 1.82 MG/DL — HIGH (ref 0.5–1.3)
CREAT SERPL-MCNC: 2.01 MG/DL — HIGH (ref 0.5–1.3)
CREAT SERPL-MCNC: 2.39 MG/DL — HIGH (ref 0.5–1.3)
DIFF PNL FLD: ABNORMAL
EOSINOPHIL # BLD AUTO: 0.09 K/UL — SIGNIFICANT CHANGE UP (ref 0–0.5)
EOSINOPHIL # BLD AUTO: 0.17 K/UL
EOSINOPHIL NFR BLD AUTO: 1.7 % — SIGNIFICANT CHANGE UP (ref 0–6)
EOSINOPHIL NFR BLD AUTO: 2.7 %
EPI CELLS # UR: SIGNIFICANT CHANGE UP /HPF (ref 0–5)
ERYTHROCYTE [SEDIMENTATION RATE] IN BLOOD: 3 MM/HR — SIGNIFICANT CHANGE UP
ESTIMATED AVERAGE GLUCOSE: 94 MG/DL — SIGNIFICANT CHANGE UP (ref 68–114)
GAS PNL BLDA: SIGNIFICANT CHANGE UP
GAS PNL BLDA: SIGNIFICANT CHANGE UP
GIANT PLATELETS BLD QL SMEAR: PRESENT — SIGNIFICANT CHANGE UP
GLUCOSE BLDC GLUCOMTR-MCNC: 118 MG/DL — HIGH (ref 70–99)
GLUCOSE SERPL-MCNC: 101 MG/DL — HIGH (ref 70–99)
GLUCOSE SERPL-MCNC: 112 MG/DL — HIGH (ref 70–99)
GLUCOSE SERPL-MCNC: 122 MG/DL — HIGH (ref 70–99)
GLUCOSE SERPL-MCNC: 86 MG/DL — SIGNIFICANT CHANGE UP (ref 70–99)
GLUCOSE SERPL-MCNC: 88 MG/DL — SIGNIFICANT CHANGE UP (ref 70–99)
GLUCOSE SERPL-MCNC: 96 MG/DL — SIGNIFICANT CHANGE UP (ref 70–99)
GLUCOSE SERPL-MCNC: 97 MG/DL
GLUCOSE UR QL: NEGATIVE — SIGNIFICANT CHANGE UP
HCT VFR BLD CALC: 36.2 % — SIGNIFICANT CHANGE UP (ref 34.5–45)
HCT VFR BLD CALC: 36.5 % — SIGNIFICANT CHANGE UP (ref 34.5–45)
HCT VFR BLD CALC: 37.1 %
HCT VFR BLD CALC: 37.8 % — SIGNIFICANT CHANGE UP (ref 34.5–45)
HCT VFR BLD CALC: 39.4 % — SIGNIFICANT CHANGE UP (ref 34.5–45)
HCT VFR BLD CALC: 40.5 % — SIGNIFICANT CHANGE UP (ref 34.5–45)
HCT VFR BLD CALC: 41 % — SIGNIFICANT CHANGE UP (ref 34.5–45)
HDLC SERPL-MCNC: 37 MG/DL — LOW
HGB BLD-MCNC: 10.4 G/DL
HGB BLD-MCNC: 10.6 G/DL — LOW (ref 11.5–15.5)
HGB BLD-MCNC: 10.8 G/DL — LOW (ref 11.5–15.5)
HGB BLD-MCNC: 10.8 G/DL — LOW (ref 11.5–15.5)
HGB BLD-MCNC: 11.4 G/DL — LOW (ref 11.5–15.5)
HGB BLD-MCNC: 11.5 G/DL — SIGNIFICANT CHANGE UP (ref 11.5–15.5)
HGB BLD-MCNC: 11.7 G/DL — SIGNIFICANT CHANGE UP (ref 11.5–15.5)
INR BLD: 1.6 — HIGH (ref 0.88–1.16)
INR BLD: 2.01 — HIGH (ref 0.88–1.16)
INR BLD: 2.16 — HIGH (ref 0.88–1.16)
INR BLD: 3.05 — HIGH (ref 0.88–1.16)
KETONES UR-MCNC: NEGATIVE — SIGNIFICANT CHANGE UP
LACTATE SERPL-SCNC: 1.9 MMOL/L — SIGNIFICANT CHANGE UP (ref 0.5–2)
LDH SERPL L TO P-CCNC: 291 U/L — HIGH (ref 50–242)
LEUKOCYTE ESTERASE UR-ACNC: ABNORMAL
LIPID PNL WITH DIRECT LDL SERPL: 12 MG/DL — SIGNIFICANT CHANGE UP
LYMPHOCYTES # BLD AUTO: 0.79 K/UL — LOW (ref 1–3.3)
LYMPHOCYTES # BLD AUTO: 1.07 K/UL
LYMPHOCYTES # BLD AUTO: 14.6 % — SIGNIFICANT CHANGE UP (ref 13–44)
LYMPHOCYTES NFR BLD AUTO: 17.1 %
MACROCYTES BLD QL: SLIGHT — SIGNIFICANT CHANGE UP
MAGNESIUM SERPL-MCNC: 1.8 MG/DL — SIGNIFICANT CHANGE UP (ref 1.6–2.6)
MAGNESIUM SERPL-MCNC: 2 MG/DL — SIGNIFICANT CHANGE UP (ref 1.6–2.6)
MAGNESIUM SERPL-MCNC: 2.3 MG/DL — SIGNIFICANT CHANGE UP (ref 1.6–2.6)
MAGNESIUM SERPL-MCNC: 2.6 MG/DL — SIGNIFICANT CHANGE UP (ref 1.6–2.6)
MAN DIFF?: NORMAL
MANUAL SMEAR VERIFICATION: SIGNIFICANT CHANGE UP
MCHC RBC-ENTMCNC: 28 GM/DL
MCHC RBC-ENTMCNC: 28.1 GM/DL — LOW (ref 32–36)
MCHC RBC-ENTMCNC: 28.2 PG
MCHC RBC-ENTMCNC: 28.5 GM/DL — LOW (ref 32–36)
MCHC RBC-ENTMCNC: 28.6 GM/DL — LOW (ref 32–36)
MCHC RBC-ENTMCNC: 29.2 GM/DL — LOW (ref 32–36)
MCHC RBC-ENTMCNC: 29.3 GM/DL — LOW (ref 32–36)
MCHC RBC-ENTMCNC: 29.4 PG — SIGNIFICANT CHANGE UP (ref 27–34)
MCHC RBC-ENTMCNC: 29.5 PG — SIGNIFICANT CHANGE UP (ref 27–34)
MCHC RBC-ENTMCNC: 29.6 GM/DL — LOW (ref 32–36)
MCHC RBC-ENTMCNC: 29.8 PG — SIGNIFICANT CHANGE UP (ref 27–34)
MCHC RBC-ENTMCNC: 30.1 PG — SIGNIFICANT CHANGE UP (ref 27–34)
MCV RBC AUTO: 100.5 FL
MCV RBC AUTO: 101.7 FL — HIGH (ref 80–100)
MCV RBC AUTO: 102.8 FL — HIGH (ref 80–100)
MCV RBC AUTO: 103 FL — HIGH (ref 80–100)
MCV RBC AUTO: 103.1 FL — HIGH (ref 80–100)
MCV RBC AUTO: 104.1 FL — HIGH (ref 80–100)
MCV RBC AUTO: 104.9 FL — HIGH (ref 80–100)
MONOCYTES # BLD AUTO: 0.23 K/UL
MONOCYTES # BLD AUTO: 0.42 K/UL — SIGNIFICANT CHANGE UP (ref 0–0.9)
MONOCYTES NFR BLD AUTO: 3.6 %
MONOCYTES NFR BLD AUTO: 7.8 % — SIGNIFICANT CHANGE UP (ref 2–14)
NEUTROPHILS # BLD AUTO: 3.99 K/UL — SIGNIFICANT CHANGE UP (ref 1.8–7.4)
NEUTROPHILS # BLD AUTO: 4.53 K/UL
NEUTROPHILS NFR BLD AUTO: 72.1 %
NEUTROPHILS NFR BLD AUTO: 73.3 % — SIGNIFICANT CHANGE UP (ref 43–77)
NITRITE UR-MCNC: NEGATIVE — SIGNIFICANT CHANGE UP
NON HDL CHOLESTEROL: 27 MG/DL — SIGNIFICANT CHANGE UP
NRBC # BLD: 0 /100 WBCS — SIGNIFICANT CHANGE UP (ref 0–0)
NT-PROBNP SERPL-MCNC: 5479 PG/ML
NT-PROBNP SERPL-SCNC: 4010 PG/ML — HIGH (ref 0–300)
OVALOCYTES BLD QL SMEAR: SLIGHT — SIGNIFICANT CHANGE UP
PH UR: 6 — SIGNIFICANT CHANGE UP (ref 5–8)
PHOSPHATE SERPL-MCNC: 4.7 MG/DL — HIGH (ref 2.5–4.5)
PLAT MORPH BLD: ABNORMAL
PLATELET # BLD AUTO: 134 K/UL
PLATELET # BLD AUTO: 59 K/UL — LOW (ref 150–400)
PLATELET # BLD AUTO: 62 K/UL — LOW (ref 150–400)
PLATELET # BLD AUTO: 75 K/UL — LOW (ref 150–400)
PLATELET # BLD AUTO: 82 K/UL — LOW (ref 150–400)
PLATELET # BLD AUTO: 89 K/UL — LOW (ref 150–400)
PLATELET # BLD AUTO: 92 K/UL — LOW (ref 150–400)
POIKILOCYTOSIS BLD QL AUTO: SLIGHT — SIGNIFICANT CHANGE UP
POTASSIUM SERPL-MCNC: 3.5 MMOL/L — SIGNIFICANT CHANGE UP (ref 3.5–5.3)
POTASSIUM SERPL-MCNC: 3.8 MMOL/L — SIGNIFICANT CHANGE UP (ref 3.5–5.3)
POTASSIUM SERPL-MCNC: 4 MMOL/L — SIGNIFICANT CHANGE UP (ref 3.5–5.3)
POTASSIUM SERPL-MCNC: 4 MMOL/L — SIGNIFICANT CHANGE UP (ref 3.5–5.3)
POTASSIUM SERPL-MCNC: 4.1 MMOL/L — SIGNIFICANT CHANGE UP (ref 3.5–5.3)
POTASSIUM SERPL-MCNC: 4.2 MMOL/L — SIGNIFICANT CHANGE UP (ref 3.5–5.3)
POTASSIUM SERPL-SCNC: 3.5 MMOL/L — SIGNIFICANT CHANGE UP (ref 3.5–5.3)
POTASSIUM SERPL-SCNC: 3.8 MMOL/L — SIGNIFICANT CHANGE UP (ref 3.5–5.3)
POTASSIUM SERPL-SCNC: 4 MMOL/L — SIGNIFICANT CHANGE UP (ref 3.5–5.3)
POTASSIUM SERPL-SCNC: 4 MMOL/L — SIGNIFICANT CHANGE UP (ref 3.5–5.3)
POTASSIUM SERPL-SCNC: 4.1 MMOL/L — SIGNIFICANT CHANGE UP (ref 3.5–5.3)
POTASSIUM SERPL-SCNC: 4.2 MMOL/L
POTASSIUM SERPL-SCNC: 4.2 MMOL/L — SIGNIFICANT CHANGE UP (ref 3.5–5.3)
PROT SERPL-MCNC: 6.4 G/DL — SIGNIFICANT CHANGE UP (ref 6–8.3)
PROT SERPL-MCNC: 6.5 G/DL — SIGNIFICANT CHANGE UP (ref 6–8.3)
PROT SERPL-MCNC: 7 G/DL — SIGNIFICANT CHANGE UP (ref 6–8.3)
PROT SERPL-MCNC: 7.1 G/DL
PROT UR-MCNC: NEGATIVE MG/DL — SIGNIFICANT CHANGE UP
PROTHROM AB SERPL-ACNC: 18.8 SEC — HIGH (ref 10.6–13.6)
PROTHROM AB SERPL-ACNC: 23.3 SEC — HIGH (ref 10.6–13.6)
PROTHROM AB SERPL-ACNC: 25 SEC — HIGH (ref 10.6–13.6)
PROTHROM AB SERPL-ACNC: 34.7 SEC — HIGH (ref 10.6–13.6)
RBC # BLD: 3.52 M/UL — LOW (ref 3.8–5.2)
RBC # BLD: 3.59 M/UL — LOW (ref 3.8–5.2)
RBC # BLD: 3.63 M/UL — LOW (ref 3.8–5.2)
RBC # BLD: 3.69 M/UL
RBC # BLD: 3.82 M/UL — SIGNIFICANT CHANGE UP (ref 3.8–5.2)
RBC # BLD: 3.86 M/UL — SIGNIFICANT CHANGE UP (ref 3.8–5.2)
RBC # BLD: 3.98 M/UL — SIGNIFICANT CHANGE UP (ref 3.8–5.2)
RBC # FLD: 22.1 % — HIGH (ref 10.3–14.5)
RBC # FLD: 22.4 % — HIGH (ref 10.3–14.5)
RBC # FLD: 23 % — HIGH (ref 10.3–14.5)
RBC # FLD: 23.1 % — HIGH (ref 10.3–14.5)
RBC # FLD: 23.1 % — HIGH (ref 10.3–14.5)
RBC # FLD: 23.5 % — HIGH (ref 10.3–14.5)
RBC # FLD: 23.8 %
RBC BLD AUTO: ABNORMAL
RBC CASTS # UR COMP ASSIST: < 5 /HPF — SIGNIFICANT CHANGE UP
RH IG SCN BLD-IMP: NEGATIVE — SIGNIFICANT CHANGE UP
RH IG SCN BLD-IMP: NEGATIVE — SIGNIFICANT CHANGE UP
SARS-COV-2 RNA SPEC QL NAA+PROBE: SIGNIFICANT CHANGE UP
SMUDGE CELLS # BLD: PRESENT — SIGNIFICANT CHANGE UP
SODIUM SERPL-SCNC: 140 MMOL/L — SIGNIFICANT CHANGE UP (ref 135–145)
SODIUM SERPL-SCNC: 142 MMOL/L — SIGNIFICANT CHANGE UP (ref 135–145)
SODIUM SERPL-SCNC: 143 MMOL/L
SODIUM SERPL-SCNC: 143 MMOL/L — SIGNIFICANT CHANGE UP (ref 135–145)
SODIUM SERPL-SCNC: 144 MMOL/L — SIGNIFICANT CHANGE UP (ref 135–145)
SODIUM SERPL-SCNC: 145 MMOL/L — SIGNIFICANT CHANGE UP (ref 135–145)
SODIUM SERPL-SCNC: 146 MMOL/L — HIGH (ref 135–145)
SP GR SPEC: 1.02 — SIGNIFICANT CHANGE UP (ref 1–1.03)
TARGETS BLD QL SMEAR: SLIGHT — SIGNIFICANT CHANGE UP
TRIGL SERPL-MCNC: 73 MG/DL — SIGNIFICANT CHANGE UP
TROPONIN T SERPL-MCNC: 0.01 NG/ML — SIGNIFICANT CHANGE UP (ref 0–0.01)
TROPONIN T SERPL-MCNC: <0.01 NG/ML — SIGNIFICANT CHANGE UP (ref 0–0.01)
TSH SERPL-MCNC: 3.9 UIU/ML — SIGNIFICANT CHANGE UP (ref 0.35–4.94)
UROBILINOGEN FLD QL: 0.2 E.U./DL — SIGNIFICANT CHANGE UP
WBC # BLD: 5.31 K/UL — SIGNIFICANT CHANGE UP (ref 3.8–10.5)
WBC # BLD: 5.44 K/UL — SIGNIFICANT CHANGE UP (ref 3.8–10.5)
WBC # BLD: 5.68 K/UL — SIGNIFICANT CHANGE UP (ref 3.8–10.5)
WBC # BLD: 7.12 K/UL — SIGNIFICANT CHANGE UP (ref 3.8–10.5)
WBC # BLD: 7.46 K/UL — SIGNIFICANT CHANGE UP (ref 3.8–10.5)
WBC # BLD: 7.72 K/UL — SIGNIFICANT CHANGE UP (ref 3.8–10.5)
WBC # FLD AUTO: 5.31 K/UL — SIGNIFICANT CHANGE UP (ref 3.8–10.5)
WBC # FLD AUTO: 5.44 K/UL — SIGNIFICANT CHANGE UP (ref 3.8–10.5)
WBC # FLD AUTO: 5.68 K/UL — SIGNIFICANT CHANGE UP (ref 3.8–10.5)
WBC # FLD AUTO: 6.28 K/UL
WBC # FLD AUTO: 7.12 K/UL — SIGNIFICANT CHANGE UP (ref 3.8–10.5)
WBC # FLD AUTO: 7.46 K/UL — SIGNIFICANT CHANGE UP (ref 3.8–10.5)
WBC # FLD AUTO: 7.72 K/UL — SIGNIFICANT CHANGE UP (ref 3.8–10.5)
WBC UR QL: > 10 /HPF

## 2021-01-01 PROCEDURE — 99223 1ST HOSP IP/OBS HIGH 75: CPT

## 2021-01-01 PROCEDURE — 99233 SBSQ HOSP IP/OBS HIGH 50: CPT

## 2021-01-01 PROCEDURE — 71045 X-RAY EXAM CHEST 1 VIEW: CPT | Mod: 26

## 2021-01-01 PROCEDURE — 99358 PROLONG SERVICE W/O CONTACT: CPT

## 2021-01-01 PROCEDURE — 99213 OFFICE O/P EST LOW 20 MIN: CPT

## 2021-01-01 PROCEDURE — 81001 URINALYSIS AUTO W/SCOPE: CPT

## 2021-01-01 PROCEDURE — 84132 ASSAY OF SERUM POTASSIUM: CPT

## 2021-01-01 PROCEDURE — 85652 RBC SED RATE AUTOMATED: CPT

## 2021-01-01 PROCEDURE — 93306 TTE W/DOPPLER COMPLETE: CPT

## 2021-01-01 PROCEDURE — 75573 CT HRT C+ STRUX CGEN HRT DS: CPT | Mod: 26

## 2021-01-01 PROCEDURE — 84443 ASSAY THYROID STIM HORMONE: CPT

## 2021-01-01 PROCEDURE — 83036 HEMOGLOBIN GLYCOSYLATED A1C: CPT

## 2021-01-01 PROCEDURE — 84100 ASSAY OF PHOSPHORUS: CPT

## 2021-01-01 PROCEDURE — 74174 CTA ABD&PLVS W/CONTRAST: CPT

## 2021-01-01 PROCEDURE — 93005 ELECTROCARDIOGRAM TRACING: CPT

## 2021-01-01 PROCEDURE — 97162 PT EVAL MOD COMPLEX 30 MIN: CPT

## 2021-01-01 PROCEDURE — 86900 BLOOD TYPING SEROLOGIC ABO: CPT

## 2021-01-01 PROCEDURE — 80053 COMPREHEN METABOLIC PANEL: CPT

## 2021-01-01 PROCEDURE — 84295 ASSAY OF SERUM SODIUM: CPT

## 2021-01-01 PROCEDURE — 85025 COMPLETE CBC W/AUTO DIFF WBC: CPT

## 2021-01-01 PROCEDURE — 85610 PROTHROMBIN TIME: CPT

## 2021-01-01 PROCEDURE — 36415 COLL VENOUS BLD VENIPUNCTURE: CPT

## 2021-01-01 PROCEDURE — 70450 CT HEAD/BRAIN W/O DYE: CPT

## 2021-01-01 PROCEDURE — 97530 THERAPEUTIC ACTIVITIES: CPT

## 2021-01-01 PROCEDURE — 74174 CTA ABD&PLVS W/CONTRAST: CPT | Mod: 26

## 2021-01-01 PROCEDURE — 93010 ELECTROCARDIOGRAM REPORT: CPT

## 2021-01-01 PROCEDURE — 99212 OFFICE O/P EST SF 10 MIN: CPT

## 2021-01-01 PROCEDURE — 85730 THROMBOPLASTIN TIME PARTIAL: CPT

## 2021-01-01 PROCEDURE — 83735 ASSAY OF MAGNESIUM: CPT

## 2021-01-01 PROCEDURE — 83615 LACTATE (LD) (LDH) ENZYME: CPT

## 2021-01-01 PROCEDURE — 93880 EXTRACRANIAL BILAT STUDY: CPT

## 2021-01-01 PROCEDURE — 83880 ASSAY OF NATRIURETIC PEPTIDE: CPT

## 2021-01-01 PROCEDURE — 71045 X-RAY EXAM CHEST 1 VIEW: CPT

## 2021-01-01 PROCEDURE — U0005: CPT

## 2021-01-01 PROCEDURE — 82803 BLOOD GASES ANY COMBINATION: CPT

## 2021-01-01 PROCEDURE — 93306 TTE W/DOPPLER COMPLETE: CPT | Mod: 26

## 2021-01-01 PROCEDURE — 70450 CT HEAD/BRAIN W/O DYE: CPT | Mod: 26,MH

## 2021-01-01 PROCEDURE — 82330 ASSAY OF CALCIUM: CPT

## 2021-01-01 PROCEDURE — 87635 SARS-COV-2 COVID-19 AMP PRB: CPT

## 2021-01-01 PROCEDURE — 99292 CRITICAL CARE ADDL 30 MIN: CPT

## 2021-01-01 PROCEDURE — 84484 ASSAY OF TROPONIN QUANT: CPT

## 2021-01-01 PROCEDURE — 93000 ELECTROCARDIOGRAM COMPLETE: CPT

## 2021-01-01 PROCEDURE — 83605 ASSAY OF LACTIC ACID: CPT

## 2021-01-01 PROCEDURE — 99214 OFFICE O/P EST MOD 30 MIN: CPT

## 2021-01-01 PROCEDURE — 99291 CRITICAL CARE FIRST HOUR: CPT

## 2021-01-01 PROCEDURE — 99238 HOSP IP/OBS DSCHRG MGMT 30/<: CPT

## 2021-01-01 PROCEDURE — 97161 PT EVAL LOW COMPLEX 20 MIN: CPT

## 2021-01-01 PROCEDURE — 82553 CREATINE MB FRACTION: CPT

## 2021-01-01 PROCEDURE — 75573 CT HRT C+ STRUX CGEN HRT DS: CPT

## 2021-01-01 PROCEDURE — 82550 ASSAY OF CK (CPK): CPT

## 2021-01-01 PROCEDURE — 82962 GLUCOSE BLOOD TEST: CPT

## 2021-01-01 PROCEDURE — 86901 BLOOD TYPING SEROLOGIC RH(D): CPT

## 2021-01-01 PROCEDURE — 80061 LIPID PANEL: CPT

## 2021-01-01 PROCEDURE — 80048 BASIC METABOLIC PNL TOTAL CA: CPT

## 2021-01-01 PROCEDURE — 86850 RBC ANTIBODY SCREEN: CPT

## 2021-01-01 PROCEDURE — 99215 OFFICE O/P EST HI 40 MIN: CPT

## 2021-01-01 PROCEDURE — 85027 COMPLETE CBC AUTOMATED: CPT

## 2021-01-01 PROCEDURE — 94640 AIRWAY INHALATION TREATMENT: CPT

## 2021-01-01 RX ORDER — ATORVASTATIN CALCIUM 80 MG/1
40 TABLET, FILM COATED ORAL AT BEDTIME
Refills: 0 | Status: DISCONTINUED | OUTPATIENT
Start: 2021-01-01 | End: 2021-01-01

## 2021-01-01 RX ORDER — POLYETHYLENE GLYCOL 3350 17 G/17G
17 POWDER, FOR SOLUTION ORAL DAILY
Refills: 0 | Status: DISCONTINUED | OUTPATIENT
Start: 2021-01-01 | End: 2021-01-01

## 2021-01-01 RX ORDER — SENNA PLUS 8.6 MG/1
2 TABLET ORAL AT BEDTIME
Refills: 0 | Status: DISCONTINUED | OUTPATIENT
Start: 2021-01-01 | End: 2021-01-01

## 2021-01-01 RX ORDER — FERROUS SULFATE 325(65) MG
325 TABLET ORAL DAILY
Refills: 0 | Status: DISCONTINUED | OUTPATIENT
Start: 2021-01-01 | End: 2021-01-01

## 2021-01-01 RX ORDER — POTASSIUM CHLORIDE 20 MEQ
20 PACKET (EA) ORAL ONCE
Refills: 0 | Status: COMPLETED | OUTPATIENT
Start: 2021-01-01 | End: 2021-01-01

## 2021-01-01 RX ORDER — TRAVOPROST 0.04 MG/ML
1 SOLUTION/ DROPS OPHTHALMIC
Qty: 0 | Refills: 0 | DISCHARGE

## 2021-01-01 RX ORDER — ATORVASTATIN CALCIUM 40 MG/1
40 TABLET, FILM COATED ORAL
Qty: 30 | Refills: 2 | Status: ACTIVE | COMMUNITY

## 2021-01-01 RX ORDER — ACETAMINOPHEN 500 MG
1000 TABLET ORAL ONCE
Refills: 0 | Status: COMPLETED | OUTPATIENT
Start: 2021-01-01 | End: 2021-01-01

## 2021-01-01 RX ORDER — CLOTRIMAZOLE 10 MG/G
1 CREAM TOPICAL
Qty: 45 | Refills: 0 | Status: ACTIVE | COMMUNITY
Start: 2020-01-01

## 2021-01-01 RX ORDER — FUROSEMIDE 40 MG
20 TABLET ORAL ONCE
Refills: 0 | Status: COMPLETED | OUTPATIENT
Start: 2021-01-01 | End: 2021-01-01

## 2021-01-01 RX ORDER — FUROSEMIDE 40 MG
20 TABLET ORAL EVERY 12 HOURS
Refills: 0 | Status: DISCONTINUED | OUTPATIENT
Start: 2021-01-01 | End: 2021-01-01

## 2021-01-01 RX ORDER — FUROSEMIDE 40 MG
40 TABLET ORAL EVERY 12 HOURS
Refills: 0 | Status: DISCONTINUED | OUTPATIENT
Start: 2021-01-01 | End: 2021-01-01

## 2021-01-01 RX ORDER — HYDROMORPHONE HYDROCHLORIDE 2 MG/ML
1 INJECTION INTRAMUSCULAR; INTRAVENOUS; SUBCUTANEOUS
Refills: 0 | Status: DISCONTINUED | OUTPATIENT
Start: 2021-01-01 | End: 2021-01-01

## 2021-01-01 RX ORDER — LEVALBUTEROL 1.25 MG/.5ML
0.63 SOLUTION, CONCENTRATE RESPIRATORY (INHALATION) EVERY 6 HOURS
Refills: 0 | Status: DISCONTINUED | OUTPATIENT
Start: 2021-01-01 | End: 2021-01-01

## 2021-01-01 RX ORDER — METOPROLOL TARTRATE 50 MG
25 TABLET ORAL EVERY 12 HOURS
Refills: 0 | Status: DISCONTINUED | OUTPATIENT
Start: 2021-01-01 | End: 2021-01-01

## 2021-01-01 RX ORDER — TORSEMIDE 10 MG/1
10 TABLET ORAL DAILY
Refills: 0 | Status: ACTIVE | COMMUNITY

## 2021-01-01 RX ORDER — METOPROLOL TARTRATE 50 MG
12.5 TABLET ORAL EVERY 12 HOURS
Refills: 0 | Status: DISCONTINUED | OUTPATIENT
Start: 2021-01-01 | End: 2021-01-01

## 2021-01-01 RX ORDER — ASPIRIN/CALCIUM CARB/MAGNESIUM 324 MG
81 TABLET ORAL DAILY
Refills: 0 | Status: DISCONTINUED | OUTPATIENT
Start: 2021-01-01 | End: 2021-01-01

## 2021-01-01 RX ORDER — DILTIAZEM HYDROCHLORIDE 120 MG/1
120 CAPSULE, EXTENDED RELEASE ORAL DAILY
Qty: 30 | Refills: 1 | Status: ACTIVE | COMMUNITY
Start: 2021-01-01

## 2021-01-01 RX ORDER — ACETAMINOPHEN 500 MG
650 TABLET ORAL EVERY 6 HOURS
Refills: 0 | Status: DISCONTINUED | OUTPATIENT
Start: 2021-01-01 | End: 2021-01-01

## 2021-01-01 RX ORDER — ESMOLOL HCL 100MG/10ML
50 VIAL (ML) INTRAVENOUS
Qty: 2500 | Refills: 0 | Status: DISCONTINUED | OUTPATIENT
Start: 2021-01-01 | End: 2021-01-01

## 2021-01-01 RX ORDER — ALBUTEROL SULFATE 90 UG/1
108 (90 BASE) INHALANT RESPIRATORY (INHALATION)
Qty: 18 | Refills: 0 | Status: ACTIVE | COMMUNITY
Start: 2021-01-01

## 2021-01-01 RX ORDER — MAGNESIUM SULFATE 500 MG/ML
2 VIAL (ML) INJECTION ONCE
Refills: 0 | Status: COMPLETED | OUTPATIENT
Start: 2021-01-01 | End: 2021-01-01

## 2021-01-01 RX ORDER — PANTOPRAZOLE SODIUM 20 MG/1
40 TABLET, DELAYED RELEASE ORAL
Refills: 0 | Status: DISCONTINUED | OUTPATIENT
Start: 2021-01-01 | End: 2021-01-01

## 2021-01-01 RX ORDER — IPRATROPIUM BROMIDE 0.2 MG/ML
1 SOLUTION, NON-ORAL INHALATION
Refills: 0 | Status: DISCONTINUED | OUTPATIENT
Start: 2021-01-01 | End: 2021-01-01

## 2021-01-01 RX ORDER — HYDROMORPHONE HYDROCHLORIDE 2 MG/ML
0.2 INJECTION INTRAMUSCULAR; INTRAVENOUS; SUBCUTANEOUS
Qty: 10 | Refills: 0 | Status: DISCONTINUED | OUTPATIENT
Start: 2021-01-01 | End: 2021-01-01

## 2021-01-01 RX ORDER — SODIUM CHLORIDE 9 MG/ML
1000 INJECTION, SOLUTION INTRAVENOUS
Refills: 0 | Status: DISCONTINUED | OUTPATIENT
Start: 2021-01-01 | End: 2021-01-01

## 2021-01-01 RX ORDER — POTASSIUM CHLORIDE 20 MEQ
20 PACKET (EA) ORAL ONCE
Refills: 0 | Status: DISCONTINUED | OUTPATIENT
Start: 2021-01-01 | End: 2021-01-01

## 2021-01-01 RX ORDER — DILTIAZEM HCL 120 MG
30 CAPSULE, EXT RELEASE 24 HR ORAL EVERY 6 HOURS
Refills: 0 | Status: DISCONTINUED | OUTPATIENT
Start: 2021-01-01 | End: 2021-01-01

## 2021-01-01 RX ORDER — LATANOPROST/PF 0.005 %
0.01 DROPS OPHTHALMIC (EYE)
Qty: 2 | Refills: 0 | Status: ACTIVE | COMMUNITY
Start: 2021-01-01

## 2021-01-01 RX ORDER — DILTIAZEM HCL 120 MG
30 CAPSULE, EXT RELEASE 24 HR ORAL EVERY 8 HOURS
Refills: 0 | Status: DISCONTINUED | OUTPATIENT
Start: 2021-01-01 | End: 2021-01-01

## 2021-01-01 RX ORDER — SODIUM CHLORIDE 9 MG/ML
3 INJECTION INTRAMUSCULAR; INTRAVENOUS; SUBCUTANEOUS EVERY 8 HOURS
Refills: 0 | Status: DISCONTINUED | OUTPATIENT
Start: 2021-01-01 | End: 2021-01-01

## 2021-01-01 RX ORDER — HYDROMORPHONE HYDROCHLORIDE 2 MG/ML
1 INJECTION INTRAMUSCULAR; INTRAVENOUS; SUBCUTANEOUS EVERY 4 HOURS
Refills: 0 | Status: DISCONTINUED | OUTPATIENT
Start: 2021-01-01 | End: 2021-01-01

## 2021-01-01 RX ORDER — ROBINUL 0.2 MG/ML
0.2 INJECTION INTRAMUSCULAR; INTRAVENOUS EVERY 6 HOURS
Refills: 0 | Status: DISCONTINUED | OUTPATIENT
Start: 2021-01-01 | End: 2021-01-01

## 2021-01-01 RX ORDER — FUROSEMIDE 40 MG
40 TABLET ORAL DAILY
Refills: 0 | Status: DISCONTINUED | OUTPATIENT
Start: 2021-01-01 | End: 2021-01-01

## 2021-01-01 RX ORDER — LATANOPROST 0.05 MG/ML
1 SOLUTION/ DROPS OPHTHALMIC; TOPICAL AT BEDTIME
Refills: 0 | Status: DISCONTINUED | OUTPATIENT
Start: 2021-01-01 | End: 2021-01-01

## 2021-01-01 RX ORDER — ENOXAPARIN SODIUM 100 MG/ML
115 INJECTION SUBCUTANEOUS EVERY 12 HOURS
Refills: 0 | Status: DISCONTINUED | OUTPATIENT
Start: 2021-01-01 | End: 2021-01-01

## 2021-01-01 RX ORDER — APIXABAN 2.5 MG/1
1 TABLET, FILM COATED ORAL
Qty: 0 | Refills: 0 | DISCHARGE

## 2021-01-01 RX ORDER — METOPROLOL TARTRATE 50 MG
1 TABLET ORAL
Qty: 0 | Refills: 0 | DISCHARGE

## 2021-01-01 RX ADMIN — SODIUM CHLORIDE 3 MILLILITER(S): 9 INJECTION INTRAMUSCULAR; INTRAVENOUS; SUBCUTANEOUS at 05:40

## 2021-01-01 RX ADMIN — Medication 1 PUFF(S): at 21:57

## 2021-01-01 RX ADMIN — POLYETHYLENE GLYCOL 3350 17 GRAM(S): 17 POWDER, FOR SOLUTION ORAL at 00:14

## 2021-01-01 RX ADMIN — Medication 100 MILLIGRAM(S): at 22:18

## 2021-01-01 RX ADMIN — PANTOPRAZOLE SODIUM 40 MILLIGRAM(S): 20 TABLET, DELAYED RELEASE ORAL at 06:55

## 2021-01-01 RX ADMIN — Medication 30 MILLIGRAM(S): at 17:41

## 2021-01-01 RX ADMIN — Medication 20 MILLIEQUIVALENT(S): at 18:31

## 2021-01-01 RX ADMIN — ROBINUL 0.2 MILLIGRAM(S): 0.2 INJECTION INTRAMUSCULAR; INTRAVENOUS at 13:33

## 2021-01-01 RX ADMIN — Medication 40 MILLIGRAM(S): at 17:38

## 2021-01-01 RX ADMIN — HYDROMORPHONE HYDROCHLORIDE 1 MILLIGRAM(S): 2 INJECTION INTRAMUSCULAR; INTRAVENOUS; SUBCUTANEOUS at 01:30

## 2021-01-01 RX ADMIN — Medication 30 MILLIGRAM(S): at 11:54

## 2021-01-01 RX ADMIN — SODIUM CHLORIDE 3 MILLILITER(S): 9 INJECTION INTRAMUSCULAR; INTRAVENOUS; SUBCUTANEOUS at 22:19

## 2021-01-01 RX ADMIN — Medication 30 MILLIGRAM(S): at 00:44

## 2021-01-01 RX ADMIN — HYDROMORPHONE HYDROCHLORIDE 1 MILLIGRAM(S): 2 INJECTION INTRAMUSCULAR; INTRAVENOUS; SUBCUTANEOUS at 12:20

## 2021-01-01 RX ADMIN — LEVALBUTEROL 0.63 MILLIGRAM(S): 1.25 SOLUTION, CONCENTRATE RESPIRATORY (INHALATION) at 18:07

## 2021-01-01 RX ADMIN — Medication 30 MILLIGRAM(S): at 22:03

## 2021-01-01 RX ADMIN — Medication 30 MILLIGRAM(S): at 06:11

## 2021-01-01 RX ADMIN — LATANOPROST 1 DROP(S): 0.05 SOLUTION/ DROPS OPHTHALMIC; TOPICAL at 22:17

## 2021-01-01 RX ADMIN — Medication 30 MILLIGRAM(S): at 17:38

## 2021-01-01 RX ADMIN — PANTOPRAZOLE SODIUM 40 MILLIGRAM(S): 20 TABLET, DELAYED RELEASE ORAL at 06:02

## 2021-01-01 RX ADMIN — LEVALBUTEROL 0.63 MILLIGRAM(S): 1.25 SOLUTION, CONCENTRATE RESPIRATORY (INHALATION) at 00:21

## 2021-01-01 RX ADMIN — POLYETHYLENE GLYCOL 3350 17 GRAM(S): 17 POWDER, FOR SOLUTION ORAL at 11:57

## 2021-01-01 RX ADMIN — Medication 25 MILLIGRAM(S): at 11:57

## 2021-01-01 RX ADMIN — SODIUM CHLORIDE 3 MILLILITER(S): 9 INJECTION INTRAMUSCULAR; INTRAVENOUS; SUBCUTANEOUS at 06:19

## 2021-01-01 RX ADMIN — Medication 650 MILLIGRAM(S): at 22:46

## 2021-01-01 RX ADMIN — PANTOPRAZOLE SODIUM 40 MILLIGRAM(S): 20 TABLET, DELAYED RELEASE ORAL at 06:05

## 2021-01-01 RX ADMIN — LEVALBUTEROL 0.63 MILLIGRAM(S): 1.25 SOLUTION, CONCENTRATE RESPIRATORY (INHALATION) at 18:58

## 2021-01-01 RX ADMIN — Medication 30 MILLIGRAM(S): at 12:38

## 2021-01-01 RX ADMIN — LEVALBUTEROL 0.63 MILLIGRAM(S): 1.25 SOLUTION, CONCENTRATE RESPIRATORY (INHALATION) at 11:15

## 2021-01-01 RX ADMIN — Medication 30 MILLIGRAM(S): at 06:02

## 2021-01-01 RX ADMIN — SENNA PLUS 2 TABLET(S): 8.6 TABLET ORAL at 21:55

## 2021-01-01 RX ADMIN — SODIUM CHLORIDE 3 MILLILITER(S): 9 INJECTION INTRAMUSCULAR; INTRAVENOUS; SUBCUTANEOUS at 21:39

## 2021-01-01 RX ADMIN — Medication 20 MILLIGRAM(S): at 05:55

## 2021-01-01 RX ADMIN — Medication 40 MILLIGRAM(S): at 06:55

## 2021-01-01 RX ADMIN — POLYETHYLENE GLYCOL 3350 17 GRAM(S): 17 POWDER, FOR SOLUTION ORAL at 12:52

## 2021-01-01 RX ADMIN — SODIUM CHLORIDE 3 MILLILITER(S): 9 INJECTION INTRAMUSCULAR; INTRAVENOUS; SUBCUTANEOUS at 13:24

## 2021-01-01 RX ADMIN — LEVALBUTEROL 0.63 MILLIGRAM(S): 1.25 SOLUTION, CONCENTRATE RESPIRATORY (INHALATION) at 01:55

## 2021-01-01 RX ADMIN — LEVALBUTEROL 0.63 MILLIGRAM(S): 1.25 SOLUTION, CONCENTRATE RESPIRATORY (INHALATION) at 17:22

## 2021-01-01 RX ADMIN — HYDROMORPHONE HYDROCHLORIDE 1 MILLIGRAM(S): 2 INJECTION INTRAMUSCULAR; INTRAVENOUS; SUBCUTANEOUS at 18:08

## 2021-01-01 RX ADMIN — Medication 25 MILLIGRAM(S): at 21:56

## 2021-01-01 RX ADMIN — SENNA PLUS 2 TABLET(S): 8.6 TABLET ORAL at 00:14

## 2021-01-01 RX ADMIN — SODIUM CHLORIDE 3 MILLILITER(S): 9 INJECTION INTRAMUSCULAR; INTRAVENOUS; SUBCUTANEOUS at 13:35

## 2021-01-01 RX ADMIN — PANTOPRAZOLE SODIUM 40 MILLIGRAM(S): 20 TABLET, DELAYED RELEASE ORAL at 06:25

## 2021-01-01 RX ADMIN — POLYETHYLENE GLYCOL 3350 17 GRAM(S): 17 POWDER, FOR SOLUTION ORAL at 12:36

## 2021-01-01 RX ADMIN — Medication 20 MILLIGRAM(S): at 08:51

## 2021-01-01 RX ADMIN — SODIUM CHLORIDE 3 MILLILITER(S): 9 INJECTION INTRAMUSCULAR; INTRAVENOUS; SUBCUTANEOUS at 06:02

## 2021-01-01 RX ADMIN — SODIUM CHLORIDE 3 MILLILITER(S): 9 INJECTION INTRAMUSCULAR; INTRAVENOUS; SUBCUTANEOUS at 06:40

## 2021-01-01 RX ADMIN — LATANOPROST 1 DROP(S): 0.05 SOLUTION/ DROPS OPHTHALMIC; TOPICAL at 21:07

## 2021-01-01 RX ADMIN — Medication 25 MILLIGRAM(S): at 19:56

## 2021-01-01 RX ADMIN — HYDROMORPHONE HYDROCHLORIDE 1 MILLIGRAM(S): 2 INJECTION INTRAMUSCULAR; INTRAVENOUS; SUBCUTANEOUS at 15:15

## 2021-01-01 RX ADMIN — PANTOPRAZOLE SODIUM 40 MILLIGRAM(S): 20 TABLET, DELAYED RELEASE ORAL at 06:01

## 2021-01-01 RX ADMIN — SODIUM CHLORIDE 3 MILLILITER(S): 9 INJECTION INTRAMUSCULAR; INTRAVENOUS; SUBCUTANEOUS at 22:01

## 2021-01-01 RX ADMIN — LEVALBUTEROL 0.63 MILLIGRAM(S): 1.25 SOLUTION, CONCENTRATE RESPIRATORY (INHALATION) at 00:13

## 2021-01-01 RX ADMIN — HYDROMORPHONE HYDROCHLORIDE 1 MILLIGRAM(S): 2 INJECTION INTRAMUSCULAR; INTRAVENOUS; SUBCUTANEOUS at 17:45

## 2021-01-01 RX ADMIN — LATANOPROST 1 DROP(S): 0.05 SOLUTION/ DROPS OPHTHALMIC; TOPICAL at 21:55

## 2021-01-01 RX ADMIN — ENOXAPARIN SODIUM 115 MILLIGRAM(S): 100 INJECTION SUBCUTANEOUS at 06:18

## 2021-01-01 RX ADMIN — Medication 325 MILLIGRAM(S): at 11:57

## 2021-01-01 RX ADMIN — SODIUM CHLORIDE 3 MILLILITER(S): 9 INJECTION INTRAMUSCULAR; INTRAVENOUS; SUBCUTANEOUS at 22:03

## 2021-01-01 RX ADMIN — LEVALBUTEROL 0.63 MILLIGRAM(S): 1.25 SOLUTION, CONCENTRATE RESPIRATORY (INHALATION) at 06:50

## 2021-01-01 RX ADMIN — Medication 30 MILLIGRAM(S): at 12:52

## 2021-01-01 RX ADMIN — Medication 1 PUFF(S): at 22:17

## 2021-01-01 RX ADMIN — ATORVASTATIN CALCIUM 40 MILLIGRAM(S): 80 TABLET, FILM COATED ORAL at 22:46

## 2021-01-01 RX ADMIN — LEVALBUTEROL 0.63 MILLIGRAM(S): 1.25 SOLUTION, CONCENTRATE RESPIRATORY (INHALATION) at 11:10

## 2021-01-01 RX ADMIN — SODIUM CHLORIDE 3 MILLILITER(S): 9 INJECTION INTRAMUSCULAR; INTRAVENOUS; SUBCUTANEOUS at 06:57

## 2021-01-01 RX ADMIN — SODIUM CHLORIDE 3 MILLILITER(S): 9 INJECTION INTRAMUSCULAR; INTRAVENOUS; SUBCUTANEOUS at 15:09

## 2021-01-01 RX ADMIN — Medication 25 MILLIGRAM(S): at 11:04

## 2021-01-01 RX ADMIN — Medication 1000 MILLIGRAM(S): at 07:30

## 2021-01-01 RX ADMIN — Medication 81 MILLIGRAM(S): at 18:31

## 2021-01-01 RX ADMIN — SENNA PLUS 2 TABLET(S): 8.6 TABLET ORAL at 22:18

## 2021-01-01 RX ADMIN — Medication 25 MILLIGRAM(S): at 21:07

## 2021-01-01 RX ADMIN — HYDROMORPHONE HYDROCHLORIDE 1 MILLIGRAM(S): 2 INJECTION INTRAMUSCULAR; INTRAVENOUS; SUBCUTANEOUS at 03:51

## 2021-01-01 RX ADMIN — Medication 400 MILLIGRAM(S): at 07:16

## 2021-01-01 RX ADMIN — HYDROMORPHONE HYDROCHLORIDE 1 MILLIGRAM(S): 2 INJECTION INTRAMUSCULAR; INTRAVENOUS; SUBCUTANEOUS at 06:11

## 2021-01-01 RX ADMIN — ATORVASTATIN CALCIUM 40 MILLIGRAM(S): 80 TABLET, FILM COATED ORAL at 22:21

## 2021-01-01 RX ADMIN — PANTOPRAZOLE SODIUM 40 MILLIGRAM(S): 20 TABLET, DELAYED RELEASE ORAL at 06:18

## 2021-01-01 RX ADMIN — LEVALBUTEROL 0.63 MILLIGRAM(S): 1.25 SOLUTION, CONCENTRATE RESPIRATORY (INHALATION) at 18:21

## 2021-01-01 RX ADMIN — ROBINUL 0.2 MILLIGRAM(S): 0.2 INJECTION INTRAMUSCULAR; INTRAVENOUS at 09:00

## 2021-01-01 RX ADMIN — Medication 30 MILLIGRAM(S): at 18:06

## 2021-01-01 RX ADMIN — Medication 650 MILLIGRAM(S): at 15:54

## 2021-01-01 RX ADMIN — Medication 40 MILLIGRAM(S): at 18:21

## 2021-01-01 RX ADMIN — Medication 40 MILLIGRAM(S): at 06:11

## 2021-01-01 RX ADMIN — HYDROMORPHONE HYDROCHLORIDE 1 MILLIGRAM(S): 2 INJECTION INTRAMUSCULAR; INTRAVENOUS; SUBCUTANEOUS at 22:19

## 2021-01-01 RX ADMIN — SODIUM CHLORIDE 3 MILLILITER(S): 9 INJECTION INTRAMUSCULAR; INTRAVENOUS; SUBCUTANEOUS at 14:07

## 2021-01-01 RX ADMIN — Medication 325 MILLIGRAM(S): at 12:38

## 2021-01-01 RX ADMIN — LEVALBUTEROL 0.63 MILLIGRAM(S): 1.25 SOLUTION, CONCENTRATE RESPIRATORY (INHALATION) at 17:57

## 2021-01-01 RX ADMIN — Medication 30 MILLIGRAM(S): at 16:46

## 2021-01-01 RX ADMIN — Medication 1 PUFF(S): at 12:13

## 2021-01-01 RX ADMIN — SODIUM CHLORIDE 3 MILLILITER(S): 9 INJECTION INTRAMUSCULAR; INTRAVENOUS; SUBCUTANEOUS at 13:55

## 2021-01-01 RX ADMIN — LEVALBUTEROL 0.63 MILLIGRAM(S): 1.25 SOLUTION, CONCENTRATE RESPIRATORY (INHALATION) at 06:02

## 2021-01-01 RX ADMIN — LEVALBUTEROL 0.63 MILLIGRAM(S): 1.25 SOLUTION, CONCENTRATE RESPIRATORY (INHALATION) at 17:39

## 2021-01-01 RX ADMIN — Medication 30 MILLIGRAM(S): at 12:36

## 2021-01-01 RX ADMIN — Medication 30 MILLIGRAM(S): at 18:21

## 2021-01-01 RX ADMIN — LEVALBUTEROL 0.63 MILLIGRAM(S): 1.25 SOLUTION, CONCENTRATE RESPIRATORY (INHALATION) at 12:38

## 2021-01-01 RX ADMIN — Medication 30 MILLIGRAM(S): at 00:22

## 2021-01-01 RX ADMIN — SODIUM CHLORIDE 3 MILLILITER(S): 9 INJECTION INTRAMUSCULAR; INTRAVENOUS; SUBCUTANEOUS at 06:13

## 2021-01-01 RX ADMIN — Medication 30 MILLIGRAM(S): at 00:06

## 2021-01-01 RX ADMIN — Medication 325 MILLIGRAM(S): at 13:44

## 2021-01-01 RX ADMIN — SODIUM CHLORIDE 3 MILLILITER(S): 9 INJECTION INTRAMUSCULAR; INTRAVENOUS; SUBCUTANEOUS at 13:06

## 2021-01-01 RX ADMIN — Medication 100 MILLIGRAM(S): at 06:55

## 2021-01-01 RX ADMIN — LEVALBUTEROL 0.63 MILLIGRAM(S): 1.25 SOLUTION, CONCENTRATE RESPIRATORY (INHALATION) at 15:29

## 2021-01-01 RX ADMIN — Medication 30 MILLIGRAM(S): at 05:41

## 2021-01-01 RX ADMIN — SODIUM CHLORIDE 3 MILLILITER(S): 9 INJECTION INTRAMUSCULAR; INTRAVENOUS; SUBCUTANEOUS at 06:24

## 2021-01-01 RX ADMIN — Medication 25 MILLIGRAM(S): at 10:04

## 2021-01-01 RX ADMIN — SODIUM CHLORIDE 3 MILLILITER(S): 9 INJECTION INTRAMUSCULAR; INTRAVENOUS; SUBCUTANEOUS at 18:08

## 2021-01-01 RX ADMIN — SENNA PLUS 2 TABLET(S): 8.6 TABLET ORAL at 21:07

## 2021-01-01 RX ADMIN — SODIUM CHLORIDE 3 MILLILITER(S): 9 INJECTION INTRAMUSCULAR; INTRAVENOUS; SUBCUTANEOUS at 06:00

## 2021-01-01 RX ADMIN — Medication 40 MILLIGRAM(S): at 05:41

## 2021-01-01 RX ADMIN — SODIUM CHLORIDE 3 MILLILITER(S): 9 INJECTION INTRAMUSCULAR; INTRAVENOUS; SUBCUTANEOUS at 22:09

## 2021-01-01 RX ADMIN — Medication 25 MILLIGRAM(S): at 11:07

## 2021-01-01 RX ADMIN — Medication 30 MILLIGRAM(S): at 04:29

## 2021-01-01 RX ADMIN — Medication 25 MILLIGRAM(S): at 05:55

## 2021-01-01 RX ADMIN — SODIUM CHLORIDE 3 MILLILITER(S): 9 INJECTION INTRAMUSCULAR; INTRAVENOUS; SUBCUTANEOUS at 05:51

## 2021-01-01 RX ADMIN — HYDROMORPHONE HYDROCHLORIDE 1 MG/HR: 2 INJECTION INTRAMUSCULAR; INTRAVENOUS; SUBCUTANEOUS at 13:01

## 2021-01-01 RX ADMIN — LEVALBUTEROL 0.63 MILLIGRAM(S): 1.25 SOLUTION, CONCENTRATE RESPIRATORY (INHALATION) at 05:42

## 2021-01-01 RX ADMIN — LEVALBUTEROL 0.63 MILLIGRAM(S): 1.25 SOLUTION, CONCENTRATE RESPIRATORY (INHALATION) at 18:38

## 2021-01-01 RX ADMIN — HYDROMORPHONE HYDROCHLORIDE 1 MILLIGRAM(S): 2 INJECTION INTRAMUSCULAR; INTRAVENOUS; SUBCUTANEOUS at 19:25

## 2021-01-01 RX ADMIN — Medication 30 MILLIGRAM(S): at 00:13

## 2021-01-01 RX ADMIN — POLYETHYLENE GLYCOL 3350 17 GRAM(S): 17 POWDER, FOR SOLUTION ORAL at 11:04

## 2021-01-01 RX ADMIN — ATORVASTATIN CALCIUM 40 MILLIGRAM(S): 80 TABLET, FILM COATED ORAL at 22:03

## 2021-01-01 RX ADMIN — HYDROMORPHONE HYDROCHLORIDE 1 MILLIGRAM(S): 2 INJECTION INTRAMUSCULAR; INTRAVENOUS; SUBCUTANEOUS at 00:42

## 2021-01-01 RX ADMIN — SENNA PLUS 2 TABLET(S): 8.6 TABLET ORAL at 22:27

## 2021-01-01 RX ADMIN — Medication 30 MILLIGRAM(S): at 22:46

## 2021-01-01 RX ADMIN — Medication 40 MILLIGRAM(S): at 05:32

## 2021-01-01 RX ADMIN — HYDROMORPHONE HYDROCHLORIDE 1 MILLIGRAM(S): 2 INJECTION INTRAMUSCULAR; INTRAVENOUS; SUBCUTANEOUS at 10:25

## 2021-01-01 RX ADMIN — Medication 20 MILLIGRAM(S): at 18:31

## 2021-01-01 RX ADMIN — Medication 50 GRAM(S): at 06:25

## 2021-01-01 RX ADMIN — ENOXAPARIN SODIUM 115 MILLIGRAM(S): 100 INJECTION SUBCUTANEOUS at 22:03

## 2021-01-01 RX ADMIN — Medication 12.5 MILLIGRAM(S): at 06:18

## 2021-01-01 RX ADMIN — LEVALBUTEROL 0.63 MILLIGRAM(S): 1.25 SOLUTION, CONCENTRATE RESPIRATORY (INHALATION) at 00:22

## 2021-01-01 RX ADMIN — Medication 81 MILLIGRAM(S): at 11:54

## 2021-01-01 RX ADMIN — Medication 30 MILLIGRAM(S): at 06:55

## 2021-01-01 RX ADMIN — Medication 30 MILLIGRAM(S): at 07:12

## 2021-01-01 RX ADMIN — LATANOPROST 1 DROP(S): 0.05 SOLUTION/ DROPS OPHTHALMIC; TOPICAL at 23:31

## 2021-01-01 RX ADMIN — Medication 25 MILLIGRAM(S): at 22:18

## 2021-01-01 RX ADMIN — Medication 1 PUFF(S): at 10:25

## 2021-01-01 RX ADMIN — Medication 30 MILLIGRAM(S): at 11:57

## 2021-01-01 RX ADMIN — ATORVASTATIN CALCIUM 40 MILLIGRAM(S): 80 TABLET, FILM COATED ORAL at 21:49

## 2021-01-01 RX ADMIN — HYDROMORPHONE HYDROCHLORIDE 1 MILLIGRAM(S): 2 INJECTION INTRAMUSCULAR; INTRAVENOUS; SUBCUTANEOUS at 09:23

## 2021-01-01 RX ADMIN — Medication 30 MILLIGRAM(S): at 05:55

## 2021-01-01 RX ADMIN — LEVALBUTEROL 0.63 MILLIGRAM(S): 1.25 SOLUTION, CONCENTRATE RESPIRATORY (INHALATION) at 00:46

## 2021-01-01 RX ADMIN — Medication 650 MILLIGRAM(S): at 15:19

## 2021-01-01 RX ADMIN — HYDROMORPHONE HYDROCHLORIDE 1 MILLIGRAM(S): 2 INJECTION INTRAMUSCULAR; INTRAVENOUS; SUBCUTANEOUS at 21:56

## 2021-01-01 RX ADMIN — LATANOPROST 1 DROP(S): 0.05 SOLUTION/ DROPS OPHTHALMIC; TOPICAL at 22:03

## 2021-01-01 RX ADMIN — Medication 30 MILLIGRAM(S): at 17:40

## 2021-01-01 RX ADMIN — HYDROMORPHONE HYDROCHLORIDE 1 MILLIGRAM(S): 2 INJECTION INTRAMUSCULAR; INTRAVENOUS; SUBCUTANEOUS at 22:11

## 2021-01-01 RX ADMIN — LEVALBUTEROL 0.63 MILLIGRAM(S): 1.25 SOLUTION, CONCENTRATE RESPIRATORY (INHALATION) at 07:12

## 2021-01-01 RX ADMIN — HYDROMORPHONE HYDROCHLORIDE 1 MILLIGRAM(S): 2 INJECTION INTRAMUSCULAR; INTRAVENOUS; SUBCUTANEOUS at 10:16

## 2021-01-01 RX ADMIN — PANTOPRAZOLE SODIUM 40 MILLIGRAM(S): 20 TABLET, DELAYED RELEASE ORAL at 07:12

## 2021-01-01 RX ADMIN — SODIUM CHLORIDE 3 MILLILITER(S): 9 INJECTION INTRAMUSCULAR; INTRAVENOUS; SUBCUTANEOUS at 21:58

## 2021-01-01 RX ADMIN — Medication 40 MILLIGRAM(S): at 06:02

## 2021-01-01 RX ADMIN — HYDROMORPHONE HYDROCHLORIDE 1 MILLIGRAM(S): 2 INJECTION INTRAMUSCULAR; INTRAVENOUS; SUBCUTANEOUS at 13:58

## 2021-01-01 RX ADMIN — Medication 30 MILLIGRAM(S): at 18:39

## 2021-01-01 RX ADMIN — HYDROMORPHONE HYDROCHLORIDE 1 MILLIGRAM(S): 2 INJECTION INTRAMUSCULAR; INTRAVENOUS; SUBCUTANEOUS at 17:30

## 2021-01-01 RX ADMIN — LATANOPROST 1 DROP(S): 0.05 SOLUTION/ DROPS OPHTHALMIC; TOPICAL at 22:31

## 2021-01-01 RX ADMIN — PANTOPRAZOLE SODIUM 40 MILLIGRAM(S): 20 TABLET, DELAYED RELEASE ORAL at 05:55

## 2021-01-01 RX ADMIN — SODIUM CHLORIDE 3 MILLILITER(S): 9 INJECTION INTRAMUSCULAR; INTRAVENOUS; SUBCUTANEOUS at 21:07

## 2021-01-01 RX ADMIN — HYDROMORPHONE HYDROCHLORIDE 1 MILLIGRAM(S): 2 INJECTION INTRAMUSCULAR; INTRAVENOUS; SUBCUTANEOUS at 14:51

## 2021-01-01 RX ADMIN — HYDROMORPHONE HYDROCHLORIDE 1 MILLIGRAM(S): 2 INJECTION INTRAMUSCULAR; INTRAVENOUS; SUBCUTANEOUS at 09:25

## 2021-01-01 RX ADMIN — HYDROMORPHONE HYDROCHLORIDE 1 MILLIGRAM(S): 2 INJECTION INTRAMUSCULAR; INTRAVENOUS; SUBCUTANEOUS at 10:11

## 2021-01-01 RX ADMIN — Medication 25 MILLIGRAM(S): at 09:35

## 2021-01-01 RX ADMIN — Medication 34.7 MICROGRAM(S)/KG/MIN: at 14:30

## 2021-01-01 RX ADMIN — LEVALBUTEROL 0.63 MILLIGRAM(S): 1.25 SOLUTION, CONCENTRATE RESPIRATORY (INHALATION) at 12:36

## 2021-01-01 RX ADMIN — Medication 25 MILLIGRAM(S): at 19:16

## 2021-01-01 RX ADMIN — Medication 325 MILLIGRAM(S): at 11:04

## 2021-01-01 RX ADMIN — Medication 20 MILLIGRAM(S): at 06:18

## 2021-01-01 RX ADMIN — SODIUM CHLORIDE 3 MILLILITER(S): 9 INJECTION INTRAMUSCULAR; INTRAVENOUS; SUBCUTANEOUS at 05:10

## 2021-01-01 RX ADMIN — Medication 1 PUFF(S): at 09:34

## 2021-01-01 RX ADMIN — ATORVASTATIN CALCIUM 40 MILLIGRAM(S): 80 TABLET, FILM COATED ORAL at 22:27

## 2021-01-01 RX ADMIN — LATANOPROST 1 DROP(S): 0.05 SOLUTION/ DROPS OPHTHALMIC; TOPICAL at 00:14

## 2021-01-01 RX ADMIN — Medication 30 MILLIGRAM(S): at 11:04

## 2021-01-01 RX ADMIN — Medication 1 PUFF(S): at 00:00

## 2021-01-01 RX ADMIN — Medication 40 MILLIGRAM(S): at 07:12

## 2021-01-01 RX ADMIN — SODIUM CHLORIDE 3 MILLILITER(S): 9 INJECTION INTRAMUSCULAR; INTRAVENOUS; SUBCUTANEOUS at 14:43

## 2021-01-01 RX ADMIN — HYDROMORPHONE HYDROCHLORIDE 1 MILLIGRAM(S): 2 INJECTION INTRAMUSCULAR; INTRAVENOUS; SUBCUTANEOUS at 10:40

## 2021-01-01 RX ADMIN — SODIUM CHLORIDE 3 MILLILITER(S): 9 INJECTION INTRAMUSCULAR; INTRAVENOUS; SUBCUTANEOUS at 22:40

## 2021-01-01 RX ADMIN — Medication 25 MILLIGRAM(S): at 06:25

## 2021-01-01 RX ADMIN — SODIUM CHLORIDE 3 MILLILITER(S): 9 INJECTION INTRAMUSCULAR; INTRAVENOUS; SUBCUTANEOUS at 22:12

## 2021-01-01 RX ADMIN — Medication 20 MILLIGRAM(S): at 06:25

## 2021-01-01 RX ADMIN — SODIUM CHLORIDE 3 MILLILITER(S): 9 INJECTION INTRAMUSCULAR; INTRAVENOUS; SUBCUTANEOUS at 14:05

## 2021-01-01 RX ADMIN — Medication 20 MILLIEQUIVALENT(S): at 15:15

## 2021-01-01 RX ADMIN — SODIUM CHLORIDE 3 MILLILITER(S): 9 INJECTION INTRAMUSCULAR; INTRAVENOUS; SUBCUTANEOUS at 21:55

## 2021-01-01 RX ADMIN — LEVALBUTEROL 0.63 MILLIGRAM(S): 1.25 SOLUTION, CONCENTRATE RESPIRATORY (INHALATION) at 00:09

## 2021-01-01 RX ADMIN — Medication 25 MILLIGRAM(S): at 22:27

## 2021-01-01 RX ADMIN — HYDROMORPHONE HYDROCHLORIDE 1 MILLIGRAM(S): 2 INJECTION INTRAMUSCULAR; INTRAVENOUS; SUBCUTANEOUS at 15:30

## 2021-01-01 RX ADMIN — Medication 25 MILLIGRAM(S): at 09:34

## 2021-01-01 RX ADMIN — Medication 100 MILLIGRAM(S): at 15:08

## 2021-01-01 RX ADMIN — Medication 30 MILLIGRAM(S): at 06:25

## 2021-01-01 RX ADMIN — HYDROMORPHONE HYDROCHLORIDE 1 MILLIGRAM(S): 2 INJECTION INTRAMUSCULAR; INTRAVENOUS; SUBCUTANEOUS at 22:40

## 2021-01-01 RX ADMIN — LEVALBUTEROL 0.63 MILLIGRAM(S): 1.25 SOLUTION, CONCENTRATE RESPIRATORY (INHALATION) at 06:11

## 2021-01-01 RX ADMIN — SODIUM CHLORIDE 3 MILLILITER(S): 9 INJECTION INTRAMUSCULAR; INTRAVENOUS; SUBCUTANEOUS at 05:31

## 2021-01-01 RX ADMIN — HYDROMORPHONE HYDROCHLORIDE 1 MILLIGRAM(S): 2 INJECTION INTRAMUSCULAR; INTRAVENOUS; SUBCUTANEOUS at 09:00

## 2021-01-01 RX ADMIN — HYDROMORPHONE HYDROCHLORIDE 0.2 MG/HR: 2 INJECTION INTRAMUSCULAR; INTRAVENOUS; SUBCUTANEOUS at 14:01

## 2021-01-01 RX ADMIN — SODIUM CHLORIDE 3 MILLILITER(S): 9 INJECTION INTRAMUSCULAR; INTRAVENOUS; SUBCUTANEOUS at 22:23

## 2021-01-01 RX ADMIN — Medication 100 MILLIGRAM(S): at 21:56

## 2021-01-01 RX ADMIN — HYDROMORPHONE HYDROCHLORIDE 1 MILLIGRAM(S): 2 INJECTION INTRAMUSCULAR; INTRAVENOUS; SUBCUTANEOUS at 04:19

## 2021-01-01 RX ADMIN — HYDROMORPHONE HYDROCHLORIDE 1 MILLIGRAM(S): 2 INJECTION INTRAMUSCULAR; INTRAVENOUS; SUBCUTANEOUS at 11:00

## 2021-01-01 RX ADMIN — LEVALBUTEROL 0.63 MILLIGRAM(S): 1.25 SOLUTION, CONCENTRATE RESPIRATORY (INHALATION) at 13:47

## 2021-01-01 RX ADMIN — LATANOPROST 1 DROP(S): 0.05 SOLUTION/ DROPS OPHTHALMIC; TOPICAL at 00:42

## 2021-01-01 RX ADMIN — HYDROMORPHONE HYDROCHLORIDE 1 MILLIGRAM(S): 2 INJECTION INTRAMUSCULAR; INTRAVENOUS; SUBCUTANEOUS at 12:35

## 2021-01-01 RX ADMIN — Medication 30 MILLIGRAM(S): at 17:22

## 2021-01-01 RX ADMIN — Medication 25 MILLIGRAM(S): at 17:41

## 2021-01-01 RX ADMIN — LATANOPROST 1 DROP(S): 0.05 SOLUTION/ DROPS OPHTHALMIC; TOPICAL at 21:49

## 2021-01-01 RX ADMIN — SODIUM CHLORIDE 3 MILLILITER(S): 9 INJECTION INTRAMUSCULAR; INTRAVENOUS; SUBCUTANEOUS at 13:14

## 2021-01-01 RX ADMIN — Medication 30 MILLIGRAM(S): at 06:18

## 2021-01-01 RX ADMIN — Medication 20 MILLIGRAM(S): at 16:46

## 2021-01-01 RX ADMIN — Medication 81 MILLIGRAM(S): at 12:40

## 2021-01-01 RX ADMIN — Medication 30 MILLIGRAM(S): at 12:54

## 2021-01-15 NOTE — ASSESSMENT
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 Patient improved after LP and decision made to proceed with VPS insertion. Patient underwent VPS insertion on 8/5/20 with significant improvement in cognitive function at a setting of 3. Comparison of meningioma from 2010 to 2020 shows approximately 0.5 cm growth in the skull base meningioma over 10 years. While her mental status continues to improve she is reporting worsening vision described as blurriness. At this visit, CT H with large subdural hematoma likely from over shunting. Shunt dialed to 6 in the clinic and i explained in detail what signs/symptoms of hydrocephalus the daughter should loook for. We will obtain a CT scan in 2 weeks to assess progression of subdural . It is likely due to overdraining. Plan:\par \par Shunt dialed to 6\par Repeat CT in 2 weeks\par RTC in 2 weeks.

## 2021-01-15 NOTE — PHYSICAL EXAM
[I: Normal Smell] : smell intact bilaterally [Cranial Nerves Oculomotor (III)] : extraocular motion intact [Cranial Nerves Trigeminal (V)] : facial sensation intact symmetrically [Cranial Nerves Facial (VII)] : face symmetrical [Cranial Nerves Vestibulocochlear (VIII)] : hearing was intact bilaterally [Cranial Nerves Accessory (XI - Cranial And Spinal)] : head turning and shoulder shrug symmetric [Motor Tone] : muscle tone was normal in all four extremities [Sensation Tactile Decrease] : light touch was intact [Sensation Pain / Temperature Decrease] : pain and temperature was intact [Limited Balance] : the patient's balance was impaired [FreeTextEntry5] : bilateral vision loss described as blurry

## 2021-01-15 NOTE — HISTORY OF PRESENT ILLNESS
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 Patient improved after LP and decision made to proceed with VPS insertion. Patient underwent VPS insertion on 8/5/20 with significant improvement in cognitive function. She continued to suffer from persistent confusion and memory loss. No changes in vision. CT last visit demonstrated persistent hydrocephalus and so the shunt was dialed to 3. New CT demonstrates resolution of hydrocephalus and transependymal flow. Her cognitive function has improved significantly. Her daughter is significantly happier. She brings with her a CD showing serial MRI's since 2010 which demonstrates minimal growth in the past 10 years. Vision is stable. No new deficits. All incisions c/d/i. \par \par

## 2021-01-15 NOTE — DATA REVIEWED
[de-identified] : EXAM: CT BRAIN \par \par PROCEDURE DATE: 08/12/2020 \par \par \par \par INTERPRETATION: PROCEDURE: CT head without intravenous contrast\par \par INDICATION: Hydrocephalus\par \par TECHNIQUE: Serial axial images were obtained from the skull base to the vertex without the use of intravenous contrast. Coronal and sagittal reconstructions were created.\par \par COMPARISON EXAMINATION: CT head from 08/07/2020 MR head from 08/01/2020.\par \par FINDINGS:\par VENTRICLES AND SULCI: Again patient is noted to be status post right frontal approach ventricular shunt catheter placement through a right frontal sanford home with tip terminating in the body of the left lateral ventricle. There is interval decrease in caliber of the ventricles.\par EXTRA-AXIAL: No extra-axial fluid collection is present. There is interval decrease in intracranial gas involving the frontal convexity.\par INTRA-AXIAL: There is interval decrease in periventricular hypodensities, suggesting resolving transependymal edema. No acute hemorrhage, herniation, or midline shift is present. No acute transcortical infarction identified, though ischemic changes are better identified on MRI.\par Orbits: Right lens is absent.\par VISUALIZED SINUSES: No air-fluid levels are identified.\par VISUALIZED MASTOIDS: Well aerated.\par CALVARIUM: No fracture identified. Again noted is a partially calcified mass that is stable in size at the anterior and central skull base compatible with meningioma.\par \par IMPRESSION:\par \par Interval improvement of ventriculomegaly with decrease in suspected transependymal edema compared with imaging from 08/07/2020 with right transfrontal ventricular shunt in place. No acute intracranial hemorrhage or demarcated territorial infarction.\par \par \par \par \par Thank you for the opportunity to participate in the care of this patient.\par \par ИВАН GRAF M.D., RADIOLOGY RESIDENT\par This document has been electronically signed.\par MADELINE VALDES M.D., ATTENDING RADIOLOGIST\par This document has been electronically signed. Aug 12 2020 4:09PM\par  \par \par  \par \par \par EXAM: CT BRAIN \par \par PROCEDURE DATE: 08/07/2020 \par \par \par \par INTERPRETATION: PROCEDURE: CT head without intravenous contrast\par \par INDICATIONS:\par \par TECHNIQUE: Serial axial images were obtained from the skull base to the vertex without the use of intravenous contrast. Coronal and sagittal reconstructions were created.\par \par COMPARISON EXAMINATION: CT head dated 8/6/2020 and 7/20/2020..\par \par FINDINGS:\par \par Patient is status post right frontal approach ventricular shunt catheter placement through a right frontal sanford hole, with catheter traversing the right frontal lobe, crossing the midline with tip terminating in the body of the left lateral ventricle. There is slight interval decrease in caliber of the ventricles. Redemonstrated is periventricular hypoattenuation compatible with transependymal edema and/or small vessel ischemic change. There are a few persistent foci of gas within the frontal convexity, likely introduced at time of ventricular catheter placement. Scalp gas is decreased.\par \par No intraaxial mass, acute hemorrhage, or midline shift is present. No acute transcortical infarction. No extra-axial fluid collection. There are scattered hypodensities throughout the periventricular white matter, likely the sequela of chronic small vessel ischemic disease.\par \par There is again noted partially calcified mass at the anterior and central skull base compatible with meningioma and based along the planum sphenoidale and tuberculum sella.\par \par VISUALIZED SINUSES: No air-fluid levels are identified.\par VISUALIZED MASTOIDS: Well aerated.\par CALVARIUM: Evaluation at the skull base is limited by streak artifact. No visible fracture.\par MISCELLANEOUS: Right ocular lens is absent consistent with prior cataract surgery.\par \par IMPRESSION:\par \par Compared to 08/06/2020, there is slight improvement in hydrocephalus with right transfrontal ventricular shunt in place. No acute intracranial hemorrhage.\par \par \par \par \par Thank you for the opportunity to participate in the care of this patient.\par \par DIANNE WALLS M.D., RADIOLOGY RESIDENT\par This document has been electronically signed.\par FACUNDO BLOCK M.D. ATTENDING RADIOLOGIST\par This document has been electronically signed. Aug 7 2020 11:32AM\par  \par \par  \par \par \par EXAM: CT BRAIN \par \par PROCEDURE DATE: 08/06/2020 \par \par \par \par INTERPRETATION: PROCEDURE: CT head without intravenous contrast\par \par INDICATION: Status post ventriculoperitoneal shunt placement\par \par TECHNIQUE: Multiple axial images were obtained at 5 mm intervals from the skull base to the vertex. Sagittal and coronal reformatted images were obtained from the axial data set. The images were reviewed in brain and bone windows.\par \par COMPARISON: 8/1/2020 MRI brain. 7/28/2020 CT angiography of the head. 5/22/2020 head CT\par \par FINDINGS:\par \par There has been interval placement of a right frontal approach ventricular drainage catheter entering via a right frontal sanford hole with tip traversing the frontal horn of the right lateral ventricle, crossing the midline and tip terminating in the body of the left lateral ventricle.\par \par Redemonstrated is marked dilatation of the lateral, third and fourth ventricles with associated periventricular hypoattenuation and dilatation of the temporal horns, compatible with acute hydrocephalus with transependymal resorption of cerebrospinal fluid. Ventricular caliber is not significantly changed when compared to the 8/1/2020 and brain MR 7/20/2020 head CTA, but mildly increased when compared to the 5/22/2020 exam.\par \par Redemonstrated, there is a midline calcified mass along the planum sphenoidale which is better characterized on the recently performed 8/1/2020 brain MR, which may represent a calcified meningioma.\par \par There is no extra-axial fluid collection.\par \par The gray-white differentiation is preserved without evidence of transcortical infarction. There is no acute intracranial hemorrhage. There is no significant midline shift, mass effect or herniation. There is additional mild subcortical white matter hypoattenuation which is nonspecific and is favored to reflect sequela of mild chronic microvascular ischemia.\par \par There is evidence of right lens replacement. Globes and orbits are otherwise grossly unremarkable.\par \par There is mild mucosal thickening of the anterior ethmoid air cells. Mastoid air cells are well aerated.\par \par \par \par IMPRESSION:\par \par * Interval placement of a right frontal approach ventricular drainage catheter, crossing the midline with tip terminating in the body of the left lateral ventricle. Ventriculomegaly involving the lateral, third and fourth ventricles with evidence of transependymal morcellation of cerebrospinal fluid, compatible with acute hydrocephalus. Delayed caliber is stable compared to the eighth/1/2020 brain MR and 7/20/2020 CT of the head but mildly increased when compared to the 5/22/2020 head CT.\par * Partially imaged calcified mass centered along the planum sphenoidale better characterized on 8/1/2020 brain MR, likely representing a calcified meningioma.\par * No acute intracranial hemorrhage or extra-axial fluid collection. No CT evidence for transcortical infarction.\par \par \par \par \par Thank you for the opportunity to participate in the care of this patient.\par \par \par \par ROBBIN MOORE MD, ATTENDING RADIOLOGIST\par This document has been electronically signed. Aug 6 2020 9:55AM\par  \par  [de-identified] : EXAM: MR BRAIN IC \par \par PROCEDURE DATE: 08/01/2020 \par \par \par \par INTERPRETATION: INova MD, have reviewed the images and the report and agree with the findings.\par \par PROCEDURE INFORMATION:\par Exam: MR Head With Contrast\par Exam date and time: 8/1/2020 9:31 AM\par Age: 75 years old\par Clinical indication: Other: AMS hydrocephalus, tumor on CT\par \par TECHNIQUE:\par Imaging protocol: MR of the head with intravenous contrast.\par 3D rendering: MIP and/or 3D reconstructed images were created by the technologist.\par \par COMPARISON:\par CT HEAD STROKE PROTOCOL 7/28/2020 5:49 PM\par \par FINDINGS:\par Brain: No acute infarct identified on the diffusion-weighted imaging. No parenchymal hemorrhage.\par A midline mass along plane of sphenoidale extending into the sella turcica measuring approximately\par 3.5 cm in AP x 3.1 cm in craniocaudal x 2.5 cm in transverse dimensions, image 88 series [id number],\par image 20 series [id number]. This is predominantly T1 isointense/soft tissue signal intensity with avid\par enhancement. Within the the right aspect of the mass, an area of low signal intensity in keeping with\par calcification seen on the prior CT study. The mass may represent a partially calcified meningioma;\par craniopharyngioma or collision tumor cannot be excluded. There are some limitations characterizing\par the mass on the sequences due to considerable motion artifacts. The mass impinges upon the optic\par apparatus.\par Ventricles: Marked ventriculomegaly consistent with hydrocephalus. Periventricular T2 hyperintensity\par consistent with transependymal flow of CSF. The ventricular enlargement is diffuse, including 4th\par ventricle and aqueduct of Sylvius.\par Sinuses: Mild sinus mucosal thickening. No acute sinusitis.\par Mastoid air cells: Normal as visualized. No mastoid effusion.\par Orbits: Prior right lens surgery.\par Soft tissues: Unremarkable.\par \par IMPRESSION:\par 1. Images are motion degraded.\par 2. Findings consistent with hydrocephalus; periventricular T2 hyperintensity in keeping with\par transependymal flow of CSF. The ventricular enlargement is diffuse suggesting communicating\par hydrocephalus.\par 3. Midline mass in the region of the sella turcica, as above.\par \par Thank you for allowing us to participate in the care of your patient.\par Dictated and Authenticated by: Janeen Santiago MD\par 08/01/2020 6:08 PM Eastern Time ([place] & [place])\par \par The above report was submitted by the [facility] attending radiologist and copied to PowerScribe by resident Dr. Burrell.\par \par \par Resident Findings: Hydrocephalus as above. Ventricles appear unchanged in size in comparison to the prior CT from 7/28/20.\par \par \par \par \par Thank you for the opportunity to participate in the care of this patient.\par \par KELVIN BURRELL M.D., RADIOLOGY RESIDENT\par This document has been electronically signed.\par NOVA HOLM M.D., ATTENDING RADIOLOGIST\par This document has been electronically signed. Aug 2 2020 4:11PM\par  \par \par  \par \par

## 2021-01-27 PROBLEM — R42 VERTIGO: Status: ACTIVE | Noted: 2021-01-01

## 2021-01-27 NOTE — PHYSICAL EXAM
[I: Normal Smell] : smell intact bilaterally [Cranial Nerves Oculomotor (III)] : extraocular motion intact [Cranial Nerves Trigeminal (V)] : facial sensation intact symmetrically [Cranial Nerves Facial (VII)] : face symmetrical [Cranial Nerves Vestibulocochlear (VIII)] : hearing was intact bilaterally [Cranial Nerves Accessory (XI - Cranial And Spinal)] : head turning and shoulder shrug symmetric [Motor Tone] : muscle tone was normal in all four extremities [Sensation Tactile Decrease] : light touch was intact [Sensation Pain / Temperature Decrease] : pain and temperature was intact [Limited Balance] : the patient's balance was impaired [FreeTextEntry5] : bilateral vision loss described as blurry, unable to count fingers.

## 2021-01-27 NOTE — REASON FOR VISIT
[Follow-Up: _____] : a [unfilled] follow-up visit [FreeTextEntry1] : TODAY'S VISIT:\par Patient returns for follow-up visit with new Head CT, current VPS setting is 6. Dizziness improving. CT H with right sided subacute subdural hematoma without significant mass effect. 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 No seizures, altered mental status, numbness/tingling. EOMI. \par \par PRIOR VISIT 1/15/2021:\par Patient presents for follow-up visit with new brain CT to review. Patient saw her own ophthalmologist who recorded no change in vision. Patient presents today because official read on CT H with subdural hematoma without significant mass effect. Patient remains at neuro-cognitive baseline although reportedly tired and dizzy. CT unavailable for review, but radiology center contacted and CD forwarded. Otherwise, remains clinically stable. No seizures, vision changes. Ambulates around the house. \par \par PRIOR VISIT 10/14/2020:\par Patient returns to review new Head CT done at [facility] in [place], s/p  Shunt placement, Certas #4. Patient has kindly refused  services for Gerogian, daughter is present and providing translation. PatIEnt c/o continued blurry/hazy vision changes, declining hearing and continued headaches (Tylenol). She also notes worsening balance since  Shunt placement, she uses walker at home to aid in ambulation. Today she presents in w/c, daughter states she wears diapers and has occasional episodes of urge incontinence. Denies recent falls. She has not followed up with Neuro Ophtho as of yet, we will provide referral for Dr. Mauri Hernandez.\par Daughter states she had a recent heart valve placed.\par

## 2021-01-27 NOTE — DATA REVIEWED
[de-identified] : EXAM: CT BRAIN \par \par PROCEDURE DATE: 08/12/2020 \par \par \par \par INTERPRETATION: PROCEDURE: CT head without intravenous contrast\par \par INDICATION: Hydrocephalus\par \par TECHNIQUE: Serial axial images were obtained from the skull base to the vertex without the use of intravenous contrast. Coronal and sagittal reconstructions were created.\par \par COMPARISON EXAMINATION: CT head from 08/07/2020 MR head from 08/01/2020.\par \par FINDINGS:\par VENTRICLES AND SULCI: Again patient is noted to be status post right frontal approach ventricular shunt catheter placement through a right frontal sanford home with tip terminating in the body of the left lateral ventricle. There is interval decrease in caliber of the ventricles.\par EXTRA-AXIAL: No extra-axial fluid collection is present. There is interval decrease in intracranial gas involving the frontal convexity.\par INTRA-AXIAL: There is interval decrease in periventricular hypodensities, suggesting resolving transependymal edema. No acute hemorrhage, herniation, or midline shift is present. No acute transcortical infarction identified, though ischemic changes are better identified on MRI.\par Orbits: Right lens is absent.\par VISUALIZED SINUSES: No air-fluid levels are identified.\par VISUALIZED MASTOIDS: Well aerated.\par CALVARIUM: No fracture identified. Again noted is a partially calcified mass that is stable in size at the anterior and central skull base compatible with meningioma.\par \par IMPRESSION:\par \par Interval improvement of ventriculomegaly with decrease in suspected transependymal edema compared with imaging from 08/07/2020 with right transfrontal ventricular shunt in place. No acute intracranial hemorrhage or demarcated territorial infarction.\par \par \par \par \par Thank you for the opportunity to participate in the care of this patient.\par \par ИВАН GRAF M.D., RADIOLOGY RESIDENT\par This document has been electronically signed.\par MADELINE VALDES M.D., ATTENDING RADIOLOGIST\par This document has been electronically signed. Aug 12 2020 4:09PM\par  \par \par  \par \par \par EXAM: CT BRAIN \par \par PROCEDURE DATE: 08/07/2020 \par \par \par \par INTERPRETATION: PROCEDURE: CT head without intravenous contrast\par \par INDICATIONS:\par \par TECHNIQUE: Serial axial images were obtained from the skull base to the vertex without the use of intravenous contrast. Coronal and sagittal reconstructions were created.\par \par COMPARISON EXAMINATION: CT head dated 8/6/2020 and 7/20/2020..\par \par FINDINGS:\par \par Patient is status post right frontal approach ventricular shunt catheter placement through a right frontal sanford hole, with catheter traversing the right frontal lobe, crossing the midline with tip terminating in the body of the left lateral ventricle. There is slight interval decrease in caliber of the ventricles. Redemonstrated is periventricular hypoattenuation compatible with transependymal edema and/or small vessel ischemic change. There are a few persistent foci of gas within the frontal convexity, likely introduced at time of ventricular catheter placement. Scalp gas is decreased.\par \par No intraaxial mass, acute hemorrhage, or midline shift is present. No acute transcortical infarction. No extra-axial fluid collection. There are scattered hypodensities throughout the periventricular white matter, likely the sequela of chronic small vessel ischemic disease.\par \par There is again noted partially calcified mass at the anterior and central skull base compatible with meningioma and based along the planum sphenoidale and tuberculum sella.\par \par VISUALIZED SINUSES: No air-fluid levels are identified.\par VISUALIZED MASTOIDS: Well aerated.\par CALVARIUM: Evaluation at the skull base is limited by streak artifact. No visible fracture.\par MISCELLANEOUS: Right ocular lens is absent consistent with prior cataract surgery.\par \par IMPRESSION:\par \par Compared to 08/06/2020, there is slight improvement in hydrocephalus with right transfrontal ventricular shunt in place. No acute intracranial hemorrhage.\par \par \par \par \par Thank you for the opportunity to participate in the care of this patient.\par \par DIANNE WALLS M.D., RADIOLOGY RESIDENT\par This document has been electronically signed.\par FACUNDO BLOCK M.D. ATTENDING RADIOLOGIST\par This document has been electronically signed. Aug 7 2020 11:32AM\par  \par \par  \par \par \par EXAM: CT BRAIN \par \par PROCEDURE DATE: 08/06/2020 \par \par \par \par INTERPRETATION: PROCEDURE: CT head without intravenous contrast\par \par INDICATION: Status post ventriculoperitoneal shunt placement\par \par TECHNIQUE: Multiple axial images were obtained at 5 mm intervals from the skull base to the vertex. Sagittal and coronal reformatted images were obtained from the axial data set. The images were reviewed in brain and bone windows.\par \par COMPARISON: 8/1/2020 MRI brain. 7/28/2020 CT angiography of the head. 5/22/2020 head CT\par \par FINDINGS:\par \par There has been interval placement of a right frontal approach ventricular drainage catheter entering via a right frontal sanford hole with tip traversing the frontal horn of the right lateral ventricle, crossing the midline and tip terminating in the body of the left lateral ventricle.\par \par Redemonstrated is marked dilatation of the lateral, third and fourth ventricles with associated periventricular hypoattenuation and dilatation of the temporal horns, compatible with acute hydrocephalus with transependymal resorption of cerebrospinal fluid. Ventricular caliber is not significantly changed when compared to the 8/1/2020 and brain MR 7/20/2020 head CTA, but mildly increased when compared to the 5/22/2020 exam.\par \par Redemonstrated, there is a midline calcified mass along the planum sphenoidale which is better characterized on the recently performed 8/1/2020 brain MR, which may represent a calcified meningioma.\par \par There is no extra-axial fluid collection.\par \par The gray-white differentiation is preserved without evidence of transcortical infarction. There is no acute intracranial hemorrhage. There is no significant midline shift, mass effect or herniation. There is additional mild subcortical white matter hypoattenuation which is nonspecific and is favored to reflect sequela of mild chronic microvascular ischemia.\par \par There is evidence of right lens replacement. Globes and orbits are otherwise grossly unremarkable.\par \par There is mild mucosal thickening of the anterior ethmoid air cells. Mastoid air cells are well aerated.\par \par \par \par IMPRESSION:\par \par * Interval placement of a right frontal approach ventricular drainage catheter, crossing the midline with tip terminating in the body of the left lateral ventricle. Ventriculomegaly involving the lateral, third and fourth ventricles with evidence of transependymal morcellation of cerebrospinal fluid, compatible with acute hydrocephalus. Delayed caliber is stable compared to the eighth/1/2020 brain MR and 7/20/2020 CT of the head but mildly increased when compared to the 5/22/2020 head CT.\par * Partially imaged calcified mass centered along the planum sphenoidale better characterized on 8/1/2020 brain MR, likely representing a calcified meningioma.\par * No acute intracranial hemorrhage or extra-axial fluid collection. No CT evidence for transcortical infarction.\par \par \par \par \par Thank you for the opportunity to participate in the care of this patient.\par \par \par \par ROBBIN MOORE MD, ATTENDING RADIOLOGIST\par This document has been electronically signed. Aug 6 2020 9:55AM\par  [de-identified] : EXAM: MR BRAIN IC \par \par PROCEDURE DATE: 08/01/2020 \par \par \par \par INTERPRETATION: INova MD, have reviewed the images and the report and agree with the findings.\par \par PROCEDURE INFORMATION:\par Exam: MR Head With Contrast\par Exam date and time: 8/1/2020 9:31 AM\par Age: 75 years old\par Clinical indication: Other: AMS hydrocephalus, tumor on CT\par \par TECHNIQUE:\par Imaging protocol: MR of the head with intravenous contrast.\par 3D rendering: MIP and/or 3D reconstructed images were created by the technologist.\par \par COMPARISON:\par CT HEAD STROKE PROTOCOL 7/28/2020 5:49 PM\par \par FINDINGS:\par Brain: No acute infarct identified on the diffusion-weighted imaging. No parenchymal hemorrhage.\par A midline mass along plane of sphenoidale extending into the sella turcica measuring approximately\par 3.5 cm in AP x 3.1 cm in craniocaudal x 2.5 cm in transverse dimensions, image 88 series [id number],\par image 20 series [id number]. This is predominantly T1 isointense/soft tissue signal intensity with avid\par enhancement. Within the the right aspect of the mass, an area of low signal intensity in keeping with\par calcification seen on the prior CT study. The mass may represent a partially calcified meningioma;\par craniopharyngioma or collision tumor cannot be excluded. There are some limitations characterizing\par the mass on the sequences due to considerable motion artifacts. The mass impinges upon the optic\par apparatus.\par Ventricles: Marked ventriculomegaly consistent with hydrocephalus. Periventricular T2 hyperintensity\par consistent with transependymal flow of CSF. The ventricular enlargement is diffuse, including 4th\par ventricle and aqueduct of Sylvius.\par Sinuses: Mild sinus mucosal thickening. No acute sinusitis.\par Mastoid air cells: Normal as visualized. No mastoid effusion.\par Orbits: Prior right lens surgery.\par Soft tissues: Unremarkable.\par \par IMPRESSION:\par 1. Images are motion degraded.\par 2. Findings consistent with hydrocephalus; periventricular T2 hyperintensity in keeping with\par transependymal flow of CSF. The ventricular enlargement is diffuse suggesting communicating\par hydrocephalus.\par 3. Midline mass in the region of the sella turcica, as above.\par \par Thank you for allowing us to participate in the care of your patient.\par Dictated and Authenticated by: Janeen Santiago MD\par 08/01/2020 6:08 PM Eastern Time ([place] & [place])\par \par The above report was submitted by the [facility] attending radiologist and copied to [employer] by resident Dr. Burrell.\par \par \par Resident Findings: Hydrocephalus as above. Ventricles appear unchanged in size in comparison to the prior CT from 7/28/20.\par \par \par \par \par Thank you for the opportunity to participate in the care of this patient.\par \par KELVIN BURRELL M.D., RADIOLOGY RESIDENT\par This document has been electronically signed.\par NOVA HOLM M.D., ATTENDING RADIOLOGIST\par This document has been electronically signed. Aug 2 2020 4:11PM\par  \par

## 2021-01-27 NOTE — ASSESSMENT
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 Patient improved after LP and decision made to proceed with VPS insertion. Patient underwent VPS insertion on 8/5/20 with significant improvement in cognitive function at a setting of 3. Comparison of meningioma from 2010 to 2020 shows approximately 0.5 cm growth in the skull base meningioma over 10 years. While her mental status continues to improve she is reporting worsening vision described as blurriness. At this visit, CT H with large subdural hematoma likely from over shunting. Shunt dialed to 6 in the clinic and i explained in detail what signs/symptoms of hydrocephalus the daughter should loook for. Repeat CT with subcaute SDH without significant mass effect. Ventricles remain smaller compared with august. Plan to continue shunt setting at 6. Given bitemporal hemianopsia and risk to vision, will discuss surgical intervention after next MRI demonstrates improved SDH and/or growth of the lesion. Also with dizziness/vertigo. Daughter requesting neurology follow up.  Plan:\par \par MRI brain w/wo contrast at 6month follow up\par neurology referral for dizziness/vertigo\par rtc in 4 weeks\par shunt set at 6.

## 2021-02-03 PROBLEM — Z00.00 ENCOUNTER FOR PREVENTIVE HEALTH EXAMINATION: Status: ACTIVE | Noted: 2018-08-21

## 2021-02-03 PROBLEM — Z95.2 S/P AVR (AORTIC VALVE REPLACEMENT): Status: ACTIVE | Noted: 2021-01-01

## 2021-02-03 PROBLEM — I65.23 OCCLUSION AND STENOSIS OF BILATERAL CAROTID ARTERIES: Status: ACTIVE | Noted: 2020-01-31

## 2021-02-03 PROBLEM — I48.91 ATRIAL FIBRILLATION: Status: ACTIVE | Noted: 2020-01-01

## 2021-02-03 PROBLEM — I10 HTN (HYPERTENSION): Status: ACTIVE | Noted: 2018-09-20

## 2021-02-03 PROBLEM — R06.02 SHORTNESS OF BREATH ON EXERTION: Status: ACTIVE | Noted: 2021-01-01

## 2021-02-07 NOTE — DISCUSSION/SUMMARY
[Atrial Fibrillation] : atrial fibrillation [Controlled Ventricular Response] : controlled ventricular response [Hypertension] : hypertension [Stable] : stable [None] : none [Sodium Restriction] : sodium restriction [Patient] : the patient [FreeTextEntry1] : Carotid artery disease- Stable; no further intervention at this time.\par SOB- Echo without change LV fxn or status of AVR but increased MR noted; may be cause of MIRZA/SOB;\par VHD (AVR/MR)- Stable AVR; Consultation for MR requested (see below).\par Will increase diuretic to daily use, obtain BNP/CBC/CMP and refer to structural heart team- Dr. Dickerson, for possible MV intervention.  F/u 1 week; Will consider further w/u if sx.'s persist or worsen. Discussed at length with patient.\par

## 2021-02-07 NOTE — HISTORY OF PRESENT ILLNESS
[FreeTextEntry1] : The patient c/o sob for the last 2 weeks described as mild in severity, worsening with exertion and relieved with rest. The patient denies associated chest pain, orthopnea, palpitations, leg edema, dizziness, diaphoresis, PND. The patient considers this a change in their clinical condition/status.\par Echo ordered to assess LV function/possible valvular heart disease.\par

## 2021-02-07 NOTE — REASON FOR VISIT
[Follow-Up - Clinic] : a clinic follow-up of [Atrial Fibrillation] : atrial fibrillation [Dyspnea] : dyspnea [Mitral Regurgitation] : mitral regurgitation [Hypertension] : hypertension [Prosthetic Valve] : a prosthetic valve [FreeTextEntry1] : known carotid artery disease.

## 2021-02-07 NOTE — PHYSICAL EXAM
[General Appearance - Well Developed] : well developed [Normal Appearance] : normal appearance [Well Groomed] : well groomed [General Appearance - Well Nourished] : well nourished [No Deformities] : no deformities [General Appearance - In No Acute Distress] : no acute distress [Normal Conjunctiva] : the conjunctiva exhibited no abnormalities [Eyelids - No Xanthelasma] : the eyelids demonstrated no xanthelasmas [Normal Oral Mucosa] : normal oral mucosa [No Oral Pallor] : no oral pallor [No Oral Cyanosis] : no oral cyanosis [Normal Jugular Venous A Waves Present] : normal jugular venous A waves present [Normal Jugular Venous V Waves Present] : normal jugular venous V waves present [No Jugular Venous Valle A Waves] : no jugular venous valle A waves [Respiration, Rhythm And Depth] : normal respiratory rhythm and effort [Exaggerated Use Of Accessory Muscles For Inspiration] : no accessory muscle use [Auscultation Breath Sounds / Voice Sounds] : lungs were clear to auscultation bilaterally [Tachycardic ___] : the heart rate was tachycardic at [unfilled] bpm [Irregularly Irregular] : the rhythm was irregularly irregular [Systolic grade ___/6] : A grade [unfilled]/6 systolic murmur was heard. [Left Carotid Bruit] : left carotid bruit heard [Bowel Sounds] : normal bowel sounds [Abdomen Soft] : soft [Abdomen Tenderness] : non-tender [Abnormal Walk] : normal gait [Abdomen Mass (___ Cm)] : no abdominal mass palpated [Nail Clubbing] : no clubbing of the fingernails [Cyanosis, Localized] : no localized cyanosis [Petechial Hemorrhages (___cm)] : no petechial hemorrhages [Skin Color & Pigmentation] : normal skin color and pigmentation [] : no rash [No Venous Stasis] : no venous stasis [Skin Lesions] : no skin lesions [No Skin Ulcers] : no skin ulcer [No Xanthoma] : no  xanthoma was observed [Oriented To Time, Place, And Person] : oriented to person, place, and time [Affect] : the affect was normal [Mood] : the mood was normal [No Anxiety] : not feeling anxious

## 2021-02-22 PROBLEM — I50.32 CHRONIC DIASTOLIC HEART FAILURE: Status: ACTIVE | Noted: 2021-01-01

## 2021-02-22 PROBLEM — I34.0 NONRHEUMATIC MITRAL (VALVE) INSUFFICIENCY: Status: ACTIVE | Noted: 2021-01-01

## 2021-02-22 NOTE — REASON FOR VISIT
[Follow-Up - Clinic] : a clinic follow-up of [FreeTextEntry1] : aortic stenosis s/p TAVR now with MR

## 2021-02-22 NOTE — HISTORY OF PRESENT ILLNESS
[FreeTextEntry1] : \par 75 year old female with a history of obesity, hypertension, hyperlipidemia, GERD, TIA with carotid artery disease s/p left carotid endarterectomy, CKD Stage III (baseline creatinine 1.45), ARMANDO (non-compliant with CPAP), atrial fibrillation (on Eliquis), known tuberculum meningioma, UTI (ESBL) requiring IV antibiotics in [facility] in July 2020, communicating hydrocephalus s/p right frontal  shunt on 8/5/2020 with recent CT showing a right-sided subacute subdural hematoma and chronic diastolic heart failure with severe aortic stenosis s/p TF TAVR on 05/26/2020 using Adelaida S3 (23 mm, commercial) now with significant mitral regurgitation who presents for follow up of her valvular heart disease. \par \par Post-TAVR, the patient was discharged to rehab. Her recovery course was complicated by a UTI in rehab requiring IV antibiotics. In addition, she was readmitted to [facility] in July 2020 and found to have worsening hydrocephalus treated with a  shunt. In total, the patient spent four months in [facility] before returning home.\par \par The patient's daughter states she has not felt well since her valve procedure in May last year. Over the past several months, her shortness of breath with exertion and fatigue have gotten significantly worse. Currently, the patient reports SOB when walking less than 5 feet. She has not been walking much at all due to her symptoms. She also has had significant LE edema and reports a 10-15 pound weight gain over the past six months. The patient denies chest pain, orthopnea, PND, dizziness, syncope and palpitations. The patient had a a recent ECHO that showed severe mitral regurgitation. \par \par The patient has a HHA assist with showering and self-care. She is able to dress herself and toilet herself. The patient uses a cane in her home and a wheelchair outside of the home.

## 2021-02-22 NOTE — PHYSICAL EXAM
[General Appearance - In No Acute Distress] : no acute distress [] : no respiratory distress [Respiration, Rhythm And Depth] : normal respiratory rhythm and effort [Exaggerated Use Of Accessory Muscles For Inspiration] : no accessory muscle use [Auscultation Breath Sounds / Voice Sounds] : lungs were clear to auscultation bilaterally [Chest Palpation] : palpation of the chest revealed no abnormalities [Heart Rate And Rhythm] : heart rate and rhythm were normal [Bowel Sounds] : normal bowel sounds [Abdomen Tenderness] : non-tender [Abdomen Soft] : soft [Oriented To Time, Place, And Person] : oriented to person, place, and time [Normal Appearance] : normal appearance [Abnormal Walk] : normal gait [Skin Color & Pigmentation] : normal skin color and pigmentation [FreeTextEntry1] : RUBY best heard at the apex. 3+ pitting edema to bilateral lower extremities.

## 2021-02-22 NOTE — REVIEW OF SYSTEMS
[Feeling Fatigued] : feeling fatigued [see HPI] : see HPI [Shortness Of Breath] : shortness of breath [Dyspnea on exertion] : dyspnea during exertion [Lower Ext Edema] : lower extremity edema [Negative] : Integumentary [Fever] : no fever [Headache] : no headache [Chills] : no chills [Chest  Pressure] : no chest pressure [Chest Pain] : no chest pain [Leg Claudication] : no intermittent leg claudication [Palpitations] : no palpitations

## 2021-02-24 PROBLEM — G91.0 COMMUNICATING HYDROCEPHALUS: Status: ACTIVE | Noted: 2020-01-01

## 2021-02-24 PROBLEM — D32.9 MENINGIOMA: Status: ACTIVE | Noted: 2018-09-20

## 2021-02-24 PROBLEM — S06.5X9A SUBDURAL HEMATOMA: Status: ACTIVE | Noted: 2021-01-01

## 2021-02-24 PROBLEM — Z86.79 HISTORY OF SUBDURAL HEMATOMA: Status: RESOLVED | Noted: 2021-01-01 | Resolved: 2021-01-01

## 2021-02-24 NOTE — ASSESSMENT
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 Patient improved after LP and decision made to proceed with VPS insertion. Patient underwent VPS insertion on 8/5/20 with significant improvement in cognitive function at a setting of 3. Comparison of meningioma from 2010 to 2020 shows approximately 0.5 cm growth in the skull base meningioma over 10 years. While her mental status continues to improve she is reporting worsening vision described as blurriness. It is possible that this is due to her improving mental status and improved recognition of symptoms.  All wounds c/d/i. Unable to obtain MRI. Will order CT. Currently dealing with decompensated heart failure.  Plan. \par \par CT Head w/o contrast\par patient advised to go to the ER for worsening breathing and pitting edema\par will obtain MRI when able to follow meningioma size. \par f/u in 4 weeks

## 2021-02-24 NOTE — HISTORY OF PRESENT ILLNESS
[FreeTextEntry1] : \par TODAY:\par Patient returns for continuation of care \par Last shunt setting @ 6\par \par Patient unable to obtain MRI given difficulty breathing and claustrophobia. \par Per patient, worsening breathing, swelling. \par \par Advised by cardiologist to be admitted for procedure. Currently wants to wait. No new deficits. Visual fields reviewed.. Shunt incision c/d/i.

## 2021-02-24 NOTE — REASON FOR VISIT
[Follow-Up: _____] : a [unfilled] follow-up visit [FreeTextEntry1] : VISIT 10/14/2020:\par Patient returns to review new Head CT done at [facility] in [place], s/p  Shunt placement, Certas #4. Patient has kindly refused  services for Gerogian, daughter is present and providing translation. PatIEnt c/o continued blurry/hazy vision changes, declining hearing and continued headaches (Tylenol). She also notes worsening balance since  Shunt placement, she uses walker at home to aid in ambulation. Today she presents in w/c, daughter states she wears diapers and has occasional episodes of urge incontinence. Denies recent falls. She has not followed up with Neuro Ophtho as of yet, we will provide referral for Dr. Mauri Hernandez.\par Daughter states she had a recent heart valve placed.\par Patient with a h/o Laparoscopic ventriculoperitoneal shunt placement on 8/5/2020 for hydrocephalus.Other pertinent history includes a skull base meningioma\par \par \par \par PRIOR VISIT 1/15/2021:\par Patient presents for follow-up visit with new brain CT to review. Patient saw her own ophthalmologist who recorded no change in vision. Patient presents today because official read on CT H with subdural hematoma without significant mass effect. Patient remains at neuro-cognitive baseline although reportedly tired and dizzy. CT unavailable for review, but radiology center contacted and CD forwarded. Otherwise, remains clinically stable. No seizures, vision changes. Ambulates around the house. \par \par Visit dated 1/27/2021\par Patient returns for follow-up visit with new Head CT, current VPS setting is 6. Dizziness improving. CT H with right sided subacute subdural hematoma without significant mass effect. 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 No seizures, altered mental status, numbness/tingling. EOMI. \par \par

## 2021-03-01 NOTE — H&P ADULT - NSHPREVIEWOFSYSTEMS_GEN_ALL_CORE
Review of Systems  CONSTITUTIONAL:  Denies Fevers / chills, sweats, fatigue, weight loss, weight gain                                      NEURO:  Denies parethesias, seizures, syncope, confusion                                                                                EYES:  Denies Blurry vision, discharge, pain, loss of vision                                                                                    ENMT:  Denies Difficulty hearing, vertigo, dysphagia, epistaxis, recent dental work                                       CV:  + MIRZA, orthopnea Denies Chest pain, palpitations                                                                                   RESPIRATORY:  +SOB, Denies Wheezing, cough / sputum, hemoptysis                                                                GI:  Denies Nausea, vomiting, diarrhea, constipation, melena, difficulty swallowing                                               : Denies Hematuria, dysuria, urgency, incontinence                                                                                         MUSCULOSKELETAL:  Denies arthritis, joint swelling, muscle weakness                                                             SKIN/BREAST:  Denies rash, itching, hair loss, masses                                                                                            PSYCH:  Denies depression, anxiety, suicidal ideation                                                                                               HEME/LYMPH:  Denies bruises easily, enlarged lymph nodes, tender lymph nodes                                        ENDOCRINE:  Denies cold intolerance, heat intolerance, polydipsia

## 2021-03-01 NOTE — H&P ADULT - ASSESSMENT
This is a 75 year old female with a history of obesity, hypertension, hyperlipidemia, GERD, TIA with carotid artery disease s/p left carotid endarterectomy, CKD Stage III (baseline creatinine 1.45), ARMANDO (non-compliant with CPAP), atrial fibrillation (on Eliquis), known tuberculum meningioma, UTI (ESBL) requiring IV antibiotics in [facility] in July 2020, communicating hydrocephalus s/p right frontal  shunt on 8/5/2020 with recent CT showing a right-sided subacute subdural hematoma and chronic diastolic heart failure with severe aortic stenosis s/p TF TAVR on 05/26/2020 using Adelaida S3 (23 mm, commercial) now with significant mitral regurgitation who presents for follow up of her valvular heart disease.   Post-TAVR, the patient was discharged to rehab. Her recovery course was complicated by a UTI in rehab requiring IV antibiotics. In addition, she was readmitted to [facility] in July 2020 and found to have worsening hydrocephalus treated with a  shunt. In total, the patient spent four months in [facility] before returning home.  The patient's daughter states she has not felt well since her valve procedure in May last year. Over the past several months, her shortness of breath with exertion and fatigue have gotten significantly worse. Currently, the patient reports SOB when walking less than 5 feet. She has not been walking much at all due to her symptoms. She also has had significant LE edema and reports a 10-15 pound weight gain over the past six months. The patient had a recent ECHO that showed severe mitral regurgitation. The patient denies chest pain, dizziness, syncope and palpitations. She endorses shortness of breath at rest and orthopnea.  History taken from daughter.    Neuro  No delirium  No PRNs required for pain    Cardiac  Continue ASA 81mg QD  Continue Atorvastatin 40mg QHS  Monitor on Tele  Switch Toprol XL 50mg QD to Metoprolol tartrate 25mg BID, titrate as needed  Hold Eliquis (last dose 3/1/21 in AM)  Start Heparin drip vs Lovenox this evening  Start Diltizem 30mg Q8hrs, titrate as necessary  Hold torsemide, will start Lasix IVP once BMP Results    Pulm  Encourage IS  Maintain O2 sat >93%  Wean O2 as tolerated    GI  DASH Diet  Protonix 40mg QD for ppx  Bowel regimen      F/u BUN/Cr  Rodriguez for accurate I/Os  Lasix IVP for diuresis    ID  WBC 5  Possible Keflex for cellulitis of panus    Endo  A1C 4.9  Follow up TSH, no history of thyroid dz    DVT: Heparin gtt vs Lovenox to start this evening, took morning dose of eliquis    Dispo: Possible mitral clip this admission pending medical optimization

## 2021-03-01 NOTE — H&P ADULT - HISTORY OF PRESENT ILLNESS
Ms. Martin is a 73 y/o female with a PMHx of morbid obesity, HTN, HLD, GERD, TIA with carotid artery disease s/p left carotid endartectomy, CKD (baseline Cr 1.45) and chronic diastolic heart failure with severe aortic stenosis. Pt was initially r/i for NSTEMI on 5/20/20 at OSH after complaints of CP and SOB for several days. Patient follows with Dr. Dubois as an outpatient and was transferred to [facility] under his care. On arrival, pt noted to be in AF with RVR (rate 140s) and tachypenic and was placed on BiPAP with lopressor, heparin, amio and lasix gtts. ECHO showed EF 40% and moderate MR, severe AS and lateral wall hypokinesis. A CT chest was negative for PE and a CT head was significant for 2.6 cm X 2.1 cm mass lesion with mild hydrocephalus (neurology consulted, will need outpatient follow up). Pt deemed not a good surgical candidate and determined to be a good TAVR candidate. She underwent an uncomplicated TAVR (Edward 23 mm), EF 40% on 5/26/20 with Dr. Dubois. On POD#1 pt with mild ectopy and started on low-dose BB. Patient remained stable the rest of her post-op course however discharge was delayed after she went into atrial fibrillation with RVR. On POD#5, Cr trended upwards, lasix d/c'ed and gentle hydration given with repeat Cr downtrended. On POD#6, pt was discharged home. This is a 75 year old female with a history of obesity, hypertension, hyperlipidemia, GERD, TIA with carotid artery disease s/p left carotid endarterectomy, CKD Stage III (baseline creatinine 1.45), ARMANDO (non-compliant with CPAP), atrial fibrillation (on Eliquis), known tuberculum meningioma, UTI (ESBL) requiring IV antibiotics in [facility] in July 2020, communicating hydrocephalus s/p right frontal  shunt on 8/5/2020 with recent CT showing a right-sided subacute subdural hematoma and chronic diastolic heart failure with severe aortic stenosis s/p TF TAVR on 05/26/2020 using Adelaida S3 (23 mm, commercial) now with significant mitral regurgitation who presents for follow up of her valvular heart disease.   Post-TAVR, the patient was discharged to rehab. Her recovery course was complicated by a UTI in rehab requiring IV antibiotics. In addition, she was readmitted to [facility] in July 2020 and found to have worsening hydrocephalus treated with a  shunt. In total, the patient spent four months in [facility] before returning home.  The patient's daughter states she has not felt well since her valve procedure in May last year. Over the past several months, her shortness of breath with exertion and fatigue have gotten significantly worse. Currently, the patient reports SOB when walking less than 5 feet. She has not been walking much at all due to her symptoms. She also has had significant LE edema and reports a 10-15 pound weight gain over the past six months. The patient had a recent ECHO that showed severe mitral regurgitation. The patient denies chest pain, dizziness, syncope and palpitations. She endorses shortness of breath at rest and orthopnea.     This is a 75 year old female with a history of obesity, hypertension, hyperlipidemia, GERD, TIA with carotid artery disease s/p left carotid endarterectomy, CKD Stage III (baseline creatinine 1.45), ARMANDO (non-compliant with CPAP), atrial fibrillation (on Eliquis), known tuberculum meningioma, UTI (ESBL) requiring IV antibiotics in [facility] in July 2020, communicating hydrocephalus s/p right frontal  shunt on 8/5/2020 with recent CT showing a right-sided subacute subdural hematoma and chronic diastolic heart failure with severe aortic stenosis s/p TF TAVR on 05/26/2020 using Adelaida S3 (23 mm, commercial) now with significant mitral regurgitation who presents for follow up of her valvular heart disease.   Post-TAVR, the patient was discharged to rehab. Her recovery course was complicated by a UTI in rehab requiring IV antibiotics. In addition, she was readmitted to [facility] in July 2020 and found to have worsening hydrocephalus treated with a  shunt. In total, the patient spent four months in [facility] before returning home.  The patient's daughter states she has not felt well since her valve procedure in May last year. Over the past several months, her shortness of breath with exertion and fatigue have gotten significantly worse. Currently, the patient reports SOB when walking less than 5 feet. She has not been walking much at all due to her symptoms. She also has had significant LE edema and reports a 10-15 pound weight gain over the past six months. The patient had a recent ECHO that showed severe mitral regurgitation. The patient denies chest pain, dizziness, syncope and palpitations. She endorses shortness of breath at rest and orthopnea.  History taken from daughter.

## 2021-03-01 NOTE — H&P ADULT - NSHPPHYSICALEXAM_GEN_ALL_CORE
T 96.7 HR 90 /91 RR 24 O2 sat 97% on 4L NC  GEN: NAD, looks comfortable  Psych: Mood appropriate  Neuro: A&Ox3.  No focal deficits.  Moving all extremities.   HEENT: No obvious abnormalities  CV: S1S2, regular, no murmurs appreciated.  No carotid bruits.  No JVD  Lungs: Clear B/L.  No wheezing, rales or rhonchi  ABD: Soft, non-tender, non-distended.  +Bowel sounds  EXT: Warm and well perfused.  No peripheral edema noted  Musculoskeletal: Moving all extremities with normal ROM, no joint swelling  PV: Pedal pulses palpable T 96.7 HR 90 /91 RR 24 O2 sat 97% on 4L NC    GEN: NAD  Psych: Mood appropriate  Neuro: A&Ox3.  No focal deficits.  Moving all extremities.   HEENT: No obvious abnormalities  CV: S1S2, regular, +Systolic murmurs appreciated.  No carotid bruits.  No JVD  Lungs: Clear B/L.  No wheezing, rales or rhonchi  ABD: Soft, non-tender, non-distended.  +Bowel sounds, Large panus with induration and erythema  EXT: Warm and well perfused.  3+ peripheral edema noted  Musculoskeletal: Moving all extremities with normal ROM, no joint swelling  PV: Pedal pulses palpable

## 2021-03-01 NOTE — H&P ADULT - NSHPLABSRESULTS_GEN_ALL_CORE
CBC Full  -  ( 01 Mar 2021 14:15 )  WBC Count : 5.44 K/uL  RBC Count : 3.98 M/uL  Hemoglobin : 11.7 g/dL  Hematocrit : 41.0 %  Platelet Count - Automated : 92 K/uL  Mean Cell Volume : 103.0 fl  Mean Cell Hemoglobin : 29.4 pg  Mean Cell Hemoglobin Concentration : 28.5 gm/dL  Auto Neutrophil # : 3.99 K/uL  Auto Lymphocyte # : 0.79 K/uL  Auto Monocyte # : 0.42 K/uL  Auto Eosinophil # : 0.09 K/uL  Auto Basophil # : 0.14 K/uL  Auto Neutrophil % : 73.3 %  Auto Lymphocyte % : 14.6 %  Auto Monocyte % : 7.8 %  Auto Eosinophil % : 1.7 %  Auto Basophil % : 2.6 %

## 2021-03-01 NOTE — H&P ADULT - NSHPSOCIALHISTORY_GEN_ALL_CORE
Smoker:   No  ETOH Use:  No  Ilcit Drug Use:  No  The patient has a HHA assist with showering and self-care. She is able to dress herself and toilet herself. The patient uses a cane in her home and a wheelchair outside of the home.

## 2021-03-02 NOTE — PROGRESS NOTE ADULT - SUBJECTIVE AND OBJECTIVE BOX
Surgeon: Dr. Hernandez    Requesting Physician: Dr. Dickerson    HISTORY OF PRESENT ILLNESS (Need 4):  This is a 75 year old female with a history of obesity, hypertension, hyperlipidemia, GERD, TIA with carotid artery disease s/p left carotid endarterectomy, CKD Stage III (baseline creatinine 1.45), ARMANDO (non-compliant with CPAP), atrial fibrillation (on Eliquis), known tuberculum meningioma, UTI (ESBL) requiring IV antibiotics in [facility] in July 2020, communicating hydrocephalus s/p right frontal  shunt on 8/5/2020 with recent CT showing a right-sided subacute subdural hematoma and chronic diastolic heart failure with severe aortic stenosis s/p TF TAVR on 05/26/2020 using Adelaida S3 (23 mm, commercial) now with significant mitral regurgitation who presents for follow up of her valvular heart disease.  Post-TAVR, the patient was discharged to rehab. Her recovery course was complicated by a UTI in rehab requiring IV antibiotics. In addition, she was readmitted to [facility] in July 2020 and found to have worsening hydrocephalus treated with a  shunt. In total, the patient spent four months in [facility] before returning home. The patient's daughter states she has not felt well since her valve procedure in May last year. Over the past several months, her shortness of breath with exertion and fatigue have gotten significantly worse. Currently, the patient reports SOB when walking less than 5 feet. She has not been walking much at all due to her symptoms. She also has had significant LE edema and reports a 10-15 pound weight gain over the past six months. The patient had a recent ECHO that showed severe mitral regurgitation. Pt direct admission for HF management prior to possible Mitraclip. 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 Started on esmolol gtt transferred to [address] [place] [address] Surgery team consulted for possible surgical intervention of Acute Type A dissection.    PAST MEDICAL & SURGICAL HISTORY:  H/O carotid stenosis    TIA (transient ischemic attack)    Chronic kidney disease (CKD)    Aortic stenosis    HLD (hyperlipidemia)    HTN (hypertension)    H/O carotid endarterectomy        MEDICATIONS  (STANDING):  benzonatate 100 milliGRAM(s) Oral every 8 hours  diltiazem    Tablet 30 milliGRAM(s) Oral every 6 hours  ferrous    sulfate 325 milliGRAM(s) Oral daily  furosemide    Tablet 40 milliGRAM(s) Oral daily  latanoprost 0.005% Ophthalmic Solution 1 Drop(s) Both EYES at bedtime  levalbuterol Inhalation 0.63 milliGRAM(s) Inhalation every 6 hours  metoprolol tartrate 25 milliGRAM(s) Oral every 12 hours  pantoprazole    Tablet 40 milliGRAM(s) Oral before breakfast  polyethylene glycol 3350 17 Gram(s) Oral daily  senna 2 Tablet(s) Oral at bedtime  sodium chloride 0.9% lock flush 3 milliLiter(s) IV Push every 8 hours    MEDICATIONS  (PRN):  acetaminophen   Tablet .. 650 milliGRAM(s) Oral every 6 hours PRN Moderate pain (4 - 6)  HYDROmorphone  Injectable 1 milliGRAM(s) IV Push every 4 hours PRN Increased work of breathing or severe pain  ipratropium 17 MICROgram(s) HFA Inhaler 1 Puff(s) Inhalation four times a day PRN SOB/wheezing      Allergies    No Known Allergies    Intolerances      Review of Systems (Need 10):  CONSTITUTIONAL: Denies fevers / chills, sweats, fatigue, weight loss, weight gain                                       NEURO:  Denies parathesias, seizures, syncope, confusion                                                                                  EYES:  Denies blurry vision, discharge, pain, loss of vision                                                                                    ENMT:  Denies difficulty hearing, vertigo, dysphagia, epistaxis, recent dental work                                       CV:  Denies chest pain, palpitations, MIRZA, orthopnea                                                                                           RESPIRATORY:  Denies wWheezing, SOB, cough / sputum, hemoptysis                                                               GI:  Denies nausea, vomiting, diarrhea, constipation, melena                                                                          : Denies hematuria, dysuria, urgency, incontinence                                                                                          MUSKULOSKELETAL:  Denies arthritis, joint swelling, muscle weakness                                                             SKIN/BREAST:  Denies rash, itching, hair loss, masses                                                                                              PSYCH:  Denies depression, anxiety, suicidal ideation                                                                                                HEME/LYMPH:  Denies bruises easily, enlarged lymph nodes, tender lymph nodes                                          ENDOCRINE:  Denies cold intolerance, heat intolerance, polydipsia                                                                      Vital Signs Last 24 Hrs  T(C): 36.4 (09 Mar 2021 05:01), Max: 36.6 (08 Mar 2021 09:45)  T(F): 97.6 (09 Mar 2021 05:01), Max: 97.9 (08 Mar 2021 09:45)  HR: 99 (09 Mar 2021 05:18) (96 - 109)  BP: 155/84 (09 Mar 2021 05:18) (123/77 - 155/84)  BP(mean): 111 (09 Mar 2021 05:18) (94 - 119)  RR: 18 (09 Mar 2021 05:18) (16 - 20)  SpO2: 100% (09 Mar 2021 05:18) (92% - 100%)    Physical Exam (Need 8)  Neuro: A+O x 3, non-focal, speech clear and intact  HEENT: PERRL, EOMI, oral mucosa pink and moist  Neck: supple, no JVD  CV: regular rate, regular rhythm, +S1S2, no murmurs or rub  Pulm/chest: lung sounds CTA and equal bilaterally, no accessory muscle use noted  Abd: soft, NT, ND, +BS  Ext: DIETZ x 4, no C/C/E  Skin: warm, well perfused, no rashes       LABS:              RADIOLOGY & ADDITIONAL STUDIES:  CAROTID U/S:    CXR:    CT Scan:    EKG:    TTE / SABRINA:    Cardiac Cath:   Surgeon: Dr. Hernandez    Requesting Physician: Dr. Dickerson    HISTORY OF PRESENT ILLNESS (Need 4):  This is a 75 year old female with a history of obesity, hypertension, hyperlipidemia, GERD, TIA with carotid artery disease s/p left carotid endarterectomy, CKD Stage III (baseline creatinine 1.45), ARMANDO (non-compliant with CPAP), atrial fibrillation (on Eliquis), known tuberculum meningioma, UTI (ESBL) requiring IV antibiotics in [facility] in July 2020, communicating hydrocephalus s/p right frontal  shunt on 8/5/2020 with recent CT showing a right-sided subacute subdural hematoma and chronic diastolic heart failure with severe aortic stenosis s/p TF TAVR on 05/26/2020 using Adelaida S3 (23 mm, commercial) now with significant mitral regurgitation who presents for follow up of her valvular heart disease.  Post-TAVR, the patient was discharged to rehab. Her recovery course was complicated by a UTI in rehab requiring IV antibiotics. In addition, she was readmitted to [facility] in July 2020 and found to have worsening hydrocephalus treated with a  shunt. In total, the patient spent four months in [facility] before returning home. The patient's daughter states she has not felt well since her valve procedure in May last year. Over the past several months, her shortness of breath with exertion and fatigue have gotten significantly worse. Currently, the patient reports SOB when walking less than 5 feet. She has not been walking much at all due to her symptoms. She also has had significant LE edema and reports a 10-15 pound weight gain over the past six months. The patient had a recent ECHO that showed severe mitral regurgitation. Pt direct admission for HF management prior to possible Mitraclip. 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 Started on esmolol gtt transferred to [address] [place] [address] Surgery team consulted for possible surgical intervention of Acute Type A dissection.    PAST MEDICAL & SURGICAL HISTORY:  H/O carotid stenosis    TIA (transient ischemic attack)    Chronic kidney disease (CKD)    Aortic stenosis    HLD (hyperlipidemia)    HTN (hypertension)    H/O carotid endarterectomy        MEDICATIONS  (STANDING):  benzonatate 100 milliGRAM(s) Oral every 8 hours  diltiazem    Tablet 30 milliGRAM(s) Oral every 6 hours  ferrous    sulfate 325 milliGRAM(s) Oral daily  furosemide    Tablet 40 milliGRAM(s) Oral daily  latanoprost 0.005% Ophthalmic Solution 1 Drop(s) Both EYES at bedtime  levalbuterol Inhalation 0.63 milliGRAM(s) Inhalation every 6 hours  metoprolol tartrate 25 milliGRAM(s) Oral every 12 hours  pantoprazole    Tablet 40 milliGRAM(s) Oral before breakfast  polyethylene glycol 3350 17 Gram(s) Oral daily  senna 2 Tablet(s) Oral at bedtime  sodium chloride 0.9% lock flush 3 milliLiter(s) IV Push every 8 hours    MEDICATIONS  (PRN):  acetaminophen   Tablet .. 650 milliGRAM(s) Oral every 6 hours PRN Moderate pain (4 - 6)  HYDROmorphone  Injectable 1 milliGRAM(s) IV Push every 4 hours PRN Increased work of breathing or severe pain  ipratropium 17 MICROgram(s) HFA Inhaler 1 Puff(s) Inhalation four times a day PRN SOB/wheezing      Allergies    No Known Allergies    Intolerances    Physical Exam (Need 8)  Neuro: A+O x 3, non-focal, Syrian speaking  HEENT: PERRL, EOMI, oral mucosa pink and moist  Neck: supple, no JVD  CV: regular rate, regular rhythm, +S1S2, no murmurs or rub  Pulm/chest: accessory muscle use noted, severely diminished bilateral bases with rales throughout  Abd: soft, NT, ND, +BS  Ext: DIETZ x 4, no C/C/E  Skin: warm, well perfused, no rashes       LABS:    ICU Vital Signs Last 24 Hrs  T(C): 36.5 (02 Mar 2021 10:04), Max: 36.8 (02 Mar 2021 06:52)  T(F): 97.7 (02 Mar 2021 10:04), Max: 98.3 (02 Mar 2021 06:52)  HR: 85 (02 Mar 2021 08:05) (72 - 106) fib   BP: 104/64 (02 Mar 2021 08:05) (101/57 - 122/88)  BP(mean): 79 (02 Mar 2021 08:05) (70 - 102)  RR: 24 (02 Mar 2021 08:05) (20 - 24)  SpO2: 95% (02 Mar 2021 08:05) (93% - 100%)        RADIOLOGY & ADDITIONAL STUDIES    Head CT:   < from: CT Head No Cont (01.27.21 @ 14:07) >  IMPRESSION:    Interval decrease in size of the ventricles status post ventricular shunt placement.    Isodense right sided subdural hematoma with mild local mass effect without significant midline shift. This is new from prior CT head 8/31/2020 although per the electronic medical record the patient had a recent outside scan demonstrating right-sided subdural hematoma and the clinicians were aware of the finding at the time of dictation. Questionable small left parietal subacute subdural hematoma without significant mass effect.    3.9 cm extra-axial mass along the planum sphenoidale which may represent a partially calcified meningioma, this can be further evaluated with dedicated MRI brain as clinically warranted          Thank you for the opportunity to participate in the care of this patient.    MILTON PAREDES M.D., RADIOLOGY RESIDENT  This document has been electronically signed.  MADELINE VALDES MD; Attending Radiologist  This document has been electronically signed. Jan 27 2021  4:42PM    < end of copied text >      TTE / SABRINA:  < from: TTE Echo Complete w/o Contrast w/ Doppler (03.02.21 @ 10:54) >  FINDINGS:    Left Ventricle:  There is mild concentric leftventricular hypertrophy. The left ventricle is normal in size and systolic function with a calculated ejection fraction of 61%.    Right Ventricle:  The right ventricle is normal in size. Right ventricular systolic function is normal. The tricuspid annular plane systolic excursion (TAPSE) is 20.00 mm (normal >=17 mm).    Left Atrium:  The left atrium is severely dilated. Left atrial volume index (DEANDRE) is 51.4 ml/m².    Right Atrium:  The right atrium is dilated.    Aortic Valve:  23 mm Adelaida 3 valve is noted in the aortic position wirh trace valvular aortic regurgitation. The peak transvalvular velocity is 2.75 m/s, the mean transvalvular gradient is 20.00 mmHg, and the LVOT/AV velocity ratio is 0.36. The peak transaortic gradient is 30.25 mmHg.    Mitral Valve:  The mitral valve is mildly calcified. Mitral annular calcification noted. There is severe mitral regurgitation.    Tricuspid Valve:  Structurally normal tricuspid valve with normal leaflet excursion. There is moderate tricuspidregurgitation. There is mild pulmonary hypertension, pulmonary artery systolic pressure is 62 mmHg.    Inferior Vena Cava:  The inferior vena cava is dilated (>2.1cm) with abnormal inspiratory collapse (<50%) consistent with elevated right atrial pressure (  15, range 10-20mmHg).    Pulmonic Valve:  Structurally normal pulmonic valve with normal leaflet excursion. There is trace pulmonic regurgitation.    Aorta:  The proximal ascending aorta is severely dilated. The proximal ascending aorta measures 5.00 cm (normal 2.6-3.4 cm for men, 2.3-3.1 cm for women). There is a dissection flap seen in the ascending aorta extending into the aortic arch.    Pericardium:  There is a small pericardial effusion without echocardiographic evidence of cardiac tamponade.    --------------------------------------------------------------------------------  Kristy Braga MD    Electronically signed by Kristy Braga MD  Signature Date/Time: 3/2/2021/12:10:22 PM    *** Final ***      "Thank you for the opportunity to participate in the care of this patient."      KRISTY BRAGA MD; Attending Cardiologist  This document has been electronically signed. Mar  2 2021 10:54AM    < end of copied text >    Cardiac Cath:   Surgeon: Dr. Hernandez    Requesting Physician: Dr. Dickerson    Reason for Consult: Emergent type A dissection    HISTORY OF PRESENT ILLNESS (Need 4):  This is a 75 year old female with a history of obesity, hypertension, hyperlipidemia, GERD, TIA with carotid artery disease s/p left carotid endarterectomy, CKD Stage III (baseline creatinine 1.45), ARMANDO (non-compliant with CPAP), atrial fibrillation (on Eliquis), known tuberculum meningioma, UTI (ESBL) requiring IV antibiotics in [facility] in July 2020, communicating hydrocephalus s/p right frontal  shunt on 8/5/2020 with recent CT showing a right-sided subacute subdural hematoma and chronic diastolic heart failure with severe aortic stenosis s/p TF TAVR on 05/26/2020 using Adelaida S3 (23 mm, commercial) now with significant mitral regurgitation who presents for follow up of her valvular heart disease.  Post-TAVR, the patient was discharged to rehab. Her recovery course was complicated by a UTI in rehab requiring IV antibiotics. In addition, she was readmitted to [facility] in July 2020 and found to have worsening hydrocephalus treated with a  shunt. In total, the patient spent four months in [facility] before returning home. The patient's daughter states she has not felt well since her valve procedure in May last year. Over the past several months, her shortness of breath with exertion and fatigue have gotten significantly worse. Currently, the patient reports SOB when walking less than 5 feet. She has not been walking much at all due to her symptoms. She also has had significant LE edema and reports a 10-15 pound weight gain over the past six months. The patient had a recent ECHO that showed severe mitral regurgitation. Pt direct admission for HF management prior to possible Mitraclip. 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 Started on esmolol gtt transferred to [address] [place] [address] Surgery team consulted for possible surgical intervention of Acute Type A dissection.    PAST MEDICAL & SURGICAL HISTORY:  H/O carotid stenosis    TIA (transient ischemic attack)    Chronic kidney disease (CKD)    Aortic stenosis    HLD (hyperlipidemia)    HTN (hypertension)    H/O carotid endarterectomy        MEDICATIONS  (STANDING):  benzonatate 100 milliGRAM(s) Oral every 8 hours  diltiazem    Tablet 30 milliGRAM(s) Oral every 6 hours  ferrous    sulfate 325 milliGRAM(s) Oral daily  furosemide    Tablet 40 milliGRAM(s) Oral daily  latanoprost 0.005% Ophthalmic Solution 1 Drop(s) Both EYES at bedtime  levalbuterol Inhalation 0.63 milliGRAM(s) Inhalation every 6 hours  metoprolol tartrate 25 milliGRAM(s) Oral every 12 hours  pantoprazole    Tablet 40 milliGRAM(s) Oral before breakfast  polyethylene glycol 3350 17 Gram(s) Oral daily  senna 2 Tablet(s) Oral at bedtime  sodium chloride 0.9% lock flush 3 milliLiter(s) IV Push every 8 hours    MEDICATIONS  (PRN):  acetaminophen   Tablet .. 650 milliGRAM(s) Oral every 6 hours PRN Moderate pain (4 - 6)  HYDROmorphone  Injectable 1 milliGRAM(s) IV Push every 4 hours PRN Increased work of breathing or severe pain  ipratropium 17 MICROgram(s) HFA Inhaler 1 Puff(s) Inhalation four times a day PRN SOB/wheezing      Allergies    No Known Allergies    Intolerances    Physical Exam (Need 8)  Neuro: A+O x 3, non-focal, Burkinan speaking  HEENT: PERRL, EOMI, oral mucosa pink and moist  Neck: supple, no JVD  CV: regular rate, regular rhythm, +S1S2, no murmurs or rub  Pulm/chest: accessory muscle use noted, severely diminished bilateral bases with rales throughout  Abd: soft, NT, ND, +BS  Ext: DIETZ x 4, no C/C/E  Skin: warm, well perfused, no rashes       LABS:    ICU Vital Signs Last 24 Hrs  T(C): 36.5 (02 Mar 2021 10:04), Max: 36.8 (02 Mar 2021 06:52)  T(F): 97.7 (02 Mar 2021 10:04), Max: 98.3 (02 Mar 2021 06:52)  HR: 85 (02 Mar 2021 08:05) (72 - 106) fib   BP: 104/64 (02 Mar 2021 08:05) (101/57 - 122/88)  BP(mean): 79 (02 Mar 2021 08:05) (70 - 102)  RR: 24 (02 Mar 2021 08:05) (20 - 24)  SpO2: 95% (02 Mar 2021 08:05) (93% - 100%)        RADIOLOGY & ADDITIONAL STUDIES    Head CT:   < from: CT Head No Cont (01.27.21 @ 14:07) >  IMPRESSION:    Interval decrease in size of the ventricles status post ventricular shunt placement.    Isodense right sided subdural hematoma with mild local mass effect without significant midline shift. This is new from prior CT head 8/31/2020 although per the electronic medical record the patient had a recent outside scan demonstrating right-sided subdural hematoma and the clinicians were aware of the finding at the time of dictation. Questionable small left parietal subacute subdural hematoma without significant mass effect.    3.9 cm extra-axial mass along the planum sphenoidale which may represent a partially calcified meningioma, this can be further evaluated with dedicated MRI brain as clinically warranted          Thank you for the opportunity to participate in the care of this patient.    MILTON PAREDES M.D., RADIOLOGY RESIDENT  This document has been electronically signed.  MADELINE VALDES MD; Attending Radiologist  This document has been electronically signed. Jan 27 2021  4:42PM    < end of copied text >      TTE / SABRINA:  < from: TTE Echo Complete w/o Contrast w/ Doppler (03.02.21 @ 10:54) >  FINDINGS:    Left Ventricle:  There is mild concentric leftventricular hypertrophy. The left ventricle is normal in size and systolic function with a calculated ejection fraction of 61%.    Right Ventricle:  The right ventricle is normal in size. Right ventricular systolic function is normal. The tricuspid annular plane systolic excursion (TAPSE) is 20.00 mm (normal >=17 mm).    Left Atrium:  The left atrium is severely dilated. Left atrial volume index (DEANDRE) is 51.4 ml/m².    Right Atrium:  The right atrium is dilated.    Aortic Valve:  23 mm Adelaida 3 valve is noted in the aortic position wirh trace valvular aortic regurgitation. The peak transvalvular velocity is 2.75 m/s, the mean transvalvular gradient is 20.00 mmHg, and the LVOT/AV velocity ratio is 0.36. The peak transaortic gradient is 30.25 mmHg.    Mitral Valve:  The mitral valve is mildly calcified. Mitral annular calcification noted. There is severe mitral regurgitation.    Tricuspid Valve:  Structurally normal tricuspid valve with normal leaflet excursion. There is moderate tricuspidregurgitation. There is mild pulmonary hypertension, pulmonary artery systolic pressure is 62 mmHg.    Inferior Vena Cava:  The inferior vena cava is dilated (>2.1cm) with abnormal inspiratory collapse (<50%) consistent with elevated right atrial pressure (  15, range 10-20mmHg).    Pulmonic Valve:  Structurally normal pulmonic valve with normal leaflet excursion. There is trace pulmonic regurgitation.    Aorta:  The proximal ascending aorta is severely dilated. The proximal ascending aorta measures 5.00 cm (normal 2.6-3.4 cm for men, 2.3-3.1 cm for women). There is a dissection flap seen in the ascending aorta extending into the aortic arch.    Pericardium:  There is a small pericardial effusion without echocardiographic evidence of cardiac tamponade.    --------------------------------------------------------------------------------  Kristy Braga MD    Electronically signed by Kristy Braga MD  Signature Date/Time: 3/2/2021/12:10:22 PM    *** Final ***      "Thank you for the opportunity to participate in the care of this patient."      KRISTY BRAGA MD; Attending Cardiologist  This document has been electronically signed. Mar  2 2021 10:54AM    < end of copied text >    Cardiac Cath:

## 2021-03-02 NOTE — PROGRESS NOTE ADULT - ASSESSMENT
This is a 75 year old female with a history of obesity, hypertension, hyperlipidemia, GERD, TIA with carotid artery disease s/p left carotid endarterectomy, CKD Stage III (baseline creatinine 1.45), ARMANDO (non-compliant with CPAP), atrial fibrillation (on Eliquis), known tuberculum meningioma, UTI (ESBL) requiring IV antibiotics in [facility] ([place] in July 2020, communicating hydrocephalus s/p right frontal  shunt on 8/5/2020 with recent CT showing a right-sided subacute subdural hematoma and chronic diastolic heart failure with severe aortic stenosis s/p TF TAVR on 05/26/2020 using Adelaida S3 (23 mm, commercial) now with significant mitral regurgitation who presents for follow up of her valvular heart disease.  Post-TAVR, the patient was discharged to rehab. Her recovery course was complicated by a UTI in rehab requiring IV antibiotics. In addition, she was readmitted to [facility] in July 2020 and found to have worsening hydrocephalus treated with a  shunt. In total, the patient spent four months in [facility] before returning home. The patient's daughter states she has not felt well since her valve procedure in May last year. Over the past several months, her shortness of breath with exertion and fatigue have gotten significantly worse. Currently, the patient reports SOB when walking less than 5 feet. She has not been walking much at all due to her symptoms. She also has had significant LE edema and reports a 10-15 pound weight gain over the past six months. The patient had a recent ECHO that showed severe mitral regurgitation. Pt direct admission for HF management prior to possible Mitraclip. 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 Started on esmolol gtt.  Due to poor prognosis, pt deemed not a surgical candidate due to 100% mortality risk and now has been referred to palliative care team. Patient was stepdown from the unit and transitioned from esmolol gtt to PO beta blocker therapy pending possible placement with palliative team.     #Acute Type A Dissection  -obtain stat labs including type and screen; LA; D-dimer (for negative predictive value)  -d/c systemic AC - on lovenox - last dosing 3/1  -tight BP control - esmolol infusion started   -In setting of severe complicated PMHx and significant debility and poor functional status patient deemed not a surgical candidate at this time, medical management with palliative care consult placed.              This is a 75 year old female with a history of obesity, hypertension, hyperlipidemia, GERD, TIA with carotid artery disease s/p left carotid endarterectomy, CKD Stage III (baseline creatinine 1.45), ARMANDO (non-compliant with CPAP), atrial fibrillation (on Eliquis), known tuberculum meningioma, UTI (ESBL) requiring IV antibiotics in [facility] in July 2020, communicating hydrocephalus s/p right frontal  shunt on 8/5/2020 with recent CT showing a right-sided subacute subdural hematoma and chronic diastolic heart failure with severe aortic stenosis s/p TF TAVR on 05/26/2020 using Adelaida S3 (23 mm, commercial) now with significant mitral regurgitation who presents for follow up of her valvular heart disease.  Post-TAVR, the patient was discharged to rehab. Her recovery course was complicated by a UTI in rehab requiring IV antibiotics. In addition, she was readmitted to [facility] in July 2020 and found to have worsening hydrocephalus treated with a  shunt. In total, the patient spent four months in [facility] before returning home. The patient's daughter states she has not felt well since her valve procedure in May last year. Over the past several months, her shortness of breath with exertion and fatigue have gotten significantly worse. Currently, the patient reports SOB when walking less than 5 feet. She has not been walking much at all due to her symptoms. She also has had significant LE edema and reports a 10-15 pound weight gain over the past six months. The patient had a recent ECHO that showed severe mitral regurgitation. Pt direct admission for HF management prior to possible Mitraclip. 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 Started on esmolol gtt.  Due to poor prognosis, pt deemed not a surgical candidate due to 100% mortality risk and now has been referred to palliative care team. Patient was stepdown from the unit and transitioned from esmolol gtt to PO beta blocker therapy pending possible placement with palliative team.     #Acute Type A Dissection  -In setting of severe complicated PMHx and significant debility and poor functional status patient deemed not a surgical candidate at this time, medical management with palliative care consult placed.   -Tight BP control with esmolol drip, transition to PO as tolerated  -Palliative care consulted for GOC and potential placement

## 2021-03-02 NOTE — PROGRESS NOTE ADULT - SUBJECTIVE AND OBJECTIVE BOX
INTERVAL HPI/OVERNIGHT EVENTS:    transferred to [facility] for assessment of type A dissection      74yo Female Hx obesity (BMI 49), HTN, chol, GERD, TIA with w/u demonstrating carotid dz - s/p L CEA,   CKD III (Cr 1.45), ARMANDO (non-compliant w/CPAP), Afib (Eliquis), known tuberculum meningioma, Hx UTI (ESBL),   communicating hydrocephalus - admission 2020 for worsening hydrocephalus - Tx Rt  shunt (2020) ; CT Head : Rt subacute SDH, CHF (chronic diastolic), aortic stenosis - TAVR (20 - Adelaida S3 (23 mm, commercial) and d/c to rehab/[facility] where spent 4 mo before returning home. Patient with several months of severe/progressive SOB/MIRZA and fatigue; (+)LE edema and weight gain    ECHO: Severe MR    admitted 3/1 for assessment of possible mitral clip and acute on chronic CHF exacerbation   CE (-); BNP 4010     ECHO performed today:   Structural heart attending contacted emergently - concern for Type A dissection     Mild symmetric LVH; Normal LV/RV size/fxn; Biatrial enlargement.  23 mm Adelaida 3 valve in aortic position w/trace valvular aortic regurgitation. Severe mitral regurgitation.  Mod tricuspid regurgitation. Pulm HTN 62 mmHg. Small pericardial effusion - no tamponade physiology.  The proximal ascending aorta is severely dilated. Poximal ascending 5.00 cm. There is a dissection flap seen in the ascending aorta extending into the aortic arch.    SBP controlled <120 at this time  emergently arranged for CTa assessment/dissection protocol     PAST MEDICAL & SURGICAL HISTORY:  H/O carotid stenosis    TIA (transient ischemic attack)    Chronic kidney disease (CKD)    Aortic stenosis    HLD (hyperlipidemia)    HTN (hypertension)    H/O carotid endarterectomy          ICU Vital Signs Last 24 Hrs  T(C): 36.5 (02 Mar 2021 10:04), Max: 36.8 (02 Mar 2021 06:52)  T(F): 97.7 (02 Mar 2021 10:04), Max: 98.3 (02 Mar 2021 06:52)  HR: 85 (02 Mar 2021 08:05) (72 - 106)  BP: 104/64 (02 Mar 2021 08:05) (101/57 - 122/88)  BP(mean): 79 (02 Mar 2021 08:05) (70 - 102)  ABP: --  ABP(mean): --  RR: 24 (02 Mar 2021 08:05) (20 - 24)  SpO2: 95% (02 Mar 2021 08:05) (93% - 100%)    Qtts:     I&O's Summary    01 Mar 2021 07:01  -  02 Mar 2021 07:00  --------------------------------------------------------  IN: 0 mL / OUT: 1780 mL / NET: -1780 mL    02 Mar 2021 07:01  -  02 Mar 2021 14:02  --------------------------------------------------------  IN: 300 mL / OUT: 450 mL / NET: -150 mL            Physical Exam    Heart  Lungs  Abd  Ext  Chest  Neuro  Skin    LABS:                        11.4   5.68  )-----------( 89       ( 02 Mar 2021 06:33 )             40.5     03-02    143  |  112<H>  |  36<H>  ----------------------------<  86  4.0   |  19<L>  |  1.59<H>    Ca    9.2      02 Mar 2021 06:33  Mg     2.0     03-02    TPro  7.0  /  Alb  3.9  /  TBili  1.0  /  DBili  x   /  AST  20  /  ALT  18  /  AlkPhos  118  03-01    PT/INR - ( 01 Mar 2021 17:11 )   PT: 25.0 sec;   INR: 2.16          PTT - ( 01 Mar 2021 17:11 )  PTT:35.5 sec  Urinalysis Basic - ( 01 Mar 2021 14:15 )    Color: Yellow / Appearance: Clear / S.020 / pH: x  Gluc: x / Ketone: NEGATIVE  / Bili: Negative / Urobili: 0.2 E.U./dL   Blood: x / Protein: NEGATIVE mg/dL / Nitrite: NEGATIVE   Leuk Esterase: Trace / RBC: < 5 /HPF / WBC > 10 /HPF   Sq Epi: x / Non Sq Epi: 0-5 /HPF / Bacteria: Many /HPF      ABG - ( 02 Mar 2021 13:34 )  pH, Arterial: 7.38  pH, Blood: x     /  pCO2: 37    /  pO2: 96    / HCO3: 22    / Base Excess: -2.9  /  SaO2: 97                  RADIOLOGY & ADDITIONAL STUDIES:    I have spent/provided stated minutes of critical care time to this patient:  INTERVAL HPI/OVERNIGHT EVENTS:    transferred to [facility] for assessment of type A dissection      76yo Peruvian speaking Female Hx obesity (BMI 49), HTN, chol, GERD, TIA with w/u demonstrating carotid dz - s/p L CEA,   CKD III (Cr 1.45), ARMANDO (non-compliant w/CPAP), Afib (Eliquis), known tuberculum meningioma, Hx UTI (ESBL),   communicating hydrocephalus - admission July 2020 for worsening hydrocephalus - Tx Rt  shunt (8/5/2020) ; CT Head 1/27: Rt subacute SDH, CHF (chronic diastolic), aortic stenosis - TAVR (5/26/20 - Adelaida S3 (23 mm, commercial) and d/c to rehab/[facility] where spent 4 mo before returning home. Patient with several months of severe/progressive SOB/MIRZA and fatigue; (+)LE edema and weight gain    ECHO: Severe MR    admitted 3/1 for assessment of possible mitral clip and acute on chronic CHF exacerbation   CE (-); BNP 4010     ECHO performed today:   Structural heart attending contacted emergently - concern for Type A dissection     Mild symmetric LVH; Normal LV/RV size/fxn; Biatrial enlargement.  23 mm Adelaida 3 valve in aortic position w/trace valvular aortic regurgitation. Severe mitral regurgitation.  Mod tricuspid regurgitation. Pulm HTN 62 mmHg. Small pericardial effusion - no tamponade physiology.  The proximal ascending aorta is severely dilated. Poximal ascending 5.00 cm. There is a dissection flap seen in the ascending aorta extending into the aortic arch.    SBP controlled <120 at this time  emergently arranged for CTa assessment/dissection protocol     arrived to ICU   's reporting pain sxs when spoken to via Malian   riri krueger; labetalol IVP given (total 50mg) and esmolol infusion started - fib now rate controlled and -120  pain sxs improved    with pain sxs structural heart and CTS reviewed case in images  Family (Daughter Nadine) contacted and informed of events - en route to hospital now    ICU Vital Signs Last 24 Hrs  T(C): 36.5 (02 Mar 2021 10:04), Max: 36.8 (02 Mar 2021 06:52)  T(F): 97.7 (02 Mar 2021 10:04), Max: 98.3 (02 Mar 2021 06:52)  HR: 85 (02 Mar 2021 08:05) (72 - 106) fib   BP: 104/64 (02 Mar 2021 08:05) (101/57 - 122/88)  BP(mean): 79 (02 Mar 2021 08:05) (70 - 102)  RR: 24 (02 Mar 2021 08:05) (20 - 24)  SpO2: 95% (02 Mar 2021 08:05) (93% - 100%)    Qtts: esmolol being started     I&O's Summary    01 Mar 2021 07:01  -  02 Mar 2021 07:00  --------------------------------------------------------  IN: 0 mL / OUT: 1780 mL / NET: -1780 mL    02 Mar 2021 07:01  -  02 Mar 2021 14:02  --------------------------------------------------------  IN: 300 mL / OUT: 450 mL / NET: -150 mL    Physical Exam    Heart irregular irregular (-)rub/gallop  Lungs - CTA anterior (-)rhonchi/wheeze  Abd - (+)BS obese; large pannus (-)r/r/g  Ext - warm to touch; pulses appreciated LE 1+ (+)edema diffuse  Neuro - awake/alert - interactive when spoke to in Peruvian - moves all extremities - denies paresthesias at this time  Skin - no rash     LABS:                        11.4   5.68  )-----------( 89       ( 02 Mar 2021 06:33 )             40.5     03-02    143  |  112<H>  |  36<H>  ----------------------------<  86  4.0   |  19<L>  |  1.59<H>    Ca    9.2      02 Mar 2021 06:33  Mg     2.0     03-02    TPro  7.0  /  Alb  3.9  /  TBili  1.0  /  DBili  x   /  AST  20  /  ALT  18  /  AlkPhos  118  03-01    PT/INR - ( 01 Mar 2021 17:11 )   PT: 25.0 sec;   INR: 2.16     PTT - ( 01 Mar 2021 17:11 )  PTT:35.5 sec    ABG - ( 02 Mar 2021 13:34 )  pH, Arterial: 7.38  pH, Blood: x     /  pCO2: 37    /  pO2: 96    / HCO3: 22    / Base Excess: -2.9  /  SaO2: 97        RADIOLOGY & ADDITIONAL STUDIES: reviewed     Patient with very complicated PMHx with significant debility and poor functional status presenting with fluid overload - severe MR; acute on chronic diastolic HF found on ECHO assessment to have Type A aortic dissection - emergent imaging and transfer to  for BP optimization pending analysis and assessment from CTS of this complicated scenario    obtain stat labs including type and screen; LA; D-dimer (for negative predictive value)  d/c systemic AC - on lovenox - last dosing 3/1  Neurosurgery called to comment on noted SDH (subacute) in event patient to be taken to OR  tight BP control - esmolol infusion started     await assessment of imaging CTS and determination if intervention planned    I have spent/provided stated minutes of critical care time to this patient: 2 hour

## 2021-03-03 NOTE — PROGRESS NOTE ADULT - ASSESSMENT
This is a 75 year old female with a history of obesity, hypertension, hyperlipidemia, GERD, TIA with carotid artery disease s/p left carotid endarterectomy, CKD Stage III (baseline creatinine 1.45), ARMANDO (non-compliant with CPAP), atrial fibrillation (on Eliquis), known tuberculum meningioma, UTI (ESBL) requiring IV antibiotics in [facility] in July 2020, communicating hydrocephalus s/p right frontal  shunt on 8/5/2020 with recent CT showing a right-sided subacute subdural hematoma and chronic diastolic heart failure with severe aortic stenosis s/p TF TAVR on 05/26/2020 using Adelaida S3 (23 mm, commercial) now with significant mitral regurgitation who presents for follow up of her valvular heart disease.  Post-TAVR, the patient was discharged to rehab. Her recovery course was complicated by a UTI in rehab requiring IV antibiotics. In addition, she was readmitted to [facility] in July 2020 and found to have worsening hydrocephalus treated with a  shunt. In total, the patient spent four months in [facility] before returning home. The patient's daughter states she has not felt well since her valve procedure in May last year. Over the past several months, her shortness of breath with exertion and fatigue have gotten significantly worse. Currently, the patient reports SOB when walking less than 5 feet. She has not been walking much at all due to her symptoms. She also has had significant LE edema and reports a 10-15 pound weight gain over the past six months. The patient had a recent ECHO that showed severe mitral regurgitation. Pt direct admission for HF management prior to possible Mitraclip. 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 Started on esmolol gtt.  Due to poor prognosis, pt deemed not a surgical candidate due to 100% mortality risk and now has been referred to palliative care team. Patient was stepdown from the unit and transitioned from esmolol gtt to PO beta blocker therapy pending possible placement with palliative team.     Plan:  Neuro  -No delirium  -Pain: acetaminophen PRN   -hx of severe carotid dx s/p endarterectomy     Cardiac  -acute type A dissection, not a surgical candidate.   -BP control: Switched from esmolol gtt to Metoprolol 25mg Q12 hours  -Continue ASA 81mg QD  -Continue Atorvastatin 40mg QHS  -Monitor on Tele   -AF hx: Holding Eliquis (last dose 3/1/21 in AM)  -c/w Diltizem 30mg Q8hrs, titrate as necessary    Pulm  -Encourage IS  -Maintain O2 sat >93%  -Oxygenating well on RA     GI  -DASH Diet  -Protonix 40mg QD for ppx  -Bowel regimen: senna, miralax       -CKD stage III   -BUN/Cr  25/1.68   -Rodriguez for accurate I/Os  -Lasix 20mg IV push Q12 hours     ID  -WBC 5  -Afebrile     Endo  -A1C 4.9  -Follow up TSH, no history of thyroid dz    DVT:   -no DVT ppx at this time     Dispo:  Palliative care consultation, possible placement?   DNR/DNI

## 2021-03-03 NOTE — DIETITIAN INITIAL EVALUATION ADULT. - ADD RECOMMEND
1. Trend wts 2. Manage pain prn 3. Monitor and replete lytes 4. Encourage intake through day 5. Reinforce ed

## 2021-03-03 NOTE — CONSULT NOTE ADULT - SUBJECTIVE AND OBJECTIVE BOX
GRACE KEY          MRN-[id number]            (1945)    HPI:  This is a 75 year old female with a history of obesity, hypertension, hyperlipidemia, GERD, TIA with carotid artery disease s/p left carotid endarterectomy, CKD Stage III (baseline creatinine 1.45), ARMANDO (non-compliant with CPAP), atrial fibrillation (on Eliquis), known tuberculum meningioma, UTI (ESBL) requiring IV antibiotics in [facility] in 2020, communicating hydrocephalus s/p right frontal  shunt on 2020 with recent CT showing a right-sided subacute subdural hematoma and chronic diastolic heart failure with severe aortic stenosis s/p TF TAVR on 2020 using Adelaida S3 (23 mm, commercial) now with significant mitral regurgitation who presents for follow up of her valvular heart disease.   Post-TAVR, the patient was discharged to rehab. Her recovery course was complicated by a UTI in rehab requiring IV antibiotics. In addition, she was readmitted to [facility] in 2020 and found to have worsening hydrocephalus treated with a  shunt. In total, the patient spent four months in [facility] before returning home.  The patient's daughter states she has not felt well since her valve procedure in May last year. Over the past several months, her shortness of breath with exertion and fatigue have gotten significantly worse. Currently, the patient reports SOB when walking less than 5 feet. She has not been walking much at all due to her symptoms. She also has had significant LE edema and reports a 10-15 pound weight gain over the past six months. The patient had a recent ECHO that showed severe mitral regurgitation. The patient denies chest pain, dizziness, syncope and palpitations. She endorses shortness of breath at rest and orthopnea.  History taken from daughter.     (01 Mar 2021 14:52)      PAST MEDICAL & SURGICAL HISTORY:  H/O carotid stenosis    TIA (transient ischemic attack)    Chronic kidney disease (CKD)    Aortic stenosis    HLD (hyperlipidemia)    HTN (hypertension)    H/O carotid endarterectomy        FAMILY HISTORY:   Reviewed and found non contributory in mother or father    SOCIAL HISTORY: Smoker:   No  ETOH Use:  No  Ilcit Drug Use:  No  The patient has a HHA assist with showering and self-care (6 hours a day/ 7days a week). She is able to dress herself and toilet herself. The patient uses a cane in her home and a wheelchair outside of the home.    ROS:    Unable to attain due to:   n/a                     Dyspnea (Shanta 0-10):     5                   N/V (Y/N):          N                    Secretions (Y/N) :   N             Agitation(Y/N): N  Pain (Y/N):     N  -Provocation/Palliation: n/a   -Quality/Quantity: n/a   -Radiating: n/a   -Severity: n/a   -Timing/Frequency: n/a   -Impact on ADLs:n/a     General:  + weakness  HEENT:    Denied  Neck:  Denied  CVS:  Denied  Resp:  Denied  GI:  Denied  :  Denied  Musc:  Denied  Neuro:  Denied  Psych:  Denied  Skin:  Denied  Lymph:  Denied    Allergies    No Known Allergies    Intolerances      Opiate Naive (Y/N): Y   -iStop reviewed (Y/N): Y (Ref#:      [id number]  )  2021	alprazolam 0.25 mg tablets    Medications:      MEDICATIONS  (STANDING):  aspirin  chewable 81 milliGRAM(s) Oral daily  atorvastatin 40 milliGRAM(s) Oral at bedtime  diltiazem    Tablet 30 milliGRAM(s) Oral every 6 hours  esMOLOL  Infusion 50 MICROgram(s)/kG/Min (34.7 mL/Hr) IV Continuous <Continuous>  furosemide   Injectable 20 milliGRAM(s) IV Push every 12 hours  latanoprost 0.005% Ophthalmic Solution 1 Drop(s) Both EYES at bedtime  metoprolol tartrate 25 milliGRAM(s) Oral every 12 hours  pantoprazole    Tablet 40 milliGRAM(s) Oral before breakfast  polyethylene glycol 3350 17 Gram(s) Oral daily  senna 2 Tablet(s) Oral at bedtime  sodium chloride 0.9% lock flush 3 milliLiter(s) IV Push every 8 hours    MEDICATIONS  (PRN):  acetaminophen   Tablet .. 650 milliGRAM(s) Oral every 6 hours PRN Severe Pain (7 - 10)    Labs:    CBC:                        10.8   7.72  )-----------( 75       ( 03 Mar 2021 03:18 )             36.5     CMP:        144  |  109<H>  |  35<H>  ----------------------------<  122<H>  4.0   |  21<L>  |  1.68<H>    Ca    9.1      03 Mar 2021 03:18  Phos  4.7     03-03  Mg     1.8     03-03    TPro  6.4  /  Alb  3.6  /  TBili  1.1  /  DBili  x   /  AST  20  /  ALT  16  /  AlkPhos  108  03-03    PT/INR - ( 03 Mar 2021 03:18 )   PT: 34.7 sec;   INR: 3.05          PTT - ( 03 Mar 2021 03:18 )  PTT:44.9 sec    Imaging:    < from: CT Heart Congenital w/ IV Cont (21 @ 14:20) >    EXAM:  CT HEART CONGENITAL IC                          *** ADDENDUM 2021  ***  IMPRESSION:  1.  Type A aortic dissection from aortic root to right common carotid artery. Correlates with echocardiographic findings from same day. Increased mid ascending aortic aneurysm 5.1 cm, previously 4.8 cm.  2.  New moderate right and trace left pleural effusions with mosaic lung attenuation likely mild edema.  3.  Cardiomegaly with mechanical aortic valve prosthesis in situ.  4.  Dilated main pulmonary artery suggesting pulmonary artery hypertension.    Impression:    1.Please note this study was not optimized forthe evaluation of the coronary arteries.    2. Densely calcified plaque and motion artifact, cannot  evaluate Coronary arteries.    3.Large Type A dissection extending to aortic arch to R common carotid artery    4. Dilated LA, RA, RV and main Pulmonary artery    5. s/p TAVR    6. CTS team aware of findings.      < end of copied text >    < from: CT Angio Abdomen and Pelvis w/ IV Cont (21 @ 14:19) >  EXAM:  CT ANGIO ABD PELV (W)AW IC                          PROCEDURE DATE:  2021    IMPRESSION:  No acute aortic findings in the abdomen/pelvis. Other incidental comments as above.      < end of copied text >    < from: Xray Chest 1 View- PORTABLE-Routine (Xray Chest 1 View- PORTABLE-Routine in AM.) (21 @ 04:16) >  EXAM:  XR CHEST PORTABLE ROUTINE 1V                          PROCEDURE DATE:  2021    Findings/  impression: Stable positioning right internal jugular line. Stable cardiomegaly, status post TAVR. Bilateral opacities/right pleural effusion. Stable bony structures.    < end of copied text >      PEx:  T(C): 37.1 (21 @ 14:00), Max: 37.1 (21 @ 14:00)  HR: 83 (21 @ 15:00) (83 - 115)  BP: 118/62 (21 @ 10:00) (91/51 - 137/85)  RR: 12 (21 @ 15:00) (12 - 30)  SpO2: 93% (21 @ 15:00) (91% - 98%)  Wt(kg): 115.5kG  Daily     Daily   CAPILLARY BLOOD GLUCOSE    I&O's Summary    02 Mar 2021 07:  -  03 Mar 2021 07:00  --------------------------------------------------------  IN: 1844 mL / OUT: 1960 mL / NET: -116 mL    03 Mar 2021 07:01  -  03 Mar 2021 15:33  --------------------------------------------------------  IN: 120 mL / OUT: 330 mL / NET: -210 mL    GENERAL:  [x ]Alert  [x ]Oriented x 3  [ ]Lethargic  [ ]Cachexia  [ ]Unarousable  [ x]Verbal  [ ]Non-Verbal  Behavioral:   [ ] Anxiety  [ ] Delirium [ ] Agitation [ x] Other - calm  HEENT:  [ ]Normal   [x ]Dry mouth   [ ]ET Tube/Trach  [ ]Oral lesions  PULMONARY:   [ ]Clear  [ ]Tachypnea  [ ]Audible excessive secretions [x] Dyspneic   [ x]Rhonchi        [ ]Right [ ]Left [x ]Bilateral  [ ]Crackles        [ ]Right [ ]Left [ ]Bilateral  [ ]Wheezing     [ ]Right [ ]Left [ ]Bilateral  CARDIOVASCULAR:    [x ]Regular [ ]Irregular [ ]Tachy  [ ]Kevin [ ]Murmur [ ]Other  GASTROINTESTINAL:  [x] Soft  [ ]Distended   [ ]+BS  [x ]Non tender [ ]Tender  [ ]PEG [ ]OGT/ NGT  Last BM: 3/2/2021  GENITOURINARY:  [ ]Normal [ ] Incontinent   [ ]Oliguria/Anuria   [x] Foly  MUSCULOSKELETAL:   [ ]Normal   [ ]Weakness  [ x] Wheelchair bound [ ]Edema  NEUROLOGIC:   [x ]No focal deficits  [ ] Cognitive impairment  [ ] Dysphagia [ ]Dysarthria [ ] Paresis [ ]Other   SKIN:   [ x]Normal   [ ]Pressure ulcer(s)  [ ]Rash      Preadmit Karnofsky: 60  %           Current Karnofsky:    50 %  Cachexia (Y/N): N  BMI: 49.7kg/m2    Advanced Directives:     DNR/DNI     MOLST    DECISION MAKER:  Patient is able to make decisions for herself   LEGAL SURROGATE: [place] # [phone]      GOALS OF CARE DISCUSSION       Palliative care info/counseling provided	           Documentation of GOC: 	DNR/DNI          REFERRALS	        Palliative Med        Unit SW/Case Mgmt       Patient/Family Support       Hospice       PT/OT   GRACE KEY          MRN-[id number]            (1945)    HPI:  This is a 75 year old female with a history of obesity, hypertension, hyperlipidemia, GERD, TIA with carotid artery disease s/p left carotid endarterectomy, CKD Stage III (baseline creatinine 1.45), ARMANDO (non-compliant with CPAP), atrial fibrillation (on Eliquis), known tuberculum meningioma, UTI (ESBL) requiring IV antibiotics in [facility] in 2020, communicating hydrocephalus s/p right frontal  shunt on 2020 with recent CT showing a right-sided subacute subdural hematoma and chronic diastolic heart failure with severe aortic stenosis s/p TF TAVR on 2020 using Adelaida S3 (23 mm, commercial) now with significant mitral regurgitation who presents for follow up of her valvular heart disease.   Post-TAVR, the patient was discharged to rehab. Her recovery course was complicated by a UTI in rehab requiring IV antibiotics. In addition, she was readmitted to [facility] in 2020 and found to have worsening hydrocephalus treated with a  shunt. In total, the patient spent four months in [facility] before returning home.  The patient's daughter states she has not felt well since her valve procedure in May last year. Over the past several months, her shortness of breath with exertion and fatigue have gotten significantly worse. Currently, the patient reports SOB when walking less than 5 feet. She has not been walking much at all due to her symptoms. She also has had significant LE edema and reports a 10-15 pound weight gain over the past six months. The patient had a recent ECHO that showed severe mitral regurgitation. The patient denies chest pain, dizziness, syncope and palpitations. She endorses shortness of breath at rest and orthopnea.  History taken from daughter.     (01 Mar 2021 14:52)      PAST MEDICAL & SURGICAL HISTORY:  H/O carotid stenosis    TIA (transient ischemic attack)    Chronic kidney disease (CKD)    Aortic stenosis    HLD (hyperlipidemia)    HTN (hypertension)    H/O carotid endarterectomy    FAMILY HISTORY:   Reviewed and found non contributory in mother or father    SOCIAL HISTORY: Smoker:   No  ETOH Use:  No  Ilcit Drug Use:  No  The patient has a HHA assist with showering and self-care (6 hours a day/ 7days a week). She is able to dress herself and toilet herself. The patient uses a cane in her home and a wheelchair outside of the home.    ROS:    Unable to attain due to:   n/a                     Dyspnea (Shanta 0-10):     5                   N/V (Y/N):          N                    Secretions (Y/N) :   N             Agitation(Y/N): N  Pain (Y/N):     N  -Provocation/Palliation: n/a   -Quality/Quantity: n/a   -Radiating: n/a   -Severity: n/a   -Timing/Frequency: n/a   -Impact on ADLs:n/a     General:  + weakness  HEENT:    Denied  Neck:  Denied  CVS:  Denied  Resp:  Denied  GI:  Denied  :  Denied  Musc:  Denied  Neuro:  Denied  Psych:  Denied  Skin:  Denied  Lymph:  Denied    Allergies    No Known Allergies    Intolerances      Opiate Naive (Y/N): Y   -iStop reviewed (Y/N): Y (Ref#:      [id number]  )  2021	alprazolam 0.25 mg tablets    Medications:      MEDICATIONS  (STANDING):  aspirin  chewable 81 milliGRAM(s) Oral daily  atorvastatin 40 milliGRAM(s) Oral at bedtime  diltiazem    Tablet 30 milliGRAM(s) Oral every 6 hours  esMOLOL  Infusion 50 MICROgram(s)/kG/Min (34.7 mL/Hr) IV Continuous <Continuous>  furosemide   Injectable 20 milliGRAM(s) IV Push every 12 hours  latanoprost 0.005% Ophthalmic Solution 1 Drop(s) Both EYES at bedtime  metoprolol tartrate 25 milliGRAM(s) Oral every 12 hours  pantoprazole    Tablet 40 milliGRAM(s) Oral before breakfast  polyethylene glycol 3350 17 Gram(s) Oral daily  senna 2 Tablet(s) Oral at bedtime  sodium chloride 0.9% lock flush 3 milliLiter(s) IV Push every 8 hours    MEDICATIONS  (PRN):  acetaminophen   Tablet .. 650 milliGRAM(s) Oral every 6 hours PRN Severe Pain (7 - 10)    Labs:    CBC:                        10.8   7.72  )-----------( 75       ( 03 Mar 2021 03:18 )             36.5     CMP:        144  |  109<H>  |  35<H>  ----------------------------<  122<H>  4.0   |  21<L>  |  1.68<H>    Ca    9.1      03 Mar 2021 03:18  Phos  4.7     03-03  Mg     1.8     03-03    TPro  6.4  /  Alb  3.6  /  TBili  1.1  /  DBili  x   /  AST  20  /  ALT  16  /  AlkPhos  108  03-03    PT/INR - ( 03 Mar 2021 03:18 )   PT: 34.7 sec;   INR: 3.05          PTT - ( 03 Mar 2021 03:18 )  PTT:44.9 sec    Imaging:    < from: CT Heart Congenital w/ IV Cont (21 @ 14:20) >    EXAM:  CT HEART CONGENITAL IC                          *** ADDENDUM 2021  ***  IMPRESSION:  1.  Type A aortic dissection from aortic root to right common carotid artery. Correlates with echocardiographic findings from same day. Increased mid ascending aortic aneurysm 5.1 cm, previously 4.8 cm.  2.  New moderate right and trace left pleural effusions with mosaic lung attenuation likely mild edema.  3.  Cardiomegaly with mechanical aortic valve prosthesis in situ.  4.  Dilated main pulmonary artery suggesting pulmonary artery hypertension.    Impression:    1.Please note this study was not optimized forthe evaluation of the coronary arteries.    2. Densely calcified plaque and motion artifact, cannot  evaluate Coronary arteries.    3.Large Type A dissection extending to aortic arch to R common carotid artery    4. Dilated LA, RA, RV and main Pulmonary artery    5. s/p TAVR    6. CTS team aware of findings.      < end of copied text >    < from: CT Angio Abdomen and Pelvis w/ IV Cont (21 @ 14:19) >  EXAM:  CT ANGIO ABD PELV (W)AW IC                          PROCEDURE DATE:  2021    IMPRESSION:  No acute aortic findings in the abdomen/pelvis. Other incidental comments as above.      < end of copied text >    < from: Xray Chest 1 View- PORTABLE-Routine (Xray Chest 1 View- PORTABLE-Routine in AM.) (21 @ 04:16) >  EXAM:  XR CHEST PORTABLE ROUTINE 1V                          PROCEDURE DATE:  2021    Findings/  impression: Stable positioning right internal jugular line. Stable cardiomegaly, status post TAVR. Bilateral opacities/right pleural effusion. Stable bony structures.    < end of copied text >    < from: TTE Echo Complete w/o Contrast w/ Doppler (21 @ 10:54) >  EXAM:  ECHO TTE WO CON COMP W DOPP                          PROCEDURE DATE:  2021    CONCLUSIONS:     1. Mild symmetric left ventricular hypertrophy.   2. Normal left and right ventricular size and systolic function.   3. Biatrial enlargement.   4. 23 mm Adelaida 3 valve is noted in the aortic position wirh trace valvular aortic regurgitation. The peak transvalvular velocity is 2.75 m/s, the mean transvalvular gradient is 20.00 mmHg, and the LVOT/AV velocity ratio is 0.36. The peak transaortic gradient is 30.25 mmHg.   5. The mitral valve is mildly calcified. Mitral annular calcification noted. There is severe mitral regurgitation.   6. Moderate tricuspid regurgitation.   7. Pulmonary hypertension present, pulmonary artery systolic pressure is 62 mmHg.   8. Small pericardial effusion without echocardiographic evidence of cardiac tamponade physiology.   9. The proximal ascending aorta is severely dilated. The proximal ascending aorta measures 5.00 cm (normal 2.6-3.4 cm for men, 2.3-3.1 cm for women). There is a dissection flap seen in the ascending aorta extending into the aortic arch.  10. Findings were discussed with CT Surgery Team and Dr. Dickerson on 3/2/2021 at 11:45 am.    < end of copied text >    PEx:  T(C): 37.1 (21 @ 14:00), Max: 37.1 (21 @ 14:00)  HR: 83 (21 @ 15:00) (83 - 115)  BP: 118/62 (21 @ 10:00) (91/51 - 137/85)  RR: 12 (21 @ 15:00) (12 - 30)  SpO2: 93% (21 @ 15:00) (91% - 98%)  Wt(kg): 115.5kG  Daily     Daily   CAPILLARY BLOOD GLUCOSE    I&O's Summary    02 Mar 2021 07:01  -  03 Mar 2021 07:00  --------------------------------------------------------  IN: 1844 mL / OUT: 1960 mL / NET: -116 mL    03 Mar 2021 07:01  -  03 Mar 2021 15:33  --------------------------------------------------------  IN: 120 mL / OUT: 330 mL / NET: -210 mL    GENERAL:  [x ]Alert  [x ]Oriented x 3  [ ]Lethargic  [ ]Cachexia  [ ]Unarousable  [ x]Verbal  [ ]Non-Verbal  Behavioral:   [ ] Anxiety  [ ] Delirium [ ] Agitation [ x] Other - calm  HEENT:  [ ]Normal   [x ]Dry mouth   [ ]ET Tube/Trach  [ ]Oral lesions  PULMONARY:   [ ]Clear  [ ]Tachypnea  [ ]Audible excessive secretions [x] Dyspneic   [ x]Rhonchi        [ ]Right [ ]Left [x ]Bilateral  [ ]Crackles        [ ]Right [ ]Left [ ]Bilateral  [ ]Wheezing     [ ]Right [ ]Left [ ]Bilateral  CARDIOVASCULAR:    [x ]Regular [ ]Irregular [ ]Tachy  [ ]Kevin [ ]Murmur [ ]Other  GASTROINTESTINAL:  [x] Soft  [ ]Distended   [ ]+BS  [x ]Non tender [ ]Tender  [ ]PEG [ ]OGT/ NGT  Last BM: 3/2/2021  GENITOURINARY:  [ ]Normal [ ] Incontinent   [ ]Oliguria/Anuria   [x] Foly  MUSCULOSKELETAL:   [ ]Normal   [ ]Weakness  [ x] Wheelchair bound [ ]Edema  NEUROLOGIC:   [x ]No focal deficits  [ ] Cognitive impairment  [ ] Dysphagia [ ]Dysarthria [ ] Paresis [ ]Other   SKIN:   [ x]Normal   [ ]Pressure ulcer(s)  [ ]Rash      Preadmit Karnofsky: 60  %           Current Karnofsky:    50 %  Cachexia (Y/N): N  BMI: 49.7kg/m2    Advanced Directives:     DNR/DNI     MOL    DECISION MAKER:  Patient is able to make decisions for herself   LEGAL SURROGATE: [place] # [phone]      GOALS OF CARE DISCUSSION       Palliative care info/counseling provided	           Documentation of GOC: 	DNR/DNI          REFERRALS	        Palliative Med        Unit SW/Case Mgmt       Patient/Family Support       Hospice       PT/OT

## 2021-03-03 NOTE — CONSULT NOTE ADULT - PROBLEM SELECTOR RECOMMENDATION 5
Patient was made a DNR/DNI by primary team. Spoke to daughter and she will be present tomorrow at 130pm for a meeting with palliative care team. Plan is to discuss home hospice.  As discussed during the palliative IDT meeting, the patients PSSA screening did not identify any current psychosocial need or spiritual support deficits.

## 2021-03-03 NOTE — CONSULT NOTE ADULT - PROBLEM SELECTOR RECOMMENDATION 3
Patient with a history of aortic stenosis for which she had at TF TAVR on 05/26/2020. Continue care as per cardiology team.

## 2021-03-03 NOTE — DIETITIAN INITIAL EVALUATION ADULT. - OTHER INFO
75F with a history of obesity, hypertension, hyperlipidemia, GERD, TIA with carotid artery disease s/p left carotid endarterectomy, CKD Stage III (baseline creatinine 1.45), ARMANDO (non-compliant with CPAP), atrial fibrillation (on Eliquis), known tuberculum meningioma, UTI (ESBL) requiring IV antibiotics in [facility] in July 2020, communicating hydrocephalus s/p right frontal  shunt on 8/5/2020 with recent CT showing a right-sided subacute subdural hematoma and chronic diastolic heart failure with severe aortic stenosis s/p TF TAVR on 05/26/2020 using Adelaida S3 (23 mm, commercial) now with significant mitral regurgitation who presents for follow up of her valvular heart disease.  Post-TAVR, the patient was discharged to rehab. Her recovery course was complicated by a UTI in rehab requiring IV antibiotics. In addition, she was readmitted to [facility] in July 2020 and found to have worsening hydrocephalus treated with a  shunt. In total, the patient spent four months in [facility] before returning home.  The patient's daughter states she has not felt well since her valve procedure in May last year. Over the past several months, her shortness of breath with exertion and fatigue have gotten significantly worse. Currently, the patient reports SOB when walking less than 5 feet. She has not been walking much at all due to her symptoms. She also has had significant LE edema and reports a 4.5-7kg weight gain over the past six months.     Admitted for CHF exacerbation and possible mitral clip. Known to writer from past several admissions, most recent this July where wt noted at 107kg, noting wt increase of 8kg, fairly consistent with pt report. Suspect wt changes in setting of fluid retention/ CHF. Skin noted with 3+ generalized edema, jacoby score 18, no n/v/d, + constipation endorsed, no chewing/ swallowing issues or pain impacting intake. Observed pt resting in bed noting SOB, fair-poor intake d/t this. Current on 1L fluid restriction, discussed purpose of diet and restriction, unsure of comprehension wanting ed for dtr when arrives. Will follow and reinforce education.

## 2021-03-03 NOTE — CONSULT NOTE ADULT - PROBLEM SELECTOR RECOMMENDATION 4
During patient TTE, patient found to have dissection flap in ascending aorta. CT showed  a type A aortic dissection from aortic root to right common carotid artery. Increased mid ascending aortic aneurysm 5.1 cm, previously 4.8 cm. No interventions being offered. Overall prognosis is poor. Patient would benefit from home hospice.

## 2021-03-03 NOTE — DIETITIAN INITIAL EVALUATION ADULT. - CALCULATED TO (G/KG)
Hospitalist Progress Note      PCP: Estevan Collet, PA-C    Date of Admission: 3/20/2020    Chief Complaint: hypotension, weight gain    Hospital Course:   63 y.o. female who presented to [facility] with hypotension and weight gain. Patient was just discharged last week for SHAUNNA and CHF exacerbation. Since her discharge, she has had persistent weight gain and increasing LE edema. She has also had persistent BP in the 12W-74N systolic. This is common for her and she has never had symptoms associated with BP in the this range before. She has been in contact with her cardiology team since her discharge and increased her home diuretic but it had no effect on her weight gain. Over the past couple days, she has had increased HIGHTOWER as well which is typical for her when she gets volume overloaded. She denies fevers, chills, chest pain, nausea, vomiting or diarrhea. Subjective: On RA. Afebrile. VSS. +HIGHTOWER, no chest pain. No N/V/D. Weight is down to 267 lbs today.      Medications:  Reviewed    Infusion Medications    furosemide (LASIX) 1mg/ml infusion 5 mg/hr (03/26/20 0012)    dextrose       Scheduled Medications    potassium chloride  40 mEq Oral BID WC    polyethylene glycol  17 g Oral Daily    amoxicillin-clavulanate  1 tablet Oral 2 times per day    ARIPiprazole  10 mg Oral Daily    aspirin EC  81 mg Oral Daily    atorvastatin  80 mg Oral Nightly    benztropine  1 mg Oral Nightly    buprenorphine-naloxone  1 Film Sublingual BID    busPIRone  30 mg Oral BID    clopidogrel  75 mg Oral Daily    lamoTRIgine  150 mg Oral BID    pantoprazole  40 mg Oral BID    tiotropium  2 puff Inhalation Daily    sodium chloride flush  10 mL Intravenous 2 times per day    insulin lispro  8 Units Subcutaneous TID     insulin lispro  0-12 Units Subcutaneous TID     insulin lispro  0-6 Units Subcutaneous Nightly    heparin (porcine)  5,000 Units Subcutaneous 3 times per day    midodrine  10 mg Oral TID  63

## 2021-03-03 NOTE — PROGRESS NOTE ADULT - SUBJECTIVE AND OBJECTIVE BOX
Patient discussed on morning rounds with Dr. Dickerson and Dr. Calle     Readmission:  3/2/21 found to have type A dissection     Operation / Date:   s/p TAVR 5/26/21    SUBJECTIVE ASSESSMENT:  Patient is feeling well, no acute issues. Denies any CP, palpitations, SOB, wheezing, abd pain, n/v/d/c, fevers or chills.     Vital Signs Last 24 Hrs  T(C): 37.1 (03 Mar 2021 14:00), Max: 37.1 (03 Mar 2021 14:00)  T(F): 98.7 (03 Mar 2021 14:00), Max: 98.7 (03 Mar 2021 14:00)  HR: 108 (03 Mar 2021 16:00) (83 - 115)  BP: 118/62 (03 Mar 2021 10:00) (100/61 - 137/85)  BP(mean): 81 (03 Mar 2021 10:00) (75 - 105)  RR: 24 (03 Mar 2021 16:00) (12 - 30)  SpO2: 93% (03 Mar 2021 16:00) (91% - 95%)  I&O's Detail    02 Mar 2021 07:01  -  03 Mar 2021 07:00  --------------------------------------------------------  IN:    Esmolol: 104 mL    IV PiggyBack: 200 mL    Oral Fluid: 1540 mL  Total IN: 1844 mL    OUT:    Indwelling Catheter - Urethral (mL): 1960 mL  Total OUT: 1960 mL    Total NET: -116 mL      03 Mar 2021 07:01  -  03 Mar 2021 17:42  --------------------------------------------------------  IN:    Oral Fluid: 180 mL  Total IN: 180 mL    OUT:    Indwelling Catheter - Urethral (mL): 310 mL    Voided (mL): 50 mL  Total OUT: 360 mL    Total NET: -180 mL    CHEST TUBE: no  JUAN DANIEL DRAIN: no  EPICARDIAL WIRES: no  TIE DOWNS: no  KOO: no    PHYSICAL EXAM:  General: well appearing sitting in chair in NAD   Neurological: AOx3. Motor skills grossly intact  Cardiovascular: Normal S1/S2. Regular rate/rhythm. +murmur   Respiratory: Lungs CTA bilaterally. No wheezing or rales  Gastrointestinal: +BS in all 4 quadrants. Non-distended. Soft. Non-tender  Extremities: Strength 5/5 b/l upper/lower extremities. Sensation grossly intact upper/lower extremities. No edema. No calf tenderness.  Vascular: Radial 2+bilaterally, DP 2+ b/l  Incision Sites: none      LABS:                        10.8   7.72  )-----------( 75       ( 03 Mar 2021 03:18 )             36.5       COUMADIN:  no    PT/INR - ( 03 Mar 2021 03:18 )   PT: 34.7 sec;   INR: 3.05     PTT - ( 03 Mar 2021 03:18 )  PTT:44.9 sec    03-03    144  |  109<H>  |  35<H>  ----------------------------<  122<H>  4.0   |  21<L>  |  1.68<H>    Ca    9.1      03 Mar 2021 03:18  Phos  4.7     03-03  Mg     1.8     03-03    TPro  6.4  /  Alb  3.6  /  TBili  1.1  /  DBili  x   /  AST  20  /  ALT  16  /  AlkPhos  108  03-03    MEDICATIONS  (STANDING):  aspirin  chewable 81 milliGRAM(s) Oral daily  atorvastatin 40 milliGRAM(s) Oral at bedtime  diltiazem    Tablet 30 milliGRAM(s) Oral every 6 hours  furosemide   Injectable 20 milliGRAM(s) IV Push every 12 hours  latanoprost 0.005% Ophthalmic Solution 1 Drop(s) Both EYES at bedtime  metoprolol tartrate 25 milliGRAM(s) Oral every 12 hours  pantoprazole    Tablet 40 milliGRAM(s) Oral before breakfast  polyethylene glycol 3350 17 Gram(s) Oral daily  senna 2 Tablet(s) Oral at bedtime  sodium chloride 0.9% lock flush 3 milliLiter(s) IV Push every 8 hours    MEDICATIONS  (PRN):  acetaminophen   Tablet .. 650 milliGRAM(s) Oral every 6 hours PRN Severe Pain (7 - 10)    RADIOLOGY & ADDITIONAL TESTS:  < from: Xray Chest 1 View- PORTABLE-Routine (Xray Chest 1 View- PORTABLE-Routine in AM.) (03.03.21 @ 04:16) >  Findings/  impression: Stable positioning right internal jugular line. Stable cardiomegaly, status post TAVR. Bilateral opacities/right pleural effusion. Stable bony structures.  < end of copied text >

## 2021-03-03 NOTE — DIETITIAN INITIAL EVALUATION ADULT. - OTHER CALCULATIONS
Ideal body weight used for calculations as pt >120% of IBW. (255% IBW). Nutrient needs based on [facility] standards of care for maintenance in adults, adjusted for CHF, age, fluid per team

## 2021-03-03 NOTE — PROGRESS NOTE ADULT - NUTRITIONAL ASSESSMENT
This patient has been assessed with a concern for Malnutrition and has been determined to have a diagnosis/diagnoses of Morbid obesity (BMI > 40).    This patient is being managed with:   Diet DASH/TLC-  Sodium & Cholesterol Restricted  1000mL Fluid Restriction ([id number])  Entered: Mar  1 2021  4:25PM

## 2021-03-03 NOTE — DIETITIAN NUTRITION RISK NOTIFICATION - TREATMENT: THE FOLLOWING DIET HAS BEEN RECOMMENDED
C/w Diet, DASH/TLC:   Sodium & Cholesterol Restricted  1000mL Fluid Restriction ([id number]) (03-01-21 @ 16:25) [Active]

## 2021-03-03 NOTE — CONSULT NOTE ADULT - ASSESSMENT
75 year old woman with Dyspnea, debility 75 year old woman with Dyspnea, debility, aortic stenosis, Type A aortic dissection and encounter for palliative care.

## 2021-03-04 NOTE — PROGRESS NOTE ADULT - ASSESSMENT
This is a 75 year old female with a history of obesity, hypertension, hyperlipidemia, GERD, TIA with carotid artery disease s/p left carotid endarterectomy, CKD Stage III (baseline creatinine 1.45), ARMANDO (non-compliant with CPAP), atrial fibrillation (on Eliquis), known tuberculum meningioma, UTI (ESBL) requiring IV antibiotics in [facility] in July 2020, communicating hydrocephalus s/p right frontal  shunt on 8/5/2020 with recent CT showing a right-sided subacute subdural hematoma and chronic diastolic heart failure with severe aortic stenosis s/p TF TAVR on 05/26/2020 using Adelaida S3 (23 mm, commercial) now with significant mitral regurgitation who presents for follow up of her valvular heart disease.  Post-TAVR, the patient was discharged to rehab. Her recovery course was complicated by a UTI in rehab requiring IV antibiotics. In addition, she was readmitted to [facility] in July 2020 and found to have worsening hydrocephalus treated with a  shunt. In total, the patient spent four months in [facility] before returning home. The patient's daughter states she has not felt well since her valve procedure in May last year. Over the past several months, her shortness of breath with exertion and fatigue have gotten significantly worse. Currently, the patient reports SOB when walking less than 5 feet. She has not been walking much at all due to her symptoms. She also has had significant LE edema and reports a 10-15 pound weight gain over the past six months. The patient had a recent ECHO that showed severe mitral regurgitation. Pt direct admission for HF management prior to possible Mitraclip. 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 Started on esmolol gtt.  Due to poor prognosis, pt deemed not a surgical candidate due to 100% mortality risk and now has been referred to palliative care team. Patient was stepdown from the unit and transitioned from esmolol gtt to PO beta blocker therapy pending possible placement with palliative team.     Plan:  Neuro  -No delirium  -Pain: acetaminophen PRN   -hx of severe carotid dx s/p endarterectomy     Cardiac  -acute type A dissection, not a surgical candidate.         -BP control: Switched from esmolol gtt to Metoprolol 25mg Q12 hours        -AF hx: Holding Eliquis (last dose 3/1/21 in AM)        -Palliative care consulted, Family meeting today at 1330 for GOC and potentially hospice care  -Continue ASA 81mg QD  -Continue Atorvastatin 40mg QHS  -Monitor on Tele   -c/w Diltizem 30mg Q8hrs, titrate as necessary    Pulm  -Encourage IS  -Maintain O2 sat >93%  -Oxygenating well on RA     GI  -DASH Diet  -Protonix 40mg QD for ppx  -Bowel regimen: senna, miralax       -CKD stage III   -BUN/Cr -NESTOR cr 2.0 this AM  -Rodriguez for accurate I/Os  -Lasix 20mg IV push Q12 hours     ID  -Afebrile   -Continue to monitor SIRS response    Endo  -A1C 4.9  -Follow up TSH, no history of thyroid dz    HEME:  -no anticoagulation at this time in setting of Aortic Dissection last dose of Eliquis 3/1/21      -Elevated Coagulation factor, INR 3.05 yesterday, downtrending today to 1.6    Dispo:  Palliative care consultated  DNR/DNI   This is a 75 year old female with a history of obesity, hypertension, hyperlipidemia, GERD, TIA with carotid artery disease s/p left carotid endarterectomy, CKD Stage III (baseline creatinine 1.45), ARMANDO (non-compliant with CPAP), atrial fibrillation (on Eliquis), known tuberculum meningioma, UTI (ESBL) requiring IV antibiotics in [facility] in July 2020, communicating hydrocephalus s/p right frontal  shunt on 8/5/2020 with recent CT showing a right-sided subacute subdural hematoma and chronic diastolic heart failure with severe aortic stenosis s/p TF TAVR on 05/26/2020 using Adelaida S3 (23 mm, commercial) now with significant mitral regurgitation who presents for follow up of her valvular heart disease.  Post-TAVR, the patient was discharged to rehab. Her recovery course was complicated by a UTI in rehab requiring IV antibiotics. In addition, she was readmitted to [facility] in July 2020 and found to have worsening hydrocephalus treated with a  shunt. In total, the patient spent four months in [facility] before returning home. The patient's daughter states she has not felt well since her valve procedure in May last year. Over the past several months, her shortness of breath with exertion and fatigue have gotten significantly worse. Currently, the patient reports SOB when walking less than 5 feet. She has not been walking much at all due to her symptoms. She also has had significant LE edema and reports a 10-15 pound weight gain over the past six months. The patient had a recent ECHO that showed severe mitral regurgitation. Pt direct admission for HF management prior to possible Mitraclip. 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 Started on esmolol gtt.  Due to poor prognosis, pt deemed not a surgical candidate due to 100% mortality risk and now has been referred to palliative care team. Patient was stepdown from the unit and transitioned from esmolol gtt to PO beta blocker therapy pending possible placement with palliative team.     Plan:  Neuro  -No delirium  -Pain: acetaminophen PRN   -hx of severe carotid dx s/p endarterectomy     Cardiac  -acute type A dissection, not a surgical candidate.         -BP control: Switched from esmolol gtt to Metoprolol 25mg Q12 hours        -AF hx: Holding Eliquis (last dose 3/1/21 in AM)        -Palliative care consulted, Family meeting today at 1330 for GOC and potentially hospice care  -ASA discontinued in setting of risk / benefit   -Continue Atorvastatin 40mg QHS  -Monitor on Tele   -c/w Diltizem 30mg Q8hrs, titrate as necessary    Pulm  -Encourage IS  -Maintain O2 sat >93%  -Oxygenating well on RA     GI  -DASH Diet  -Protonix 40mg QD for ppx  -Bowel regimen: senna, miralax       -CKD stage III    -Severe Fluid Overload        -BUN/Cr -cr elevated to 2.0 this AM        -Rodriguez for accurate I/Os        -Lasix 20mg switched to PO    ID  -Afebrile   -Continue to monitor SIRS response    Endo  -A1C 4.9  -Follow up TSH, no history of thyroid dz    HEME:  -no anticoagulation at this time in setting of Aortic Dissection last dose of Eliquis 3/1/21      -Elevated Coagulation factor, INR 3.05 yesterday, downtrending today to 1.6    Dispo:  Palliative care consultated  DNR/DNI

## 2021-03-04 NOTE — PROGRESS NOTE ADULT - SUBJECTIVE AND OBJECTIVE BOX
GRACE KEY             MRN-[id number]    CC: Mitral valve    HPI:  This is a 75 year old female with a history of obesity, hypertension, hyperlipidemia, GERD, TIA with carotid artery disease s/p left carotid endarterectomy, CKD Stage III (baseline creatinine 1.45), ARMANDO (non-compliant with CPAP), atrial fibrillation (on Eliquis), known tuberculum meningioma, UTI (ESBL) requiring IV antibiotics in [facility] in July 2020, communicating hydrocephalus s/p right frontal  shunt on 8/5/2020 with recent CT showing a right-sided subacute subdural hematoma and chronic diastolic heart failure with severe aortic stenosis s/p TF TAVR on 05/26/2020 using Adelaida S3 (23 mm, commercial) now with significant mitral regurgitation who presents for follow up of her valvular heart disease.   Post-TAVR, the patient was discharged to rehab. Her recovery course was complicated by a UTI in rehab requiring IV antibiotics. In addition, she was readmitted to [facility] in July 2020 and found to have worsening hydrocephalus treated with a  shunt. In total, the patient spent four months in [facility] before returning home.  The patient's daughter states she has not felt well since her valve procedure in May last year. Over the past several months, her shortness of breath with exertion and fatigue have gotten significantly worse. Currently, the patient reports SOB when walking less than 5 feet. She has not been walking much at all due to her symptoms. She also has had significant LE edema and reports a 10-15 pound weight gain over the past six months. The patient had a recent ECHO that showed severe mitral regurgitation. The patient denies chest pain, dizziness, syncope and palpitations. She endorses shortness of breath at rest and orthopnea.  History taken from daughter.     (01 Mar 2021 14:52)    SUBJECTIVE: Patient sitting in chair in no distress. Daughter at bedside     ROS:  DYSPNEA: Y  NAUS/VOM: N	  SECRETIONS: N	  AGITATION: N  Pain (Y/N):     N  -Provocation/Palliation: n/a   -Quality/Quantity: n/a   -Radiating: n/a   -Severity: n/a   -Timing/Frequency: n/a   -Impact on ADLs: n/a     OTHER REVIEW OF SYSTEMS: + weakness   UNABLE TO OBTAIN  due to: n/a     PEx:  T(C): 36.8 (03-04-21 @ 13:11), Max: 37.4 (03-03-21 @ 22:14)  HR: 85 (03-04-21 @ 12:24) (78 - 108)  BP: 102/61 (03-04-21 @ 12:24) (93/54 - 137/95)  RR: 18 (03-04-21 @ 12:24) (12 - 25)  SpO2: 97% (03-04-21 @ 12:24) (93% - 97%)  Wt(kg): 115.5kG    GENERAL:  [x ]Alert  [x ]Oriented x 3  [ ]Lethargic  [ ]Cachexia  [ ]Unarousable  [ x]Verbal  [ ]Non-Verbal  Behavioral:   [ ] Anxiety  [ ] Delirium [ ] Agitation [ x] Other - calm  HEENT:  [ ]Normal   [x ]Dry mouth   [ ]ET Tube/Trach  [ ]Oral lesions  PULMONARY:   [ ]Clear  [ ]Tachypnea  [ ]Audible excessive secretions [x] Dyspneic   [ x]Rhonchi        [ ]Right [ ]Left [x ]Bilateral  [ ]Crackles        [ ]Right [ ]Left [ ]Bilateral  [ ]Wheezing     [ ]Right [ ]Left [ ]Bilateral  CARDIOVASCULAR:    [x ]Regular [ ]Irregular [ ]Tachy  [ ]Kevin [ ]Murmur [ ]Other  GASTROINTESTINAL:  [x] Soft  [ ]Distended   [ ]+BS  [x ]Non tender [ ]Tender  [ ]PEG [ ]OGT/ NGT  Last BM: 3/3/2021  GENITOURINARY:  [ ]Normal [ ] Incontinent   [ ]Oliguria/Anuria   [x] Foly  MUSCULOSKELETAL:   [ ]Normal   [ ]Weakness  [ x] Wheelchair bound [ ]Edema  NEUROLOGIC:   [x ]No focal deficits  [ ] Cognitive impairment  [ ] Dysphagia [ ]Dysarthria [ ] Paresis [ ]Other   SKIN:   [ x]Normal   [ ]Pressure ulcer(s)  [ ]Rash      ALLERGIES: No Known Allergies      OPIATE NAÏVE (Y/N): Y    MEDICATIONS: REVIEWED  MEDICATIONS  (STANDING):  atorvastatin 40 milliGRAM(s) Oral at bedtime  diltiazem    Tablet 30 milliGRAM(s) Oral every 6 hours  furosemide    Tablet 40 milliGRAM(s) Oral every 12 hours  latanoprost 0.005% Ophthalmic Solution 1 Drop(s) Both EYES at bedtime  levalbuterol Inhalation 0.63 milliGRAM(s) Inhalation every 6 hours  metoprolol tartrate 25 milliGRAM(s) Oral every 12 hours  pantoprazole    Tablet 40 milliGRAM(s) Oral before breakfast  polyethylene glycol 3350 17 Gram(s) Oral daily  senna 2 Tablet(s) Oral at bedtime  sodium chloride 0.9% lock flush 3 milliLiter(s) IV Push every 8 hours    MEDICATIONS  (PRN):  acetaminophen   Tablet .. 650 milliGRAM(s) Oral every 6 hours PRN Severe Pain (7 - 10)      LABS: REVIEWED  CBC:                        10.6   7.46  )-----------( 59       ( 04 Mar 2021 05:57 )             36.2     CMP:    03-04    142  |  108  |  43<H>  ----------------------------<  88  4.1   |  23  |  2.01<H>    Ca    8.6      04 Mar 2021 05:57  Phos  4.7     03-03  Mg     2.3     03-04    TPro  6.4  /  Alb  3.6  /  TBili  1.1  /  DBili  x   /  AST  20  /  ALT  16  /  AlkPhos  108  03-03      IMAGING: REVIEWED    ADVANCED DIRECTIVES:            DNR DNI            MOLST    DECISION MAKER:  Patient is able to make decisions for herself   LEGAL SURROGATE: Sandy Mireilleia # [phone]    PSYCHOSOCIAL-SPIRITUAL ASSESSMENT:       Reviewed       Care plan unchanged    GOALS OF CARE DISCUSSION       Palliative care info/counseling provided	           Documentation of GOC:  DNR/DNI  	      AGENCY CHOICE DISCUSSED:               DIANA    REFERRALS	        Palliative Med        Unit SW/Case Mgmt       PT/OT

## 2021-03-04 NOTE — PROGRESS NOTE ADULT - SUBJECTIVE AND OBJECTIVE BOX
Patient discussed on morning rounds with Dr. Dickerson    Operation / Date:   5/26/20 TF TAVR now with severe MR, preop Mitral clip?  3/2- Found to have acute Type A dissection, no surgical intervention indicated. DNR/DNI      SUBJECTIVE ASSESSMENT:  75y Female seen and examined this AM, patient OOB to chair in no acute distress. Patent minimal english, states she feels fine.         Vital Signs Last 24 Hrs  T(C): 36.6 (04 Mar 2021 09:32), Max: 37.4 (03 Mar 2021 22:14)  T(F): 97.9 (04 Mar 2021 09:32), Max: 99.3 (03 Mar 2021 22:14)  HR: 82 (04 Mar 2021 09:10) (78 - 108)  BP: 93/54 (04 Mar 2021 09:10) (93/54 - 137/95)  BP(mean): 69 (04 Mar 2021 09:10) (69 - 112)  RR: 20 (04 Mar 2021 09:10) (12 - 26)  SpO2: 94% (04 Mar 2021 09:10) (93% - 96%)  I&O's Detail    03 Mar 2021 07:01  -  04 Mar 2021 07:00  --------------------------------------------------------  IN:    Oral Fluid: 180 mL  Total IN: 180 mL    OUT:    Indwelling Catheter - Urethral (mL): 340 mL    Voided (mL): 50 mL  Total OUT: 390 mL    Total NET: -210 mL      04 Mar 2021 07:01  -  04 Mar 2021 10:12  --------------------------------------------------------  IN:    Oral Fluid: 120 mL  Total IN: 120 mL    OUT:  Total OUT: 0 mL    Total NET: 120 mL          CHEST TUBE:  No  JUAN DANIEL DRAIN:  No.  EPICARDIAL WIRES: No.  TIE DOWNS: No.  KOO: Yes    PHYSICAL EXAM:  Neuro: A+O x 3, non-focal, speech clear and intact, English speaking  HEENT: PERRL, EOMI, oral mucosa pink and moist  Neck: supple, no JVD  CV: irregular rhythm,  Pulm/chest: lung sounds decreased bilateral bases, no accessory muscle use  Abd: soft, NT, ND, +BS  Ext: DIETZ x 4, no C/C/E  Skin: warm, well perfused, no rashes     LABS:                        10.6   7.46  )-----------( 59       ( 04 Mar 2021 05:57 )             36.2       COUMADIN:  Yes/No. REASON: .    PT/INR - ( 03 Mar 2021 03:18 )   PT: 34.7 sec;   INR: 3.05          PTT - ( 03 Mar 2021 03:18 )  PTT:44.9 sec    03-04    142  |  108  |  43<H>  ----------------------------<  88  4.1   |  23  |  2.01<H>    Ca    8.6      04 Mar 2021 05:57  Phos  4.7     03-03  Mg     2.3     03-04    TPro  6.4  /  Alb  3.6  /  TBili  1.1  /  DBili  x   /  AST  20  /  ALT  16  /  AlkPhos  108  03-03          MEDICATIONS  (STANDING):  aspirin  chewable 81 milliGRAM(s) Oral daily  atorvastatin 40 milliGRAM(s) Oral at bedtime  diltiazem    Tablet 30 milliGRAM(s) Oral every 6 hours  furosemide   Injectable 20 milliGRAM(s) IV Push every 12 hours  latanoprost 0.005% Ophthalmic Solution 1 Drop(s) Both EYES at bedtime  levalbuterol Inhalation 0.63 milliGRAM(s) Inhalation every 6 hours  metoprolol tartrate 25 milliGRAM(s) Oral every 12 hours  pantoprazole    Tablet 40 milliGRAM(s) Oral before breakfast  polyethylene glycol 3350 17 Gram(s) Oral daily  senna 2 Tablet(s) Oral at bedtime  sodium chloride 0.9% lock flush 3 milliLiter(s) IV Push every 8 hours    MEDICATIONS  (PRN):  acetaminophen   Tablet .. 650 milliGRAM(s) Oral every 6 hours PRN Severe Pain (7 - 10)        RADIOLOGY & ADDITIONAL TESTS:     Patient discussed on morning rounds with Dr. Dickerson    Operation / Date:   5/26/20 TF TAVR now with severe MR, preop Mitral clip?  3/2- Found to have acute Type A dissection, no surgical intervention indicated. DNR/DNI      SUBJECTIVE ASSESSMENT:  75y Female seen and examined this AM, patient OOB to chair in no acute distress. Patent minimal english, states she feels fine.         Vital Signs Last 24 Hrs  T(C): 36.6 (04 Mar 2021 09:32), Max: 37.4 (03 Mar 2021 22:14)  T(F): 97.9 (04 Mar 2021 09:32), Max: 99.3 (03 Mar 2021 22:14)  HR: 82 (04 Mar 2021 09:10) (78 - 108)  BP: 93/54 (04 Mar 2021 09:10) (93/54 - 137/95)  BP(mean): 69 (04 Mar 2021 09:10) (69 - 112)  RR: 20 (04 Mar 2021 09:10) (12 - 26)  SpO2: 94% (04 Mar 2021 09:10) (93% - 96%)  I&O's Detail    03 Mar 2021 07:01  -  04 Mar 2021 07:00  --------------------------------------------------------  IN:    Oral Fluid: 180 mL  Total IN: 180 mL    OUT:    Indwelling Catheter - Urethral (mL): 340 mL    Voided (mL): 50 mL  Total OUT: 390 mL    Total NET: -210 mL      04 Mar 2021 07:01  -  04 Mar 2021 10:12  --------------------------------------------------------  IN:    Oral Fluid: 120 mL  Total IN: 120 mL    OUT:  Total OUT: 0 mL    Total NET: 120 mL      CHEST TUBE:  No  JUAN DANIEL DRAIN:  No.  EPICARDIAL WIRES: No.  TIE DOWNS: No.  KOO: Yes    PHYSICAL EXAM:  Neuro: A+O x 3, non-focal, speech clear and intact, [place]ian speaking  HEENT: PERRL, EOMI, oral mucosa pink and moist  Neck: supple, no JVD  CV: irregular rhythm,  Pulm/chest: lung sounds decreased bilateral bases, no accessory muscle use  Abd: soft, NT, ND, +BS  Ext: DIETZ x 4, bilateral lower extremity edema  Skin: warm, well perfused, no rashes     LABS:                        10.6   7.46  )-----------( 59       ( 04 Mar 2021 05:57 )             36.2       PT/INR - ( 03 Mar 2021 03:18 )   PT: 34.7 sec;   INR: 3.05          PTT - ( 03 Mar 2021 03:18 )  PTT:44.9 sec    03-04    142  |  108  |  43<H>  ----------------------------<  88  4.1   |  23  |  2.01<H>    Ca    8.6      04 Mar 2021 05:57  Phos  4.7     03-03  Mg     2.3     03-04    TPro  6.4  /  Alb  3.6  /  TBili  1.1  /  DBili  x   /  AST  20  /  ALT  16  /  AlkPhos  108  03-03          MEDICATIONS  (STANDING):  aspirin  chewable 81 milliGRAM(s) Oral daily  atorvastatin 40 milliGRAM(s) Oral at bedtime  diltiazem    Tablet 30 milliGRAM(s) Oral every 6 hours  furosemide   Injectable 20 milliGRAM(s) IV Push every 12 hours  latanoprost 0.005% Ophthalmic Solution 1 Drop(s) Both EYES at bedtime  levalbuterol Inhalation 0.63 milliGRAM(s) Inhalation every 6 hours  metoprolol tartrate 25 milliGRAM(s) Oral every 12 hours  pantoprazole    Tablet 40 milliGRAM(s) Oral before breakfast  polyethylene glycol 3350 17 Gram(s) Oral daily  senna 2 Tablet(s) Oral at bedtime  sodium chloride 0.9% lock flush 3 milliLiter(s) IV Push every 8 hours    MEDICATIONS  (PRN):  acetaminophen   Tablet .. 650 milliGRAM(s) Oral every 6 hours PRN Severe Pain (7 - 10)

## 2021-03-04 NOTE — PROGRESS NOTE ADULT - NUTRITIONAL ASSESSMENT
This patient has been assessed with a concern for Malnutrition and has been determined to have a diagnosis/diagnoses of Morbid obesity (BMI > 40).    This patient is being managed with:   Diet DASH/TLC-  Sodium & Cholesterol Restricted  1000mL Fluid Restriction ([id number])  Entered: Mar  1 2021  4:25PM

## 2021-03-04 NOTE — PROGRESS NOTE ADULT - ASSESSMENT
75 year old woman with Dyspnea, debility, aortic stenosis, Type A aortic dissection and encounter for palliative care.

## 2021-03-05 NOTE — PROGRESS NOTE ADULT - ASSESSMENT
75 year old female with history of obesity, HTN, HLD, GERD, TIA with carotid artery disease s/p L CEA, CKD Stage III (baseline Cr 1.45), ARMANDO non-compliant with CPAP, Afib on Eliquis, known tuberculum meningioma, UTI (ESBL) requiring DIANA and IV abx in 7/2020, communicating hydrocephalus s/p right frontal  shunt on 8/5/20 with recent CT showing right-sided subacute SDH, severe AS with chronic diastolic CHF, EF 60%, s/p Adelaida TAVR on 5/26/20 with residual severe MR initially evaluated for MitraClip and admitted to [facility] on 3/1/21 to complete pre-procedural evaluation.  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  After multidisciplinary discussion, patient deemed extreme risk for surgical/perioperative morbidity/mortality and was referred for palliative care and ultimately made DNR/DNI after discussion with family.  She is now awaiting placement to rehab per family request.     Plan:  Neuro  -No delirium  -Pain: acetaminophen PRN   -hx of severe carotid dx s/p endarterectomy     Cardiac  -acute type A dissection, not a surgical candidate.         -BP control: Switched from esmolol gtt to Metoprolol 25mg Q12 hours        -AF hx: Holding NOAC in the setting of Type A dissection        -Palliative care following; family requesting rehab placement.   -ASA discontinued in setting of risk / benefit   -Continue Atorvastatin 40mg QHS  -Monitor on Tele   -c/w Diltizem 30mg Q8hrs, titrate as necessary    Pulm  -Encourage IS  -Maintain O2 sat >93%  -Oxygenating well on RA     GI  -DASH Diet  -Protonix 40mg QD for ppx  -Bowel regimen: senna, miralax       -CKD stage III    -Severe Fluid Overload    ID  -Afebrile   -Continue to monitor SIRS response    Endo  -A1C 4.9  -Follow up TSH, no history of thyroid dz    HEME:  -no anticoagulation at this time in setting of Aortic Dissection last dose of Eliquis 3/1/21    Dispo:  Palliative care consultated  DNR/DNI  Lab holiday

## 2021-03-05 NOTE — PROGRESS NOTE ADULT - SUBJECTIVE AND OBJECTIVE BOX
GRACE KEY             [id number]    CC: Mitral valve    HPI:  This is a 75 year old female with a history of obesity, hypertension, hyperlipidemia, GERD, TIA with carotid artery disease s/p left carotid endarterectomy, CKD Stage III (baseline creatinine 1.45), ARMANDO (non-compliant with CPAP), atrial fibrillation (on Eliquis), known tuberculum meningioma, UTI (ESBL) requiring IV antibiotics in [facility] in 2020, communicating hydrocephalus s/p right frontal  shunt on 2020 with recent CT showing a right-sided subacute subdural hematoma and chronic diastolic heart failure with severe aortic stenosis s/p TF TAVR on 2020 using Adelaida S3 (23 mm, commercial) now with significant mitral regurgitation who presents for follow up of her valvular heart disease.   Post-TAVR, the patient was discharged to rehab. Her recovery course was complicated by a UTI in rehab requiring IV antibiotics. In addition, she was readmitted to [facility] in 2020 and found to have worsening hydrocephalus treated with a  shunt. In total, the patient spent four months in [facility] before returning home.  The patient's daughter states she has not felt well since her valve procedure in May last year. Over the past several months, her shortness of breath with exertion and fatigue have gotten significantly worse. Currently, the patient reports SOB when walking less than 5 feet. She has not been walking much at all due to her symptoms. She also has had significant LE edema and reports a 10-15 pound weight gain over the past six months. The patient had a recent ECHO that showed severe mitral regurgitation. The patient denies chest pain, dizziness, syncope and palpitations. She endorses shortness of breath at rest and orthopnea.  History taken from daughter.     (01 Mar 2021 14:52)    SUBJECTIVE: Patient resting in the chair, mild SOB noted     ROS:  DYSPNEA: Y (elgin: 1)  NAUS/VOM: N	  SECRETIONS: N	  AGITATION: N  Pain (Y/N):      N  -Provocation/Palliation: n/a   -Quality/Quantity: n/a   -Radiating: n/a   -Severity:   n/a   -Timing/Frequency: n/a   -Impact on ADLs: n/a     OTHER REVIEW OF SYSTEMS: + weakness  UNABLE TO OBTAIN  due to: n/a     PEx:  T(C): 37.1 (21 @ 09:00), Max: 37.7 (21 @ 21:00)  HR: 86 (21 @ 08:30) (78 - 110)  BP: 117/57 (21 @ 08:30) (95/59 - 120/62)  RR: 19 (21 @ 08:30) (16 - 19)  SpO2: 91% (21 @ 08:30) (91% - 99%)  Wt(kg): 115.5kG      GENERAL:  [x ]Alert  [x ]Oriented x 3  [ ]Lethargic  [ ]Cachexia  [ ]Unarousable  [ x]Verbal  [ ]Non-Verbal  Behavioral:   [ ] Anxiety  [ ] Delirium [ ] Agitation [ x] Other - calm  HEENT:  [ ]Normal   [x ]Dry mouth   [ ]ET Tube/Trach  [ ]Oral lesions  PULMONARY:   [ ]Clear  [ ]Tachypnea  [ ]Audible excessive secretions [x] Dyspneic   [ x]Rhonchi        [ ]Right [ ]Left [x ]Bilateral  [ ]Crackles        [ ]Right [ ]Left [ ]Bilateral  [ ]Wheezing     [ ]Right [ ]Left [ ]Bilateral  CARDIOVASCULAR:    [x ]Regular [ ]Irregular [ ]Tachy  [ ]Kevin [ ]Murmur [ ]Other  GASTROINTESTINAL:  [x] Soft  [ ]Distended   [ ]+BS  [x ]Non tender [ ]Tender  [ ]PEG [ ]OGT/ NGT  Last BM: 3/4/2021  GENITOURINARY:  [ ]Normal [x ] Incontinent   [ ]Oliguria/Anuria   [] Foly  MUSCULOSKELETAL:   [ ]Normal   [ ]Weakness  [ x] Wheelchair bound [ ]Edema  NEUROLOGIC:   [x ]No focal deficits  [ ] Cognitive impairment  [ ] Dysphagia [ ]Dysarthria [ ] Paresis [ ]Other   SKIN:   [ x]Normal   [ ]Pressure ulcer(s)  [ ]Rash      ALLERGIES: No Known Allergies      OPIATE NAÏVE (Y/N): Y    MEDICATIONS: REVIEWED  MEDICATIONS  (STANDING):  atorvastatin 40 milliGRAM(s) Oral at bedtime  diltiazem    Tablet 30 milliGRAM(s) Oral every 6 hours  furosemide    Tablet 40 milliGRAM(s) Oral every 12 hours  latanoprost 0.005% Ophthalmic Solution 1 Drop(s) Both EYES at bedtime  levalbuterol Inhalation 0.63 milliGRAM(s) Inhalation every 6 hours  metoprolol tartrate 25 milliGRAM(s) Oral every 12 hours  pantoprazole    Tablet 40 milliGRAM(s) Oral before breakfast  polyethylene glycol 3350 17 Gram(s) Oral daily  senna 2 Tablet(s) Oral at bedtime  sodium chloride 0.9% lock flush 3 milliLiter(s) IV Push every 8 hours    MEDICATIONS  (PRN):  acetaminophen   Tablet .. 650 milliGRAM(s) Oral every 6 hours PRN Moderate pain (4 - 6)  HYDROmorphone   Tablet 1 milliGRAM(s) Oral every 2 hours PRN Severe Pain (7 - 10)  ipratropium 17 MICROgram(s) HFA Inhaler 1 Puff(s) Inhalation four times a day PRN SOB/wheezing      LABS: REVIEWED  CBC:                        10.8   7.12  )-----------( 62       ( 05 Mar 2021 06:43 )             37.8     CMP:        140  |  104  |  53<H>  ----------------------------<  96  4.2   |  25  |  2.39<H>    Ca    8.6      05 Mar 2021 06:43  Mg     2.6     -05    IMAGING: REVIEWED    ADVANCED DIRECTIVES:            DNR DNI            MOLST    DECISION MAKER:  Patient is able to make decisions for herself   LEGAL SURROGATE: Sandytoshia Key # [id number]    PSYCHOSOCIAL-SPIRITUAL ASSESSMENT:       Reviewed       Care plan unchanged    GOALS OF CARE DISCUSSION       Palliative care info/counseling provided	           Documentation of GOC:  DNR/DNI  	      AGENCY CHOICE DISCUSSED:               DIANA    REFERRALS	        Palliative Med        Unit SW/Case Mgmt       PT/OT

## 2021-03-05 NOTE — PROGRESS NOTE ADULT - SUBJECTIVE AND OBJECTIVE BOX
Patient discussed on morning rounds with Dr. Dickerson     Operation / Date:   5/26/20 TF TAVR now with severe MR, preop Mitral clip?  3/2- Found to have acute Type A dissection, no surgical intervention indicated. DNR/DNI    SUBJECTIVE ASSESSMENT:  75y Female seen at bedside with Iranian  ([id number]).  She feels weak but otherwise offers no acute complaints at this time.      Vital Signs Last 24 Hrs  T(C): 36.9 (05 Mar 2021 13:14), Max: 37.7 (04 Mar 2021 21:00)  T(F): 98.4 (05 Mar 2021 13:14), Max: 99.8 (04 Mar 2021 21:00)  HR: 81 (05 Mar 2021 12:30) (78 - 110)  BP: 144/73 (05 Mar 2021 12:30) (95/59 - 144/73)  BP(mean): 96 (05 Mar 2021 12:30) (72 - 96)  RR: 19 (05 Mar 2021 12:30) (16 - 19)  SpO2: 100% (05 Mar 2021 12:30) (91% - 100%)  I&O's Detail    04 Mar 2021 07:01  -  05 Mar 2021 07:00  --------------------------------------------------------  IN:    Oral Fluid: 620 mL  Total IN: 620 mL    OUT:    Voided (mL): 500 mL  Total OUT: 500 mL    Total NET: 120 mL    CHEST TUBE:  No.   JUAN DANIEL DRAIN:  No.  EPICARDIAL WIRES: No.  TIE DOWNS: No.  KOO: No.     PHYSICAL EXAM:  General: OOB to chair, no acute distress.  Neurological: AAOx3, no AMS or focal deficits.   Cardiovascular: RRR, S1/S2, no m/r/g.   Respiratory: No acute distress.  CTA b/l, no w/r/r.   Gastrointestinal: ND, NBS, non-TTP.   Extremities: Warm and well perfused, no calf ttp or edema b/l.   Vascular: Pulses 2+ throughout    LABS:                        10.8   7.12  )-----------( 62       ( 05 Mar 2021 06:43 )             37.8       COUMADIN:  No    PT/INR - ( 04 Mar 2021 09:58 )   PT: 18.8 sec;   INR: 1.60          PTT - ( 04 Mar 2021 09:58 )  PTT:28.6 sec    03-05    140  |  104  |  53<H>  ----------------------------<  96  4.2   |  25  |  2.39<H>    Ca    8.6      05 Mar 2021 06:43  Mg     2.6     03-05    MEDICATIONS  (STANDING):  atorvastatin 40 milliGRAM(s) Oral at bedtime  diltiazem    Tablet 30 milliGRAM(s) Oral every 6 hours  furosemide    Tablet 40 milliGRAM(s) Oral every 12 hours  latanoprost 0.005% Ophthalmic Solution 1 Drop(s) Both EYES at bedtime  levalbuterol Inhalation 0.63 milliGRAM(s) Inhalation every 6 hours  metoprolol tartrate 25 milliGRAM(s) Oral every 12 hours  pantoprazole    Tablet 40 milliGRAM(s) Oral before breakfast  polyethylene glycol 3350 17 Gram(s) Oral daily  senna 2 Tablet(s) Oral at bedtime  sodium chloride 0.9% lock flush 3 milliLiter(s) IV Push every 8 hours    MEDICATIONS  (PRN):  acetaminophen   Tablet .. 650 milliGRAM(s) Oral every 6 hours PRN Moderate pain (4 - 6)  HYDROmorphone   Tablet 1 milliGRAM(s) Oral every 2 hours PRN Severe Pain (7 - 10)  ipratropium 17 MICROgram(s) HFA Inhaler 1 Puff(s) Inhalation four times a day PRN SOB/wheezing    RADIOLOGY & ADDITIONAL TESTS:  < from: Xray Chest 1 View- PORTABLE-Routine (Xray Chest 1 View- PORTABLE-Routine in AM.) (03.05.21 @ 04:58) >    EXAM:  XR CHEST PORTABLE ROUTINE 1V                          PROCEDURE DATE:  03/05/2021          INTERPRETATION:  Clinical history/reason for exam: Aortic dissection.    Frontal chest.    Comparison: March 4, 2021.    Findings/  impression: Stableenlarged cardiomediastinal silhouette, TAVR.. Bilateral opacities/pleural effusions, decreased. Stable bony structures.    < end of copied text >

## 2021-03-05 NOTE — PROGRESS NOTE ADULT - NUTRITIONAL ASSESSMENT
This patient has been assessed with a concern for Malnutrition and has been determined to have a diagnosis/diagnoses of Morbid obesity (BMI > 40).    This patient is being managed with:   Diet DASH/TLC-  Sodium & Cholesterol Restricted  1000mL Fluid Restriction ([id number])  Entered: Mar  1 2021  4:25PM

## 2021-03-05 NOTE — PROGRESS NOTE ADULT - PROBLEM SELECTOR PLAN 6
Patient last assessed: 3/4/2021  to manage: GOC/AD, symptoms, and support.  30 Minutes; Start: 0830am End: 0900am  , of non-face-to-face prolonged service provided that relates to (face-to-face) care that has or will occur and ongoing patient management, including one or more of the following:   - Reviewed records from other physicians or other health care professional services, including one or more of the following: other medical records and diagnostic / radiology study results

## 2021-03-06 NOTE — PROGRESS NOTE ADULT - ASSESSMENT
75 year old female with history of obesity, HTN, HLD, GERD, TIA with carotid artery disease s/p L CEA, CKD Stage III (baseline Cr 1.45), ARMANDO non-compliant with CPAP, Afib on Eliquis, known tuberculum meningioma, UTI (ESBL) requiring DIANA and IV abx in 7/2020, communicating hydrocephalus s/p right frontal  shunt on 8/5/20 with recent CT showing right-sided subacute SDH, severe AS with chronic diastolic CHF, EF 60%, s/p Adelaida TAVR on 5/26/20 with residual severe MR initially evaluated for MitraClip and admitted to [facility] on 3/1/21 to complete pre-procedural evaluation.  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  After multidisciplinary discussion, patient deemed extreme risk for surgical/perioperative morbidity/mortality and was referred for palliative care and ultimately made DNR/DNI after discussion with family.  She is now awaiting placement to rehab per family request.     Plan:  Neuro  -No delirium  -Pain: acetaminophen PRN   -hx of severe carotid dx s/p endarterectomy- stable    Cardiac  -acute type A dissection, not a surgical candidate.         -BP control: Switched from esmolol gtt to Metoprolol 25mg Q12 hours        -AF hx: Holding NOAC in the setting of Type A dissection        -Palliative care following; family requesting rehab placement.   -ASA discontinued in setting of risk / benefit   -Can d/c Atorvastatin 40mg QHS  -Monitor on Tele   -c/w Diltizem 30mg Q8hrs, titrate as necessary  -on lasix 40mg daily    Pulm  -Encourage IS  -Maintain O2 sat >93%  -Oxygenating well on RA     GI  -DASH Diet  -Protonix 40mg QD for ppx  -Bowel regimen: senna, miralax       -CKD stage III    -Severe Fluid Overload    ID  -Afebrile   -Continue to monitor SIRS response    Endo  -A1C 4.9  -Follow up TSH, no history of thyroid dz    HEME:  -no anticoagulation at this time in setting of Aortic Dissection last dose of Eliquis 3/1/21    Dispo:  DNR/DNI  home hospice

## 2021-03-06 NOTE — PHYSICAL THERAPY INITIAL EVALUATION ADULT - ADDITIONAL COMMENTS
Limited subjective taken from pt, SOB with speaking. [employer]  used. Lives with children/grandchildren (?). Daughters (one or two at a time) assist with all ADLs. Pt reports using RW and WC at home, staying in chair or bed. Reports increased difficulty overall at home. Bifocals.

## 2021-03-06 NOTE — PHYSICAL THERAPY INITIAL EVALUATION ADULT - DIAGNOSIS, PT EVAL
5A: Primary Prevention/Risk Reduction for Loss of Balance and Falling , 6D: Impaired Aerobic Capacity/Endurance Associated with Cardiovascular Pump Dysfunction or Failure

## 2021-03-06 NOTE — PHYSICAL THERAPY INITIAL EVALUATION ADULT - PERTINENT HX OF CURRENT PROBLEM, REHAB EVAL
5 year old female with history of obesity, HTN, HLD, GERD, TIA with carotid artery disease s/p L CEA, CKD Stage III (baseline Cr 1.45), ARMANDO non-compliant with CPAP, Afib on Eliquis, found to have Type A dissection.

## 2021-03-06 NOTE — PHYSICAL THERAPY INITIAL EVALUATION ADULT - IMPAIRMENTS FOUND, PT EVAL
aerobic capacity/endurance/anthropometric characteristics/cognitive impairment/ergonomics and body mechanics/gait, locomotion, and balance/muscle strength/posture/ventilation and respiration/gas exchange

## 2021-03-06 NOTE — PROGRESS NOTE ADULT - SUBJECTIVE AND OBJECTIVE BOX
Operation / Date:   5/26/20 TF TAVR now with severe MR, preop Mitral clip?  3/2- Found to have acute Type A dissection, no surgical intervention indicated. DNR/DNI    SUBJECTIVE ASSESSMENT:   no acute complaints at this time.      Vital Signs Last 24 Hrs  T(C): 36.7 (06 Mar 2021 05:01), Max: 37.1 (05 Mar 2021 09:00)  T(F): 98.1 (06 Mar 2021 05:01), Max: 98.7 (05 Mar 2021 09:00)  HR: 82 (05 Mar 2021 23:43) (81 - 100)  BP: 108/53 (05 Mar 2021 23:43) (108/53 - 144/73)  BP(mean): 77 (05 Mar 2021 23:43) (77 - 97)  RR: 16 (05 Mar 2021 23:43) (16 - 19)  SpO2: 95% (05 Mar 2021 23:43) (91% - 100%)    PHYSICAL EXAM:  General: OOB to chair, no acute distress.  Neurological: AAOx3, no AMS or focal deficits.   Cardiovascular: +Systolic murmur  Respiratory: No acute distress.  CTA b/l, no w/r/r.   Gastrointestinal: ND, NBS, non-TTP.   Extremities: Warm and well perfused, no calf ttp or edema b/l.       LABS:                        10.8   7.12  )-----------( 62       ( 05 Mar 2021 06:43 )             37.8       COUMADIN:  No    PT/INR - ( 04 Mar 2021 09:58 )   PT: 18.8 sec;   INR: 1.60          PTT - ( 04 Mar 2021 09:58 )  PTT:28.6 sec    03-05    140  |  104  |  53<H>  ----------------------------<  96  4.2   |  25  |  2.39<H>    Ca    8.6      05 Mar 2021 06:43  Mg     2.6     03-05    MEDICATIONS  (STANDING):  atorvastatin 40 milliGRAM(s) Oral at bedtime  diltiazem    Tablet 30 milliGRAM(s) Oral every 6 hours  furosemide    Tablet 40 milliGRAM(s) Oral every 12 hours  latanoprost 0.005% Ophthalmic Solution 1 Drop(s) Both EYES at bedtime  levalbuterol Inhalation 0.63 milliGRAM(s) Inhalation every 6 hours  metoprolol tartrate 25 milliGRAM(s) Oral every 12 hours  pantoprazole    Tablet 40 milliGRAM(s) Oral before breakfast  polyethylene glycol 3350 17 Gram(s) Oral daily  senna 2 Tablet(s) Oral at bedtime  sodium chloride 0.9% lock flush 3 milliLiter(s) IV Push every 8 hours    MEDICATIONS  (PRN):  acetaminophen   Tablet .. 650 milliGRAM(s) Oral every 6 hours PRN Moderate pain (4 - 6)  HYDROmorphone   Tablet 1 milliGRAM(s) Oral every 2 hours PRN Severe Pain (7 - 10)  ipratropium 17 MICROgram(s) HFA Inhaler 1 Puff(s) Inhalation four times a day PRN SOB/wheezing    RADIOLOGY & ADDITIONAL TESTS:  < from: Xray Chest 1 View- PORTABLE-Routine (Xray Chest 1 View- PORTABLE-Routine in AM.) (03.05.21 @ 04:58) >    EXAM:  XR CHEST PORTABLE ROUTINE 1V                          PROCEDURE DATE:  03/05/2021          INTERPRETATION:  Clinical history/reason for exam: Aortic dissection.    Frontal chest.    Comparison: March 4, 2021.    Findings/  impression: Stableenlarged cardiomediastinal silhouette, TAVR.. Bilateral opacities/pleural effusions, decreased. Stable bony structures.    < end of copied text >

## 2021-03-06 NOTE — PHYSICAL THERAPY INITIAL EVALUATION ADULT - GENERAL OBSERVATIONS, REHAB EVAL
FIM gait=1. Spoke to RN Eddie, pt cleared for PT. Pt rcvd semi supine, +tele, +hubbard, +Primafit with audible wheezing on RA (CLEVE Vázquez made aware). [employer]  used, pt A&Ox2. Tolerated session fairly, desat to 89% with activity. Demo modA bed mob, ModA transfer, Sydnee sidestepping.

## 2021-03-07 NOTE — PROGRESS NOTE ADULT - SUBJECTIVE AND OBJECTIVE BOX
Patient discussed on morning rounds with Dr. Cedeno    Operation / Date:   5/26/20 TF TAVR now with severe MR  3/2- Found to have acute Type A dissection, no surgical intervention indicated. DNR/DNI    SUBJECTIVE ASSESSMENT:  75y Female     Vital Signs Last 24 Hrs  T(C): 36.5 (07 Mar 2021 05:04), Max: 36.5 (06 Mar 2021 09:33)  T(F): 97.7 (07 Mar 2021 05:04), Max: 97.7 (06 Mar 2021 09:33)  HR: 81 (07 Mar 2021 05:55) (74 - 100)  BP: 124/70 (07 Mar 2021 05:55) (107/61 - 139/85)  BP(mean): 90 (07 Mar 2021 05:55) (77 - 107)  RR: 20 (07 Mar 2021 05:55) (16 - 20)  SpO2: 92% (07 Mar 2021 05:55) (92% - 99%)  I&O's Detail    06 Mar 2021 07:01  -  07 Mar 2021 07:00  --------------------------------------------------------  IN:    Oral Fluid: 250 mL  Total IN: 250 mL    OUT:    Voided (mL): 100 mL  Total OUT: 100 mL    Total NET: 150 mL    CHEST TUBE:  No  JUAN DANIEL DRAIN:  No.  EPICARDIAL WIRES: No.  TIE DOWNS: No.  KOO: No.    PHYSICAL EXAM:    General:     Neurological:    Cardiovascular:    Respiratory:    Gastrointestinal:    Extremities:    Vascular:    Incision Sites:    LABS:    MEDICATIONS  (STANDING):  diltiazem    Tablet 30 milliGRAM(s) Oral every 6 hours  ferrous    sulfate 325 milliGRAM(s) Oral daily  furosemide    Tablet 40 milliGRAM(s) Oral daily  latanoprost 0.005% Ophthalmic Solution 1 Drop(s) Both EYES at bedtime  levalbuterol Inhalation 0.63 milliGRAM(s) Inhalation every 6 hours  metoprolol tartrate 25 milliGRAM(s) Oral every 12 hours  pantoprazole    Tablet 40 milliGRAM(s) Oral before breakfast  polyethylene glycol 3350 17 Gram(s) Oral daily  senna 2 Tablet(s) Oral at bedtime  sodium chloride 0.9% lock flush 3 milliLiter(s) IV Push every 8 hours    MEDICATIONS  (PRN):  acetaminophen   Tablet .. 650 milliGRAM(s) Oral every 6 hours PRN Moderate pain (4 - 6)  HYDROmorphone   Tablet 1 milliGRAM(s) Oral every 2 hours PRN Severe Pain (7 - 10)  ipratropium 17 MICROgram(s) HFA Inhaler 1 Puff(s) Inhalation four times a day PRN SOB/wheezing        RADIOLOGY & ADDITIONAL TESTS:  < from: CT Heart Congenital w/ IV Cont (03.02.21 @ 14:20) >  IMPRESSION:  1.  Type A aortic dissection from aortic root to right common carotid artery. Correlates with echocardiographic findings from same day. Increased mid ascending aortic aneurysm 5.1 cm, previously 4.8 cm.  2.  New moderate right and trace left pleural effusions with mosaic lung attenuation likely mild edema.  3.  Cardiomegaly with mechanical aortic valve prosthesis in situ.  4.  Dilated main pulmonary artery suggesting pulmonary artery hypertension.    < end of copied text >       Patient discussed on morning rounds with Dr. Cedeno    Operation / Date:   5/26/20 TF TAVR now with severe MR  3/2- Found to have acute Type A dissection, no surgical intervention indicated. DNR/DNI    SUBJECTIVE ASSESSMENT:  75y Female     Vital Signs Last 24 Hrs  T(C): 36.5 (07 Mar 2021 05:04), Max: 36.5 (06 Mar 2021 09:33)  T(F): 97.7 (07 Mar 2021 05:04), Max: 97.7 (06 Mar 2021 09:33)  HR: 81 (07 Mar 2021 05:55) (74 - 100)  BP: 124/70 (07 Mar 2021 05:55) (107/61 - 139/85)  BP(mean): 90 (07 Mar 2021 05:55) (77 - 107)  RR: 20 (07 Mar 2021 05:55) (16 - 20)  SpO2: 92% (07 Mar 2021 05:55) (92% - 99%)  I&O's Detail    06 Mar 2021 07:01  -  07 Mar 2021 07:00  --------------------------------------------------------  IN:    Oral Fluid: 250 mL  Total IN: 250 mL    OUT:    Voided (mL): 100 mL  Total OUT: 100 mL    Total NET: 150 mL    CHEST TUBE:  No  JUAN DANIEL DRAIN:  No.  EPICARDIAL WIRES: No.  TIE DOWNS: No.  KOO: No.    PHYSICAL EXAM:    General: NAD, lethargic, in chair    Neurological: no focal neuro deficits    Cardiovascular: tachycardic, +systolic murmur    Respiratory: CTA b/l    Gastrointestinal: soft, NT    Extremities: warm, well-perfused, large LE edema (unchanged)      Incision Sites:    LABS:    MEDICATIONS  (STANDING):  diltiazem    Tablet 30 milliGRAM(s) Oral every 6 hours  ferrous    sulfate 325 milliGRAM(s) Oral daily  furosemide    Tablet 40 milliGRAM(s) Oral daily  latanoprost 0.005% Ophthalmic Solution 1 Drop(s) Both EYES at bedtime  levalbuterol Inhalation 0.63 milliGRAM(s) Inhalation every 6 hours  metoprolol tartrate 25 milliGRAM(s) Oral every 12 hours  pantoprazole    Tablet 40 milliGRAM(s) Oral before breakfast  polyethylene glycol 3350 17 Gram(s) Oral daily  senna 2 Tablet(s) Oral at bedtime  sodium chloride 0.9% lock flush 3 milliLiter(s) IV Push every 8 hours    MEDICATIONS  (PRN):  acetaminophen   Tablet .. 650 milliGRAM(s) Oral every 6 hours PRN Moderate pain (4 - 6)  HYDROmorphone   Tablet 1 milliGRAM(s) Oral every 2 hours PRN Severe Pain (7 - 10)  ipratropium 17 MICROgram(s) HFA Inhaler 1 Puff(s) Inhalation four times a day PRN SOB/wheezing        RADIOLOGY & ADDITIONAL TESTS:  < from: CT Heart Congenital w/ IV Cont (03.02.21 @ 14:20) >  IMPRESSION:  1.  Type A aortic dissection from aortic root to right common carotid artery. Correlates with echocardiographic findings from same day. Increased mid ascending aortic aneurysm 5.1 cm, previously 4.8 cm.  2.  New moderate right and trace left pleural effusions with mosaic lung attenuation likely mild edema.  3.  Cardiomegaly with mechanical aortic valve prosthesis in situ.  4.  Dilated main pulmonary artery suggesting pulmonary artery hypertension.    < end of copied text >       Patient discussed on morning rounds with Dr. Cedeno    Operation / Date:   5/26/20 TF TAVR now with severe MR  3/2- Found to have acute Type A dissection, no surgical intervention indicated. DNR/DNI    SUBJECTIVE ASSESSMENT:  75y Female assessed at bedside with German phone  ID [id number]. Patient feels SOB with some wheezing this morning. She also endorses some right sided chest pain. Denies fever, chills, nausea, vomiting.     Vital Signs Last 24 Hrs  T(C): 36.5 (07 Mar 2021 05:04), Max: 36.5 (06 Mar 2021 09:33)  T(F): 97.7 (07 Mar 2021 05:04), Max: 97.7 (06 Mar 2021 09:33)  HR: 81 (07 Mar 2021 05:55) (74 - 100)  BP: 124/70 (07 Mar 2021 05:55) (107/61 - 139/85)  BP(mean): 90 (07 Mar 2021 05:55) (77 - 107)  RR: 20 (07 Mar 2021 05:55) (16 - 20)  SpO2: 92% (07 Mar 2021 05:55) (92% - 99%)  I&O's Detail    06 Mar 2021 07:01  -  07 Mar 2021 07:00  --------------------------------------------------------  IN:    Oral Fluid: 250 mL  Total IN: 250 mL    OUT:    Voided (mL): 100 mL  Total OUT: 100 mL    Total NET: 150 mL    CHEST TUBE:  No  JUAN DANIEL DRAIN:  No.  EPICARDIAL WIRES: No.  TIE DOWNS: No.  KOO: No.    PHYSICAL EXAM:    General: NAD, lethargic, in chair    Neurological: no focal neuro deficits    Cardiovascular: tachycardic, +systolic murmur    Respiratory: CTA b/l    Gastrointestinal: soft, NT    Extremities: warm, well-perfused, large LE edema (unchanged)      Incision Sites:    LABS:    MEDICATIONS  (STANDING):  diltiazem    Tablet 30 milliGRAM(s) Oral every 6 hours  ferrous    sulfate 325 milliGRAM(s) Oral daily  furosemide    Tablet 40 milliGRAM(s) Oral daily  latanoprost 0.005% Ophthalmic Solution 1 Drop(s) Both EYES at bedtime  levalbuterol Inhalation 0.63 milliGRAM(s) Inhalation every 6 hours  metoprolol tartrate 25 milliGRAM(s) Oral every 12 hours  pantoprazole    Tablet 40 milliGRAM(s) Oral before breakfast  polyethylene glycol 3350 17 Gram(s) Oral daily  senna 2 Tablet(s) Oral at bedtime  sodium chloride 0.9% lock flush 3 milliLiter(s) IV Push every 8 hours    MEDICATIONS  (PRN):  acetaminophen   Tablet .. 650 milliGRAM(s) Oral every 6 hours PRN Moderate pain (4 - 6)  HYDROmorphone   Tablet 1 milliGRAM(s) Oral every 2 hours PRN Severe Pain (7 - 10)  ipratropium 17 MICROgram(s) HFA Inhaler 1 Puff(s) Inhalation four times a day PRN SOB/wheezing        RADIOLOGY & ADDITIONAL TESTS:  < from: CT Heart Congenital w/ IV Cont (03.02.21 @ 14:20) >  IMPRESSION:  1.  Type A aortic dissection from aortic root to right common carotid artery. Correlates with echocardiographic findings from same day. Increased mid ascending aortic aneurysm 5.1 cm, previously 4.8 cm.  2.  New moderate right and trace left pleural effusions with mosaic lung attenuation likely mild edema.  3.  Cardiomegaly with mechanical aortic valve prosthesis in situ.  4.  Dilated main pulmonary artery suggesting pulmonary artery hypertension.    < end of copied text >

## 2021-03-07 NOTE — PROGRESS NOTE ADULT - ASSESSMENT
A/P:    75 year old female with history of obesity, HTN, HLD, GERD, TIA with carotid artery disease s/p L CEA, CKD Stage III (baseline Cr 1.45), ARMANDO non-compliant with CPAP, Afib on Eliquis, known tuberculum meningioma, UTI (ESBL) requiring DIANA and IV abx in 7/2020, communicating hydrocephalus s/p right frontal  shunt on 8/5/20 with recent CT showing right-sided subacute SDH, severe AS with chronic diastolic CHF, EF 60%, s/p Adelaida TAVR on 5/26/20 with residual severe MR initially evaluated for MitraClip and admitted to [facility] on 3/1/21 to complete pre-procedural evaluation.  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  After multidisciplinary discussion, patient deemed extreme risk for surgical/perioperative morbidity/mortality and was referred for palliative care and ultimately made DNR/DNI after discussion with family.  She is now awaiting placement to rehab vs. hospice per family request.     Neurovascular:   - No delirium. Pain well controlled with current regimen.  -Continue tylenol PRN  - Per palliative continue dilauded 1 mg q2 hours PRN pain or increased work of breathing  - Hx severe carotid disease s/p endarterectomy: stable     Cardiovascular:   - Acute type A dissection found 3/2, not a surgical candidate. Continue BP control with metoprolol 25 mg q12 hours  -Hemodynamically stable. HR controlled (), continue cardizem 30 mg q6 hours   - Hx of HTN, BP controlled (110//85)  - Hx AF: holding AC in the setting of type A dissection   - ASA and atorvastatin discontinued in setting of risk/ benefit   - HFpEF: continue lasix 40 mg daily     Respiratory:   - 02 Sat = 98% on 3L NC  -If on oxygen wean to RA from for O2 Sat > 93%.  -Encourage C+DB and Use of IS 10x / hr while awake.  -No indication for daily CXR, comfort care     GI:   - Stable.  -Continue GI PPX with protonix  -PO Diet.    Renal / :  - CKD stage III, no daily labs   - Severe fluid overload, continue diuresis 40 lasix daily   - Replete electrolytes PRN    Endocrine:    -A1c 4.9, no known hx diabetes  -TSH 3.904, no known hx thyroid disease     Hematologic:  -No obvious signs of bleeding  - AC (including SQH) held in setting of type A dissection, last dose eliquis 3/1/21    ID:  -Pt remains afebrile with no signs of infection  -Continue to monitor CBC  -Observe for SIRS/Sepsis Syndrome.    Prophylaxis:  -DVT prophylaxis held   -SCD's    Disposition:  -DNR/DNI  - Awaiting discharge to rehab vs. hospice      A/P:    75 year old female with history of obesity, HTN, HLD, GERD, TIA with carotid artery disease s/p L CEA, CKD Stage III (baseline Cr 1.45), ARMANDO non-compliant with CPAP, Afib on Eliquis, known tuberculum meningioma, UTI (ESBL) requiring DIANA and IV abx in 7/2020, communicating hydrocephalus s/p right frontal  shunt on 8/5/20 with recent CT showing right-sided subacute SDH, severe AS with chronic diastolic CHF, EF 60%, s/p Adelaida TAVR on 5/26/20 with residual severe MR initially evaluated for MitraClip and admitted to [facility] on 3/1/21 to complete pre-procedural evaluation.  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  After multidisciplinary discussion, patient deemed extreme risk for surgical/perioperative morbidity/mortality and was referred for palliative care and ultimately made DNR/DNI after discussion with family.  She is now awaiting placement to rehab vs. hospice per family request.     Neurovascular:   - No delirium. Pain well controlled with current regimen.  -Continue tylenol PRN  - Per palliative continue dilauded 1 mg IV q4 hours PRN pain or increased work of breathing  - Hx severe carotid disease s/p endarterectomy: stable     Cardiovascular:   - Acute type A dissection found 3/2, not a surgical candidate. Continue BP control with metoprolol 25 mg q12 hours  -Hemodynamically stable. HR controlled (), continue cardizem 30 mg q6 hours   - Hx of HTN, BP controlled (110//85)  - Hx AF: holding AC in the setting of type A dissection   - ASA and atorvastatin discontinued in setting of risk/ benefit   - HFpEF: continue lasix 40 mg daily     Respiratory:   - 02 Sat = 98% on 3L NC  -If on oxygen wean to RA from for O2 Sat > 93%.  -Encourage C+DB and Use of IS 10x / hr while awake.  -No indication for daily CXR, comfort care     GI:   - Stable.  -Continue GI PPX with protonix  -PO Diet.    Renal / :  - CKD stage III, no daily labs   - Severe fluid overload, continue diuresis 40 lasix daily   - Replete electrolytes PRN    Endocrine:    -A1c 4.9, no known hx diabetes  -TSH 3.904, no known hx thyroid disease     Hematologic:  -No obvious signs of bleeding  - AC (including SQH) held in setting of type A dissection, last dose eliquis 3/1/21    ID:  -Pt remains afebrile with no signs of infection  -Continue to monitor CBC  -Observe for SIRS/Sepsis Syndrome.    Prophylaxis:  -DVT prophylaxis held   -SCD's    Disposition:  -DNR/DNI  - Awaiting discharge to rehab vs. hospice

## 2021-03-07 NOTE — PROGRESS NOTE ADULT - NUTRITIONAL ASSESSMENT
This patient has been assessed with a concern for Malnutrition and has been determined to have a diagnosis/diagnoses of Morbid obesity (BMI > 40).    This patient is being managed with:   Diet Regular-  Supplement Feeding Modality:  Oral  Ensure Enlive Cans or Servings Per Day:  1       Frequency:  Three Times a day  Entered: Mar  5 2021  2:41PM

## 2021-03-07 NOTE — PROGRESS NOTE ADULT - ATTENDING COMMENTS
74 y/o F with pmhx above with severe MR and found to have Type A dissection  -hospice care  -pall care recs  -continue current medication regimen  -no blood draws

## 2021-03-08 NOTE — PROGRESS NOTE ADULT - SUBJECTIVE AND OBJECTIVE BOX
Patient discussed on morning rounds with Dr. Dickerson    Operation / Date:   5/26/20 TF TAVR now with severe MR  3/2- Found to have acute Type A dissection, no surgical intervention indicated. DNR/DNI    SUBJECTIVE ASSESSMENT:  75y Female assessed at bedside with Bel[place]ian phone  ID [id number]. She states overall she feels "not well." Her breathing and cough are bothering her most. She states her pain is minimal today.     Vital Signs Last 24 Hrs  T(C): 36.4 (08 Mar 2021 13:00), Max: 36.7 (07 Mar 2021 21:42)  T(F): 97.6 (08 Mar 2021 13:00), Max: 98.1 (07 Mar 2021 21:42)  HR: 117 (08 Mar 2021 07:28) (92 - 117)  BP: 122/98 (08 Mar 2021 04:25) (117/73 - 132/83)  BP(mean): 108 (08 Mar 2021 04:25) (89 - 108)  RR: 12 (08 Mar 2021 04:25) (12 - 20)  SpO2: 100% (08 Mar 2021 07:28) (96% - 100%)  I&O's Detail    07 Mar 2021 07:01  -  08 Mar 2021 07:00  --------------------------------------------------------  IN:  Total IN: 0 mL    OUT:    Voided (mL): 450 mL  Total OUT: 450 mL    Total NET: -450 mL      08 Mar 2021 07:01  -  08 Mar 2021 16:08  --------------------------------------------------------  IN:    Oral Fluid: 120 mL  Total IN: 120 mL    OUT:  Total OUT: 0 mL    Total NET: 120 mL    CHEST TUBE:  No  JUAN DANIEL DRAIN:  No.  EPICARDIAL WIRES: No.  TIE DOWNS: No.  KOO: No.    PHYSICAL EXAM:    General: NAD, labored breathing.     Neurological: no focal neuro deficits    Cardiovascular: tachycardic, +systolic murmur    Respiratory: bilateral wheezing     Gastrointestinal: soft, NT    Extremities: warm, well-perfused, large LE edema (unchanged)      LABS:    MEDICATIONS  (STANDING):  diltiazem    Tablet 30 milliGRAM(s) Oral every 6 hours  ferrous    sulfate 325 milliGRAM(s) Oral daily  furosemide    Tablet 40 milliGRAM(s) Oral daily  latanoprost 0.005% Ophthalmic Solution 1 Drop(s) Both EYES at bedtime  levalbuterol Inhalation 0.63 milliGRAM(s) Inhalation every 6 hours  metoprolol tartrate 25 milliGRAM(s) Oral every 12 hours  pantoprazole    Tablet 40 milliGRAM(s) Oral before breakfast  polyethylene glycol 3350 17 Gram(s) Oral daily  senna 2 Tablet(s) Oral at bedtime  sodium chloride 0.9% lock flush 3 milliLiter(s) IV Push every 8 hours    MEDICATIONS  (PRN):  acetaminophen   Tablet .. 650 milliGRAM(s) Oral every 6 hours PRN Moderate pain (4 - 6)  HYDROmorphone  Injectable 1 milliGRAM(s) IV Push every 4 hours PRN Increased work of breathing or severe pain  ipratropium 17 MICROgram(s) HFA Inhaler 1 Puff(s) Inhalation four times a day PRN SOB/wheezing        RADIOLOGY & ADDITIONAL TESTS:

## 2021-03-08 NOTE — PROGRESS NOTE ADULT - SUBJECTIVE AND OBJECTIVE BOX
GRACE KEY             [id number]    CC: Mitral valve    HPI:  This is a 75 year old female with a history of obesity, hypertension, hyperlipidemia, GERD, TIA with carotid artery disease s/p left carotid endarterectomy, CKD Stage III (baseline creatinine 1.45), ARMANDO (non-compliant with CPAP), atrial fibrillation (on Eliquis), known tuberculum meningioma, UTI (ESBL) requiring IV antibiotics in [facility] in July 2020, communicating hydrocephalus s/p right frontal  shunt on 8/5/2020 with recent CT showing a right-sided subacute subdural hematoma and chronic diastolic heart failure with severe aortic stenosis s/p TF TAVR on 05/26/2020 using Adelaida S3 (23 mm, commercial) now with significant mitral regurgitation who presents for follow up of her valvular heart disease.   Post-TAVR, the patient was discharged to rehab. Her recovery course was complicated by a UTI in rehab requiring IV antibiotics. In addition, she was readmitted to [facility] in July 2020 and found to have worsening hydrocephalus treated with a  shunt. In total, the patient spent four months in [facility] before returning home.  The patient's daughter states she has not felt well since her valve procedure in May last year. Over the past several months, her shortness of breath with exertion and fatigue have gotten significantly worse. Currently, the patient reports SOB when walking less than 5 feet. She has not been walking much at all due to her symptoms. She also has had significant LE edema and reports a 10-15 pound weight gain over the past six months. The patient had a recent ECHO that showed severe mitral regurgitation. The patient denies chest pain, dizziness, syncope and palpitations. She endorses shortness of breath at rest and orthopnea.  History taken from daughter.     (01 Mar 2021 14:52)    SUBJECTIVE:  Patient resting in bed in mild distress.     ROS:  DYSPNEA: Y Shanta: 3  NAUS/VOM: N	  SECRETIONS: N  AGITATION: N  Pain (Y/N):     N  -Provocation/Palliation: n/a   -Quality/Quantity:  n/a   -Radiating:  n/a   -Severity:  n/a   -Timing/Frequency:  n/a   -Impact on ADLs:  n/a     OTHER REVIEW OF SYSTEMS: + weakness  UNABLE TO OBTAIN  due to: n/a     PEx:  T(C): 36.6 (03-08-21 @ 09:45), Max: 36.7 (03-07-21 @ 14:26)  HR: 117 (03-08-21 @ 07:28) (84 - 117)  BP: 122/98 (03-08-21 @ 04:25) (117/73 - 132/83)  RR: 12 (03-08-21 @ 04:25) (12 - 20)  SpO2: 100% (03-08-21 @ 07:28) (92% - 100%)  Wt(kg): 115.5kg      GENERAL:  [x ]Alert  [x ]Oriented x 3  [ ]Lethargic  [ ]Cachexia  [ ]Unarousable  [ x]Verbal  [ ]Non-Verbal  Behavioral:   [ ] Anxiety  [ ] Delirium [ ] Agitation [ x] Other - calm  HEENT:  [ ]Normal   [x ]Dry mouth   [ ]ET Tube/Trach  [ ]Oral lesions  PULMONARY:   [ ]Clear  [ ]Tachypnea  [ ]Audible excessive secretions [x] Dyspneic   [ x]Rhonchi        [ ]Right [ ]Left [x ]Bilateral  [ ]Crackles        [ ]Right [ ]Left [ ]Bilateral  [ ]Wheezing     [ ]Right [ ]Left [ ]Bilateral  CARDIOVASCULAR:    [x ]Regular [ ]Irregular [ ]Tachy  [ ]Kevin [ ]Murmur [ ]Other  GASTROINTESTINAL:  [x] Soft  [ ]Distended   [ ]+BS  [x ]Non tender [ ]Tender  [ ]PEG [ ]OGT/ NGT  Last BM: 3/6/2021  GENITOURINARY:  [ ]Normal [x ] Incontinent   [ ]Oliguria/Anuria   [] Foly  MUSCULOSKELETAL:   [ ]Normal   [ ]Weakness  [ x] Wheelchair bound [ ]Edema  NEUROLOGIC:   [x ]No focal deficits  [ ] Cognitive impairment  [ ] Dysphagia [ ]Dysarthria [ ] Paresis [ ]Other   SKIN:   [ x]Normal   [ ]Pressure ulcer(s)  [ ]Rash    ALLERGIES: No Known Allergies    OPIATE NAÏVE (Y/N): Y    MEDICATIONS: REVIEWED  MEDICATIONS  (STANDING):  diltiazem    Tablet 30 milliGRAM(s) Oral every 6 hours  ferrous    sulfate 325 milliGRAM(s) Oral daily  furosemide    Tablet 40 milliGRAM(s) Oral daily  latanoprost 0.005% Ophthalmic Solution 1 Drop(s) Both EYES at bedtime  levalbuterol Inhalation 0.63 milliGRAM(s) Inhalation every 6 hours  metoprolol tartrate 25 milliGRAM(s) Oral every 12 hours  pantoprazole    Tablet 40 milliGRAM(s) Oral before breakfast  polyethylene glycol 3350 17 Gram(s) Oral daily  senna 2 Tablet(s) Oral at bedtime  sodium chloride 0.9% lock flush 3 milliLiter(s) IV Push every 8 hours    MEDICATIONS  (PRN):  acetaminophen   Tablet .. 650 milliGRAM(s) Oral every 6 hours PRN Moderate pain (4 - 6)  HYDROmorphone  Injectable 1 milliGRAM(s) IV Push every 4 hours PRN Increased work of breathing or severe pain  ipratropium 17 MICROgram(s) HFA Inhaler 1 Puff(s) Inhalation four times a day PRN SOB/wheezing    LABS: REVIEWED    IMAGING: REVIEWED    ADVANCED DIRECTIVES:            DNR DNI            MOLST    DECISION MAKER:  Patient is able to make decisions for herself   LEGAL SURROGATE: [place] # [id number]    PSYCHOSOCIAL-SPIRITUAL ASSESSMENT:       Reviewed       Care plan unchanged    GOALS OF CARE DISCUSSION       Palliative care info/counseling provided	           Documentation of GOC:  DNR/DNI  	      AGENCY CHOICE DISCUSSED:               Hospice    REFERRALS	        Palliative Med        Unit SW/Case Mgmt       PT/OT

## 2021-03-08 NOTE — PROGRESS NOTE ADULT - PROBLEM SELECTOR PLAN 6
Patient last assessed: 3/4/2021  to manage: GOC/AD, symptoms, and support.  30 Minutes; Start: 0830am End: 0900am  , of non-face-to-face prolonged service provided that relates to (face-to-face) care that has or will occur and ongoing patient management, including one or more of the following:   - Reviewed records from other physicians or other health care professional services, including one or more of the following: other medical records and diagnostic / radiology study results Patient last assessed: 3/5/2021  to manage: GOC/AD, symptoms, and support.  30 Minutes; Start: 0830am End: 0900am  , of non-face-to-face prolonged service provided that relates to (face-to-face) care that has or will occur and ongoing patient management, including one or more of the following:   - Reviewed records from other physicians or other health care professional services, including one or more of the following: other medical records and diagnostic / radiology study results

## 2021-03-08 NOTE — CHART NOTE - NSCHARTNOTEFT_GEN_A_CORE
Admitting Diagnosis:   Patient is a 75y old  Female who presents with a chief complaint of Mitral valve (08 Mar 2021 11:24)      PAST MEDICAL & SURGICAL HISTORY:  H/O carotid stenosis    TIA (transient ischemic attack)    Chronic kidney disease (CKD)    Aortic stenosis    HLD (hyperlipidemia)    HTN (hypertension)    H/O carotid endarterectomy        Current Nutrition Order: regular + Ensure Enlive TID (1050 kcal, 60g protein, 540mL free H2O)        PO Intake: Good (%) [   ]  Fair (50-75%) [ x  ] Poor (<25%) [   ]    GI Issues: no n/v/d/c    Pain: comfortable in bed     Skin Integrity:  1+ generalized edema  2+ B/L feet   Adam score 17     Labs:         CAPILLARY BLOOD GLUCOSE          Medications:  MEDICATIONS  (STANDING):  benzonatate 100 milliGRAM(s) Oral every 8 hours  diltiazem    Tablet 30 milliGRAM(s) Oral every 6 hours  ferrous    sulfate 325 milliGRAM(s) Oral daily  furosemide    Tablet 40 milliGRAM(s) Oral daily  latanoprost 0.005% Ophthalmic Solution 1 Drop(s) Both EYES at bedtime  levalbuterol Inhalation 0.63 milliGRAM(s) Inhalation every 6 hours  metoprolol tartrate 25 milliGRAM(s) Oral every 12 hours  pantoprazole    Tablet 40 milliGRAM(s) Oral before breakfast  polyethylene glycol 3350 17 Gram(s) Oral daily  senna 2 Tablet(s) Oral at bedtime  sodium chloride 0.9% lock flush 3 milliLiter(s) IV Push every 8 hours    MEDICATIONS  (PRN):  acetaminophen   Tablet .. 650 milliGRAM(s) Oral every 6 hours PRN Moderate pain (4 - 6)  HYDROmorphone  Injectable 1 milliGRAM(s) IV Push every 4 hours PRN Increased work of breathing or severe pain  ipratropium 17 MICROgram(s) HFA Inhaler 1 Puff(s) Inhalation four times a day PRN SOB/wheezing      Weight: 115.5kg     Weight Change: no new wts     Nutrition Focused Physical Exam: Completed [   ]  Not Pertinent [ x  ]    Estimated energy needs:   Ideal body weight used for calculations as pt >120% of IBW. (255% IBW). Nutrient needs based on [facility] standards of care for maintenance in adults, adjusted for CHF, age, fluid per team  1125-1350kcal (25-30kcal/kg)   54-63g pro (1.2-1.4g/kg pro)     Subjective:   75F with a history of obesity, hypertension, hyperlipidemia, GERD, TIA with carotid artery disease s/p left carotid endarterectomy, CKD Stage III (baseline creatinine 1.45), ARMANDO (non-compliant with CPAP), atrial fibrillation (on Eliquis), known tuberculum meningioma, UTI (ESBL) requiring IV antibiotics in [facility] in July 2020, communicating hydrocephalus s/p right frontal  shunt on 8/5/2020 with recent CT showing a right-sided subacute subdural hematoma and chronic diastolic heart failure with severe aortic stenosis s/p TF TAVR on 05/26/2020 using Adelaida S3 (23 mm, commercial) now with significant mitral regurgitation who presents for follow up of her valvular heart disease.  Post-TAVR, the patient was discharged to rehab. Her recovery course was complicated by a UTI in rehab requiring IV antibiotics. In addition, she was readmitted to [facility] in July 2020 and found to have worsening hydrocephalus treated with a  shunt. In total, the patient spent four months in [facility] before returning home.  The patient's daughter states she has not felt well since her valve procedure in May last year. Over the past several months, her shortness of breath with exertion and fatigue have gotten significantly worse. Currently, the patient reports SOB when walking less than 5 feet. She has not been walking much at all due to her symptoms. She also has had significant LE edema and reports a 4.5-7kg weight gain over the past six months.     Admitted for CHF exacerbation and possible mitral clip. Known to writer from past several admissions, most recent this July where wt noted at 107kg, noting wt increase of 8kg, fairly consistent with pt report. Suspect wt changes in setting of fluid retention/ CHF. Pt moved from 9E to 9L for monitoring and management/ dispo planning. Pt stable for DC to [facility] vs hospice, dtr initially refusing hospice now amenable however DC issues as pt not accepted to first choice facilities. Pending new referrals. Will align nutrition with GOC at all times.     Previous Nutrition Diagnosis: Increased nutrient needs r/t hypermetabolic state AEB CHF needs     Active [ x  ]  Resolved [   ]    If resolved, new PES:     Goal:  meet >75% EER    Recommendations:  1. Trend wts   2. Manage pain prn   3. Monitor and replete lytes   4. Encourage intake through day   5. Align nutrition with GOC   6. Honor food preferences     Education: n/a    Risk Level: High [   ] Moderate [  x ] Low [   ]
PALLIATIVE MEDICINE COORDINATION OF CARE NOTE FOR GRACE KEY  [  ] ED Trigger   [  ] MICU Trigger     [  X] Consult    [  ] AI Comanagement    Never seen by palliative in the past    __30___ Minutes; Start: __0200om___  End: _0230pm___, of non-face-to-face prolonged service provided that relates to (face-to-face) care that has or will occur and ongoing patient management, including one or more of the following:   - Reviewed records from other physicians or other health care professional services, including one or more of the following: other medical records and diagnostic / radiology study results     HPI:  This is a 75 year old female with a history of obesity, hypertension, hyperlipidemia, GERD, TIA with carotid artery disease s/p left carotid endarterectomy, CKD Stage III (baseline creatinine 1.45), ARMANDO (non-compliant with CPAP), atrial fibrillation (on Eliquis), known tuberculum meningioma, UTI (ESBL) requiring IV antibiotics in [facility] in July 2020, communicating hydrocephalus s/p right frontal  shunt on 8/5/2020 with recent CT showing a right-sided subacute subdural hematoma and chronic diastolic heart failure with severe aortic stenosis s/p TF TAVR on 05/26/2020 using Adelaida S3 (23 mm, commercial) now with significant mitral regurgitation who presents for follow up of her valvular heart disease.   Post-TAVR, the patient was discharged to rehab. Her recovery course was complicated by a UTI in rehab requiring IV antibiotics. In addition, she was readmitted to [facility] in July 2020 and found to have worsening hydrocephalus treated with a  shunt. In total, the patient spent four months in [facility] before returning home.  The patient's daughter states she has not felt well since her valve procedure in May last year. Over the past several months, her shortness of breath with exertion and fatigue have gotten significantly worse. Currently, the patient reports SOB when walking less than 5 feet. She has not been walking much at all due to her symptoms. She also has had significant LE edema and reports a 10-15 pound weight gain over the past six months. The patient had a recent ECHO that showed severe mitral regurgitation. The patient denies chest pain, dizziness, syncope and palpitations. She endorses shortness of breath at rest and orthopnea.  History taken from daughter.     (01 Mar 2021 14:52)      - Other: [facility] reviewed.    Rx found on [facility] review. Ref #: [id number]  02/18/2021	02/18/2021	alprazolam 0.25 mg tablet	60	30	Saleem Barnes    - Other: Medication reviewed.    The patient HAS NOT used PRN's in the last 24h.    MEDICATIONS  (STANDING):  aspirin  chewable 81 milliGRAM(s) Oral daily  atorvastatin 40 milliGRAM(s) Oral at bedtime  diltiazem    Tablet 30 milliGRAM(s) Oral every 6 hours  esMOLOL  Infusion 50 MICROgram(s)/kG/Min (34.7 mL/Hr) IV Continuous <Continuous>  furosemide   Injectable 20 milliGRAM(s) IV Push every 12 hours  latanoprost 0.005% Ophthalmic Solution 1 Drop(s) Both EYES at bedtime  metoprolol tartrate 25 milliGRAM(s) Oral every 12 hours  pantoprazole    Tablet 40 milliGRAM(s) Oral before breakfast  polyethylene glycol 3350 17 Gram(s) Oral daily  senna 2 Tablet(s) Oral at bedtime  sodium chloride 0.9% lock flush 3 milliLiter(s) IV Push every 8 hours    MEDICATIONS  (PRN):  acetaminophen   Tablet .. 650 milliGRAM(s) Oral every 6 hours PRN Severe Pain (7 - 10)      - Other: Advanced directives     DNR DNI     MOLST form found on [facility] stating Full code on 7/8/2020 admission page 2      No documented HCP form found on [facility]     No Living will / POA / Advance directives found on [place] / [facility].     No documented GOC notes on [facility]    - Other: Coordination/Plan of care     ___4_ admissions in 1 year     Current admission LOS: _2__ days     LACE score: _7___ NOT ADVANCE ILLNESS PATIENT.     Patient NOT previously seen by palliative medicine consult service.      Consult request for: GOC and support.     Full consult to follow within 24h.
Patient was seen in ECHO holding.  Called by Dr. Long after dissection flap in ascending aorta seen on TTE.  Interpretation given by bedside nurse who is Polish speaking.  Patient denied chest pain but endorse shortness of breath.  Place 6L NC, satting >93%.  AAOx3.  BP cycled, SBP 140s, HR 80-120s atrial fibrillation.  Patient taken to CT for dissection protocol.  CT showed Type A dissection.  Patient transferred to CTICU for further management.  Discussed with Dr. Dickerson.

## 2021-03-08 NOTE — DISCHARGE NOTE PROVIDER - NSDCMRMEDTOKEN_GEN_ALL_CORE_FT
atorvastatin 40 mg oral tablet: 1 tab(s) orally once a day (at bedtime)  dilTIAZem 120 mg/24 hours oral capsule, extended release: 1 cap(s) orally once a day  Eliquis 5 mg oral tablet: 1 tab(s) orally 2 times a day  pantoprazole 40 mg oral delayed release tablet: 1 tab(s) orally once a day (before a meal)  Toprol- mg oral tablet, extended release: 1 tab(s) orally once a day  torsemide 10 mg oral tablet: 1 tab(s) orally once a day

## 2021-03-08 NOTE — DISCHARGE NOTE PROVIDER - DETAILS OF MALNUTRITION DIAGNOSIS/DIAGNOSES
This patient has been assessed with a concern for Malnutrition and was treated during this hospitalization for the following Nutrition diagnosis/diagnoses:     -  03/03/2021: Morbid obesity (BMI > 40)    This patient has been assessed with a concern for Malnutrition and was treated during this hospitalization for the following Nutrition diagnosis/diagnoses:     -  03/03/2021: Morbid obesity (BMI > 40)

## 2021-03-08 NOTE — DISCHARGE NOTE PROVIDER - HOSPITAL COURSE
75 year old female with PMH of HTN, HLD, GERD, TIA with carotid artery disease s/p left CEA, Stage III CKD (baseline Cr 1.4), ARMANDO (non compliant with CPAP), atrial fibrillation (on Eliquis), ESBL UTI requiring IV abx July 2020, tuberculum meningioma, communicating hydrocephalus s/p R frontal  shunt on 8/5/2020 with recent CT head revealing right sided subacute subdural hematoma, chronic diastolic heart failure and severe aortic stenosis s/p TAVR 5/26/2020 with Dr. Dickerson now found to have significant MR  presented to [facility] for workup for MR and acute on chronic CHF exacerbation on 3/1/2021. On 3/2, TTE revealed acute type A dissection. She was transferred   to CTICU on started on esmolol gtt. 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 She was deemed a high risk surgical patient with overall poor prognosis. After goals of care discussion she was made DNR/DNI. Palliative care was consulted.              3/2-Acute type A dissection on TTE, transfered to [address] [place] [address] placed started on esmolol drip. No surgical intervetion indicated at this time.   3/3- [place] dcd, DNR/DNI placed, transfer to [facility]  3/1/21: admitted for heart failure optimization    3/3- Barbie removed, esmolol transitioned to Betablocker   -DNR/DNI placed with Dr. Grimm and Jayla Davidson   -SBP goals <130   -Palliative care consult placed    3/4  - palliative care meeting today, pending long term plan     -Family originally discussed DIANA, per kliger patient best option is hospice should rediscuss in AM  - discontinued aspirin  -Lasix switched to PO regemin     3/5-3/6: Comfort care.  Awaiting rehab vs. inpatient palliatve for Sunday or Monday.  Needs COVID swab on Sunday.

## 2021-03-08 NOTE — PROGRESS NOTE ADULT - ASSESSMENT
A/P:    75 year old female with history of obesity, HTN, HLD, GERD, TIA with carotid artery disease s/p L CEA, CKD Stage III (baseline Cr 1.45), ARMANDO non-compliant with CPAP, Afib on Eliquis, known tuberculum meningioma, UTI (ESBL) requiring DIANA and IV abx in 7/2020, communicating hydrocephalus s/p right frontal  shunt on 8/5/20 with recent CT showing right-sided subacute SDH, severe AS with chronic diastolic CHF, EF 60%, s/p Adelaida TAVR on 5/26/20 with residual severe MR initially evaluated for MitraClip and admitted to [facility] on 3/1/21 to complete pre-procedural evaluation.  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  After multidisciplinary discussion, patient deemed extreme risk for surgical/perioperative morbidity/mortality and was referred for palliative care and ultimately made DNR/DNI after discussion with family.  She is now awaiting placement to rehab vs. hospice per family request.     Neurovascular:   - No delirium. Pain well controlled with current regimen.  -Continue tylenol PRN  - Per palliative continue dilauded 1 mg IV q4 hours PRN pain or increased work of breathing  - Hx severe carotid disease s/p endarterectomy: stable     Cardiovascular:   - Acute type A dissection found 3/2, not a surgical candidate. Continue BP control with metoprolol 25 mg q12 hours  -Hemodynamically stable. HR controlled (), continue cardizem 30 mg q6 hours   - Hx of HTN, BP controlled (132//98)  - Hx AF: holding AC in the setting of type A dissection   - ASA and atorvastatin discontinued in setting of risk/ benefit   - HFpEF: continue lasix 40 mg daily     Respiratory:   - 02 Sat = 98% on 4L NC  -If on oxygen wean to RA from for O2 Sat > 93%.  - Continue atrovent, xopenex for wheezing/SOB  -Encourage C+DB and Use of IS 10x / hr while awake.  -No indication for daily CXR, comfort care     GI:   - Stable.  -Continue GI PPX with protonix  -PO Diet.    Renal / :  - CKD stage III, no daily labs   - Severe fluid overload, continue diuresis 40 lasix daily   - Replete electrolytes PRN    Endocrine:    -A1c 4.9, no known hx diabetes  -TSH 3.904, no known hx thyroid disease     Hematologic:  -No obvious signs of bleeding  - AC (including SQH) held in setting of type A dissection, last dose eliquis 3/1/21    ID:  -Pt remains afebrile with no signs of infection  -Continue to monitor CBC  -Observe for SIRS/Sepsis Syndrome.    Prophylaxis:  -DVT prophylaxis held   -SCD's    Disposition:  -DNR/DNI  - Awaiting discharge to rehab vs. hospice

## 2021-03-09 NOTE — PROGRESS NOTE ADULT - SUBJECTIVE AND OBJECTIVE BOX
Patient discussed on morning rounds with       Operation / Date:     SUBJECTIVE ASSESSMENT:  75y Female         Vital Signs Last 24 Hrs  T(C): 35.7 (09 Mar 2021 12:53), Max: 36.6 (09 Mar 2021 09:12)  T(F): 96.2 (09 Mar 2021 12:53), Max: 97.9 (09 Mar 2021 09:12)  HR: 127 (09 Mar 2021 16:50) (95 - 127)  BP: 118/83 (09 Mar 2021 16:50) (118/76 - 155/84)  BP(mean): 97 (09 Mar 2021 16:50) (92 - 119)  RR: 18 (09 Mar 2021 05:18) (16 - 20)  SpO2: 91% (09 Mar 2021 16:50) (91% - 100%)  I&O's Detail    08 Mar 2021 07:01  -  09 Mar 2021 07:00  --------------------------------------------------------  IN:    Oral Fluid: 120 mL  Total IN: 120 mL    OUT:    Voided (mL): 200 mL  Total OUT: 200 mL    Total NET: -80 mL      09 Mar 2021 07:01  -  09 Mar 2021 16:56  --------------------------------------------------------  IN:    Oral Fluid: 120 mL  Total IN: 120 mL    OUT:    Voided (mL): 100 mL  Total OUT: 100 mL    Total NET: 20 mL          CHEST TUBE:  Yes/No. AIR LEAKS: Yes/No. Suction / H2O SEAL.   JUAN DANIEL DRAIN:  Yes/No.  EPICARDIAL WIRES: Yes/No.  TIE DOWNS: Yes/No.  KOO: Yes/No.    PHYSICAL EXAM:  Neuro: A+O x 3, non-focal, speech clear and intact  HEENT: PERRL, EOMI, oral mucosa pink and moist  Neck: supple, no JVD  CV: regular rate, regular rhythm, +S1S2, no murmurs or rub  Pulm/chest: lung sounds CTA and equal bilaterally, no accessory muscle use noted  Abd: soft, NT, ND, +BS  Ext: DIETZ x 4, no C/C/E  Skin: warm, well perfused, no rashes                     MEDICATIONS  (STANDING):  benzonatate 100 milliGRAM(s) Oral every 8 hours  diltiazem    Tablet 30 milliGRAM(s) Oral every 6 hours  ferrous    sulfate 325 milliGRAM(s) Oral daily  furosemide    Tablet 40 milliGRAM(s) Oral daily  latanoprost 0.005% Ophthalmic Solution 1 Drop(s) Both EYES at bedtime  levalbuterol Inhalation 0.63 milliGRAM(s) Inhalation every 6 hours  metoprolol tartrate 25 milliGRAM(s) Oral every 12 hours  pantoprazole    Tablet 40 milliGRAM(s) Oral before breakfast  polyethylene glycol 3350 17 Gram(s) Oral daily  senna 2 Tablet(s) Oral at bedtime  sodium chloride 0.9% lock flush 3 milliLiter(s) IV Push every 8 hours    MEDICATIONS  (PRN):  acetaminophen   Tablet .. 650 milliGRAM(s) Oral every 6 hours PRN Moderate pain (4 - 6)  HYDROmorphone  Injectable 1 milliGRAM(s) IV Push every 4 hours PRN Increased work of breathing or severe pain  ipratropium 17 MICROgram(s) HFA Inhaler 1 Puff(s) Inhalation four times a day PRN SOB/wheezing        RADIOLOGY & ADDITIONAL TESTS:     Patient discussed on morning rounds with Dr. Dickerson    Operation / Date:   5/26/20 TF TAVR now with severe MR  3/2- Found to have acute Type A dissection, no surgical intervention indicated. DNR/DNI    SUBJECTIVE ASSESSMENT:  75y Female assessed at bedside with daughter, patient and daughter anxious and frustrated by care in the hospital, currently rejecting all DIANA/Hospice units, requesting to talk to surgeon regarding surgical options.       Vital Signs Last 24 Hrs  T(C): 35.7 (09 Mar 2021 12:53), Max: 36.6 (09 Mar 2021 09:12)  T(F): 96.2 (09 Mar 2021 12:53), Max: 97.9 (09 Mar 2021 09:12)  HR: 127 (09 Mar 2021 16:50) (95 - 127)  BP: 118/83 (09 Mar 2021 16:50) (118/76 - 155/84)  BP(mean): 97 (09 Mar 2021 16:50) (92 - 119)  RR: 18 (09 Mar 2021 05:18) (16 - 20)  SpO2: 91% (09 Mar 2021 16:50) (91% - 100%)  I&O's Detail    08 Mar 2021 07:01  -  09 Mar 2021 07:00  --------------------------------------------------------  IN:    Oral Fluid: 120 mL  Total IN: 120 mL    OUT:    Voided (mL): 200 mL  Total OUT: 200 mL    Total NET: -80 mL      09 Mar 2021 07:01  -  09 Mar 2021 16:56  --------------------------------------------------------  IN:    Oral Fluid: 120 mL  Total IN: 120 mL    OUT:    Voided (mL): 100 mL  Total OUT: 100 mL    Total NET: 20 mL    CHEST TUBE:  No.  JUAN DANIEL DRAIN:  No.  EPICARDIAL WIRES: No.  TIE DOWNS: No.  KOO: No.    PHYSICAL EXAM:  Neuro: A+O x 3, non-focal, speech clear and intact  HEENT: PERRL, EOMI, oral mucosa pink and moist  Neck: supple, no JVD  CV: regular rate, regular rhythm, +S1S2, +systolic murmur noted over mitral region  Pulm/chest: lung sounds decreased bilaterally, rales throughout with accessory  muscle use noted.  Abd: soft, NT, ND, +BS  Ext: DIETZ x 4, no C/C/E  Skin: warm, well perfused, no rashes           MEDICATIONS  (STANDING):  benzonatate 100 milliGRAM(s) Oral every 8 hours  diltiazem    Tablet 30 milliGRAM(s) Oral every 6 hours  ferrous    sulfate 325 milliGRAM(s) Oral daily  furosemide    Tablet 40 milliGRAM(s) Oral daily  latanoprost 0.005% Ophthalmic Solution 1 Drop(s) Both EYES at bedtime  levalbuterol Inhalation 0.63 milliGRAM(s) Inhalation every 6 hours  metoprolol tartrate 25 milliGRAM(s) Oral every 12 hours  pantoprazole    Tablet 40 milliGRAM(s) Oral before breakfast  polyethylene glycol 3350 17 Gram(s) Oral daily  senna 2 Tablet(s) Oral at bedtime  sodium chloride 0.9% lock flush 3 milliLiter(s) IV Push every 8 hours    MEDICATIONS  (PRN):  acetaminophen   Tablet .. 650 milliGRAM(s) Oral every 6 hours PRN Moderate pain (4 - 6)  HYDROmorphone  Injectable 1 milliGRAM(s) IV Push every 4 hours PRN Increased work of breathing or severe pain  ipratropium 17 MICROgram(s) HFA Inhaler 1 Puff(s) Inhalation four times a day PRN SOB/wheezing

## 2021-03-09 NOTE — PROGRESS NOTE ADULT - ASSESSMENT
75 year old female with history of obesity, HTN, HLD, GERD, TIA with carotid artery disease s/p L CEA, CKD Stage III (baseline Cr 1.45), ARMANDO non-compliant with CPAP, Afib on Eliquis, known tuberculum meningioma, UTI (ESBL) requiring DIANA and IV abx in 7/2020, communicating hydrocephalus s/p right frontal  shunt on 8/5/20 with recent CT showing right-sided subacute SDH, severe AS with chronic diastolic CHF, EF 60%, s/p Adelaida TAVR on 5/26/20 with residual severe MR initially evaluated for MitraClip and admitted to [facility] on 3/1/21 to complete pre-procedural evaluation.  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  After multidisciplinary discussion, patient deemed extreme risk for surgical/perioperative morbidity/mortality and was referred for palliative care and ultimately made DNR/DNI after discussion with family.  She is now awaiting placement to rehab vs. hospice per family request.     Neurovascular:   - No delirium. Pain well controlled with current regimen.  -Continue tylenol PRN  - Per palliative continue dilauded 1 mg IV q4 hours PRN pain or increased work of breathing  - Hx severe carotid disease s/p endarterectomy: stable     Cardiovascular:   - Acute type A dissection found 3/2, not a surgical candidate. Continue BP control with metoprolol 25 mg q12 hours  -Hemodynamically stable. HR controlled (), continue cardizem 30 mg q6 hours   - Hx of HTN, BP controlled (132//98)  - Hx AF: holding AC in the setting of type A dissection   - ASA and atorvastatin discontinued in setting of risk/ benefit   - HFpEF: continue lasix 40 mg daily     Respiratory:   - 02 Sat = 98% on 4L NC  -If on oxygen wean to RA from for O2 Sat > 93%.  - Continue atrovent, xopenex for wheezing/SOB  -Encourage C+DB and Use of IS 10x / hr while awake.  -No indication for daily CXR, comfort care     GI:   - Stable.  -Continue GI PPX with protonix  -PO Diet.    Renal / :  - CKD stage III, no daily labs   - Severe fluid overload, continue diuresis 40 lasix daily   - Replete electrolytes PRN    Endocrine:    -A1c 4.9, no known hx diabetes  -TSH 3.904, no known hx thyroid disease     Hematologic:  -No obvious signs of bleeding  - AC (including SQH) held in setting of type A dissection, last dose eliquis 3/1/21    ID:  -Pt remains afebrile with no signs of infection  -Continue to monitor CBC  -Observe for SIRS/Sepsis Syndrome.    Prophylaxis:  -DVT prophylaxis held   -SCD's    Disposition:  -DNR/DNI  - Awaiting discharge to rehab vs. hospice       Goals of care:  DNR/DNI  Discussion with family today regarding surgical options, Discussion with Dr. Calle to reinforce that patient is not a surgical candidate for her Type A dissection and that symptom management with hospice care for other co-morbidities is most appropriate. Daughter requesting more options for placement as she is unhappy with current selection ratings. Plan for discharge home tomorrow. Appreciate Hospice continued support.

## 2021-03-09 NOTE — PROGRESS NOTE ADULT - PROBLEM SELECTOR PLAN 6
Patient last assessed: 3/8/2021  to manage: GOC/AD, symptoms, and support.  30 Minutes; Start: 0430am End: 0500am  , of non-face-to-face prolonged service provided that relates to (face-to-face) care that has or will occur and ongoing patient management, including one or more of the following:   - Reviewed records from other physicians or other health care professional services, including one or more of the following: other medical records and diagnostic / radiology study results

## 2021-03-09 NOTE — PROGRESS NOTE ADULT - SUBJECTIVE AND OBJECTIVE BOX
GRACE KEY             [id number]    CC: Mitral valve    HPI:  This is a 75 year old female with a history of obesity, hypertension, hyperlipidemia, GERD, TIA with carotid artery disease s/p left carotid endarterectomy, CKD Stage III (baseline creatinine 1.45), ARMANDO (non-compliant with CPAP), atrial fibrillation (on Eliquis), known tuberculum meningioma, UTI (ESBL) requiring IV antibiotics in [facility] in July 2020, communicating hydrocephalus s/p right frontal  shunt on 8/5/2020 with recent CT showing a right-sided subacute subdural hematoma and chronic diastolic heart failure with severe aortic stenosis s/p TF TAVR on 05/26/2020 using Adelaida S3 (23 mm, commercial) now with significant mitral regurgitation who presents for follow up of her valvular heart disease.   Post-TAVR, the patient was discharged to rehab. Her recovery course was complicated by a UTI in rehab requiring IV antibiotics. In addition, she was readmitted to [facility] in July 2020 and found to have worsening hydrocephalus treated with a  shunt. In total, the patient spent four months in [facility] before returning home.  The patient's daughter states she has not felt well since her valve procedure in May last year. Over the past several months, her shortness of breath with exertion and fatigue have gotten significantly worse. Currently, the patient reports SOB when walking less than 5 feet. She has not been walking much at all due to her symptoms. She also has had significant LE edema and reports a 10-15 pound weight gain over the past six months. The patient had a recent ECHO that showed severe mitral regurgitation. The patient denies chest pain, dizziness, syncope and palpitations. She endorses shortness of breath at rest and orthopnea.  History taken from daughter.     (01 Mar 2021 14:52)    SUBJECTIVE: Patient resting in bed in no distress. Required 2 doses of her  Dilaudid 1mg q4h IV PRN overnight.    ROS:  DYSPNEA: Y  NAUS/VOM: N	  SECRETIONS: N  AGITATION: N  Pain (Y/N):     N  -Provocation/Palliation: n/a   -Quality/Quantity: n/a  -Radiating: n/a  -Severity: n/a  -Timing/Frequency: n/a  -Impact on ADLs: n/a    OTHER REVIEW OF SYSTEMS: +weakness   UNABLE TO OBTAIN  due to: n/a     PEx:  T(C): 36.4 (03-09-21 @ 05:01), Max: 36.6 (03-08-21 @ 09:45)  HR: 99 (03-09-21 @ 05:18) (96 - 117)  BP: 155/84 (03-09-21 @ 05:18) (123/77 - 155/84)  RR: 18 (03-09-21 @ 05:18) (16 - 20)  SpO2: 100% (03-09-21 @ 05:18) (92% - 100%)  Wt(kg): 115.5kg      GENERAL:  [x ]Alert  [x ]Oriented x 3  [ ]Lethargic  [ ]Cachexia  [ ]Unarousable  [ x]Verbal  [ ]Non-Verbal  Behavioral:   [ ] Anxiety  [ ] Delirium [ ] Agitation [ x] Other - calm  HEENT:  [ ]Normal   [x ]Dry mouth   [ ]ET Tube/Trach  [ ]Oral lesions  PULMONARY:   [ ]Clear  [ ]Tachypnea  [ ]Audible excessive secretions [x] Dyspneic   [ x]Rhonchi        [ ]Right [ ]Left [x ]Bilateral  [ ]Crackles        [ ]Right [ ]Left [ ]Bilateral  [ ]Wheezing     [ ]Right [ ]Left [ ]Bilateral  CARDIOVASCULAR:    [x ]Regular [ ]Irregular [ ]Tachy  [ ]Kevin [ ]Murmur [ ]Other  GASTROINTESTINAL:  [x] Soft  [ ]Distended   [ ]+BS  [x ]Non tender [ ]Tender  [ ]PEG [ ]OGT/ NGT  Last BM: 3/8/2021  GENITOURINARY:  [ ]Normal [x ] Incontinent   [ ]Oliguria/Anuria   [] Foly  MUSCULOSKELETAL:   [ ]Normal   [ ]Weakness  [ x] Wheelchair bound [ ]Edema  NEUROLOGIC:   [x ]No focal deficits  [ ] Cognitive impairment  [ ] Dysphagia [ ]Dysarthria [ ] Paresis [ ]Other   SKIN:   [ x]Normal   [ ]Pressure ulcer(s)  [ ]Rash    ALLERGIES: No Known Allergies    OPIATE NAÏVE (Y/N): Y    MEDICATIONS: REVIEWED  MEDICATIONS  (STANDING):  benzonatate 100 milliGRAM(s) Oral every 8 hours  diltiazem    Tablet 30 milliGRAM(s) Oral every 6 hours  ferrous    sulfate 325 milliGRAM(s) Oral daily  furosemide    Tablet 40 milliGRAM(s) Oral daily  latanoprost 0.005% Ophthalmic Solution 1 Drop(s) Both EYES at bedtime  levalbuterol Inhalation 0.63 milliGRAM(s) Inhalation every 6 hours  metoprolol tartrate 25 milliGRAM(s) Oral every 12 hours  pantoprazole    Tablet 40 milliGRAM(s) Oral before breakfast  polyethylene glycol 3350 17 Gram(s) Oral daily  senna 2 Tablet(s) Oral at bedtime  sodium chloride 0.9% lock flush 3 milliLiter(s) IV Push every 8 hours    MEDICATIONS  (PRN):  acetaminophen   Tablet .. 650 milliGRAM(s) Oral every 6 hours PRN Moderate pain (4 - 6)  HYDROmorphone  Injectable 1 milliGRAM(s) IV Push every 4 hours PRN Increased work of breathing or severe pain  ipratropium 17 MICROgram(s) HFA Inhaler 1 Puff(s) Inhalation four times a day PRN SOB/wheezing      LABS: REVIEWED    IMAGING: REVIEWED    ADVANCED DIRECTIVES:            DNR DNI            MOLST    DECISION MAKER:  Patient is able to make decisions for herself   LEGAL SURROGATE: [place] # [phone]    PSYCHOSOCIAL-SPIRITUAL ASSESSMENT:       Reviewed       Care plan unchanged    GOALS OF CARE DISCUSSION       Palliative care info/counseling provided	           Documentation of GOC:  DNR/DNI  	      AGENCY CHOICE DISCUSSED:               Hospice    REFERRALS	        Palliative Med        Unit SW/Case Mgmt       PT/OT

## 2021-03-10 NOTE — PROGRESS NOTE ADULT - SUBJECTIVE AND OBJECTIVE BOX
Patient discussed on morning rounds with Dr. Dickerson    Operation / Date: 5/26/20 TF TAVR now with severe MR, preop mitral clip but deemed not surgical candidate    SUBJECTIVE ASSESSMENT:  75y Female. NAEO. Patient responding and able to communicate with her daughter this morning. Around 10:45 patient appeared to have thrown up, was not responding to questions or to her daughter anymore. She had new onset facial droop, c/f stroke. Patient also with increased work of breathing/gasping for air. Patient started on bipap for comfort and for her daughter to come visit. Remains dnr/dni.    Vital Signs Last 24 Hrs  T(C): 36.1 (10 Mar 2021 14:01), Max: 36.3 (09 Mar 2021 22:15)  T(F): 96.9 (10 Mar 2021 14:01), Max: 97.3 (09 Mar 2021 22:15)  HR: 92 (10 Mar 2021 13:00) (90 - 127)  BP: 107/64 (10 Mar 2021 13:00) (107/64 - 139/80)  BP(mean): 81 (10 Mar 2021 13:00) (74 - 103)  RR: 23 (10 Mar 2021 13:00) (22 - 26)  SpO2: 100% (10 Mar 2021 13:00) (91% - 100%)  I&O's Detail    09 Mar 2021 07:01  -  10 Mar 2021 07:00  --------------------------------------------------------  IN:    Oral Fluid: 120 mL  Total IN: 120 mL    OUT:    Voided (mL): 400 mL  Total OUT: 400 mL    Total NET: -280 mL      10 Mar 2021 07:01  -  10 Mar 2021 15:05  --------------------------------------------------------  IN:    Oral Fluid: 130 mL  Total IN: 130 mL    OUT:    Voided (mL): 700 mL  Total OUT: 700 mL    Total NET: -570 mL      CHEST TUBE:  No.   JUAN DANIEL DRAIN:  No.  EPICARDIAL WIRES: No.  TIE DOWNS: No.  KOO: No.    PHYSICAL EXAM:    General: uncomfortable, lying in bed, unresponsive    Neurological: unresponsive, new facial droop    Cardiovascular:     Respiratory: on bipap, accessory muscle use,     Gastrointestinal:     Extremities:    Vascular:    Incision Sites:    LABS:      COUMADIN:  No.                     MEDICATIONS  (STANDING):  benzonatate 100 milliGRAM(s) Oral every 8 hours  diltiazem    Tablet 30 milliGRAM(s) Oral every 6 hours  ferrous    sulfate 325 milliGRAM(s) Oral daily  furosemide    Tablet 40 milliGRAM(s) Oral daily  latanoprost 0.005% Ophthalmic Solution 1 Drop(s) Both EYES at bedtime  levalbuterol Inhalation 0.63 milliGRAM(s) Inhalation every 6 hours  metoprolol tartrate 25 milliGRAM(s) Oral every 12 hours  pantoprazole    Tablet 40 milliGRAM(s) Oral before breakfast  polyethylene glycol 3350 17 Gram(s) Oral daily  senna 2 Tablet(s) Oral at bedtime  sodium chloride 0.9% lock flush 3 milliLiter(s) IV Push every 8 hours    MEDICATIONS  (PRN):  acetaminophen   Tablet .. 650 milliGRAM(s) Oral every 6 hours PRN Moderate pain (4 - 6)  HYDROmorphone  Injectable 1 milliGRAM(s) IV Push every 2 hours PRN Increased work of breathing or severe pain  ipratropium 17 MICROgram(s) HFA Inhaler 1 Puff(s) Inhalation four times a day PRN SOB/wheezing        RADIOLOGY & ADDITIONAL TESTS:     Patient discussed on morning rounds with Dr. Dickerson    Operation / Date: 5/26/20 TF TAVR now with severe MR, preop mitral clip but deemed not surgical candidate    SUBJECTIVE ASSESSMENT:  75y Female. NAEO. Patient responding and able to communicate with her daughter this morning. Around 10:45 patient appeared to have thrown up, was not responding to questions or to her daughter anymore. She had new onset facial droop, c/f stroke. Patient also with increased work of breathing/gasping for air. Patient started on bipap for comfort and for her daughter to come visit. Remains dnr/dni.    Vital Signs Last 24 Hrs  T(C): 36.1 (10 Mar 2021 14:01), Max: 36.3 (09 Mar 2021 22:15)  T(F): 96.9 (10 Mar 2021 14:01), Max: 97.3 (09 Mar 2021 22:15)  HR: 92 (10 Mar 2021 13:00) (90 - 127)  BP: 107/64 (10 Mar 2021 13:00) (107/64 - 139/80)  BP(mean): 81 (10 Mar 2021 13:00) (74 - 103)  RR: 23 (10 Mar 2021 13:00) (22 - 26)  SpO2: 100% (10 Mar 2021 13:00) (91% - 100%)  I&O's Detail    09 Mar 2021 07:01  -  10 Mar 2021 07:00  --------------------------------------------------------  IN:    Oral Fluid: 120 mL  Total IN: 120 mL    OUT:    Voided (mL): 400 mL  Total OUT: 400 mL    Total NET: -280 mL      10 Mar 2021 07:01  -  10 Mar 2021 15:05  --------------------------------------------------------  IN:    Oral Fluid: 130 mL  Total IN: 130 mL    OUT:    Voided (mL): 700 mL  Total OUT: 700 mL    Total NET: -570 mL      CHEST TUBE:  No.   JUAN DANIEL DRAIN:  No.  EPICARDIAL WIRES: No.  TIE DOWNS: No.  KOO: No.    PHYSICAL EXAM:    General: uncomfortable, lying in bed, unresponsive    Neurological: unresponsive, new facial droop    Cardiovascular:  regular rate, regular rhythm, +S1S2, +systolic murmur noted over mitral region    Respiratory: on bipap, accessory muscle use, lung sounds decreased bilaterally, rales and wheezing throughout     Gastrointestinal: soft, NT, ND, +BS    Extremities: DIETZ x 4, no C/C/E, warm, well perfused, no rashes       LABS:      COUMADIN:  No.       MEDICATIONS  (STANDING):  benzonatate 100 milliGRAM(s) Oral every 8 hours  diltiazem    Tablet 30 milliGRAM(s) Oral every 6 hours  ferrous    sulfate 325 milliGRAM(s) Oral daily  furosemide    Tablet 40 milliGRAM(s) Oral daily  latanoprost 0.005% Ophthalmic Solution 1 Drop(s) Both EYES at bedtime  levalbuterol Inhalation 0.63 milliGRAM(s) Inhalation every 6 hours  metoprolol tartrate 25 milliGRAM(s) Oral every 12 hours  pantoprazole    Tablet 40 milliGRAM(s) Oral before breakfast  polyethylene glycol 3350 17 Gram(s) Oral daily  senna 2 Tablet(s) Oral at bedtime  sodium chloride 0.9% lock flush 3 milliLiter(s) IV Push every 8 hours    MEDICATIONS  (PRN):  acetaminophen   Tablet .. 650 milliGRAM(s) Oral every 6 hours PRN Moderate pain (4 - 6)  HYDROmorphone  Injectable 1 milliGRAM(s) IV Push every 2 hours PRN Increased work of breathing or severe pain  ipratropium 17 MICROgram(s) HFA Inhaler 1 Puff(s) Inhalation four times a day PRN SOB/wheezing        RADIOLOGY & ADDITIONAL TESTS:  < from: Xray Chest 1 View- PORTABLE-Routine (Xray Chest 1 View- PORTABLE-Routine in AM.) (03.05.21 @ 04:58) >  INTERPRETATION:  Clinical history/reason for exam: Aortic dissection.    Frontal chest.    Comparison: March 4, 2021.    Findings/  impression: Stableenlarged cardiomediastinal silhouette, TAVR.. Bilateral opacities/pleural effusions, decreased. Stable bony structures.    < end of copied text >

## 2021-03-10 NOTE — PROGRESS NOTE ADULT - NUTRITIONAL ASSESSMENT
This patient has been assessed with a concern for Malnutrition and has been determined to have a diagnosis/diagnoses of Morbid obesity (BMI > 40).    This patient is being managed with:   Diet Regular-  Supplement Feeding Modality:  Oral  Ensure Enlive Cans or Servings Per Day:  1       Frequency:  Three Times a day  Entered: Mar  5 2021  2:41PM     This patient has been assessed with a concern for Malnutrition and has been determined to have a diagnosis/diagnoses of Morbid obesity (BMI > 40).    This patient is being managed with:   Diet Regular-  Supplement Feeding Modality:  Oral  Ensure Enlive Cans or Servings Per Day:  1       Frequency:  Three Times a day  Entered: Mar  5 2021  2:41PM

## 2021-03-10 NOTE — PROGRESS NOTE ADULT - SUBJECTIVE AND OBJECTIVE BOX
GRACE KEY             [id number]    CC: Mitral valve    HPI:  This is a 75 year old female with a history of obesity, hypertension, hyperlipidemia, GERD, TIA with carotid artery disease s/p left carotid endarterectomy, CKD Stage III (baseline creatinine 1.45), ARMANDO (non-compliant with CPAP), atrial fibrillation (on Eliquis), known tuberculum meningioma, UTI (ESBL) requiring IV antibiotics in [facility] in July 2020, communicating hydrocephalus s/p right frontal  shunt on 8/5/2020 with recent CT showing a right-sided subacute subdural hematoma and chronic diastolic heart failure with severe aortic stenosis s/p TF TAVR on 05/26/2020 using Adelaida S3 (23 mm, commercial) now with significant mitral regurgitation who presents for follow up of her valvular heart disease.   Post-TAVR, the patient was discharged to rehab. Her recovery course was complicated by a UTI in rehab requiring IV antibiotics. In addition, she was readmitted to [facility] in July 2020 and found to have worsening hydrocephalus treated with a  shunt. In total, the patient spent four months in [facility] before returning home.  The patient's daughter states she has not felt well since her valve procedure in May last year. Over the past several months, her shortness of breath with exertion and fatigue have gotten significantly worse. Currently, the patient reports SOB when walking less than 5 feet. She has not been walking much at all due to her symptoms. She also has had significant LE edema and reports a 10-15 pound weight gain over the past six months. The patient had a recent ECHO that showed severe mitral regurgitation. The patient denies chest pain, dizziness, syncope and palpitations. She endorses shortness of breath at rest and orthopnea.  History taken from daughter.     (01 Mar 2021 14:52)    SUBJECTIVE: Patient resting in bed, minimally responsive. BIPAP mask on.    ROS:  DYSPNEA (Shanta): 4	  NAUS/VOM: N	  SECRETIONS: N	  AGITATION: N  Pain (Y/N): N      -Provocation/Palliation: n/a   -Quality/Quantity: n/a   -Radiating: n/a   -Severity: n/a   -Timing/Frequency: n/a   -Impact on ADLs: n/a     OTHER REVIEW OF SYSTEMS: unable to obtain   UNABLE TO OBTAIN  due to: cognitive impairment     PEx:  T(C): 36.1 (03-10-21 @ 09:05), Max: 36.3 (03-09-21 @ 22:15)  HR: 90 (03-10-21 @ 11:35) (90 - 127)  BP: 114/55 (03-10-21 @ 11:35) (114/55 - 139/80)  RR: 23 (03-10-21 @ 11:35) (22 - 26)  SpO2: 98% (03-10-21 @ 11:35) (91% - 98%)  Wt(kg): 115.5kg    GENERAL:  [ ]Alert  [ ]Oriented x   [ x]Lethargic  [ ]Cachexia [ ]Verbal  [ x]Non-Verbal  Behavioral:   [ ] Anxiety  [ ] Delirium [ ] Agitation [x ] Other - calm  HEENT:  [ ]Normal   [ ]Dry mouth   [ ]ET Tube  [ ]Oral lesions [x] bipap mask  PULMONARY:   [ ]Clear  [ ]Tachypnea  [ ]Audible excessive secretions   [ ]Rhonchi     [ ]Right [ ]Left [ ]Bilateral  [ x]Crackles        [ ]Right [ ]Left [ x]Bilateral  [ ]Wheezing     [ ]Right [ ]Left [ ]Bilateral  CARDIOVASCULAR:    [ x]Regular [ ]Irregular [ ]Tachy  [ ]Kevin [ ]Murmur [ ]Other  GASTROINTESTINAL:  [x ]Soft  [ ]Distended   [ ]+BS  [x ]Non tender [ ]Tender  [ ]PEG [ ]OGT/NGT  [] flexiseal with output  Last BM:   GENITOURINARY:  [ ]Normal [x ] Incontinent   [ ]Oliguria/Anuria   [ ]Rodriguez  MUSCULOSKELETAL:   [ ]Normal   [ ]Weakness  [ x]Bed/Wheelchair bound [ ]Edema  NEUROLOGIC:   [ ]No focal deficits  [x ] Cognitive impairment  [ ] Dysphagia [ ]Dysarthria [ ] Paresis [  ]Other   SKIN:   [x ]Normal   [ ]Pressure ulcer(s)  [ ]Rash       ALLERGIES: No Known Allergies      OPIATE NAÏVE (Y/N): Y    MEDICATIONS: REVIEWED  MEDICATIONS  (STANDING):  benzonatate 100 milliGRAM(s) Oral every 8 hours  diltiazem    Tablet 30 milliGRAM(s) Oral every 6 hours  ferrous    sulfate 325 milliGRAM(s) Oral daily  furosemide    Tablet 40 milliGRAM(s) Oral daily  latanoprost 0.005% Ophthalmic Solution 1 Drop(s) Both EYES at bedtime  levalbuterol Inhalation 0.63 milliGRAM(s) Inhalation every 6 hours  metoprolol tartrate 25 milliGRAM(s) Oral every 12 hours  pantoprazole    Tablet 40 milliGRAM(s) Oral before breakfast  polyethylene glycol 3350 17 Gram(s) Oral daily  senna 2 Tablet(s) Oral at bedtime  sodium chloride 0.9% lock flush 3 milliLiter(s) IV Push every 8 hours    MEDICATIONS  (PRN):  acetaminophen   Tablet .. 650 milliGRAM(s) Oral every 6 hours PRN Moderate pain (4 - 6)  HYDROmorphone  Injectable 1 milliGRAM(s) IV Push every 2 hours PRN Increased work of breathing or severe pain  ipratropium 17 MICROgram(s) HFA Inhaler 1 Puff(s) Inhalation four times a day PRN SOB/wheezing      LABS: REVIEWED    IMAGING: REVIEWED    ADVANCED DIRECTIVES:            DNR DNI            MOLST    DECISION MAKER:  Patient is able to make decisions for herself   LEGAL SURROGATE: [place] # [phone]    PSYCHOSOCIAL-SPIRITUAL ASSESSMENT:       Reviewed       Care plan unchanged    GOALS OF CARE DISCUSSION       Palliative care info/counseling provided	           Documentation of GOC:  DNR/DNI  	      AGENCY CHOICE DISCUSSED:               Hospice    REFERRALS	        Palliative Med        Unit SW/Case Mgmt       PT/OT

## 2021-03-10 NOTE — PROGRESS NOTE ADULT - PROBLEM SELECTOR PLAN 6
Patient last assessed: 3/9/2021  to manage: GOC/AD, symptoms, and support.  30 Minutes; Start: 1030am End: 1100am  , of non-face-to-face prolonged service provided that relates to (face-to-face) care that has or will occur and ongoing patient management, including one or more of the following:   - Reviewed records from other physicians or other health care professional services, including one or more of the following: other medical records and diagnostic / radiology study results

## 2021-03-10 NOTE — PROGRESS NOTE ADULT - ASSESSMENT
75y Female. NAEO. Patient responding and able to communicate with her daughter this morning. Around 10:45 patient appeared to have thrown up, was not responding to questions or to her daughter anymore. She had new onset facial droop, c/f stroke. Patient also with increased work of breathing/gasping for air. Patient started on bipap for comfort and for her daughter to come visit. Remains dnr/dni.   75 year old female with history of obesity, HTN, HLD, GERD, TIA with carotid artery disease s/p L CEA, CKD Stage III (baseline Cr 1.45), ARMANDO non-compliant with CPAP, Afib on Eliquis, known tuberculum meningioma, UTI (ESBL) requiring DIANA and IV abx in 2020, communicating hydrocephalus s/p right frontal  shunt on 20 with recent CT showing right-sided subacute SDH, severe AS with chronic diastolic CHF, EF 60%, s/p Adelaida TAVR on 20 with residual severe MR initially evaluated for MitraClip and admitted to [facility] on 3/1/21 to complete pre-procedural evaluation.  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  After multidisciplinary discussion, patient deemed extreme risk for surgical/perioperative morbidity/mortality and was referred for palliative care and ultimately made DNR/DNI after discussion with family.  She was awaiting placement to rehab vs. hospice per family request, but this morning became unresponsive with increased work of breathing. Patient responding and able to communicate with her daughter this morning. Around 10:45 patient appeared to have thrown up, was not responding to questions or to her daughter anymore. She had new onset facial droop, c/f stroke. Patient also with increased work of breathing/gasping for air. Patient started on bipap for comfort and for her daughter to come visit. Remains dnr/dni.    Neurovascular:   - No delirium. Pain well controlled with current regimen.  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 Continue BP control with metoprolol 25 mg q12 hours  -Hemodynamically stable. HR controlled (), continue cardizem 30 mg q6 hours   - Hx of HTN, BP controlled (132//98)  - Hx AF: holding AC in the setting of type A dissection   - ASA and atorvastatin discontinued in setting of risk/ benefit   - HFpEF: continue lasix 40 mg daily     Respiratory:   - 02 Sat = 98% on 4L NC - transitioned to bipap today for increased work of breathing, RR of 6  - transition to HFNC for comfort  - Continue atrovent, xopenex for wheezing/SOB  -Encourage C+DB and Use of IS 10x / hr while awake.  -No indication for daily CXR, comfort care     GI:   - Stable.  -Continue GI PPX with protonix  -PO Diet.    Renal / :  - CKD stage III, no daily labs   - Severe fluid overload, continue diuresis 40 lasix daily   - Replete electrolytes PRN    Endocrine:    -A1c 4.9, no known hx diabetes  -TSH 3.904, no known hx thyroid disease     Hematologic:  -No obvious signs of bleeding  - AC (including SQH) held in setting of type A dissection, last dose eliquis 3/1/21    ID:  -Pt remains afebrile with no signs of infection  -Continue to monitor CBC  -Observe for SIRS/Sepsis Syndrome.    Prophylaxis:  -DVT prophylaxis held   -SCD's    Disposition:  -DNR/DNI  - Awaiting discharge to rehab vs. hospice  - patient will likely  prior to disposition      Goals of care:  DNR/DNI  Discussion with family today regarding surgical options, Discussion with Dr. Calle to reinforce that patient is not a surgical candidate for her Type A dissection and that symptom management with hospice care for other co-morbidities is most appropriate. Daughter requesting more options for placement as she is unhappy with current selection ratings. Plan for discharge home tomorrow. Appreciate Hospice continued support.

## 2021-03-11 NOTE — PROGRESS NOTE ADULT - SUBJECTIVE AND OBJECTIVE BOX
GRACE KEY             [id number]    CC:    HPI:  This is a 75 year old female with a history of obesity, hypertension, hyperlipidemia, GERD, TIA with carotid artery disease s/p left carotid endarterectomy, CKD Stage III (baseline creatinine 1.45), ARMANDO (non-compliant with CPAP), atrial fibrillation (on Eliquis), known tuberculum meningioma, UTI (ESBL) requiring IV antibiotics in [facility] in July 2020, communicating hydrocephalus s/p right frontal  shunt on 8/5/2020 with recent CT showing a right-sided subacute subdural hematoma and chronic diastolic heart failure with severe aortic stenosis s/p TF TAVR on 05/26/2020 using Adelaida S3 (23 mm, commercial) now with significant mitral regurgitation who presents for follow up of her valvular heart disease.   Post-TAVR, the patient was discharged to rehab. Her recovery course was complicated by a UTI in rehab requiring IV antibiotics. In addition, she was readmitted to [facility] in July 2020 and found to have worsening hydrocephalus treated with a  shunt. In total, the patient spent four months in [facility] before returning home.  The patient's daughter states she has not felt well since her valve procedure in May last year. Over the past several months, her shortness of breath with exertion and fatigue have gotten significantly worse. Currently, the patient reports SOB when walking less than 5 feet. She has not been walking much at all due to her symptoms. She also has had significant LE edema and reports a 10-15 pound weight gain over the past six months. The patient had a recent ECHO that showed severe mitral regurgitation. The patient denies chest pain, dizziness, syncope and palpitations. She endorses shortness of breath at rest and orthopnea.  History taken from daughter.     (01 Mar 2021 14:52)    SUBJECTIVE:    ROS:  DYSPNEA (Shanta): Y / N	  NAUS/VOM: Y / N	  SECRETIONS: Y / N	  AGITATION: Y / N  Pain (Y/N):       -Provocation/Palliation:  -Quality/Quantity:  -Radiating:  -Severity:  -Timing/Frequency:  -Impact on ADLs:    OTHER REVIEW OF SYSTEMS:  UNABLE TO OBTAIN  due to:    PEx:  T(C): 36.8 (03-11-21 @ 10:16), Max: 36.8 (03-11-21 @ 10:16)  HR: 123 (03-11-21 @ 10:16) (90 - 123)  BP: 119/75 (03-11-21 @ 10:16) (102/50 - 123/88)  RR: 10 (03-11-21 @ 08:40) (10 - 23)  SpO2: 97% (03-11-21 @ 10:36) (94% - 100%)  Wt(kg): --    GENERAL:  [ ]Alert  [ ]Oriented x   [ ]Lethargic due to sedation  [ ]Cachexia [ ]Verbal  [ ]Non-Verbal  Behavioral:   [ ] Anxiety  [ ] Delirium [ ] Agitation [ ] Other  HEENT:  [ ]Normal   [ ]Dry mouth   [ ]ET Tube  [ ]Oral lesions  PULMONARY:   [ ]Clear  [ ]Tachypnea  [ ]Audible excessive secretions   [ ]Rhonchi     [ ]Right [ ]Left [ ]Bilateral  [ ]Crackles        [ ]Right [ ]Left [ ]Bilateral  [ ]Wheezing     [ ]Right [ ]Left [ ]Bilateral  CARDIOVASCULAR:    [ ]Regular [ ]Irregular [ ]Tachy  [ ]Kevin [ ]Murmur [ ]Other  GASTROINTESTINAL:  [ ]Soft  [ ]Distended   [ ]+BS  [ ]Non tender [ ]Tender  [ ]PEG [ ]OGT/NGT  [] flexiseal with output  Last BM:   GENITOURINARY:  [ ]Normal [ ] Incontinent   [ ]Oliguria/Anuria   [ ]Rodriguez  MUSCULOSKELETAL:   [ ]Normal   [ ]Weakness  [ ]Bed/Wheelchair bound [ ]Edema  NEUROLOGIC:   [ ]No focal deficits  [ ] Cognitive impairment  [ ] Dysphagia [ ]Dysarthria [ ] Paresis [  ]Other   SKIN:   [ ]Normal   [ ]Pressure ulcer(s)  [ ]Rash       ALLERGIES: No Known Allergies      OPIATE NAÏVE (Y/N):    MEDICATIONS: REVIEWED  MEDICATIONS  (STANDING):  benzonatate 100 milliGRAM(s) Oral every 8 hours  diltiazem    Tablet 30 milliGRAM(s) Oral every 6 hours  ferrous    sulfate 325 milliGRAM(s) Oral daily  furosemide    Tablet 40 milliGRAM(s) Oral daily  lactated ringers. 1000 milliLiter(s) (20 mL/Hr) IV Continuous <Continuous>  latanoprost 0.005% Ophthalmic Solution 1 Drop(s) Both EYES at bedtime  levalbuterol Inhalation 0.63 milliGRAM(s) Inhalation every 6 hours  metoprolol tartrate 25 milliGRAM(s) Oral every 12 hours  pantoprazole    Tablet 40 milliGRAM(s) Oral before breakfast  polyethylene glycol 3350 17 Gram(s) Oral daily  senna 2 Tablet(s) Oral at bedtime  sodium chloride 0.9% lock flush 3 milliLiter(s) IV Push every 8 hours    MEDICATIONS  (PRN):  acetaminophen   Tablet .. 650 milliGRAM(s) Oral every 6 hours PRN Moderate pain (4 - 6)  HYDROmorphone  Injectable 1 milliGRAM(s) IV Push every 2 hours PRN Increased work of breathing or severe pain  ipratropium 17 MICROgram(s) HFA Inhaler 1 Puff(s) Inhalation four times a day PRN SOB/wheezing      LABS: REVIEWED  CBC:    CMP:            IMAGING: REVIEWED    ADVANCED DIRECTIVES:            FULL CODE            DNR DNI            LIVING WILL            DPOA       HCP       MOLST    DECISION MAKER:   LEGAL SURROGATE:    PSYCHOSOCIAL-SPIRITUAL ASSESSMENT:       Reviewed       Care plan unchanged       Care plan adjusted as above	    GOALS OF CARE DISCUSSION       Palliative care info/counseling provided	           Family meeting       Advanced Directives addressed please see Advance Care Planning Note	           See previous Palliative Medicine Note       Documentation of GOC:   	      AGENCY CHOICE DISCUSSED:           Homecare        Hospice        [facility]        DIANA        Other:    REFERRALS	        Palliative Med        Unit SW/Case Mgmt              Speech/Swallow       Hospice       Nutrition       Dietician       PT/OT GRACE KEY             [id number]    CC: Mitral valve    HPI:  This is a 75 year old female with a history of obesity, hypertension, hyperlipidemia, GERD, TIA with carotid artery disease s/p left carotid endarterectomy, CKD Stage III (baseline creatinine 1.45), ARMANDO (non-compliant with CPAP), atrial fibrillation (on Eliquis), known tuberculum meningioma, UTI (ESBL) requiring IV antibiotics in [facility] in July 2020, communicating hydrocephalus s/p right frontal  shunt on 8/5/2020 with recent CT showing a right-sided subacute subdural hematoma and chronic diastolic heart failure with severe aortic stenosis s/p TF TAVR on 05/26/2020 using Adelaida S3 (23 mm, commercial) now with significant mitral regurgitation who presents for follow up of her valvular heart disease.   Post-TAVR, the patient was discharged to rehab. Her recovery course was complicated by a UTI in rehab requiring IV antibiotics. In addition, she was readmitted to [facility] in July 2020 and found to have worsening hydrocephalus treated with a  shunt. In total, the patient spent four months in [facility] before returning home.  The patient's daughter states she has not felt well since her valve procedure in May last year. Over the past several months, her shortness of breath with exertion and fatigue have gotten significantly worse. Currently, the patient reports SOB when walking less than 5 feet. She has not been walking much at all due to her symptoms. She also has had significant LE edema and reports a 10-15 pound weight gain over the past six months. The patient had a recent ECHO that showed severe mitral regurgitation. The patient denies chest pain, dizziness, syncope and palpitations. She endorses shortness of breath at rest and orthopnea.  History taken from daughter.     (01 Mar 2021 14:52)    SUBJECTIVE: Patient resting in bed, with High flow NC. Mild respiratory distress noted.     ROS:  DYSPNEA (Shanta): 1  NAUS/VOM: N  SECRETIONS: N  AGITATION: N  Pain (Y/N):     N  -Provocation/Palliation: n/a  -Quality/Quantity: n/a  -Radiating: n/a  -Severity: n/a  -Timing/Frequency: n/a  -Impact on ADLs: n/a     OTHER REVIEW OF SYSTEMS: unable to obtain   UNABLE TO OBTAIN  due to: cognitive impairment     PEx:  T(C): 36.8 (03-11-21 @ 10:16), Max: 36.8 (03-11-21 @ 10:16)  HR: 123 (03-11-21 @ 10:16) (90 - 123)  BP: 119/75 (03-11-21 @ 10:16) (102/50 - 123/88)  RR: 10 (03-11-21 @ 08:40) (10 - 23)  SpO2: 97% (03-11-21 @ 10:36) (94% - 100%)  Wt(kg): 115.5kg    GENERAL:  [ ]Alert  [ ]Oriented x   [ x]Lethargic  [ ]Cachexia [ ]Verbal  [ ]Non-Verbal  Behavioral:   [ ] Anxiety  [ ] Delirium [ ] Agitation [ x] Other - calm  HEENT:  [ ]Normal   [x ]Dry mouth   [ ]ET Tube  [ ]Oral lesions [x] High flow Nasal cannula   PULMONARY:   [ ]Clear  [ ]Tachypnea  [ x]Audible excessive secretions   [ ]Rhonchi     [ ]Right [ ]Left [ ]Bilateral  [ ]Crackles        [ ]Right [ ]Left [ ]Bilateral  [ ]Wheezing     [ ]Right [ ]Left [ ]Bilateral  CARDIOVASCULAR:    [ ]Regular [ ]Irregular [x ]Tachy  [ ]Kevin [ ]Murmur [ ]Other  GASTROINTESTINAL:  [x ]Soft  [ ]Distended   [ ]+BS  [ x]Non tender [ ]Tender  [ ]PEG [ ]OGT/NGT  [] flexiseal with output  Last BM:   GENITOURINARY:  [ ]Normal [x ] Incontinent   [ ]Oliguria/Anuria   [ ]Rodriguez  MUSCULOSKELETAL:   [ ]Normal   [ ]Weakness  [x ]Bed/Wheelchair bound [ ]Edema  NEUROLOGIC:   [ ]No focal deficits  [x ] Cognitive impairment  [ ] Dysphagia [ ]Dysarthria [ ] Paresis [  ]Other   SKIN:   [x]Normal   [ ]Pressure ulcer(s)  [ ]Rash       ALLERGIES: No Known Allergies      OPIATE NAÏVE (Y/N): N    MEDICATIONS: REVIEWED  MEDICATIONS  (STANDING):  benzonatate 100 milliGRAM(s) Oral every 8 hours  diltiazem    Tablet 30 milliGRAM(s) Oral every 6 hours  ferrous    sulfate 325 milliGRAM(s) Oral daily  furosemide    Tablet 40 milliGRAM(s) Oral daily  lactated ringers. 1000 milliLiter(s) (20 mL/Hr) IV Continuous <Continuous>  latanoprost 0.005% Ophthalmic Solution 1 Drop(s) Both EYES at bedtime  levalbuterol Inhalation 0.63 milliGRAM(s) Inhalation every 6 hours  metoprolol tartrate 25 milliGRAM(s) Oral every 12 hours  pantoprazole    Tablet 40 milliGRAM(s) Oral before breakfast  polyethylene glycol 3350 17 Gram(s) Oral daily  senna 2 Tablet(s) Oral at bedtime  sodium chloride 0.9% lock flush 3 milliLiter(s) IV Push every 8 hours    MEDICATIONS  (PRN):  acetaminophen   Tablet .. 650 milliGRAM(s) Oral every 6 hours PRN Moderate pain (4 - 6)  HYDROmorphone  Injectable 1 milliGRAM(s) IV Push every 2 hours PRN Increased work of breathing or severe pain  ipratropium 17 MICROgram(s) HFA Inhaler 1 Puff(s) Inhalation four times a day PRN SOB/wheezing      LABS: REVIEWED    IMAGING: REVIEWED    IMAGING: REVIEWED    ADVANCED DIRECTIVES:            DNR DNI            MOLST    DECISION MAKER:  Patient is not able to make decisions for herself   LEGAL SURROGATE: [place] # [phone]    PSYCHOSOCIAL-SPIRITUAL ASSESSMENT:       Reviewed       Care plan unchanged    GOALS OF CARE DISCUSSION       Palliative care info/counseling provided	           Documentation of GOC:  DNR/DNI  	      AGENCY CHOICE DISCUSSED:               Hospice    REFERRALS	        Palliative Med        Unit SW/Case Mgmt       PT/OT

## 2021-03-11 NOTE — PROGRESS NOTE ADULT - SUBJECTIVE AND OBJECTIVE BOX
Patient discussed on morning rounds with Dr. Dickerson    Operation / Date: 5/26/20 TF TAVR now with severe MR, preop mitral clip but deemed not surgical candidate    SUBJECTIVE ASSESSMENT:  unable to give exam     Vital Signs Last 24 Hrs  T(C): 36.3 (11 Mar 2021 16:42), Max: 36.8 (11 Mar 2021 10:16)  T(F): 97.4 (11 Mar 2021 16:42), Max: 98.3 (11 Mar 2021 10:16)  HR: 142 (11 Mar 2021 16:42) (113 - 142)  BP: 126/60 (11 Mar 2021 16:42) (102/50 - 126/60)  BP(mean): 82 (11 Mar 2021 08:40) (72 - 98)  RR: 19 (11 Mar 2021 16:42) (10 - 22)  SpO2: 91% (11 Mar 2021 16:42) (91% - 99%)  I&O's Detail    10 Mar 2021 07:01  -  11 Mar 2021 07:00  --------------------------------------------------------  IN:    Lactated Ringers: 200 mL    Oral Fluid: 130 mL  Total IN: 330 mL    OUT:    Voided (mL): 850 mL  Total OUT: 850 mL    Total NET: -520 mL      11 Mar 2021 07:01  -  11 Mar 2021 18:38  --------------------------------------------------------  IN:    Lactated Ringers: 200 mL  Total IN: 200 mL    OUT:    Voided (mL): 0 mL  Total OUT: 0 mL    Total NET: 200 mL    CHEST TUBE:  no  JUAN DANIEL DRAIN:  no  EPICARDIAL WIRES: no  TIE DOWNS: no  KOO: no    PHYSICAL EXAM:  General: comfortble resting in bed   Neurological: unresponsive   Cardiovascular: Normal S1/S2. Regular rate/rhythm. No murmurs  Respiratory: increased WOB   Gastrointestinal: +BS in all 4 quadrants. Non-distended. Soft. Non-tender  Extremities: Strength 5/5 b/l upper/lower extremities. Sensation grossly intact upper/lower extremities. No edema. No calf tenderness.  Vascular: Radial 2+bilaterally, DP 2+ b/l    LABS:    COUMADIN: no    MEDICATIONS  (STANDING):  benzonatate 100 milliGRAM(s) Oral every 8 hours  diltiazem    Tablet 30 milliGRAM(s) Oral every 6 hours  ferrous    sulfate 325 milliGRAM(s) Oral daily  furosemide    Tablet 40 milliGRAM(s) Oral daily  HYDROmorphone Infusion 0.2 mG/Hr (1 mL/Hr) IV Continuous <Continuous>  lactated ringers. 1000 milliLiter(s) (20 mL/Hr) IV Continuous <Continuous>  latanoprost 0.005% Ophthalmic Solution 1 Drop(s) Both EYES at bedtime  levalbuterol Inhalation 0.63 milliGRAM(s) Inhalation every 6 hours  metoprolol tartrate 25 milliGRAM(s) Oral every 12 hours  pantoprazole    Tablet 40 milliGRAM(s) Oral before breakfast  polyethylene glycol 3350 17 Gram(s) Oral daily  senna 2 Tablet(s) Oral at bedtime  sodium chloride 0.9% lock flush 3 milliLiter(s) IV Push every 8 hours    MEDICATIONS  (PRN):  acetaminophen   Tablet .. 650 milliGRAM(s) Oral every 6 hours PRN Moderate pain (4 - 6)  glycopyrrolate Injectable 0.2 milliGRAM(s) IV Push every 6 hours PRN increased oropharyngeal secretions  HYDROmorphone  Injectable 1 milliGRAM(s) IV Push every 2 hours PRN Increased work of breathing or severe pain  ipratropium 17 MICROgram(s) HFA Inhaler 1 Puff(s) Inhalation four times a day PRN SOB/wheezing      RADIOLOGY & ADDITIONAL TESTS:  x     Patient discussed on morning rounds with Dr. Dickerson    Operation / Date: 5/26/20 TF TAVR now with severe MR, preop mitral clip but deemed not surgical candidate    SUBJECTIVE ASSESSMENT:  unable to give exam due to clinical status     Vital Signs Last 24 Hrs  T(C): 36.3 (11 Mar 2021 16:42), Max: 36.8 (11 Mar 2021 10:16)  T(F): 97.4 (11 Mar 2021 16:42), Max: 98.3 (11 Mar 2021 10:16)  HR: 142 (11 Mar 2021 16:42) (113 - 142)  BP: 126/60 (11 Mar 2021 16:42) (102/50 - 126/60)  BP(mean): 82 (11 Mar 2021 08:40) (72 - 98)  RR: 19 (11 Mar 2021 16:42) (10 - 22)  SpO2: 91% (11 Mar 2021 16:42) (91% - 99%)  I&O's Detail    10 Mar 2021 07:01  -  11 Mar 2021 07:00  --------------------------------------------------------  IN:    Lactated Ringers: 200 mL    Oral Fluid: 130 mL  Total IN: 330 mL    OUT:    Voided (mL): 850 mL  Total OUT: 850 mL    Total NET: -520 mL      11 Mar 2021 07:01  -  11 Mar 2021 18:38  --------------------------------------------------------  IN:    Lactated Ringers: 200 mL  Total IN: 200 mL    OUT:    Voided (mL): 0 mL  Total OUT: 0 mL    Total NET: 200 mL    CHEST TUBE:  no  JUAN DANIEL DRAIN:  no  EPICARDIAL WIRES: no  TIE DOWNS: no  KOO: no    PHYSICAL EXAM:  General: comfortble resting in bed   Neurological: unresponsive   Cardiovascular: Normal S1/S2. Regular rate/rhythm. No murmurs  Respiratory: increased WOB   Gastrointestinal: +BS in all 4 quadrants. Non-distended. Soft. Non-tender  Extremities: Strength 5/5 b/l upper/lower extremities. Sensation grossly intact upper/lower extremities. No edema. No calf tenderness.  Vascular: Radial 2+bilaterally, DP 2+ b/l    LABS:    COUMADIN: no    MEDICATIONS  (STANDING):  benzonatate 100 milliGRAM(s) Oral every 8 hours  diltiazem    Tablet 30 milliGRAM(s) Oral every 6 hours  ferrous    sulfate 325 milliGRAM(s) Oral daily  furosemide    Tablet 40 milliGRAM(s) Oral daily  HYDROmorphone Infusion 0.2 mG/Hr (1 mL/Hr) IV Continuous <Continuous>  lactated ringers. 1000 milliLiter(s) (20 mL/Hr) IV Continuous <Continuous>  latanoprost 0.005% Ophthalmic Solution 1 Drop(s) Both EYES at bedtime  levalbuterol Inhalation 0.63 milliGRAM(s) Inhalation every 6 hours  metoprolol tartrate 25 milliGRAM(s) Oral every 12 hours  pantoprazole    Tablet 40 milliGRAM(s) Oral before breakfast  polyethylene glycol 3350 17 Gram(s) Oral daily  senna 2 Tablet(s) Oral at bedtime  sodium chloride 0.9% lock flush 3 milliLiter(s) IV Push every 8 hours    MEDICATIONS  (PRN):  acetaminophen   Tablet .. 650 milliGRAM(s) Oral every 6 hours PRN Moderate pain (4 - 6)  glycopyrrolate Injectable 0.2 milliGRAM(s) IV Push every 6 hours PRN increased oropharyngeal secretions  HYDROmorphone  Injectable 1 milliGRAM(s) IV Push every 2 hours PRN Increased work of breathing or severe pain  ipratropium 17 MICROgram(s) HFA Inhaler 1 Puff(s) Inhalation four times a day PRN SOB/wheezing      RADIOLOGY & ADDITIONAL TESTS:  x

## 2021-03-11 NOTE — PROGRESS NOTE ADULT - PROBLEM SELECTOR PLAN 6
Patient was made a DNR/DNI by primary team. Patient no longer stable for transport at this time and most likely will die in the hospital. Will continue to assist with management of symptoms.

## 2021-03-11 NOTE — PROGRESS NOTE ADULT - ASSESSMENT
75 year old female with history of obesity, HTN, HLD, GERD, TIA with carotid artery disease s/p L CEA, CKD Stage III (baseline Cr 1.45), ARMANDO non-compliant with CPAP, Afib on Eliquis, known tuberculum meningioma, UTI (ESBL) requiring DIANA and IV abx in 2020, communicating hydrocephalus s/p right frontal  shunt on 20 with recent CT showing right-sided subacute SDH, severe AS with chronic diastolic CHF, EF 60%, s/p Adelaida TAVR on 20 with residual severe MR initially evaluated for MitraClip and admitted to [facility] on 3/1/21 to complete pre-procedural evaluation.  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  After multidisciplinary discussion, patient deemed extreme risk for surgical/perioperative morbidity/mortality and was referred for palliative care and ultimately made DNR/DNI after discussion with family.  She was awaiting placement to rehab vs. hospice per family request, but this morning became unresponsive with increased work of breathing. Patient responding and able to communicate with her daughter this morning. Around 10:45 patient appeared to have thrown up, was not responding to questions or to her daughter anymore. She had new onset facial droop, c/f stroke. Patient also with increased work of breathing/gasping for air. Patient started on bipap for comfort and for her daughter to come visit. Remains dnr/dni. Comfortable today.     Plan:  Neurovascular:   - No delirium. Pain well controlled with current regimen.  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 Continue BP control with metoprolol 25 mg q12 hours  -Hemodynamically stable., continue cardizem 30 mg q6 hours   - Hx of HTN, BP controlled  - Hx AF: holding AC in the setting of type A dissection   - ASA and atorvastatin discontinued in setting of risk/ benefit   - HFpEF: continue lasix 40 mg daily     Respiratory:   - 02 Sat = 98% on 4L NC - transitioned to NFNC today for increased work of breathing, RR of 6  - transition to HFNC for comfort  - Continue atrovent, xopenex for wheezing/SOB  -Encourage C+DB and Use of IS 10x / hr while awake.  -No indication for daily CXR, comfort care     GI:   - Stable.  -Continue GI PPX with protonix  -PO Diet.    Renal / :  - CKD stage III, no daily labs   - Severe fluid overload, continue diuresis 40 lasix daily   - Replete electrolytes PRN    Endocrine:    -A1c 4.9, no known hx diabetes  -TSH 3.904, no known hx thyroid disease     Hematologic:  -No obvious signs of bleeding  - AC (including SQH) held in setting of type A dissection, last dose eliquis 3/1/21    ID:  -Pt remains afebrile with no signs of infection  -Continue to monitor CBC  -Observe for SIRS/Sepsis Syndrome.    Prophylaxis:  -DVT prophylaxis held   -SCD's    Disposition:  -DNR/DNI  -Awaiting discharge to rehab vs. hospice  - patient will likely  prior to disposition    Goals of care:  DNR/DNI  Discussion with family today regarding surgical options, Discussion with Dr. Calle to reinforce that patient is not a surgical candidate for her Type A dissection and that symptom management with hospice care for other co-morbidities is most appropriate. Daughter requesting more options for placement as she is unhappy with current selection ratings. Plan for discharge home tomorrow. Appreciate Hospice continued support.    75 year old female with history of obesity, HTN, HLD, GERD, TIA with carotid artery disease s/p L CEA, CKD Stage III (baseline Cr 1.45), ARMANDO non-compliant with CPAP, Afib on Eliquis, known tuberculum meningioma, UTI (ESBL) requiring DIANA and IV abx in 2020, communicating hydrocephalus s/p right frontal  shunt on 20 with recent CT showing right-sided subacute SDH, severe AS with chronic diastolic CHF, EF 60%, s/p Adelaida TAVR on 20 with residual severe MR initially evaluated for MitraClip and admitted to [facility] on 3/1/21 to complete pre-procedural evaluation.  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  After multidisciplinary discussion, patient deemed extreme risk for surgical/perioperative morbidity/mortality and was referred for palliative care and ultimately made DNR/DNI after discussion with family.  She was awaiting placement to rehab vs. hospice per family request, but this morning became unresponsive with increased work of breathing. Patient responding and able to communicate with her daughter this morning. Around 10:45 patient appeared to have thrown up, was not responding to questions or to her daughter anymore. She had new onset facial droop, c/f stroke. Patient also with increased work of breathing/gasping for air. Patient started on bipap for comfort and for her daughter to come visit. Remains dnr/dni. Comfortable today.     Plan:  Neurovascular:   - No delirium. Pain well controlled with current regimen.  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 Continue BP control with metoprolol 25 mg q12 hours  -Hemodynamically stable., continue cardizem 30 mg q6 hours   - Hx of HTN, BP controlled  - Hx AF: holding AC in the setting of type A dissection   - ASA and atorvastatin discontinued in setting of risk/ benefit   - HFpEF: continue lasix 40 mg daily     Respiratory:   - 02 Sat = 98% on 4L NC -  on NFNC today for increased work of breathing, RR of 6  - transition to HFNC for comfort  - Continue atrovent, xopenex for wheezing/SOB  -Encourage C+DB and Use of IS 10x / hr while awake.  -No indication for daily CXR, comfort care     GI:   - Stable.  -Continue GI PPX with protonix  -PO Diet.    Renal / :  - CKD stage III, no daily labs   - Severe fluid overload, continue diuresis 40 lasix daily   - Replete electrolytes PRN    Endocrine:    -A1c 4.9, no known hx diabetes  -TSH 3.904, no known hx thyroid disease     Hematologic:  -No obvious signs of bleeding  - AC (including SQH) held in setting of type A dissection, last dose eliquis 3/1/21    ID:  -Pt remains afebrile with no signs of infection  -Continue to monitor CBC  -Observe for SIRS/Sepsis Syndrome.    Prophylaxis:  -DVT prophylaxis held   -SCD's    Disposition:  -DNR/DNI  -Awaiting discharge to rehab vs. hospice  - patient will likely  prior to disposition    Goals of care:  DNR/DNI  Discussion with family today regarding surgical options, Discussion with Dr. Calle to reinforce that patient is not a surgical candidate for her Type A dissection and that symptom management with hospice care for other co-morbidities is most appropriate. Daughter requesting more options for placement as she is unhappy with current selection ratings. Plan for discharge home tomorrow. Appreciate Hospice continued support.

## 2021-03-12 NOTE — PROGRESS NOTE ADULT - PROBLEM SELECTOR PROBLEM 6
Counseling and coordination of care
Encounter for palliative care
Counseling and coordination of care
Encounter for palliative care

## 2021-03-12 NOTE — PROGRESS NOTE ADULT - PROBLEM SELECTOR PLAN 1
Patient now on bipap. would recommend changing Dilaudid from q4h to q2h PRN. Continue Dilaudid 1mg IV q2h PRN. If patient requires 4 or more Dilaudid pushes, would benefit from Dilaudid drip of 0.2mg/hr with Dilaudid 1mg q2h IV PRN.
Most likely from patients Type A aortic dissection. Would recommend Dilaudid 1mg q4h IV PRN.
Most likely from patients Type A aortic dissection. Would recommend Dilaudid 1mg q4h IV PRN for dyspnea. Please encourage patient to take medication as she forgets to ask for it. Patient had received 2 dose in 24 hours and stated that it helps her. Bowel regimen as patient on opioids.
Most likely from patients Type A aortic dissection. Would recommend Dilaudid 1mg q4h IV PRN for dyspnea. Please encourage patient to take medication as she forgets to ask for it.
Most likely from patients Type A aortic dissection. Would recommend Dilaudid 1mg q4h IV PRN for dyspnea. Please encourage patient to take medication as she forgets to ask for it. Patient had received 2 dose in 24 hours and stated that it helps her. Bowel regimen as patient on opioids.  If patient is not able to go to Inpatient level of hospice care, would recommend switching her medication to Dilaudid 4mg q4h PO PRN.
Patient now on high flow Nasal cannula. Patient required multiple PRNs of her Dilaudid 1mg IV q2h PRN at this time would benefit from Dilaudid drip of 0.2mg/hr with Dilaudid 1mg q2h IV PRN.
Continue Dilaudid drip of 0.2mg/hr with Dilaudid 1mg q2h IV PRN. Patient actively dying.

## 2021-03-12 NOTE — PROGRESS NOTE ADULT - PROBLEM SELECTOR PROBLEM 5
Encounter for palliative care
Aortic dissection
Encounter for palliative care
Aortic dissection
Encounter for palliative care

## 2021-03-12 NOTE — PROGRESS NOTE ADULT - PROBLEM SELECTOR PLAN 3
PPS 10%. Skin care PRN. Full assist with ADL's.
Patient with a history of aortic stenosis for which she had at [facility] on 05/26/2020. Continue care as per cardiology team.
PPS 10%. Skin care PRN. Full assist with ADL's.

## 2021-03-12 NOTE — PROGRESS NOTE ADULT - PROBLEM SELECTOR PROBLEM 1
Dyspnea, unspecified type

## 2021-03-12 NOTE — PROGRESS NOTE ADULT - PROBLEM SELECTOR PLAN 7
Patient last assessed: 3/11/2021  to manage: GOC/AD, symptoms, and support.  30 Minutes; Start: 0700am End: 0730am  , of non-face-to-face prolonged service provided that relates to (face-to-face) care that has or will occur and ongoing patient management, including one or more of the following:   - Reviewed records from other physicians or other health care professional services, including one or more of the following: other medical records and diagnostic / radiology study results
Patient last assessed: 3/10/2021  to manage: GOC/AD, symptoms, and support.  30 Minutes; Start: 0700am End: 0730am  , of non-face-to-face prolonged service provided that relates to (face-to-face) care that has or will occur and ongoing patient management, including one or more of the following:   - Reviewed records from other physicians or other health care professional services, including one or more of the following: other medical records and diagnostic / radiology study results

## 2021-03-12 NOTE — PROGRESS NOTE ADULT - PROBLEM SELECTOR PROBLEM 3
Aortic stenosis
Functional quadriplegia
Aortic stenosis
Aortic stenosis
Functional quadriplegia

## 2021-03-12 NOTE — PROGRESS NOTE ADULT - PROBLEM SELECTOR PLAN 2
PPS 60%. Skin care PRN. Assist with ADL's PRN.  Appreciate PT involvement
initiate Glycopyrrolate 0.2mg q6h IV PRN.
PPS 60%. Skin care PRN. Assist with ADL's PRN.  Appreciate PT involvement
PPS 30%. Skin care PRN. Assist with ADL's PRN.  Appreciate PT involvement
PPS 50%. Skin care PRN. Assist with ADL's PRN.  Appreciate PT involvement
PPS 60%. Skin care PRN. Assist with ADL's PRN.  Appreciate PT involvement
Continue Glycopyrrolate 0.2mg q6h IV PRN.

## 2021-03-12 NOTE — PROGRESS NOTE ADULT - PROBLEM SELECTOR PLAN 4
During patient TTE, patient found to have dissection flap in ascending aorta. CT showed  a type A aortic dissection from aortic root to right common carotid artery. Increased mid ascending aortic aneurysm 5.1 cm, previously 4.8 cm. No interventions being offered. Overall prognosis is poor. Patient would benefit from home hospice but family would like to go to Rehab first.
During patient TTE, patient found to have dissection flap in ascending aorta. CT showed  a type A aortic dissection from aortic root to right common carotid artery. Increased mid ascending aortic aneurysm 5.1 cm, previously 4.8 cm. No interventions being offered. Overall prognosis is poor. Patient would benefit from hospice.
During patient TTE, patient found to have dissection flap in ascending aorta. CT showed  a type A aortic dissection from aortic root to right common carotid artery. Increased mid ascending aortic aneurysm 5.1 cm, previously 4.8 cm. No interventions being offered. Overall prognosis is poor. Patient would benefit from hospice.
Patient with a history of aortic stenosis for which she had at [facility] on 05/26/2020. Continue care as per cardiology team.
During patient TTE, patient found to have dissection flap in ascending aorta. CT showed  a type A aortic dissection from aortic root to right common carotid artery. Increased mid ascending aortic aneurysm 5.1 cm, previously 4.8 cm. No interventions being offered. Overall prognosis is poor. Patient would benefit from hospice.
During patient TTE, patient found to have dissection flap in ascending aorta. CT showed  a type A aortic dissection from aortic root to right common carotid artery. Increased mid ascending aortic aneurysm 5.1 cm, previously 4.8 cm. No interventions being offered. Overall prognosis is poor. Patient would benefit from home hospice but family would like to go to Rehab first.
Patient with a history of aortic stenosis for which she had at [facility] on 05/26/2020. Continue care as per cardiology team.

## 2021-03-12 NOTE — PROGRESS NOTE ADULT - SUBJECTIVE AND OBJECTIVE BOX
GRACE KEY             MRN-[id number]    CC: Mitral valve    HPI:  This is a 75 year old female with a history of obesity, hypertension, hyperlipidemia, GERD, TIA with carotid artery disease s/p left carotid endarterectomy, CKD Stage III (baseline creatinine 1.45), ARMANDO (non-compliant with CPAP), atrial fibrillation (on Eliquis), known tuberculum meningioma, UTI (ESBL) requiring IV antibiotics in [facility] in July 2020, communicating hydrocephalus s/p right frontal  shunt on 8/5/2020 with recent CT showing a right-sided subacute subdural hematoma and chronic diastolic heart failure with severe aortic stenosis s/p [facility] TAVR on 05/26/2020 using Adelaida S3 (23 mm, commercial) now with significant mitral regurgitation who presents for follow up of her valvular heart disease.   Post-TAVR, the patient was discharged to rehab. Her recovery course was complicated by a UTI in rehab requiring IV antibiotics. In addition, she was readmitted to [facility] in July 2020 and found to have worsening hydrocephalus treated with a  shunt. In total, the patient spent four months in [facility] before returning home.  The patient's daughter states she has not felt well since her valve procedure in May last year. Over the past several months, her shortness of breath with exertion and fatigue have gotten significantly worse. Currently, the patient reports SOB when walking less than 5 feet. She has not been walking much at all due to her symptoms. She also has had significant LE edema and reports a 10-15 pound weight gain over the past six months. The patient had a recent ECHO that showed severe mitral regurgitation. The patient denies chest pain, dizziness, syncope and palpitations. She endorses shortness of breath at rest and orthopnea.  History taken from daughter.     (01 Mar 2021 14:52)    SUBJECTIVE: Patient in bed, appears very uncomfortable.     ROS:  DYSPNEA (Shanta): 5  NAUS/VOM: N	  SECRETIONS: Y	  AGITATION: N  Pain (Y/N):     N   -Provocation/Palliation: n/a   -Quality/Quantity: n/a   -Radiating: n/a   -Severity: n/a   -Timing/Frequency: n/a   -Impact on ADLs:    OTHER REVIEW OF SYSTEMS: unable to obtain   UNABLE TO OBTAIN  due to: cognitive impairment     PEx:  T(C): 36.4 (03-12-21 @ 05:33), Max: 36.9 (03-11-21 @ 22:00)  HR: 123 (03-12-21 @ 06:07) (120 - 164)  BP: 127/66 (03-12-21 @ 05:33) (107/72 - 133/87)  RR: 22 (03-12-21 @ 06:07) (12 - 22)  SpO2: 91% (03-12-21 @ 06:07) (91% - 99%)  Wt(kg): 115.5kg    GENERAL:  [ ]Alert  [ ]Oriented x   [ ]Lethargic due to sedation  [ ]Cachexia [ ]Verbal  [ x]Non-Verbal  Behavioral:   [ ] Anxiety  [ ] Delirium [ ] Agitation [x ] Other   HEENT:  [ ]Normal   [ x]Dry mouth   [ ]ET Tube  [ ]Oral lesions  PULMONARY:   [ ]Clear  [ ]Tachypnea  [ x]Audible excessive secretions   [ ]Rhonchi     [ ]Right [ ]Left [ ]Bilateral  [x ]Crackles        [ ]Right [ ]Left [ x]Bilateral  [ ]Wheezing     [ ]Right [ ]Left [ ]Bilateral  CARDIOVASCULAR:    [ ]Regular [ ]Irregular [x ]Tachy  [ ]Kevin [ ]Murmur [ ]Other  GASTROINTESTINAL:  [ ]xSoft  [ ]Distended   [ ]+BS  [x ]Non tender [ ]Tender  [ ]PEG [ ]OGT/NGT  [] flexiseal with output  Last BM:   GENITOURINARY:  [ ]Normal [x ] Incontinent   [ ]Oliguria/Anuria   [ ]Rodriguez  MUSCULOSKELETAL:   [ ]Normal   [ ]Weakness  [x ]Bed/Wheelchair bound [ ]Edema  NEUROLOGIC:   [ ]No focal deficits  [x ] Cognitive impairment  [ ] Dysphagia [ ]Dysarthria [ ] Paresis [  ]Other   SKIN:   [x ]Normal   [ ]Pressure ulcer(s)  [ ]Rash       ALLERGIES: No Known Allergies      OPIATE NAÏVE (Y/N): N    MEDICATIONS: REVIEWED  MEDICATIONS  (STANDING):  benzonatate 100 milliGRAM(s) Oral every 8 hours  diltiazem    Tablet 30 milliGRAM(s) Oral every 6 hours  ferrous    sulfate 325 milliGRAM(s) Oral daily  furosemide    Tablet 40 milliGRAM(s) Oral daily  HYDROmorphone Infusion 0.2 mG/Hr (1 mL/Hr) IV Continuous <Continuous>  lactated ringers. 1000 milliLiter(s) (20 mL/Hr) IV Continuous <Continuous>  latanoprost 0.005% Ophthalmic Solution 1 Drop(s) Both EYES at bedtime  levalbuterol Inhalation 0.63 milliGRAM(s) Inhalation every 6 hours  metoprolol tartrate 25 milliGRAM(s) Oral every 12 hours  pantoprazole    Tablet 40 milliGRAM(s) Oral before breakfast  polyethylene glycol 3350 17 Gram(s) Oral daily  senna 2 Tablet(s) Oral at bedtime  sodium chloride 0.9% lock flush 3 milliLiter(s) IV Push every 8 hours    MEDICATIONS  (PRN):  acetaminophen   Tablet .. 650 milliGRAM(s) Oral every 6 hours PRN Moderate pain (4 - 6)  glycopyrrolate Injectable 0.2 milliGRAM(s) IV Push every 6 hours PRN increased oropharyngeal secretions  HYDROmorphone  Injectable 1 milliGRAM(s) IV Push every 2 hours PRN Increased work of breathing or severe pain  ipratropium 17 MICROgram(s) HFA Inhaler 1 Puff(s) Inhalation four times a day PRN SOB/wheezing      LABS: REVIEWED    IMAGING: REVIEWED    ADVANCED DIRECTIVES:            DNR DNI            MOLST    DECISION MAKER:  Patient is not able to make decisions for herself   LEGAL SURROGATE: [place] # [id number]    PSYCHOSOCIAL-SPIRITUAL ASSESSMENT:       Reviewed       Care plan unchanged    GOALS OF CARE DISCUSSION       Palliative care info/counseling provided	           Documentation of GOC:  DNR/DNI  	      AGENCY CHOICE DISCUSSED:               Hospice    REFERRALS	        Palliative Med        Unit SW/Case Mgmt       PT/OT

## 2021-03-12 NOTE — PROGRESS NOTE ADULT - PROBLEM SELECTOR PLAN 5
Patient was made a DNR/DNI by primary team. Over the weekend the patient had more SOB and family now wants inpatient hospice. Patient to be evaluated by [facility], but family prefers [facility] as per documentation.
Patient was made a DNR/DNI by primary team. Per conversation with daughter yesterday, it was explained that her mother is not symptomatic enough to be accepted to [place] at [facility]. It was explained that she might be a better candidate for [facility] in [place] or [place], but she stated that the [place] is to [facility] and [facility] had bad reviews. The difference between Inpatient hospice and regular hospice was explained to her.
Patient was made a DNR/DNI by primary team. Plan is for patient to go to [facility], preferably [facility].
During patient TTE, patient found to have dissection flap in ascending aorta. CT showed  a type A aortic dissection from aortic root to right common carotid artery. Increased mid ascending aortic aneurysm 5.1 cm, previously 4.8 cm. No interventions being offered. Overall prognosis is poor. Patient is actively dying.
During patient TTE, patient found to have dissection flap in ascending aorta. CT showed  a type A aortic dissection from aortic root to right common carotid artery. Increased mid ascending aortic aneurysm 5.1 cm, previously 4.8 cm. No interventions being offered. Overall prognosis is poor. Patient is actively dying.
Patient was made a DNR/DNI by primary team. Met with daughter with Palliative  and patient and family support. Discussed home hospice and how that will look, but daughter stated that at this time she would like to the patient to go to rehab, [facility] choice. Emotional support was provided.
Patient was made a DNR/DNI by primary team. Patient no longer stable for transport at this time and most likely will die in the hospital. Will continue to assist with management of symptoms

## 2021-03-12 NOTE — DISCHARGE NOTE FOR THE EXPIRED PATIENT - HOSPITAL COURSE
75 year old female with history of obesity, HTN, HLD, GERD, TIA with carotid artery disease s/p L CEA, CKD Stage III (baseline Cr 1.45), ARMANDO non-compliant with CPAP, Afib on Eliquis, known tuberculum meningioma, UTI (ESBL) requiring DIANA and IV abx in 2020, communicating hydrocephalus s/p right frontal  shunt on 20 with recent CT showing right-sided subacute SDH, severe AS with chronic diastolic CHF, EF 60%, s/p Adelaida TAVR on 20 with residual severe MR initially evaluated for MitraClip and admitted to [facility] on 3/1/21 to complete pre-procedural evaluation.  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  After multidisciplinary discussion, patient deemed extreme risk for surgical/perioperative morbidity/mortality and ultimately made DNR/DNI after discussion with family.  Palliative care was consulted for comfort measures. On 3/12/21 pt  and was pronounced by Dr. Zapien at 10:15AM.

## 2021-03-12 NOTE — PROGRESS NOTE ADULT - REASON FOR ADMISSION
Mitral valve

## 2021-03-12 NOTE — PROGRESS NOTE ADULT - PROBLEM SELECTOR PROBLEM 4
Aortic dissection
Aortic dissection
Aortic stenosis
Aortic dissection
Aortic dissection
Aortic stenosis
Aortic dissection

## 2021-03-12 NOTE — PROGRESS NOTE ADULT - PROBLEM SELECTOR PROBLEM 2
Debility
Increased oropharyngeal secretions
Debility
Increased oropharyngeal secretions

## 2021-03-22 DIAGNOSIS — Z98.2 PRESENCE OF CEREBROSPINAL FLUID DRAINAGE DEVICE: ICD-10-CM

## 2021-03-22 DIAGNOSIS — G91.0 COMMUNICATING HYDROCEPHALUS: ICD-10-CM

## 2021-03-22 DIAGNOSIS — Z95.2 PRESENCE OF PROSTHETIC HEART VALVE: ICD-10-CM

## 2021-03-22 DIAGNOSIS — N18.30 CHRONIC KIDNEY DISEASE, STAGE 3 UNSPECIFIED: ICD-10-CM

## 2021-03-22 DIAGNOSIS — Z86.73 PERSONAL HISTORY OF TRANSIENT ISCHEMIC ATTACK (TIA), AND CEREBRAL INFARCTION WITHOUT RESIDUAL DEFICITS: ICD-10-CM

## 2021-03-22 DIAGNOSIS — R53.2 FUNCTIONAL QUADRIPLEGIA: ICD-10-CM

## 2021-03-22 DIAGNOSIS — I50.33 ACUTE ON CHRONIC DIASTOLIC (CONGESTIVE) HEART FAILURE: ICD-10-CM

## 2021-03-22 DIAGNOSIS — Z51.5 ENCOUNTER FOR PALLIATIVE CARE: ICD-10-CM

## 2021-03-22 DIAGNOSIS — I08.1 RHEUMATIC DISORDERS OF BOTH MITRAL AND TRICUSPID VALVES: ICD-10-CM

## 2021-03-22 DIAGNOSIS — R53.81 OTHER MALAISE: ICD-10-CM

## 2021-03-22 DIAGNOSIS — E78.5 HYPERLIPIDEMIA, UNSPECIFIED: ICD-10-CM

## 2021-03-22 DIAGNOSIS — D32.0 BENIGN NEOPLASM OF CEREBRAL MENINGES: ICD-10-CM

## 2021-03-22 DIAGNOSIS — I34.0 NONRHEUMATIC MITRAL (VALVE) INSUFFICIENCY: ICD-10-CM

## 2021-03-22 DIAGNOSIS — I71.01 DISSECTION OF THORACIC AORTA: ICD-10-CM

## 2021-03-22 DIAGNOSIS — G47.33 OBSTRUCTIVE SLEEP APNEA (ADULT) (PEDIATRIC): ICD-10-CM

## 2021-03-22 DIAGNOSIS — I13.0 HYPERTENSIVE HEART AND CHRONIC KIDNEY DISEASE WITH HEART FAILURE AND STAGE 1 THROUGH STAGE 4 CHRONIC KIDNEY DISEASE, OR UNSPECIFIED CHRONIC KIDNEY DISEASE: ICD-10-CM

## 2021-03-22 DIAGNOSIS — Z74.1 NEED FOR ASSISTANCE WITH PERSONAL CARE: ICD-10-CM

## 2021-03-22 DIAGNOSIS — E66.01 MORBID (SEVERE) OBESITY DUE TO EXCESS CALORIES: ICD-10-CM

## 2021-03-22 DIAGNOSIS — Z79.01 LONG TERM (CURRENT) USE OF ANTICOAGULANTS: ICD-10-CM

## 2021-03-22 DIAGNOSIS — I48.91 UNSPECIFIED ATRIAL FIBRILLATION: ICD-10-CM

## 2021-03-22 DIAGNOSIS — Z66 DO NOT RESUSCITATE: ICD-10-CM

## 2021-03-22 DIAGNOSIS — K21.9 GASTRO-ESOPHAGEAL REFLUX DISEASE WITHOUT ESOPHAGITIS: ICD-10-CM

## 2021-03-22 DIAGNOSIS — R29.810 FACIAL WEAKNESS: ICD-10-CM

## 2021-03-29 ENCOUNTER — APPOINTMENT (OUTPATIENT)
Dept: CARDIOTHORACIC SURGERY | Facility: CLINIC | Age: 76
End: 2021-03-29

## 2021-04-05 NOTE — PROGRESS NOTE ADULT - PROBLEM/PLAN-6
DISPLAY PLAN FREE TEXT
16
DISPLAY PLAN FREE TEXT

## 2021-04-22 ENCOUNTER — RX RENEWAL (OUTPATIENT)
Age: 76
End: 2021-04-22

## 2021-09-30 NOTE — PROGRESS NOTE ADULT - REASON FOR ADMISSION
Severe AS
Respiratory

## 2021-12-30 NOTE — REASON FOR VISIT
[FreeTextEntry1] : TODAY'S VISIT:\par Patient presents for follow-up visit with new brain CT to review. Patient saw her own ophthalmologist who recorded no change in vision. Patient presents today because official read on CT H with subdural hematoma without significant mass effect. Patient remains at neuro-cognitive baseline although reportedly tired and dizzy. CT unavailable for review, but radiology center contacted and CD forwarded. Otherwise, remains clinically stable. No seizures, vision changes. Ambulates around the house. \par \par PRIOR VISIT 10/14/2020:\par Patient returns to review new Head CT done at [facility] in [place], s/p  Shunt placement, Certas #4. Patient has kindly refused  services for Gerogian, daughter is present and providing translation. PatIEnt c/o continued blurry/hazy vision changes, declining hearing and continued headaches (Tylenol). She also notes worsening balance since  Shunt placement, she uses walker at home to aid in ambulation. Today she presents in w/c, daughter states she wears diapers and has occasional episodes of urge incontinence. Denies recent falls. She has not followed up with Neuro Ophtho as of yet, we will provide referral for Dr. Mauri Hernandez.\par Daughter states she had a recent heart valve placed.\par  PMH

## 2022-03-03 NOTE — OCCUPATIONAL THERAPY INITIAL EVALUATION ADULT - LIVES WITH, PROFILE
PAST SURGICAL HISTORY:  No significant past surgical history       
daughter in apartment, ~8 steps to enter

## 2022-03-10 NOTE — DISCHARGE NOTE NURSING/CASE MANAGEMENT/SOCIAL WORK - NURSING SECTION COMPLETE
Patient/Caregiver provided printed discharge information.
PAST MEDICAL HISTORY:  Asthma STABLE, ALLERGY AND URI TRIGGER.    Migraines

## 2022-04-11 NOTE — DISCHARGE NOTE PROVIDER - NSD[facility]_MEDRECSTATUS_GEN_ALL_CORE
Addended by: CAMRON UP on: 4/11/2022 03:43 PM     Modules accepted: Orders     Admission Reconciliation is Not Complete  Discharge Reconciliation is Not Complete Admission Reconciliation is Completed  Discharge Reconciliation is Completed

## 2022-11-10 NOTE — PROGRESS NOTE ADULT - PROBLEM SELECTOR PLAN 6
Patient last assessed: 3/3/2021  to manage: GOC/AD, symptoms, and support.  30 Minutes; Start: 0830am End: 0900am  , of non-face-to-face prolonged service provided that relates to (face-to-face) care that has or will occur and ongoing patient management, including one or more of the following:   - Reviewed records from other physicians or other health care professional services, including one or more of the following: other medical records and diagnostic / radiology study results GENERAL: No fever or chills  EYES: no change in vision   HEENT: no trouble swallowing or speaking   CARDIAC: no chest pain   PULMONARY: no cough or SOB  GI:  No abdominal pain  : No changes in urination   SKIN: no rashes   NEURO: no headache   MSK: No joint pain     All other ROS negative unless otherwise specified in HPI.

## 2023-03-28 ENCOUNTER — NON-APPOINTMENT (OUTPATIENT)
Age: 78
End: 2023-03-28

## 2024-08-07 NOTE — ED PROVIDER NOTE - CPE EDP HEME LYMPH NORM
Detail Level: Detailed
Add 81710 Cpt? (Important Note: In 2017 The Use Of 85773 Is Being Tracked By Cms To Determine Future Global Period Reimbursement For Global Periods): no
Wound Evaluated By: JESSICA
normal...

## 2025-03-08 NOTE — PROGRESS NOTE ADULT - PROBLEM SELECTOR PLAN 5
Walk in Hx of CAD s/p STEMI. On home ASA 81mg qd  - per stroke neurology, OK to restart ASA.  - Hold ASA for LP, -- Completed.  (Will hold until FOTB is completed). 99
